# Patient Record
Sex: FEMALE | Race: WHITE | NOT HISPANIC OR LATINO | Employment: FULL TIME | URBAN - METROPOLITAN AREA
[De-identification: names, ages, dates, MRNs, and addresses within clinical notes are randomized per-mention and may not be internally consistent; named-entity substitution may affect disease eponyms.]

---

## 2018-01-03 ENCOUNTER — OFFICE VISIT CONVERTED (OUTPATIENT)
Dept: SURGERY | Facility: CLINIC | Age: 43
End: 2018-01-03
Attending: PHYSICIAN ASSISTANT

## 2018-01-17 ENCOUNTER — OFFICE VISIT CONVERTED (OUTPATIENT)
Dept: SURGERY | Facility: CLINIC | Age: 43
End: 2018-01-17
Attending: PHYSICIAN ASSISTANT

## 2018-03-20 ENCOUNTER — OFFICE VISIT CONVERTED (OUTPATIENT)
Dept: FAMILY MEDICINE CLINIC | Age: 43
End: 2018-03-20
Attending: NURSE PRACTITIONER

## 2018-05-11 ENCOUNTER — OFFICE VISIT CONVERTED (OUTPATIENT)
Dept: CARDIOLOGY | Facility: CLINIC | Age: 43
End: 2018-05-11
Attending: INTERNAL MEDICINE

## 2018-05-11 ENCOUNTER — CONVERSION ENCOUNTER (OUTPATIENT)
Dept: OTHER | Facility: HOSPITAL | Age: 43
End: 2018-05-11

## 2018-07-16 ENCOUNTER — CONVERSION ENCOUNTER (OUTPATIENT)
Dept: CARDIOLOGY | Facility: CLINIC | Age: 43
End: 2018-07-16
Attending: INTERNAL MEDICINE

## 2018-11-06 ENCOUNTER — OFFICE VISIT CONVERTED (OUTPATIENT)
Dept: FAMILY MEDICINE CLINIC | Age: 43
End: 2018-11-06
Attending: NURSE PRACTITIONER

## 2019-02-06 ENCOUNTER — OFFICE VISIT CONVERTED (OUTPATIENT)
Dept: FAMILY MEDICINE CLINIC | Age: 44
End: 2019-02-06
Attending: NURSE PRACTITIONER

## 2019-07-26 ENCOUNTER — OFFICE VISIT CONVERTED (OUTPATIENT)
Dept: FAMILY MEDICINE CLINIC | Age: 44
End: 2019-07-26
Attending: NURSE PRACTITIONER

## 2019-07-26 ENCOUNTER — HOSPITAL ENCOUNTER (OUTPATIENT)
Dept: OTHER | Facility: HOSPITAL | Age: 44
Discharge: HOME | End: 2019-07-26
Attending: NURSE PRACTITIONER

## 2019-07-26 LAB
25(OH)D3 SERPL-MCNC: 58.7 NG/ML (ref 30–100)
ALBUMIN SERPL-MCNC: 4.1 G/DL (ref 3.5–5)
ALBUMIN/GLOB SERPL: 1.2 {RATIO} (ref 1.4–2.6)
ALP SERPL-CCNC: 64 U/L (ref 42–98)
ALT SERPL-CCNC: 15 U/L (ref 10–40)
ANION GAP SERPL CALC-SCNC: 21 MMOL/L (ref 8–19)
AST SERPL-CCNC: 16 U/L (ref 15–50)
BILIRUB SERPL-MCNC: 0.24 MG/DL (ref 0.2–1.3)
BUN SERPL-MCNC: 10 MG/DL (ref 5–25)
BUN/CREAT SERPL: 15 {RATIO} (ref 6–20)
CALCIUM SERPL-MCNC: 9.6 MG/DL (ref 8.7–10.4)
CHLORIDE SERPL-SCNC: 100 MMOL/L (ref 99–111)
CHOLEST SERPL-MCNC: 186 MG/DL (ref 107–200)
CHOLEST/HDLC SERPL: 3.6 {RATIO} (ref 3–6)
CONV CO2: 22 MMOL/L (ref 22–32)
CONV TOTAL PROTEIN: 7.4 G/DL (ref 6.3–8.2)
CREAT UR-MCNC: 0.66 MG/DL (ref 0.5–0.9)
ERYTHROCYTE [DISTWIDTH] IN BLOOD BY AUTOMATED COUNT: 15.7 % (ref 11.5–14.5)
GFR SERPLBLD BASED ON 1.73 SQ M-ARVRAT: >60 ML/MIN/{1.73_M2}
GLOBULIN UR ELPH-MCNC: 3.3 G/DL (ref 2–3.5)
GLUCOSE SERPL-MCNC: 102 MG/DL (ref 65–99)
HBA1C MFR BLD: 12.3 G/DL (ref 12–16)
HCT VFR BLD AUTO: 37.2 % (ref 37–47)
HDLC SERPL-MCNC: 51 MG/DL (ref 40–60)
LDLC SERPL CALC-MCNC: 112 MG/DL (ref 70–100)
MCH RBC QN AUTO: 26.9 PG (ref 27–31)
MCHC RBC AUTO-ENTMCNC: 33.1 G/DL (ref 33–37)
MCV RBC AUTO: 81.4 FL (ref 81–99)
OSMOLALITY SERPL CALC.SUM OF ELEC: 285 MOSM/KG (ref 273–304)
PLATELET # BLD AUTO: 437 10*3/UL (ref 130–400)
PMV BLD AUTO: 9.4 FL (ref 7.4–10.4)
POTASSIUM SERPL-SCNC: 4.5 MMOL/L (ref 3.5–5.3)
RBC # BLD AUTO: 4.57 10*6/UL (ref 4.2–5.4)
SODIUM SERPL-SCNC: 138 MMOL/L (ref 135–147)
TRIGL SERPL-MCNC: 116 MG/DL (ref 40–150)
TSH SERPL-ACNC: 1.41 M[IU]/L (ref 0.27–4.2)
VIT B12 SERPL-MCNC: 508 PG/ML (ref 211–911)
VLDLC SERPL-MCNC: 23 MG/DL (ref 5–37)
WBC # BLD AUTO: 10.13 10*3/UL (ref 4.8–10.8)

## 2019-08-20 ENCOUNTER — HOSPITAL ENCOUNTER (OUTPATIENT)
Dept: OTHER | Facility: HOSPITAL | Age: 44
Discharge: HOME | End: 2019-08-20
Attending: NURSE PRACTITIONER

## 2019-12-20 ENCOUNTER — OFFICE VISIT CONVERTED (OUTPATIENT)
Dept: FAMILY MEDICINE CLINIC | Age: 44
End: 2019-12-20
Attending: FAMILY MEDICINE

## 2020-01-30 ENCOUNTER — ANESTHESIA EVENT (OUTPATIENT)
Dept: PERIOP | Facility: HOSPITAL | Age: 45
End: 2020-01-30

## 2020-01-30 ENCOUNTER — HOSPITAL ENCOUNTER (OUTPATIENT)
Facility: HOSPITAL | Age: 45
Discharge: HOME HEALTH | End: 2020-02-01
Attending: ORTHOPAEDIC SURGERY | Admitting: ORTHOPAEDIC SURGERY

## 2020-01-30 ENCOUNTER — ANESTHESIA (OUTPATIENT)
Dept: PERIOP | Facility: HOSPITAL | Age: 45
End: 2020-01-30

## 2020-01-30 DIAGNOSIS — Z98.890 S/P LEFT KNEE ARTHROSCOPY: Primary | ICD-10-CM

## 2020-01-30 DIAGNOSIS — M00.9 PYOGENIC ARTHRITIS OF RIGHT KNEE JOINT, DUE TO UNSPECIFIED ORGANISM (HCC): ICD-10-CM

## 2020-01-30 DIAGNOSIS — M00.9 SEPTIC ARTHRITIS OF KNEE, RIGHT (HCC): ICD-10-CM

## 2020-01-30 LAB
ANION GAP SERPL CALCULATED.3IONS-SCNC: 16.4 MMOL/L (ref 5–15)
B-HCG UR QL: NEGATIVE
BUN BLD-MCNC: 7 MG/DL (ref 6–20)
BUN/CREAT SERPL: 11.5 (ref 7–25)
CALCIUM SPEC-SCNC: 9.5 MG/DL (ref 8.6–10.5)
CHLORIDE SERPL-SCNC: 98 MMOL/L (ref 98–107)
CO2 SERPL-SCNC: 23.6 MMOL/L (ref 22–29)
CREAT BLD-MCNC: 0.61 MG/DL (ref 0.57–1)
DEPRECATED RDW RBC AUTO: 41.7 FL (ref 37–54)
ERYTHROCYTE [DISTWIDTH] IN BLOOD BY AUTOMATED COUNT: 14.5 % (ref 12.3–15.4)
GFR SERPL CREATININE-BSD FRML MDRD: 106 ML/MIN/1.73
GLUCOSE BLD-MCNC: 104 MG/DL (ref 65–99)
HCT VFR BLD AUTO: 33.6 % (ref 34–46.6)
HGB BLD-MCNC: 11.1 G/DL (ref 12–15.9)
INTERNAL NEGATIVE CONTROL: NEGATIVE
INTERNAL POSITIVE CONTROL: POSITIVE
Lab: NORMAL
MCH RBC QN AUTO: 26.7 PG (ref 26.6–33)
MCHC RBC AUTO-ENTMCNC: 33 G/DL (ref 31.5–35.7)
MCV RBC AUTO: 81 FL (ref 79–97)
PLATELET # BLD AUTO: 600 10*3/MM3 (ref 140–450)
PMV BLD AUTO: 9.3 FL (ref 6–12)
POTASSIUM BLD-SCNC: 3.9 MMOL/L (ref 3.5–5.2)
RBC # BLD AUTO: 4.15 10*6/MM3 (ref 3.77–5.28)
SODIUM BLD-SCNC: 138 MMOL/L (ref 136–145)
WBC NRBC COR # BLD: 10.19 10*3/MM3 (ref 3.4–10.8)

## 2020-01-30 PROCEDURE — G0378 HOSPITAL OBSERVATION PER HR: HCPCS

## 2020-01-30 PROCEDURE — 87205 SMEAR GRAM STAIN: CPT | Performed by: ORTHOPAEDIC SURGERY

## 2020-01-30 PROCEDURE — 81025 URINE PREGNANCY TEST: CPT | Performed by: ORTHOPAEDIC SURGERY

## 2020-01-30 PROCEDURE — 25010000002 DEXAMETHASONE PER 1 MG: Performed by: NURSE ANESTHETIST, CERTIFIED REGISTERED

## 2020-01-30 PROCEDURE — 80048 BASIC METABOLIC PNL TOTAL CA: CPT | Performed by: ORTHOPAEDIC SURGERY

## 2020-01-30 PROCEDURE — 25010000002 HYDROMORPHONE PER 4 MG: Performed by: NURSE ANESTHETIST, CERTIFIED REGISTERED

## 2020-01-30 PROCEDURE — 25010000002 ONDANSETRON PER 1 MG: Performed by: NURSE ANESTHETIST, CERTIFIED REGISTERED

## 2020-01-30 PROCEDURE — 93010 ELECTROCARDIOGRAM REPORT: CPT | Performed by: INTERNAL MEDICINE

## 2020-01-30 PROCEDURE — 25010000002 FENTANYL CITRATE (PF) 100 MCG/2ML SOLUTION: Performed by: ANESTHESIOLOGY

## 2020-01-30 PROCEDURE — 25010000002 KETOROLAC TROMETHAMINE PER 15 MG: Performed by: NURSE ANESTHETIST, CERTIFIED REGISTERED

## 2020-01-30 PROCEDURE — 85027 COMPLETE CBC AUTOMATED: CPT | Performed by: ORTHOPAEDIC SURGERY

## 2020-01-30 PROCEDURE — 25010000002 PROPOFOL 10 MG/ML EMULSION: Performed by: NURSE ANESTHETIST, CERTIFIED REGISTERED

## 2020-01-30 PROCEDURE — 87070 CULTURE OTHR SPECIMN AEROBIC: CPT | Performed by: ORTHOPAEDIC SURGERY

## 2020-01-30 PROCEDURE — 25010000002 VANCOMYCIN 1 G RECONSTITUTED SOLUTION: Performed by: NURSE ANESTHETIST, CERTIFIED REGISTERED

## 2020-01-30 PROCEDURE — 93005 ELECTROCARDIOGRAM TRACING: CPT | Performed by: ORTHOPAEDIC SURGERY

## 2020-01-30 PROCEDURE — 87075 CULTR BACTERIA EXCEPT BLOOD: CPT | Performed by: ORTHOPAEDIC SURGERY

## 2020-01-30 PROCEDURE — 25010000002 FENTANYL CITRATE (PF) 100 MCG/2ML SOLUTION: Performed by: NURSE ANESTHETIST, CERTIFIED REGISTERED

## 2020-01-30 RX ORDER — BUPIVACAINE HYDROCHLORIDE 5 MG/ML
INJECTION, SOLUTION EPIDURAL; INTRACAUDAL AS NEEDED
Status: DISCONTINUED | OUTPATIENT
Start: 2020-01-30 | End: 2020-01-30 | Stop reason: HOSPADM

## 2020-01-30 RX ORDER — ASPIRIN 81 MG/1
81 TABLET ORAL DAILY
COMMUNITY
End: 2020-02-01 | Stop reason: HOSPADM

## 2020-01-30 RX ORDER — KETOROLAC TROMETHAMINE 30 MG/ML
30 INJECTION, SOLUTION INTRAMUSCULAR; INTRAVENOUS EVERY 6 HOURS PRN
Status: COMPLETED | OUTPATIENT
Start: 2020-01-30 | End: 2020-02-01

## 2020-01-30 RX ORDER — ESCITALOPRAM OXALATE 20 MG/1
30 TABLET ORAL DAILY
COMMUNITY
End: 2021-12-16 | Stop reason: SDUPTHER

## 2020-01-30 RX ORDER — MIDAZOLAM HYDROCHLORIDE 1 MG/ML
1 INJECTION INTRAMUSCULAR; INTRAVENOUS
Status: DISCONTINUED | OUTPATIENT
Start: 2020-01-30 | End: 2020-01-30 | Stop reason: HOSPADM

## 2020-01-30 RX ORDER — FLUMAZENIL 0.1 MG/ML
0.2 INJECTION INTRAVENOUS AS NEEDED
Status: DISCONTINUED | OUTPATIENT
Start: 2020-01-30 | End: 2020-01-30 | Stop reason: HOSPADM

## 2020-01-30 RX ORDER — ONDANSETRON 2 MG/ML
4 INJECTION INTRAMUSCULAR; INTRAVENOUS EVERY 6 HOURS PRN
Status: DISCONTINUED | OUTPATIENT
Start: 2020-01-30 | End: 2020-02-01 | Stop reason: HOSPADM

## 2020-01-30 RX ORDER — ONDANSETRON 2 MG/ML
INJECTION INTRAMUSCULAR; INTRAVENOUS AS NEEDED
Status: DISCONTINUED | OUTPATIENT
Start: 2020-01-30 | End: 2020-01-30 | Stop reason: SURG

## 2020-01-30 RX ORDER — SENNA AND DOCUSATE SODIUM 50; 8.6 MG/1; MG/1
1 TABLET, FILM COATED ORAL 2 TIMES DAILY
Status: DISCONTINUED | OUTPATIENT
Start: 2020-01-30 | End: 2020-02-01 | Stop reason: HOSPADM

## 2020-01-30 RX ORDER — DEXAMETHASONE SODIUM PHOSPHATE 10 MG/ML
INJECTION INTRAMUSCULAR; INTRAVENOUS AS NEEDED
Status: DISCONTINUED | OUTPATIENT
Start: 2020-01-30 | End: 2020-01-30 | Stop reason: SURG

## 2020-01-30 RX ORDER — OXYCODONE AND ACETAMINOPHEN 7.5; 325 MG/1; MG/1
1 TABLET ORAL ONCE AS NEEDED
Status: COMPLETED | OUTPATIENT
Start: 2020-01-30 | End: 2020-01-30

## 2020-01-30 RX ORDER — NALOXONE HCL 0.4 MG/ML
0.2 VIAL (ML) INJECTION AS NEEDED
Status: DISCONTINUED | OUTPATIENT
Start: 2020-01-30 | End: 2020-01-30 | Stop reason: HOSPADM

## 2020-01-30 RX ORDER — PROMETHAZINE HYDROCHLORIDE 25 MG/ML
12.5 INJECTION, SOLUTION INTRAMUSCULAR; INTRAVENOUS EVERY 6 HOURS PRN
Status: DISCONTINUED | OUTPATIENT
Start: 2020-01-30 | End: 2020-02-01 | Stop reason: HOSPADM

## 2020-01-30 RX ORDER — IBUPROFEN 800 MG/1
800 TABLET ORAL EVERY 6 HOURS PRN
Status: DISCONTINUED | OUTPATIENT
Start: 2020-01-30 | End: 2020-02-01 | Stop reason: HOSPADM

## 2020-01-30 RX ORDER — PROMETHAZINE HYDROCHLORIDE 25 MG/ML
6.25 INJECTION, SOLUTION INTRAMUSCULAR; INTRAVENOUS
Status: DISCONTINUED | OUTPATIENT
Start: 2020-01-30 | End: 2020-01-30 | Stop reason: HOSPADM

## 2020-01-30 RX ORDER — HYDROMORPHONE HYDROCHLORIDE 1 MG/ML
0.5 INJECTION, SOLUTION INTRAMUSCULAR; INTRAVENOUS; SUBCUTANEOUS
Status: DISCONTINUED | OUTPATIENT
Start: 2020-01-30 | End: 2020-02-01 | Stop reason: HOSPADM

## 2020-01-30 RX ORDER — NALOXONE HCL 0.4 MG/ML
0.1 VIAL (ML) INJECTION
Status: DISCONTINUED | OUTPATIENT
Start: 2020-01-30 | End: 2020-02-01 | Stop reason: HOSPADM

## 2020-01-30 RX ORDER — SODIUM CHLORIDE, SODIUM LACTATE, POTASSIUM CHLORIDE, CALCIUM CHLORIDE 600; 310; 30; 20 MG/100ML; MG/100ML; MG/100ML; MG/100ML
9 INJECTION, SOLUTION INTRAVENOUS CONTINUOUS
Status: DISCONTINUED | OUTPATIENT
Start: 2020-01-30 | End: 2020-02-01 | Stop reason: HOSPADM

## 2020-01-30 RX ORDER — PROMETHAZINE HYDROCHLORIDE 25 MG/1
25 SUPPOSITORY RECTAL ONCE AS NEEDED
Status: DISCONTINUED | OUTPATIENT
Start: 2020-01-30 | End: 2020-01-30 | Stop reason: HOSPADM

## 2020-01-30 RX ORDER — EPHEDRINE SULFATE 50 MG/ML
5 INJECTION, SOLUTION INTRAVENOUS ONCE AS NEEDED
Status: DISCONTINUED | OUTPATIENT
Start: 2020-01-30 | End: 2020-01-30 | Stop reason: HOSPADM

## 2020-01-30 RX ORDER — DIAZEPAM 5 MG/1
5 TABLET ORAL EVERY 6 HOURS PRN
Status: DISCONTINUED | OUTPATIENT
Start: 2020-01-30 | End: 2020-02-01 | Stop reason: HOSPADM

## 2020-01-30 RX ORDER — SCOLOPAMINE TRANSDERMAL SYSTEM 1 MG/1
1 PATCH, EXTENDED RELEASE TRANSDERMAL
Status: DISCONTINUED | OUTPATIENT
Start: 2020-01-30 | End: 2020-02-01 | Stop reason: HOSPADM

## 2020-01-30 RX ORDER — BUPROPION HYDROCHLORIDE 300 MG/1
150 TABLET ORAL DAILY
COMMUNITY
End: 2021-07-01 | Stop reason: SDUPTHER

## 2020-01-30 RX ORDER — OXYCODONE AND ACETAMINOPHEN 7.5; 325 MG/1; MG/1
1 TABLET ORAL EVERY 4 HOURS PRN
Status: DISCONTINUED | OUTPATIENT
Start: 2020-01-30 | End: 2020-02-01 | Stop reason: HOSPADM

## 2020-01-30 RX ORDER — ALBUTEROL SULFATE 2.5 MG/3ML
2.5 SOLUTION RESPIRATORY (INHALATION) ONCE AS NEEDED
Status: DISCONTINUED | OUTPATIENT
Start: 2020-01-30 | End: 2020-01-30 | Stop reason: HOSPADM

## 2020-01-30 RX ORDER — MIDAZOLAM HYDROCHLORIDE 1 MG/ML
2 INJECTION INTRAMUSCULAR; INTRAVENOUS
Status: DISCONTINUED | OUTPATIENT
Start: 2020-01-30 | End: 2020-01-30 | Stop reason: HOSPADM

## 2020-01-30 RX ORDER — FAMOTIDINE 10 MG/ML
20 INJECTION, SOLUTION INTRAVENOUS ONCE
Status: COMPLETED | OUTPATIENT
Start: 2020-01-30 | End: 2020-01-30

## 2020-01-30 RX ORDER — LISINOPRIL 20 MG/1
20 TABLET ORAL DAILY
COMMUNITY
End: 2021-07-01

## 2020-01-30 RX ORDER — IBUPROFEN 200 MG
800 TABLET ORAL EVERY 6 HOURS PRN
COMMUNITY
End: 2021-09-22 | Stop reason: SDUPTHER

## 2020-01-30 RX ORDER — HYDROMORPHONE HYDROCHLORIDE 1 MG/ML
0.5 INJECTION, SOLUTION INTRAMUSCULAR; INTRAVENOUS; SUBCUTANEOUS
Status: DISCONTINUED | OUTPATIENT
Start: 2020-01-30 | End: 2020-01-30 | Stop reason: HOSPADM

## 2020-01-30 RX ORDER — BUPROPION HYDROCHLORIDE 150 MG/1
150 TABLET ORAL DAILY
Status: DISCONTINUED | OUTPATIENT
Start: 2020-01-30 | End: 2020-02-01 | Stop reason: HOSPADM

## 2020-01-30 RX ORDER — MONTELUKAST SODIUM 10 MG/1
10 TABLET ORAL DAILY
Status: DISCONTINUED | OUTPATIENT
Start: 2020-01-30 | End: 2020-02-01 | Stop reason: HOSPADM

## 2020-01-30 RX ORDER — LIDOCAINE HYDROCHLORIDE 20 MG/ML
INJECTION, SOLUTION INFILTRATION; PERINEURAL AS NEEDED
Status: DISCONTINUED | OUTPATIENT
Start: 2020-01-30 | End: 2020-01-30 | Stop reason: SURG

## 2020-01-30 RX ORDER — ALBUTEROL SULFATE 2.5 MG/3ML
2.5 SOLUTION RESPIRATORY (INHALATION) EVERY 4 HOURS PRN
Status: DISCONTINUED | OUTPATIENT
Start: 2020-01-30 | End: 2020-02-01 | Stop reason: HOSPADM

## 2020-01-30 RX ORDER — OXYCODONE AND ACETAMINOPHEN 7.5; 325 MG/1; MG/1
2 TABLET ORAL EVERY 4 HOURS PRN
Status: DISCONTINUED | OUTPATIENT
Start: 2020-01-30 | End: 2020-02-01 | Stop reason: HOSPADM

## 2020-01-30 RX ORDER — KETOROLAC TROMETHAMINE 30 MG/ML
INJECTION, SOLUTION INTRAMUSCULAR; INTRAVENOUS AS NEEDED
Status: DISCONTINUED | OUTPATIENT
Start: 2020-01-30 | End: 2020-01-30 | Stop reason: SURG

## 2020-01-30 RX ORDER — DIPHENHYDRAMINE HCL 25 MG
25 CAPSULE ORAL
Status: DISCONTINUED | OUTPATIENT
Start: 2020-01-30 | End: 2020-01-30 | Stop reason: HOSPADM

## 2020-01-30 RX ORDER — HYDRALAZINE HYDROCHLORIDE 20 MG/ML
5 INJECTION INTRAMUSCULAR; INTRAVENOUS
Status: DISCONTINUED | OUTPATIENT
Start: 2020-01-30 | End: 2020-01-30 | Stop reason: HOSPADM

## 2020-01-30 RX ORDER — ONDANSETRON 4 MG/1
4 TABLET, FILM COATED ORAL EVERY 6 HOURS PRN
Status: DISCONTINUED | OUTPATIENT
Start: 2020-01-30 | End: 2020-02-01 | Stop reason: HOSPADM

## 2020-01-30 RX ORDER — PROMETHAZINE HYDROCHLORIDE 25 MG/1
25 TABLET ORAL ONCE AS NEEDED
Status: DISCONTINUED | OUTPATIENT
Start: 2020-01-30 | End: 2020-01-30 | Stop reason: HOSPADM

## 2020-01-30 RX ORDER — LISINOPRIL 20 MG/1
20 TABLET ORAL DAILY
Status: DISCONTINUED | OUTPATIENT
Start: 2020-01-30 | End: 2020-02-01 | Stop reason: HOSPADM

## 2020-01-30 RX ORDER — PROPOFOL 10 MG/ML
VIAL (ML) INTRAVENOUS AS NEEDED
Status: DISCONTINUED | OUTPATIENT
Start: 2020-01-30 | End: 2020-01-30 | Stop reason: SURG

## 2020-01-30 RX ORDER — HYDROCODONE BITARTRATE AND ACETAMINOPHEN 7.5; 325 MG/1; MG/1
1 TABLET ORAL ONCE AS NEEDED
Status: DISCONTINUED | OUTPATIENT
Start: 2020-01-30 | End: 2020-01-30 | Stop reason: HOSPADM

## 2020-01-30 RX ORDER — FENTANYL CITRATE 50 UG/ML
50 INJECTION, SOLUTION INTRAMUSCULAR; INTRAVENOUS
Status: DISCONTINUED | OUTPATIENT
Start: 2020-01-30 | End: 2020-01-30 | Stop reason: HOSPADM

## 2020-01-30 RX ORDER — ONDANSETRON 2 MG/ML
4 INJECTION INTRAMUSCULAR; INTRAVENOUS ONCE AS NEEDED
Status: DISCONTINUED | OUTPATIENT
Start: 2020-01-30 | End: 2020-01-30 | Stop reason: HOSPADM

## 2020-01-30 RX ORDER — LIDOCAINE HYDROCHLORIDE 10 MG/ML
0.5 INJECTION, SOLUTION EPIDURAL; INFILTRATION; INTRACAUDAL; PERINEURAL ONCE AS NEEDED
Status: DISCONTINUED | OUTPATIENT
Start: 2020-01-30 | End: 2020-01-30 | Stop reason: HOSPADM

## 2020-01-30 RX ORDER — DIPHENHYDRAMINE HYDROCHLORIDE 50 MG/ML
12.5 INJECTION INTRAMUSCULAR; INTRAVENOUS
Status: DISCONTINUED | OUTPATIENT
Start: 2020-01-30 | End: 2020-01-30 | Stop reason: HOSPADM

## 2020-01-30 RX ORDER — ALBUTEROL SULFATE 90 UG/1
2 AEROSOL, METERED RESPIRATORY (INHALATION)
COMMUNITY
End: 2022-06-17 | Stop reason: SDUPTHER

## 2020-01-30 RX ORDER — SODIUM CHLORIDE 0.9 % (FLUSH) 0.9 %
3 SYRINGE (ML) INJECTION EVERY 12 HOURS SCHEDULED
Status: DISCONTINUED | OUTPATIENT
Start: 2020-01-30 | End: 2020-01-30 | Stop reason: HOSPADM

## 2020-01-30 RX ORDER — ACETAMINOPHEN 325 MG/1
650 TABLET ORAL ONCE AS NEEDED
Status: DISCONTINUED | OUTPATIENT
Start: 2020-01-30 | End: 2020-01-30 | Stop reason: HOSPADM

## 2020-01-30 RX ORDER — SODIUM CHLORIDE, SODIUM LACTATE, POTASSIUM CHLORIDE, CALCIUM CHLORIDE 600; 310; 30; 20 MG/100ML; MG/100ML; MG/100ML; MG/100ML
75 INJECTION, SOLUTION INTRAVENOUS CONTINUOUS
Status: ACTIVE | OUTPATIENT
Start: 2020-01-30 | End: 2020-01-31

## 2020-01-30 RX ORDER — ASPIRIN 81 MG/1
81 TABLET ORAL DAILY
Status: DISCONTINUED | OUTPATIENT
Start: 2020-01-30 | End: 2020-02-01 | Stop reason: HOSPADM

## 2020-01-30 RX ORDER — SODIUM CHLORIDE, SODIUM LACTATE, POTASSIUM CHLORIDE, AND CALCIUM CHLORIDE .6; .31; .03; .02 G/100ML; G/100ML; G/100ML; G/100ML
IRRIGANT IRRIGATION AS NEEDED
Status: DISCONTINUED | OUTPATIENT
Start: 2020-01-30 | End: 2020-01-30 | Stop reason: HOSPADM

## 2020-01-30 RX ORDER — PROMETHAZINE HYDROCHLORIDE 25 MG/ML
12.5 INJECTION, SOLUTION INTRAMUSCULAR; INTRAVENOUS ONCE AS NEEDED
Status: DISCONTINUED | OUTPATIENT
Start: 2020-01-30 | End: 2020-01-30 | Stop reason: HOSPADM

## 2020-01-30 RX ORDER — OXYCODONE AND ACETAMINOPHEN 7.5; 325 MG/1; MG/1
1 TABLET ORAL
Status: ON HOLD | COMMUNITY
End: 2020-02-01 | Stop reason: SDUPTHER

## 2020-01-30 RX ORDER — SODIUM CHLORIDE 0.9 % (FLUSH) 0.9 %
3-10 SYRINGE (ML) INJECTION AS NEEDED
Status: DISCONTINUED | OUTPATIENT
Start: 2020-01-30 | End: 2020-01-30 | Stop reason: HOSPADM

## 2020-01-30 RX ORDER — GLYCOPYRROLATE 0.2 MG/ML
INJECTION INTRAMUSCULAR; INTRAVENOUS AS NEEDED
Status: DISCONTINUED | OUTPATIENT
Start: 2020-01-30 | End: 2020-01-30 | Stop reason: SURG

## 2020-01-30 RX ORDER — VANCOMYCIN HYDROCHLORIDE 1 G/20ML
INJECTION, POWDER, LYOPHILIZED, FOR SOLUTION INTRAVENOUS AS NEEDED
Status: DISCONTINUED | OUTPATIENT
Start: 2020-01-30 | End: 2020-01-30 | Stop reason: SURG

## 2020-01-30 RX ORDER — MONTELUKAST SODIUM 10 MG/1
10 TABLET ORAL DAILY
COMMUNITY
End: 2021-12-16 | Stop reason: SDUPTHER

## 2020-01-30 RX ADMIN — FENTANYL CITRATE 50 MCG: 50 INJECTION INTRAMUSCULAR; INTRAVENOUS at 13:57

## 2020-01-30 RX ADMIN — VANCOMYCIN HYDROCHLORIDE 2 G: 1 INJECTION, POWDER, LYOPHILIZED, FOR SOLUTION INTRAVENOUS at 15:22

## 2020-01-30 RX ADMIN — LISINOPRIL 20 MG: 20 TABLET ORAL at 20:06

## 2020-01-30 RX ADMIN — HYDROMORPHONE HYDROCHLORIDE 0.5 MG: 1 INJECTION, SOLUTION INTRAMUSCULAR; INTRAVENOUS; SUBCUTANEOUS at 17:45

## 2020-01-30 RX ADMIN — FENTANYL CITRATE 50 MCG: 50 INJECTION INTRAMUSCULAR; INTRAVENOUS at 16:30

## 2020-01-30 RX ADMIN — OXYCODONE HYDROCHLORIDE AND ACETAMINOPHEN 1 TABLET: 7.5; 325 TABLET ORAL at 17:35

## 2020-01-30 RX ADMIN — FAMOTIDINE 20 MG: 10 INJECTION INTRAVENOUS at 13:57

## 2020-01-30 RX ADMIN — PROPOFOL 200 MG: 10 INJECTION, EMULSION INTRAVENOUS at 14:52

## 2020-01-30 RX ADMIN — KETOROLAC TROMETHAMINE 30 MG: 30 INJECTION, SOLUTION INTRAMUSCULAR; INTRAVENOUS at 15:35

## 2020-01-30 RX ADMIN — HYDROMORPHONE HYDROCHLORIDE 0.5 MG: 1 INJECTION, SOLUTION INTRAMUSCULAR; INTRAVENOUS; SUBCUTANEOUS at 16:45

## 2020-01-30 RX ADMIN — ONDANSETRON HYDROCHLORIDE 4 MG: 2 SOLUTION INTRAMUSCULAR; INTRAVENOUS at 15:32

## 2020-01-30 RX ADMIN — HYDROMORPHONE HYDROCHLORIDE 0.5 MG: 1 INJECTION, SOLUTION INTRAMUSCULAR; INTRAVENOUS; SUBCUTANEOUS at 16:20

## 2020-01-30 RX ADMIN — SCOPALAMINE 1 PATCH: 1 PATCH, EXTENDED RELEASE TRANSDERMAL at 14:01

## 2020-01-30 RX ADMIN — OXYCODONE HYDROCHLORIDE AND ACETAMINOPHEN 1 TABLET: 7.5; 325 TABLET ORAL at 16:20

## 2020-01-30 RX ADMIN — FENTANYL CITRATE 50 MCG: 50 INJECTION INTRAMUSCULAR; INTRAVENOUS at 16:00

## 2020-01-30 RX ADMIN — GLYCOPYRROLATE 0.2 MG: 0.2 INJECTION INTRAMUSCULAR; INTRAVENOUS at 14:49

## 2020-01-30 RX ADMIN — SODIUM CHLORIDE, POTASSIUM CHLORIDE, SODIUM LACTATE AND CALCIUM CHLORIDE 9 ML/HR: 600; 310; 30; 20 INJECTION, SOLUTION INTRAVENOUS at 13:57

## 2020-01-30 RX ADMIN — HYDROMORPHONE HYDROCHLORIDE 0.5 MG: 1 INJECTION, SOLUTION INTRAMUSCULAR; INTRAVENOUS; SUBCUTANEOUS at 17:20

## 2020-01-30 RX ADMIN — FENTANYL CITRATE 50 MCG: 50 INJECTION INTRAMUSCULAR; INTRAVENOUS at 16:10

## 2020-01-30 RX ADMIN — MONTELUKAST SODIUM 10 MG: 10 TABLET, FILM COATED ORAL at 20:06

## 2020-01-30 RX ADMIN — HYDROMORPHONE HYDROCHLORIDE 0.5 MG: 1 INJECTION, SOLUTION INTRAMUSCULAR; INTRAVENOUS; SUBCUTANEOUS at 17:25

## 2020-01-30 RX ADMIN — FENTANYL CITRATE 50 MCG: 50 INJECTION INTRAMUSCULAR; INTRAVENOUS at 14:07

## 2020-01-30 RX ADMIN — OXYCODONE HYDROCHLORIDE AND ACETAMINOPHEN 2 TABLET: 7.5; 325 TABLET ORAL at 21:28

## 2020-01-30 RX ADMIN — ESCITALOPRAM 30 MG: 20 TABLET, FILM COATED ORAL at 20:07

## 2020-01-30 RX ADMIN — DEXAMETHASONE SODIUM PHOSPHATE 6 MG: 10 INJECTION INTRAMUSCULAR; INTRAVENOUS at 15:29

## 2020-01-30 RX ADMIN — FENTANYL CITRATE 50 MCG: 50 INJECTION INTRAMUSCULAR; INTRAVENOUS at 14:58

## 2020-01-30 RX ADMIN — HYDROMORPHONE HYDROCHLORIDE 0.5 MG: 1 INJECTION, SOLUTION INTRAMUSCULAR; INTRAVENOUS; SUBCUTANEOUS at 16:05

## 2020-01-30 RX ADMIN — FENTANYL CITRATE 50 MCG: 50 INJECTION INTRAMUSCULAR; INTRAVENOUS at 14:49

## 2020-01-30 RX ADMIN — DIAZEPAM 5 MG: 5 TABLET ORAL at 18:26

## 2020-01-30 RX ADMIN — ASPIRIN 81 MG: 81 TABLET, COATED ORAL at 20:07

## 2020-01-30 RX ADMIN — BUPROPION HYDROCHLORIDE 150 MG: 150 TABLET, FILM COATED, EXTENDED RELEASE ORAL at 20:07

## 2020-01-30 RX ADMIN — PROPOFOL 100 MG: 10 INJECTION, EMULSION INTRAVENOUS at 14:57

## 2020-01-30 RX ADMIN — LIDOCAINE HYDROCHLORIDE 100 MG: 20 INJECTION, SOLUTION INFILTRATION; PERINEURAL at 14:52

## 2020-01-30 RX ADMIN — FENTANYL CITRATE 50 MCG: 50 INJECTION INTRAMUSCULAR; INTRAVENOUS at 16:55

## 2020-01-30 NOTE — ANESTHESIA POSTPROCEDURE EVALUATION
"Patient: Karey Lopez    Procedure Summary     Date:  01/30/20 Room / Location:  [facility] OR [address] [place] [address] MAIN OR    Anesthesia Start:  1443 Anesthesia Stop:  1558    Procedure:  KNEE ARTHROSCOPY WITH INCISION AND DRAINAGE (Right Knee) Diagnosis:      Surgeon:  Fareed Chacon MD Provider:  Candi Bautista MD    Anesthesia Type:  general ASA Status:  3          Anesthesia Type: general    Vitals  Vitals Value Taken Time   /88 1/30/2020  5:30 PM   Temp 36.8 °C (98.3 °F) 1/30/2020  3:57 PM   Pulse 91 1/30/2020  5:46 PM   Resp 18 1/30/2020  5:30 PM   SpO2 96 % 1/30/2020  5:46 PM   Vitals shown include unvalidated device data.        Post Anesthesia Care and Evaluation    Patient location during evaluation: bedside  Patient participation: complete - patient participated  Level of consciousness: awake and alert  Pain management: adequate  Airway patency: patent  Anesthetic complications: No anesthetic complications    Cardiovascular status: acceptable  Respiratory status: acceptable  Hydration status: acceptable    Comments: /88   Pulse 105   Temp 36.8 °C (98.3 °F) (Oral)   Resp 18   Ht 162.6 cm (64\")   Wt 127 kg (279 lb 1.6 oz)   SpO2 98%   BMI 47.91 kg/m²       "

## 2020-01-30 NOTE — ANESTHESIA PROCEDURE NOTES
Airway  Urgency: elective    Date/Time: 1/30/2020 2:56 PM    General Information and Staff    Patient location during procedure: OR  Anesthesiologist: Candi Bautista MD  CRNA: Benjamin Villegas CRNA    Indications and Patient Condition  Indications for airway management: airway protection    Preoxygenated: yes  Mask difficulty assessment: 1 - vent by mask    Final Airway Details  Final airway type: supraglottic airway      Successful airway: unique  Size 4  Cuff Pressure (cm H2O): 15  Airway Seal Pressure (cm H2O): 22    Number of attempts at approach: 1

## 2020-01-30 NOTE — ANESTHESIA PREPROCEDURE EVALUATION
Anesthesia Evaluation     Patient summary reviewed and Nursing notes reviewed   history of anesthetic complications: PONV  NPO Solid Status: > 8 hours  NPO Liquid Status: > 2 hours           Airway   Mallampati: II  TM distance: >3 FB  Neck ROM: full  no difficulty expected  Dental - normal exam     Pulmonary - negative pulmonary ROS and normal exam    breath sounds clear to auscultation  (-) decreased breath sounds, wheezes  Cardiovascular - normal exam  Exercise tolerance: good (4-7 METS)    Rhythm: regular  Rate: normal    (+) hypertension,       Neuro/Psych  (+) psychiatric history Anxiety,     (-) seizures, CVA  GI/Hepatic/Renal/Endo    (+) morbid obesity,    (-) diabetes    Musculoskeletal     Abdominal  - normal exam   Substance History - negative use  (-) alcohol use, drug use     OB/GYN negative ob/gyn ROS         Other   arthritis,                    Anesthesia Plan    ASA 3     general     intravenous induction     Anesthetic plan, all risks, benefits, and alternatives have been provided, discussed and informed consent has been obtained with: patient.    Plan discussed with CRNA.

## 2020-01-31 LAB
ANION GAP SERPL CALCULATED.3IONS-SCNC: 16.4 MMOL/L (ref 5–15)
BUN BLD-MCNC: 8 MG/DL (ref 6–20)
BUN/CREAT SERPL: 11.6 (ref 7–25)
CALCIUM SPEC-SCNC: 9.7 MG/DL (ref 8.6–10.5)
CHLORIDE SERPL-SCNC: 99 MMOL/L (ref 98–107)
CO2 SERPL-SCNC: 21.6 MMOL/L (ref 22–29)
CREAT BLD-MCNC: 0.69 MG/DL (ref 0.57–1)
CRP SERPL-MCNC: 10.83 MG/DL (ref 0–0.5)
DEPRECATED RDW RBC AUTO: 41.3 FL (ref 37–54)
ERYTHROCYTE [DISTWIDTH] IN BLOOD BY AUTOMATED COUNT: 14 % (ref 12.3–15.4)
GFR SERPL CREATININE-BSD FRML MDRD: 92 ML/MIN/1.73
GLUCOSE BLD-MCNC: 180 MG/DL (ref 65–99)
HCT VFR BLD AUTO: 32 % (ref 34–46.6)
HGB BLD-MCNC: 10.2 G/DL (ref 12–15.9)
MCH RBC QN AUTO: 26.1 PG (ref 26.6–33)
MCHC RBC AUTO-ENTMCNC: 31.9 G/DL (ref 31.5–35.7)
MCV RBC AUTO: 81.8 FL (ref 79–97)
PLATELET # BLD AUTO: 559 10*3/MM3 (ref 140–450)
PMV BLD AUTO: 9.5 FL (ref 6–12)
POTASSIUM BLD-SCNC: 4.9 MMOL/L (ref 3.5–5.2)
RBC # BLD AUTO: 3.91 10*6/MM3 (ref 3.77–5.28)
SODIUM BLD-SCNC: 137 MMOL/L (ref 136–145)
WBC NRBC COR # BLD: 8.4 10*3/MM3 (ref 3.4–10.8)

## 2020-01-31 PROCEDURE — 25010000002 VANCOMYCIN 10 G RECONSTITUTED SOLUTION: Performed by: ORTHOPAEDIC SURGERY

## 2020-01-31 PROCEDURE — 85027 COMPLETE CBC AUTOMATED: CPT | Performed by: ORTHOPAEDIC SURGERY

## 2020-01-31 PROCEDURE — 25010000002 ENOXAPARIN PER 10 MG: Performed by: ORTHOPAEDIC SURGERY

## 2020-01-31 PROCEDURE — 97110 THERAPEUTIC EXERCISES: CPT

## 2020-01-31 PROCEDURE — 97162 PT EVAL MOD COMPLEX 30 MIN: CPT

## 2020-01-31 PROCEDURE — 99205 OFFICE O/P NEW HI 60 MIN: CPT | Performed by: INTERNAL MEDICINE

## 2020-01-31 PROCEDURE — 25010000002 KETOROLAC TROMETHAMINE PER 15 MG: Performed by: ORTHOPAEDIC SURGERY

## 2020-01-31 PROCEDURE — 86140 C-REACTIVE PROTEIN: CPT | Performed by: INTERNAL MEDICINE

## 2020-01-31 PROCEDURE — 80048 BASIC METABOLIC PNL TOTAL CA: CPT | Performed by: ORTHOPAEDIC SURGERY

## 2020-01-31 RX ADMIN — OXYCODONE HYDROCHLORIDE AND ACETAMINOPHEN 2 TABLET: 7.5; 325 TABLET ORAL at 01:34

## 2020-01-31 RX ADMIN — BUPROPION HYDROCHLORIDE 150 MG: 150 TABLET, FILM COATED, EXTENDED RELEASE ORAL at 21:22

## 2020-01-31 RX ADMIN — DIAZEPAM 5 MG: 5 TABLET ORAL at 21:27

## 2020-01-31 RX ADMIN — KETOROLAC TROMETHAMINE 30 MG: 30 INJECTION, SOLUTION INTRAMUSCULAR at 16:19

## 2020-01-31 RX ADMIN — OXYCODONE HYDROCHLORIDE AND ACETAMINOPHEN 2 TABLET: 7.5; 325 TABLET ORAL at 05:40

## 2020-01-31 RX ADMIN — ASPIRIN 81 MG: 81 TABLET, COATED ORAL at 08:25

## 2020-01-31 RX ADMIN — ESCITALOPRAM 30 MG: 20 TABLET, FILM COATED ORAL at 21:22

## 2020-01-31 RX ADMIN — OXYCODONE HYDROCHLORIDE AND ACETAMINOPHEN 2 TABLET: 7.5; 325 TABLET ORAL at 09:42

## 2020-01-31 RX ADMIN — SENNOSIDES AND DOCUSATE SODIUM 1 TABLET: 8.6; 5 TABLET ORAL at 21:21

## 2020-01-31 RX ADMIN — DIAZEPAM 5 MG: 5 TABLET ORAL at 11:11

## 2020-01-31 RX ADMIN — KETOROLAC TROMETHAMINE 30 MG: 30 INJECTION, SOLUTION INTRAMUSCULAR at 07:27

## 2020-01-31 RX ADMIN — DIAZEPAM 5 MG: 5 TABLET ORAL at 02:30

## 2020-01-31 RX ADMIN — ENOXAPARIN SODIUM 40 MG: 40 INJECTION SUBCUTANEOUS at 08:24

## 2020-01-31 RX ADMIN — VANCOMYCIN HYDROCHLORIDE 1500 MG: 10 INJECTION, POWDER, LYOPHILIZED, FOR SOLUTION INTRAVENOUS at 16:16

## 2020-01-31 RX ADMIN — OXYCODONE HYDROCHLORIDE AND ACETAMINOPHEN 2 TABLET: 7.5; 325 TABLET ORAL at 19:21

## 2020-01-31 RX ADMIN — LISINOPRIL 20 MG: 20 TABLET ORAL at 21:22

## 2020-01-31 RX ADMIN — VANCOMYCIN HYDROCHLORIDE 1500 MG: 10 INJECTION, POWDER, LYOPHILIZED, FOR SOLUTION INTRAVENOUS at 03:34

## 2020-01-31 RX ADMIN — OXYCODONE HYDROCHLORIDE AND ACETAMINOPHEN 2 TABLET: 7.5; 325 TABLET ORAL at 14:30

## 2020-01-31 RX ADMIN — MONTELUKAST SODIUM 10 MG: 10 TABLET, FILM COATED ORAL at 21:22

## 2020-02-01 VITALS
BODY MASS INDEX: 47.8 KG/M2 | OXYGEN SATURATION: 94 % | HEIGHT: 64 IN | HEART RATE: 67 BPM | SYSTOLIC BLOOD PRESSURE: 138 MMHG | WEIGHT: 279.98 LBS | TEMPERATURE: 97.9 F | RESPIRATION RATE: 16 BRPM | DIASTOLIC BLOOD PRESSURE: 83 MMHG

## 2020-02-01 LAB
CREAT BLD-MCNC: 0.53 MG/DL (ref 0.57–1)
DEPRECATED RDW RBC AUTO: 43.1 FL (ref 37–54)
ERYTHROCYTE [DISTWIDTH] IN BLOOD BY AUTOMATED COUNT: 14.5 % (ref 12.3–15.4)
GFR SERPL CREATININE-BSD FRML MDRD: 125 ML/MIN/1.73
HCT VFR BLD AUTO: 29.4 % (ref 34–46.6)
HGB BLD-MCNC: 9.4 G/DL (ref 12–15.9)
MCH RBC QN AUTO: 26.3 PG (ref 26.6–33)
MCHC RBC AUTO-ENTMCNC: 32 G/DL (ref 31.5–35.7)
MCV RBC AUTO: 82.4 FL (ref 79–97)
PLATELET # BLD AUTO: 494 10*3/MM3 (ref 140–450)
PMV BLD AUTO: 9.4 FL (ref 6–12)
RBC # BLD AUTO: 3.57 10*6/MM3 (ref 3.77–5.28)
VANCOMYCIN TROUGH SERPL-MCNC: 13.6 MCG/ML (ref 5–20)
WBC NRBC COR # BLD: 10.44 10*3/MM3 (ref 3.4–10.8)

## 2020-02-01 PROCEDURE — C1751 CATH, INF, PER/CENT/MIDLINE: HCPCS

## 2020-02-01 PROCEDURE — 80202 ASSAY OF VANCOMYCIN: CPT | Performed by: ORTHOPAEDIC SURGERY

## 2020-02-01 PROCEDURE — 82565 ASSAY OF CREATININE: CPT | Performed by: INTERNAL MEDICINE

## 2020-02-01 PROCEDURE — 99214 OFFICE O/P EST MOD 30 MIN: CPT | Performed by: INTERNAL MEDICINE

## 2020-02-01 PROCEDURE — 85027 COMPLETE CBC AUTOMATED: CPT | Performed by: INTERNAL MEDICINE

## 2020-02-01 PROCEDURE — 25010000002 VANCOMYCIN 10 G RECONSTITUTED SOLUTION: Performed by: ORTHOPAEDIC SURGERY

## 2020-02-01 PROCEDURE — 25010000002 KETOROLAC TROMETHAMINE PER 15 MG: Performed by: ORTHOPAEDIC SURGERY

## 2020-02-01 PROCEDURE — 25010000002 ENOXAPARIN PER 10 MG: Performed by: ORTHOPAEDIC SURGERY

## 2020-02-01 RX ORDER — OXYCODONE AND ACETAMINOPHEN 7.5; 325 MG/1; MG/1
1-2 TABLET ORAL EVERY 4 HOURS PRN
Qty: 50 TABLET | Refills: 0 | Status: SHIPPED | OUTPATIENT
Start: 2020-02-01 | End: 2021-07-01

## 2020-02-01 RX ORDER — SODIUM CHLORIDE 0.9 % (FLUSH) 0.9 %
20 SYRINGE (ML) INJECTION AS NEEDED
Status: DISCONTINUED | OUTPATIENT
Start: 2020-02-01 | End: 2020-02-01 | Stop reason: HOSPADM

## 2020-02-01 RX ORDER — FLUCONAZOLE 100 MG/1
100 TABLET ORAL DAILY
Qty: 7 TABLET | Refills: 0
Start: 2020-02-01 | End: 2020-02-01 | Stop reason: HOSPADM

## 2020-02-01 RX ORDER — FLUCONAZOLE 150 MG/1
150 TABLET ORAL ONCE
Status: COMPLETED | OUTPATIENT
Start: 2020-02-01 | End: 2020-02-01

## 2020-02-01 RX ORDER — SODIUM CHLORIDE 0.9 % (FLUSH) 0.9 %
10 SYRINGE (ML) INJECTION EVERY 12 HOURS SCHEDULED
Status: DISCONTINUED | OUTPATIENT
Start: 2020-02-01 | End: 2020-02-01 | Stop reason: HOSPADM

## 2020-02-01 RX ORDER — SODIUM CHLORIDE 0.9 % (FLUSH) 0.9 %
10 SYRINGE (ML) INJECTION AS NEEDED
Status: DISCONTINUED | OUTPATIENT
Start: 2020-02-01 | End: 2020-02-01 | Stop reason: HOSPADM

## 2020-02-01 RX ORDER — ASPIRIN 325 MG
325 TABLET ORAL DAILY
Qty: 14 TABLET | Refills: 0 | Status: SHIPPED | OUTPATIENT
Start: 2020-02-01 | End: 2020-02-15

## 2020-02-01 RX ADMIN — SODIUM CHLORIDE, PRESERVATIVE FREE 10 ML: 5 INJECTION INTRAVENOUS at 13:03

## 2020-02-01 RX ADMIN — SODIUM CHLORIDE, PRESERVATIVE FREE 10 ML: 5 INJECTION INTRAVENOUS at 20:01

## 2020-02-01 RX ADMIN — OXYCODONE HYDROCHLORIDE AND ACETAMINOPHEN 2 TABLET: 7.5; 325 TABLET ORAL at 00:07

## 2020-02-01 RX ADMIN — ENOXAPARIN SODIUM 40 MG: 40 INJECTION SUBCUTANEOUS at 08:40

## 2020-02-01 RX ADMIN — ASPIRIN 81 MG: 81 TABLET, COATED ORAL at 08:40

## 2020-02-01 RX ADMIN — FLUCONAZOLE 150 MG: 150 TABLET ORAL at 16:45

## 2020-02-01 RX ADMIN — OXYCODONE HYDROCHLORIDE AND ACETAMINOPHEN 2 TABLET: 7.5; 325 TABLET ORAL at 17:20

## 2020-02-01 RX ADMIN — KETOROLAC TROMETHAMINE 30 MG: 30 INJECTION, SOLUTION INTRAMUSCULAR at 15:06

## 2020-02-01 RX ADMIN — BUPROPION HYDROCHLORIDE 150 MG: 150 TABLET, FILM COATED, EXTENDED RELEASE ORAL at 20:01

## 2020-02-01 RX ADMIN — MONTELUKAST SODIUM 10 MG: 10 TABLET, FILM COATED ORAL at 20:01

## 2020-02-01 RX ADMIN — SENNOSIDES AND DOCUSATE SODIUM 1 TABLET: 8.6; 5 TABLET ORAL at 08:40

## 2020-02-01 RX ADMIN — KETOROLAC TROMETHAMINE 30 MG: 30 INJECTION, SOLUTION INTRAMUSCULAR at 07:21

## 2020-02-01 RX ADMIN — SENNOSIDES AND DOCUSATE SODIUM 1 TABLET: 8.6; 5 TABLET ORAL at 20:01

## 2020-02-01 RX ADMIN — OXYCODONE HYDROCHLORIDE AND ACETAMINOPHEN 2 TABLET: 7.5; 325 TABLET ORAL at 08:40

## 2020-02-01 RX ADMIN — VANCOMYCIN HYDROCHLORIDE 1500 MG: 10 INJECTION, POWDER, LYOPHILIZED, FOR SOLUTION INTRAVENOUS at 03:07

## 2020-02-01 RX ADMIN — LISINOPRIL 20 MG: 20 TABLET ORAL at 20:01

## 2020-02-01 RX ADMIN — VANCOMYCIN HYDROCHLORIDE 1750 MG: 10 INJECTION, POWDER, LYOPHILIZED, FOR SOLUTION INTRAVENOUS at 16:45

## 2020-02-01 RX ADMIN — OXYCODONE HYDROCHLORIDE AND ACETAMINOPHEN 2 TABLET: 7.5; 325 TABLET ORAL at 04:10

## 2020-02-01 RX ADMIN — ESCITALOPRAM 30 MG: 20 TABLET, FILM COATED ORAL at 20:01

## 2020-02-01 RX ADMIN — OXYCODONE HYDROCHLORIDE AND ACETAMINOPHEN 2 TABLET: 7.5; 325 TABLET ORAL at 13:01

## 2020-02-01 NOTE — DISCHARGE SUMMARY
Discharge Summary    Date of Admission: 1/30/2020 12:40 PM  Date of Discharge:  2/1/2020    Discharge Diagnosis:   S/P left knee arthroscopy [Z98.890]  Septic arthritis of knee, right (CMS/[facility]) [M00.9]      PMHX:   Past Medical History:   Diagnosis Date   • Anxiety    • Arthritis    • Asthma    • Hypertension    • PONV (postoperative nausea and vomiting)        [place] Disposition  [place] [facility]    Procedures Performed  Procedure(s):  KNEE ARTHROSCOPY WITH INCISION AND DRAINAGE       Indication for Admission  Patient is a 45 y.o. female admitted after undergoing the above surgical procedure. They were admitted for post-operative pain control, medical management and physical therapy.  They progressed with physical therapy.  The patient was admitted for antibiotic management.  Infectious disease was consulted.  Postoperative cultures showed no growth at the time of discharge.  However plan was for IV antibiotics for a course of 4 weeks.  With a stop date of February 27, 2020.  Patient will remain on vancomycin.  PICC line was placed.  Home health was set up.  They were deemed stable for discharge.      Consults:   Consults     Date and Time Order Name Status Description    1/30/2020 1808 Inpatient Infectious Diseases Consult Completed           Discharge Instructions:  Patient is weight bearing as tolerated on the operative leg.  Patient is to progress range of motion and ambulation as tolerated.  Use walker as needed for stability and gait.  May progress to cane as tolerated.  The dressing should be left on knee until post-operative day 7, and then removed.  Use ace wrap as needed for swelling.  Patient will follow-up in the office in 7 to 10 days.  Call the office at [phone] for any questions or concerns.      Discharge Medications     Discharge Medications      New Medications      Instructions Start Date   aspirin 325 MG tablet  Commonly known as:  BAUTISTA ASPIRIN  Replaces:  aspirin 81 MG EC  tablet  Notes to patient:  Next dose due: tomorrow   325 mg, Oral, Daily      vancomycin   1,750 mg, Intravenous, Every 12 Hours         Changes to Medications      Instructions Start Date   oxyCODONE-acetaminophen 7.5-325 MG per tablet  Commonly known as:  PERCOCET  What changed:    · how much to take  · when to take this  · reasons to take this  Notes to patient:  Next dose available:    1-2 tablets, Oral, Every 4 Hours PRN         Continue These Medications      Instructions Start Date   albuterol sulfate  (90 Base) MCG/ACT inhaler  Commonly known as:  PROVENTIL HFA;VENTOLIN HFA;PROAIR HFA   2 puffs, Inhalation      buPROPion  MG 24 hr tablet  Commonly known as:  WELLBUTRIN XL  Notes to patient:  Next dose due: tonight   150 mg, Oral, Daily      escitalopram 20 MG tablet  Commonly known as:  LEXAPRO  Notes to patient:  Next dose due: tonight   30 mg, Oral, Daily      ibuprofen 200 MG tablet  Commonly known as:  ADVIL,MOTRIN  Notes to patient:  Next dose available at anytime   800 mg, Oral, Every 6 Hours PRN      lisinopril 20 MG tablet  Commonly known as:  PRINIVIL,ZESTRIL  Notes to patient:  Next dose due: tonight   20 mg, Oral, Daily      montelukast 10 MG tablet  Commonly known as:  SINGULAIR  Notes to patient:  Next dose due: tonight   10 mg, Oral, Daily         Stop These Medications    aspirin 81 MG EC tablet  Replaced by:  aspirin 325 MG tablet            Discharge Diet: Regular home diet    Activity at Discharge: Weightbearing as tolerated with range of motion as tolerated    Follow-up Appointments  Future Appointments   Date Time Provider Department Center   2/27/2020  1:30 PM Mejia Richards MD [facility] None     Follow-up in office in 7 to 10 days with Dr. Chacon.    Test Results Pending at Discharge   Order Current Status    Anaerobic Culture 10 Day Incubation - Wound, Knee, Left In process    Wound Culture - Wound, Knee, Left Preliminary result           Alfonso Brandon  EVAN  02/01/20,  6:53 AM

## 2020-02-01 NOTE — SIGNIFICANT NOTE
02/01/20 1150   PICC Single Lumen 02/01/20 Left Cephalic   Placement Date/Time: 02/01/20 1139   Hand Hygiene Completed: Yes  Size (Fr): 4  Description (optional): (c) power picc  Length (cm): 46 cm  Orientation: Left  Location: Cephalic  Site Prep: Chlorhexidine isopropyl alcohol  All 5 Sterile Barriers Used ...   Site Assessment Clean;Dry;Intact   #1 Lumen Status Blood return noted;Capped;Flushed;Normal saline locked   Length lamin (cm) 46 cm   Line Care Connections checked and tightened   Extremity Circumference (cm) 45 cm   Dressing Type Border Dressing;Securing device;Antimicrobial dressing/disc   Dressing Status Clean;Dry;Intact   Dressing Intervention New dressing   Liquid Adhesive Contraindicated (comment)   Dressing Change Due 02/08/20   Picc line tip in SVC per 3cg technology

## 2020-02-01 NOTE — PLAN OF CARE
Problem: Patient Care Overview  Goal: Plan of Care Review  Outcome: Ongoing (interventions implemented as appropriate)  Flowsheets (Taken 2/1/2020 0217)  Progress: improving  Plan of Care Reviewed With: patient  Outcome Summary: pain under control with pain meds. vitals stable. voiding without difficulty. Dressing dry and intact. Tolerating iv vancomycin. Awaiting PICC placement and culture results. Educated on blood pressure monitoring, verbalises understanding.

## 2020-02-01 NOTE — PLAN OF CARE
Problem: Patient Care Overview  Goal: Plan of Care Review  Outcome: Ongoing (interventions implemented as appropriate)  Flowsheets (Taken 1/31/2020 1933)  Progress: improving  Plan of Care Reviewed With: patient  Outcome Summary: Patient is ambulating using walker and stand by assist. Vitals are stable and voiding function is intact. Pain is managed with po meds and IV toradol. PICC ordered by ID, consents signed and on chart. Patient receiving Vanc, next trough due tomorrow afternoon. Patient anticipates possible d/c Sunday pending culture results.

## 2020-02-01 NOTE — PROGRESS NOTES
Orthopedic Progress Note    Subjective :   Patient seen and examined.  No complaints overnight.  Drain output was 5 cc.  Infectious disease did see the patient.  They did order a PICC line yesterday.  However, PICC line has not been placed yet.  Current culture showed no growth.  CRP yesterday was 10.83.  Plan is for vancomycin for 4 weeks with a stop date of February 27, 2020.    Objective :    Vital signs in last 24 hours:  Temp:  [97.1 °F (36.2 °C)-97.9 °F (36.6 °C)] 97.4 °F (36.3 °C)  Heart Rate:  [68-71] 68  Resp:  [16] 16  BP: (115-135)/(66-78) 135/75  Vitals:    01/31/20 1552 01/31/20 1910 02/01/20 0003 02/01/20 0346   BP: 129/71 132/76 130/78 135/75   BP Location: Right arm Right arm Right arm Right arm   Patient Position: Lying Lying Sitting Sitting   Pulse: 71 70 68    Resp: 16 16 16 16   Temp: 97.9 °F (36.6 °C) 97.5 °F (36.4 °C) 97.2 °F (36.2 °C) 97.4 °F (36.3 °C)   TempSrc: Oral Oral Oral Oral   SpO2: 96% 97% 97% 98%   Weight:       Height:           PHYSICAL EXAM:  Patient is calm, in no acute distress, awake and oriented x 3.  Dressing clean, dry and intact.  No signs of infection.  Swelling is appropriate.  Ecchymosis is appropriate in amount.  Homans test is negative.  Patient is neurovascularly intact distally.  Drain intact.    LABS:  Results from last 7 days   Lab Units 02/01/20  0341   WBC 10*3/mm3 10.44   HEMOGLOBIN g/dL 9.4*   HEMATOCRIT % 29.4*   PLATELETS 10*3/mm3 494*     Results from last 7 days   Lab Units 02/01/20  0341 01/31/20  0329   SODIUM mmol/L  --  137   POTASSIUM mmol/L  --  4.9   CHLORIDE mmol/L  --  99   CO2 mmol/L  --  21.6*   BUN mg/dL  --  8   CREATININE mg/dL 0.53* 0.69   GLUCOSE mg/dL  --  180*   CALCIUM mg/dL  --  9.7           ASSESSMENT:  Status post right knee arthroscopic irrigation and debridement, POD #2    Plan:  We will continue with physical therapy this morning.  Patient is weightbearing as tolerated.  Range of motion as tolerated.  Infectious disease did see  the patient.  They did order a PICC line yesterday.  However, PICC line has not been placed yet.  Current culture showed no growth.  CRP yesterday was 10.83.  Plan is for vancomycin for 4 weeks with a stop date of February 27, 2020.  Continue Lovenox for DVT prophylaxis.  Continue SCDs.  PICC line to be placed today.  Plan for discharge today if medically stable and cleared by infectious disease.  Follow-up in office in 7 to 10 days.    Alfonso Brandon PA-C    Date: 2/1/2020  Time: 6:48 AM

## 2020-02-01 NOTE — PROGRESS NOTES
LOS: 0 days     Chief Complaint: Right knee septic arthritis    Interval History: Afebrile, states right knee pain is well controlled.  Denies any abdominal pain nausea vomiting or diarrhea.  Tolerating antibiotics without a rash.  No cough or shortness of breath    Vital Signs  Temp:  [97.2 °F (36.2 °C)-98.1 °F (36.7 °C)] 98.1 °F (36.7 °C)  Heart Rate:  [68-79] 79  Resp:  [16] 16  BP: (129-138)/(71-78) 138/77    Physical Exam:  General: In no acute distress  Cardiovascular: RRR, no LE edema   Respiratory: Breathing comfortably on room air  GI: Soft, NT/ND, + bowel sounds bilaterally,   Skin: No rashes, right knee swollen warm to the touch no erythema    Antibiotics:  vanc 1500 mg IV every 12 hours     Results Review:    Lab Results   Component Value Date    WBC 10.44 02/01/2020    HGB 9.4 (L) 02/01/2020    HCT 29.4 (L) 02/01/2020    MCV 82.4 02/01/2020     (H) 02/01/2020     Lab Results   Component Value Date    GLUCOSE 180 (H) 01/31/2020    BUN 8 01/31/2020    CREATININE 0.53 (L) 02/01/2020    EGFRIFNONA 125 02/01/2020    BCR 11.6 01/31/2020    CO2 21.6 (L) 01/31/2020    CALCIUM 9.7 01/31/2020       Microbiology:  1/30 Left Knee Cx: NGTD    Assessment/Plan   1. Acute septic arthritis of right knee s/p ACL repair status post incision and drainage on January 30  2. Morbid obesity BMI 48    Operative cultures negative to date.  Continue empiric vancomycin dosing per pharmacy with vancomycin goal trough between 15 and 20.  We will treat for 4 weeks with an antibiotic stop date of February 27.    Please monitor weekly CBC with differential, CMP, and vancomycin trough and fax the results to infectious disease clinic at [phone]    ID follow-up on February 27 with Dr. Richards

## 2020-02-01 NOTE — PROGRESS NOTES
"Pharmacokinetic Consult - Vancomycin Dosing (Follow-Up Note)  Karey Lopez is a 45 y.o. female who is on day 2 pharmacy to dose vancomycin for possible R knee PJI.  Pharmacy dosing per Dr. Chacon's request.   Goal trough: 15-20 mg/L  ID following (Dr. Anderson)  Current vancomycin dose: 1500 mg IV q12h    Status post right knee arthroscopic irrigation and debridement, POD #2    Relevant clinical data and objective history reviewed:  162.6 cm (64.02\")  127 kg (279 lb 15.8 oz)  Body mass index is 48.04 kg/m².     She has a past medical history of Anxiety, Arthritis, Asthma, Hypertension, and PONV (postoperative nausea and vomiting).    Allergies as of 01/29/2020   • (Not on File)     Vital Signs (last 24 hours)       01/31 0700  -  02/01 0659 02/01 0700  -  02/01 1538   Most Recent    Temp (°F) 97.1 -  97.9    97.8 -  98.1     97.8 (36.6)    Heart Rate 68 -  71    78 -  79     78    Resp   16      16     16    /66 -  135/75    132/74 -  138/77     132/74    SpO2 (%) 96 -  98    95 -  96     96        Estimated Creatinine Clearance: 176.9 mL/min (A) (by C-G formula based on SCr of 0.53 mg/dL (L)).  Results from last 7 days   Lab Units 02/01/20  0341 01/31/20  0329 01/30/20  1312   CREATININE mg/dL 0.53* 0.69 0.61     Results from last 7 days   Lab Units 02/01/20  0341 01/31/20  0329 01/30/20  1312   WBC 10*3/mm3 10.44 8.40 10.19     Baseline culture/source/susceptibility:   Knee, Left; Wound Cx s/p I&D (01/30): NGTD       Vancomycin Dosing History  2000 mg (~16 mg/kg) IV x1 dose              01/30 1522  1500 mg (~12 mg/kg) IV q12h               01/31 0334, 1616   2/1 0307   2/1 1404 Trough = 13.6 mcg/mL     Assessment/Plan  1) Vancomycin Trough level collected before 5th total dose subtherapeutic at 13.6 mcg/mL.  Will adjust to 1750mg IV q12h and recommend trough level before dose scheduled Monday AM (2/3).    2) Will monitor serum creatinine at least every 24h for first 72h followed by at least q48 hours per " dosing recommendations.    3) Encourage hydration as allowed by MD to help prevent toxic accumulation; monitor for s/sxn of toxicity including increase in SCr and decrease in UOP.    Pharmacy will continue to follow daily while on vancomycin and adjust as needed.     Thanks, Henri Rojas, PharmD, BCPS

## 2020-02-02 LAB
BACTERIA SPEC AEROBE CULT: NORMAL
GRAM STN SPEC: NORMAL
GRAM STN SPEC: NORMAL

## 2020-02-03 NOTE — PROGRESS NOTES
Case Management Discharge Note      Final Note: DC'd home with [facility]. Thania More RN         Destination      No service has been selected for the patient.      Durable Medical Equipment      No service has been selected for the patient.      Dialysis/Infusion - Selection Complete      Service Provider Request Status Selected Services Address Phone Number Fax Number    [facility] Selected [facility]fusion [facility] [address] [facility] [address] [id number] [phone]      Home Medical Care - Selection Complete      Service Provider Request Status Selected Services Address Phone Number Fax Number    [facility] Selected [facility] Health Services [address] [place] [id number] [phone] --      Therapy      No service has been selected for the patient.      Community Resources      No service has been selected for the patient.        Transportation Services  Private: Car    Final Discharge Disposition Code: 06 - home with home health care

## 2020-02-05 ENCOUNTER — LAB REQUISITION (OUTPATIENT)
Dept: LAB | Facility: HOSPITAL | Age: 45
End: 2020-02-05

## 2020-02-05 ENCOUNTER — APPOINTMENT (OUTPATIENT)
Dept: CT IMAGING | Facility: HOSPITAL | Age: 45
End: 2020-02-05

## 2020-02-05 ENCOUNTER — APPOINTMENT (OUTPATIENT)
Dept: GENERAL RADIOLOGY | Facility: HOSPITAL | Age: 45
End: 2020-02-05

## 2020-02-05 ENCOUNTER — HOSPITAL ENCOUNTER (EMERGENCY)
Facility: HOSPITAL | Age: 45
Discharge: HOME | End: 2020-02-05
Attending: EMERGENCY MEDICINE | Admitting: EMERGENCY MEDICINE

## 2020-02-05 VITALS
SYSTOLIC BLOOD PRESSURE: 138 MMHG | RESPIRATION RATE: 20 BRPM | BODY MASS INDEX: 47.63 KG/M2 | HEIGHT: 64 IN | DIASTOLIC BLOOD PRESSURE: 93 MMHG | WEIGHT: 279 LBS | OXYGEN SATURATION: 96 % | HEART RATE: 92 BPM | TEMPERATURE: 99.3 F

## 2020-02-05 DIAGNOSIS — A41.9 SEPSIS, UNSPECIFIED ORGANISM (HCC): ICD-10-CM

## 2020-02-05 DIAGNOSIS — R07.9 CHEST PAIN IN ADULT: Primary | ICD-10-CM

## 2020-02-05 LAB
ALBUMIN SERPL-MCNC: 4.2 G/DL (ref 3.5–5.2)
ALBUMIN/GLOB SERPL: 1.3 G/DL
ALP SERPL-CCNC: 75 U/L (ref 39–117)
ALT SERPL W P-5'-P-CCNC: 12 U/L (ref 1–33)
ANION GAP SERPL CALCULATED.3IONS-SCNC: 17.4 MMOL/L (ref 5–15)
APTT PPP: 36.7 SECONDS (ref 22.7–35.4)
AST SERPL-CCNC: 12 U/L (ref 1–32)
BASOPHILS # BLD AUTO: 0.06 10*3/MM3 (ref 0–0.2)
BASOPHILS NFR BLD AUTO: 0.5 % (ref 0–1.5)
BILIRUB SERPL-MCNC: 0.4 MG/DL (ref 0.2–1.2)
BUN BLD-MCNC: 9 MG/DL (ref 6–20)
BUN/CREAT SERPL: 13.8 (ref 7–25)
CALCIUM SPEC-SCNC: 9.4 MG/DL (ref 8.6–10.5)
CHLORIDE SERPL-SCNC: 96 MMOL/L (ref 98–107)
CO2 SERPL-SCNC: 21.6 MMOL/L (ref 22–29)
CREAT BLD-MCNC: 0.65 MG/DL (ref 0.57–1)
DEPRECATED RDW RBC AUTO: 43 FL (ref 37–54)
EOSINOPHIL # BLD AUTO: 0.7 10*3/MM3 (ref 0–0.4)
EOSINOPHIL NFR BLD AUTO: 5.9 % (ref 0.3–6.2)
ERYTHROCYTE [DISTWIDTH] IN BLOOD BY AUTOMATED COUNT: 14.5 % (ref 12.3–15.4)
GFR SERPL CREATININE-BSD FRML MDRD: 99 ML/MIN/1.73
GLOBULIN UR ELPH-MCNC: 3.2 GM/DL
GLUCOSE BLD-MCNC: 133 MG/DL (ref 65–99)
HCG SERPL QL: NEGATIVE
HCT VFR BLD AUTO: 33.6 % (ref 34–46.6)
HGB BLD-MCNC: 11.1 G/DL (ref 12–15.9)
IMM GRANULOCYTES # BLD AUTO: 0.12 10*3/MM3 (ref 0–0.05)
IMM GRANULOCYTES NFR BLD AUTO: 1 % (ref 0–0.5)
INR PPP: 1.07 (ref 0.9–1.1)
LYMPHOCYTES # BLD AUTO: 2.36 10*3/MM3 (ref 0.7–3.1)
LYMPHOCYTES NFR BLD AUTO: 19.8 % (ref 19.6–45.3)
MCH RBC QN AUTO: 26.7 PG (ref 26.6–33)
MCHC RBC AUTO-ENTMCNC: 33 G/DL (ref 31.5–35.7)
MCV RBC AUTO: 81 FL (ref 79–97)
MONOCYTES # BLD AUTO: 0.72 10*3/MM3 (ref 0.1–0.9)
MONOCYTES NFR BLD AUTO: 6 % (ref 5–12)
NEUTROPHILS # BLD AUTO: 7.95 10*3/MM3 (ref 1.7–7)
NEUTROPHILS NFR BLD AUTO: 66.8 % (ref 42.7–76)
NRBC BLD AUTO-RTO: 0 /100 WBC (ref 0–0.2)
NT-PROBNP SERPL-MCNC: 45.5 PG/ML (ref 5–450)
PLATELET # BLD AUTO: 469 10*3/MM3 (ref 140–450)
PMV BLD AUTO: 9.2 FL (ref 6–12)
POTASSIUM BLD-SCNC: 3.8 MMOL/L (ref 3.5–5.2)
PROT SERPL-MCNC: 7.4 G/DL (ref 6–8.5)
PROTHROMBIN TIME: 13.6 SECONDS (ref 11.7–14.2)
RBC # BLD AUTO: 4.15 10*6/MM3 (ref 3.77–5.28)
SODIUM BLD-SCNC: 135 MMOL/L (ref 136–145)
TROPONIN T SERPL-MCNC: <0.01 NG/ML (ref 0–0.03)
WBC NRBC COR # BLD: 11.91 10*3/MM3 (ref 3.4–10.8)

## 2020-02-05 PROCEDURE — 83880 ASSAY OF NATRIURETIC PEPTIDE: CPT | Performed by: EMERGENCY MEDICINE

## 2020-02-05 PROCEDURE — 80053 COMPREHEN METABOLIC PANEL: CPT | Performed by: INTERNAL MEDICINE

## 2020-02-05 PROCEDURE — 84484 ASSAY OF TROPONIN QUANT: CPT | Performed by: EMERGENCY MEDICINE

## 2020-02-05 PROCEDURE — 84703 CHORIONIC GONADOTROPIN ASSAY: CPT | Performed by: PHYSICIAN ASSISTANT

## 2020-02-05 PROCEDURE — 80202 ASSAY OF VANCOMYCIN: CPT | Performed by: INTERNAL MEDICINE

## 2020-02-05 PROCEDURE — 93010 ELECTROCARDIOGRAM REPORT: CPT | Performed by: INTERNAL MEDICINE

## 2020-02-05 PROCEDURE — 93005 ELECTROCARDIOGRAM TRACING: CPT

## 2020-02-05 PROCEDURE — 85610 PROTHROMBIN TIME: CPT | Performed by: EMERGENCY MEDICINE

## 2020-02-05 PROCEDURE — 93005 ELECTROCARDIOGRAM TRACING: CPT | Performed by: EMERGENCY MEDICINE

## 2020-02-05 PROCEDURE — 99283 EMERGENCY DEPT VISIT LOW MDM: CPT

## 2020-02-05 PROCEDURE — 85025 COMPLETE CBC W/AUTO DIFF WBC: CPT | Performed by: EMERGENCY MEDICINE

## 2020-02-05 PROCEDURE — 85025 COMPLETE CBC W/AUTO DIFF WBC: CPT | Performed by: INTERNAL MEDICINE

## 2020-02-05 PROCEDURE — 80053 COMPREHEN METABOLIC PANEL: CPT | Performed by: EMERGENCY MEDICINE

## 2020-02-05 PROCEDURE — 71045 X-RAY EXAM CHEST 1 VIEW: CPT

## 2020-02-05 PROCEDURE — 85730 THROMBOPLASTIN TIME PARTIAL: CPT | Performed by: EMERGENCY MEDICINE

## 2020-02-05 PROCEDURE — 0 IOPAMIDOL PER 1 ML: Performed by: EMERGENCY MEDICINE

## 2020-02-05 PROCEDURE — 71275 CT ANGIOGRAPHY CHEST: CPT

## 2020-02-05 RX ADMIN — IOPAMIDOL 95 ML: 755 INJECTION, SOLUTION INTRAVENOUS at 20:50

## 2020-02-05 NOTE — ED NOTES
"Pt to ED with c/o midsternal CP that increases when taking deep breath. Pt states PICC placed on saturday for home IV antibiotic administration for knee. Pt states pain 4/10 and \"it's not that bad I thought it was normal and then when I went to the doctor they told me to come here for eval\". Pt denies cardiac hx or SOB.      Camelia Blanca, RN  02/05/20 1740       Camelia Blanca, RN  02/05/20 1740    "

## 2020-02-06 LAB
ALBUMIN SERPL-MCNC: 4.1 G/DL (ref 3.5–5.2)
ALBUMIN/GLOB SERPL: 1.3 G/DL
ALP SERPL-CCNC: 81 U/L (ref 39–117)
ALT SERPL W P-5'-P-CCNC: 14 U/L (ref 1–33)
ANION GAP SERPL CALCULATED.3IONS-SCNC: 21.6 MMOL/L (ref 5–15)
AST SERPL-CCNC: 16 U/L (ref 1–32)
BASOPHILS # BLD AUTO: 0.06 10*3/MM3 (ref 0–0.2)
BASOPHILS NFR BLD AUTO: 0.5 % (ref 0–1.5)
BILIRUB SERPL-MCNC: 0.4 MG/DL (ref 0.2–1.2)
BUN BLD-MCNC: 11 MG/DL (ref 6–20)
BUN/CREAT SERPL: 18.6 (ref 7–25)
CALCIUM SPEC-SCNC: 10 MG/DL (ref 8.6–10.5)
CHLORIDE SERPL-SCNC: 94 MMOL/L (ref 98–107)
CO2 SERPL-SCNC: 17.4 MMOL/L (ref 22–29)
CREAT BLD-MCNC: 0.59 MG/DL (ref 0.57–1)
DEPRECATED RDW RBC AUTO: 43.4 FL (ref 37–54)
EOSINOPHIL # BLD AUTO: 0.67 10*3/MM3 (ref 0–0.4)
EOSINOPHIL NFR BLD AUTO: 5.7 % (ref 0.3–6.2)
ERYTHROCYTE [DISTWIDTH] IN BLOOD BY AUTOMATED COUNT: 14.6 % (ref 12.3–15.4)
GFR SERPL CREATININE-BSD FRML MDRD: 110 ML/MIN/1.73
GLOBULIN UR ELPH-MCNC: 3.2 GM/DL
GLUCOSE BLD-MCNC: 66 MG/DL (ref 65–99)
HCT VFR BLD AUTO: 33.5 % (ref 34–46.6)
HGB BLD-MCNC: 10.9 G/DL (ref 12–15.9)
IMM GRANULOCYTES # BLD AUTO: 0.11 10*3/MM3 (ref 0–0.05)
IMM GRANULOCYTES NFR BLD AUTO: 0.9 % (ref 0–0.5)
LYMPHOCYTES # BLD AUTO: 2.33 10*3/MM3 (ref 0.7–3.1)
LYMPHOCYTES NFR BLD AUTO: 19.9 % (ref 19.6–45.3)
MCH RBC QN AUTO: 27 PG (ref 26.6–33)
MCHC RBC AUTO-ENTMCNC: 32.5 G/DL (ref 31.5–35.7)
MCV RBC AUTO: 82.9 FL (ref 79–97)
MONOCYTES # BLD AUTO: 0.77 10*3/MM3 (ref 0.1–0.9)
MONOCYTES NFR BLD AUTO: 6.6 % (ref 5–12)
NEUTROPHILS # BLD AUTO: 7.76 10*3/MM3 (ref 1.7–7)
NEUTROPHILS NFR BLD AUTO: 66.4 % (ref 42.7–76)
NRBC BLD AUTO-RTO: 0 /100 WBC (ref 0–0.2)
PLATELET # BLD AUTO: 510 10*3/MM3 (ref 140–450)
PMV BLD AUTO: 9.6 FL (ref 6–12)
POTASSIUM BLD-SCNC: 4.2 MMOL/L (ref 3.5–5.2)
PROT SERPL-MCNC: 7.3 G/DL (ref 6–8.5)
RBC # BLD AUTO: 4.04 10*6/MM3 (ref 3.77–5.28)
SODIUM BLD-SCNC: 133 MMOL/L (ref 136–145)
VANCOMYCIN TROUGH SERPL-MCNC: 14.8 MCG/ML (ref 5–20)
WBC NRBC COR # BLD: 11.7 10*3/MM3 (ref 3.4–10.8)

## 2020-02-06 NOTE — ED PROVIDER NOTES
MD ATTESTATION NOTE    The RONEN and I have discussed this patient's history, physical exam, and treatment plan.  I have reviewed the documentation and personally had a face to face interaction with the patient. I affirm the documentation and agree with the treatment and plan.  The attached note describes my personal findings.      Patient presents with pleuritic chest pain.  Her EKG is unremarkable and chest x-ray is unremarkable.  She recently had arthroscopic surgery and washout of her knee for septic arthritis.  Her CT scan shows no PE, there is some hilar lymphadenopathy which is likely related to the active infection.     Ravi Mckinley MD  02/06/20 0212

## 2020-02-09 LAB — BACTERIA SPEC ANAEROBE CULT: NORMAL

## 2020-02-10 ENCOUNTER — LAB REQUISITION (OUTPATIENT)
Dept: LAB | Facility: HOSPITAL | Age: 45
End: 2020-02-10

## 2020-02-10 DIAGNOSIS — A41.9 SEPSIS, UNSPECIFIED ORGANISM (HCC): ICD-10-CM

## 2020-02-10 PROCEDURE — 80053 COMPREHEN METABOLIC PANEL: CPT | Performed by: INTERNAL MEDICINE

## 2020-02-10 PROCEDURE — 85025 COMPLETE CBC W/AUTO DIFF WBC: CPT | Performed by: INTERNAL MEDICINE

## 2020-02-10 PROCEDURE — 80202 ASSAY OF VANCOMYCIN: CPT | Performed by: INTERNAL MEDICINE

## 2020-02-11 LAB
ALBUMIN SERPL-MCNC: 4 G/DL (ref 3.5–5.2)
ALBUMIN/GLOB SERPL: 1.4 G/DL
ALP SERPL-CCNC: 62 U/L (ref 39–117)
ALT SERPL W P-5'-P-CCNC: 10 U/L (ref 1–33)
ANION GAP SERPL CALCULATED.3IONS-SCNC: 19.7 MMOL/L (ref 5–15)
ANISOCYTOSIS BLD QL: ABNORMAL
AST SERPL-CCNC: 17 U/L (ref 1–32)
BASOPHILS # BLD MANUAL: 0.19 10*3/MM3 (ref 0–0.2)
BASOPHILS NFR BLD AUTO: 2.2 % (ref 0–1.5)
BILIRUB SERPL-MCNC: 0.2 MG/DL (ref 0.2–1.2)
BUN BLD-MCNC: 11 MG/DL (ref 6–20)
BUN/CREAT SERPL: 16.2 (ref 7–25)
BURR CELLS BLD QL SMEAR: ABNORMAL
CALCIUM SPEC-SCNC: 9.9 MG/DL (ref 8.6–10.5)
CHLORIDE SERPL-SCNC: 100 MMOL/L (ref 98–107)
CO2 SERPL-SCNC: 17.3 MMOL/L (ref 22–29)
CREAT BLD-MCNC: 0.68 MG/DL (ref 0.57–1)
DEPRECATED RDW RBC AUTO: 44.8 FL (ref 37–54)
EOSINOPHIL # BLD MANUAL: 0.19 10*3/MM3 (ref 0–0.4)
EOSINOPHIL NFR BLD MANUAL: 2.2 % (ref 0.3–6.2)
ERYTHROCYTE [DISTWIDTH] IN BLOOD BY AUTOMATED COUNT: 14.8 % (ref 12.3–15.4)
GFR SERPL CREATININE-BSD FRML MDRD: 94 ML/MIN/1.73
GLOBULIN UR ELPH-MCNC: 2.9 GM/DL
GLUCOSE BLD-MCNC: 69 MG/DL (ref 65–99)
HCT VFR BLD AUTO: 34.3 % (ref 34–46.6)
HGB BLD-MCNC: 10.7 G/DL (ref 12–15.9)
LYMPHOCYTES # BLD MANUAL: 3.19 10*3/MM3 (ref 0.7–3.1)
LYMPHOCYTES NFR BLD MANUAL: 12.2 % (ref 5–12)
LYMPHOCYTES NFR BLD MANUAL: 36.7 % (ref 19.6–45.3)
MCH RBC QN AUTO: 26 PG (ref 26.6–33)
MCHC RBC AUTO-ENTMCNC: 31.2 G/DL (ref 31.5–35.7)
MCV RBC AUTO: 83.5 FL (ref 79–97)
MONOCYTES # BLD AUTO: 1.06 10*3/MM3 (ref 0.1–0.9)
NEUTROPHILS # BLD AUTO: 4.05 10*3/MM3 (ref 1.7–7)
NEUTROPHILS NFR BLD MANUAL: 46.7 % (ref 42.7–76)
OVALOCYTES BLD QL SMEAR: ABNORMAL
PLAT MORPH BLD: NORMAL
PLATELET # BLD AUTO: 374 10*3/MM3 (ref 140–450)
PMV BLD AUTO: 10 FL (ref 6–12)
POIKILOCYTOSIS BLD QL SMEAR: ABNORMAL
POLYCHROMASIA BLD QL SMEAR: ABNORMAL
POTASSIUM BLD-SCNC: 4.3 MMOL/L (ref 3.5–5.2)
PROT SERPL-MCNC: 6.9 G/DL (ref 6–8.5)
RBC # BLD AUTO: 4.11 10*6/MM3 (ref 3.77–5.28)
SMUDGE CELLS BLD QL SMEAR: ABNORMAL
SODIUM BLD-SCNC: 137 MMOL/L (ref 136–145)
VANCOMYCIN TROUGH SERPL-MCNC: 16.8 MCG/ML (ref 5–20)
WBC NRBC COR # BLD: 8.68 10*3/MM3 (ref 3.4–10.8)

## 2020-02-18 ENCOUNTER — LAB REQUISITION (OUTPATIENT)
Dept: LAB | Facility: HOSPITAL | Age: 45
End: 2020-02-18

## 2020-02-18 DIAGNOSIS — A41.9 SEPSIS, UNSPECIFIED ORGANISM (HCC): ICD-10-CM

## 2020-02-18 LAB
ALBUMIN SERPL-MCNC: 3.9 G/DL (ref 3.5–5.2)
ALBUMIN/GLOB SERPL: 1.4 G/DL
ALP SERPL-CCNC: 58 U/L (ref 39–117)
ALT SERPL W P-5'-P-CCNC: 12 U/L (ref 1–33)
ANION GAP SERPL CALCULATED.3IONS-SCNC: 14.6 MMOL/L (ref 5–15)
AST SERPL-CCNC: 17 U/L (ref 1–32)
BASOPHILS # BLD AUTO: 0.04 10*3/MM3 (ref 0–0.2)
BASOPHILS NFR BLD AUTO: 0.5 % (ref 0–1.5)
BILIRUB SERPL-MCNC: 0.3 MG/DL (ref 0.2–1.2)
BUN BLD-MCNC: 6 MG/DL (ref 6–20)
BUN/CREAT SERPL: 8.2 (ref 7–25)
CALCIUM SPEC-SCNC: 9.5 MG/DL (ref 8.6–10.5)
CHLORIDE SERPL-SCNC: 98 MMOL/L (ref 98–107)
CO2 SERPL-SCNC: 24.4 MMOL/L (ref 22–29)
CREAT BLD-MCNC: 0.73 MG/DL (ref 0.57–1)
DEPRECATED RDW RBC AUTO: 45.8 FL (ref 37–54)
EOSINOPHIL # BLD AUTO: 0.55 10*3/MM3 (ref 0–0.4)
EOSINOPHIL NFR BLD AUTO: 6.8 % (ref 0.3–6.2)
ERYTHROCYTE [DISTWIDTH] IN BLOOD BY AUTOMATED COUNT: 15 % (ref 12.3–15.4)
GFR SERPL CREATININE-BSD FRML MDRD: 86 ML/MIN/1.73
GLOBULIN UR ELPH-MCNC: 2.8 GM/DL
GLUCOSE BLD-MCNC: 125 MG/DL (ref 65–99)
HCT VFR BLD AUTO: 33.5 % (ref 34–46.6)
HGB BLD-MCNC: 10.9 G/DL (ref 12–15.9)
IMM GRANULOCYTES # BLD AUTO: 0.08 10*3/MM3 (ref 0–0.05)
IMM GRANULOCYTES NFR BLD AUTO: 1 % (ref 0–0.5)
LYMPHOCYTES # BLD AUTO: 1.84 10*3/MM3 (ref 0.7–3.1)
LYMPHOCYTES NFR BLD AUTO: 22.7 % (ref 19.6–45.3)
MCH RBC QN AUTO: 26.8 PG (ref 26.6–33)
MCHC RBC AUTO-ENTMCNC: 32.5 G/DL (ref 31.5–35.7)
MCV RBC AUTO: 82.3 FL (ref 79–97)
MONOCYTES # BLD AUTO: 0.45 10*3/MM3 (ref 0.1–0.9)
MONOCYTES NFR BLD AUTO: 5.6 % (ref 5–12)
NEUTROPHILS # BLD AUTO: 5.14 10*3/MM3 (ref 1.7–7)
NEUTROPHILS NFR BLD AUTO: 63.4 % (ref 42.7–76)
NRBC BLD AUTO-RTO: 0 /100 WBC (ref 0–0.2)
PLATELET # BLD AUTO: 390 10*3/MM3 (ref 140–450)
PMV BLD AUTO: 9.3 FL (ref 6–12)
POTASSIUM BLD-SCNC: 3.7 MMOL/L (ref 3.5–5.2)
PROT SERPL-MCNC: 6.7 G/DL (ref 6–8.5)
RBC # BLD AUTO: 4.07 10*6/MM3 (ref 3.77–5.28)
SODIUM BLD-SCNC: 137 MMOL/L (ref 136–145)
VANCOMYCIN TROUGH SERPL-MCNC: 17.6 MCG/ML (ref 5–20)
WBC NRBC COR # BLD: 8.1 10*3/MM3 (ref 3.4–10.8)

## 2020-02-18 PROCEDURE — 80053 COMPREHEN METABOLIC PANEL: CPT | Performed by: INTERNAL MEDICINE

## 2020-02-18 PROCEDURE — 80202 ASSAY OF VANCOMYCIN: CPT | Performed by: INTERNAL MEDICINE

## 2020-02-18 PROCEDURE — 85025 COMPLETE CBC W/AUTO DIFF WBC: CPT | Performed by: INTERNAL MEDICINE

## 2020-02-24 ENCOUNTER — LAB REQUISITION (OUTPATIENT)
Dept: LAB | Facility: HOSPITAL | Age: 45
End: 2020-02-24

## 2020-02-24 DIAGNOSIS — A41.9 SEPSIS, UNSPECIFIED ORGANISM (HCC): ICD-10-CM

## 2020-02-24 PROCEDURE — 80202 ASSAY OF VANCOMYCIN: CPT | Performed by: INTERNAL MEDICINE

## 2020-02-24 PROCEDURE — 85025 COMPLETE CBC W/AUTO DIFF WBC: CPT | Performed by: INTERNAL MEDICINE

## 2020-02-24 PROCEDURE — 80053 COMPREHEN METABOLIC PANEL: CPT | Performed by: INTERNAL MEDICINE

## 2020-02-25 LAB
ALBUMIN SERPL-MCNC: 4 G/DL (ref 3.5–5.2)
ALBUMIN/GLOB SERPL: 1.5 G/DL
ALP SERPL-CCNC: 53 U/L (ref 39–117)
ALT SERPL W P-5'-P-CCNC: 14 U/L (ref 1–33)
ANION GAP SERPL CALCULATED.3IONS-SCNC: 20.2 MMOL/L (ref 5–15)
AST SERPL-CCNC: 17 U/L (ref 1–32)
BASOPHILS # BLD AUTO: 0.05 10*3/MM3 (ref 0–0.2)
BASOPHILS NFR BLD AUTO: 0.7 % (ref 0–1.5)
BILIRUB SERPL-MCNC: 0.3 MG/DL (ref 0.2–1.2)
BUN BLD-MCNC: 6 MG/DL (ref 6–20)
BUN/CREAT SERPL: 8.6 (ref 7–25)
CALCIUM SPEC-SCNC: 9.7 MG/DL (ref 8.6–10.5)
CHLORIDE SERPL-SCNC: 99 MMOL/L (ref 98–107)
CO2 SERPL-SCNC: 19.8 MMOL/L (ref 22–29)
CREAT BLD-MCNC: 0.7 MG/DL (ref 0.57–1)
DEPRECATED RDW RBC AUTO: 47.3 FL (ref 37–54)
EOSINOPHIL # BLD AUTO: 0.48 10*3/MM3 (ref 0–0.4)
EOSINOPHIL NFR BLD AUTO: 6.8 % (ref 0.3–6.2)
ERYTHROCYTE [DISTWIDTH] IN BLOOD BY AUTOMATED COUNT: 15.3 % (ref 12.3–15.4)
GFR SERPL CREATININE-BSD FRML MDRD: 90 ML/MIN/1.73
GLOBULIN UR ELPH-MCNC: 2.6 GM/DL
GLUCOSE BLD-MCNC: 76 MG/DL (ref 65–99)
HCT VFR BLD AUTO: 33.2 % (ref 34–46.6)
HGB BLD-MCNC: 10.4 G/DL (ref 12–15.9)
IMM GRANULOCYTES # BLD AUTO: 0.04 10*3/MM3 (ref 0–0.05)
IMM GRANULOCYTES NFR BLD AUTO: 0.6 % (ref 0–0.5)
LYMPHOCYTES # BLD AUTO: 1.5 10*3/MM3 (ref 0.7–3.1)
LYMPHOCYTES NFR BLD AUTO: 21.4 % (ref 19.6–45.3)
MCH RBC QN AUTO: 26.7 PG (ref 26.6–33)
MCHC RBC AUTO-ENTMCNC: 31.3 G/DL (ref 31.5–35.7)
MCV RBC AUTO: 85.1 FL (ref 79–97)
MONOCYTES # BLD AUTO: 0.47 10*3/MM3 (ref 0.1–0.9)
MONOCYTES NFR BLD AUTO: 6.7 % (ref 5–12)
NEUTROPHILS # BLD AUTO: 4.48 10*3/MM3 (ref 1.7–7)
NEUTROPHILS NFR BLD AUTO: 63.8 % (ref 42.7–76)
NRBC BLD AUTO-RTO: 0 /100 WBC (ref 0–0.2)
PLATELET # BLD AUTO: 345 10*3/MM3 (ref 140–450)
PMV BLD AUTO: 9.1 FL (ref 6–12)
POTASSIUM BLD-SCNC: 4 MMOL/L (ref 3.5–5.2)
PROT SERPL-MCNC: 6.6 G/DL (ref 6–8.5)
RBC # BLD AUTO: 3.9 10*6/MM3 (ref 3.77–5.28)
SODIUM BLD-SCNC: 139 MMOL/L (ref 136–145)
VANCOMYCIN TROUGH SERPL-MCNC: 15.6 MCG/ML (ref 5–20)
WBC NRBC COR # BLD: 7.02 10*3/MM3 (ref 3.4–10.8)

## 2020-02-27 ENCOUNTER — APPOINTMENT (OUTPATIENT)
Dept: LAB | Facility: HOSPITAL | Age: 45
End: 2020-02-27

## 2020-02-27 ENCOUNTER — OFFICE VISIT (OUTPATIENT)
Dept: INFECTIOUS DISEASES | Facility: CLINIC | Age: 45
End: 2020-02-27

## 2020-02-27 VITALS
HEIGHT: 64 IN | TEMPERATURE: 98.3 F | BODY MASS INDEX: 47.63 KG/M2 | DIASTOLIC BLOOD PRESSURE: 83 MMHG | HEART RATE: 82 BPM | WEIGHT: 279 LBS | SYSTOLIC BLOOD PRESSURE: 143 MMHG

## 2020-02-27 DIAGNOSIS — Z98.890 S/P ACL REPAIR: ICD-10-CM

## 2020-02-27 DIAGNOSIS — M00.061 STAPHYLOCOCCAL ARTHRITIS OF RIGHT KNEE (HCC): Primary | ICD-10-CM

## 2020-02-27 DIAGNOSIS — Z79.2 LONG TERM (CURRENT) USE OF ANTIBIOTICS: ICD-10-CM

## 2020-02-27 LAB — CRP SERPL-MCNC: 3.5 MG/DL (ref 0–0.5)

## 2020-02-27 PROCEDURE — 86140 C-REACTIVE PROTEIN: CPT | Performed by: INTERNAL MEDICINE

## 2020-02-27 PROCEDURE — 36415 COLL VENOUS BLD VENIPUNCTURE: CPT | Performed by: INTERNAL MEDICINE

## 2020-02-27 PROCEDURE — 99214 OFFICE O/P EST MOD 30 MIN: CPT | Performed by: INTERNAL MEDICINE

## 2020-02-27 RX ORDER — ASPIRIN 325 MG
TABLET, DELAYED RELEASE (ENTERIC COATED) ORAL DAILY
COMMUNITY
Start: 2020-02-17 | End: 2021-12-16

## 2020-02-27 NOTE — PROGRESS NOTES
Follow-up    CC: f/u R knee septic arthritis following R ACL repair    HPI: Karey Lopez is a 45 y.o. female here for f/u R knee septic arthritis following R ACL repair. Operative cultures finalized as negative. Her Synovasure was concerning for infection with 39,000 WBCs and was positive for Staph antigen but the culture was negative. She has now finished a 4-week course of vancomycin following operative I&D. She still has sharp constant pain worse w/ walking but the swelling has improved. There is no erythema. She is not having any fevers. WBC normalized from 11 to 7. She sees Dr Chacon (ortho) again next week.     She reports some nausea while on vancomycin but no rash or diarrhea.      Review of Systems: no CP or SOA    Past medical history:  HTN  Asthma  Obesity  R knee septic arthritis following ACL repair    Past Surgical History:   Procedure Laterality Date   • FRACTURE SURGERY     • GALLBLADDER SURGERY     • KNEE ARTHROPLASTY Right 12/10/2019   • KNEE ARTHROSCOPY Right 1/30/2020    Procedure: KNEE ARTHROSCOPY WITH INCISION AND DRAINAGE;  Surgeon: Fareed Chacon MD;  [facility]: [facility];  Service: Orthopedics   • LAPAROSCOPIC GASTRIC BANDING      and was removed   • LAPAROSCOPIC TUBAL LIGATION       Social History:    Lives in [place], [place]  Has an adult son  Works in compliance     Family History:  No 1st degree relatives w/ bone or joint infections     Antibiotic allergies and intolerances:  None    Medications:   Current Outpatient Medications:   •  albuterol sulfate  (90 Base) MCG/ACT inhaler, Inhale 2 puffs., Disp: , Rfl:   •  aspirin  MG tablet, Take  by mouth Daily., Disp: , Rfl:   •  buPROPion XL (WELLBUTRIN XL) 300 MG 24 hr tablet, Take 150 mg by mouth Daily., Disp: , Rfl:   •  escitalopram (LEXAPRO) 20 MG tablet, Take 30 mg by mouth Daily., Disp: , Rfl:   •  ibuprofen (ADVIL,MOTRIN) 200 MG tablet, Take 800 mg by mouth Every 6 (Six) Hours As Needed for Mild Pain .,  "Disp: , Rfl:   •  lisinopril (PRINIVIL,ZESTRIL) 20 MG tablet, Take 20 mg by mouth Daily., Disp: , Rfl:   •  montelukast (SINGULAIR) 10 MG tablet, Take 10 mg by mouth Daily., Disp: , Rfl:   •  oxyCODONE-acetaminophen (PERCOCET) 7.5-325 MG per tablet, Take 1-2 tablets by mouth Every 4 (Four) Hours As Needed for Severe Pain ., Disp: 50 tablet, Rfl: 0      OBJECTIVE:  Blood pressure 143/83, pulse 82, temperature 98.3 °F (36.8 °C), height 162.6 cm (64.02\"), weight 127 kg (279 lb).    General: awake, alert, NAD   Eyes:  no scleral icterus  ENT: MMM  Cardiovascular: NR  Respiratory: normal work of breathing on ambient air  GI: Abdomen is obese, non-distended  : no Meléndez catheter present  Musculoskeletal: R knee with mild effusion; no warmth or erythema; incisions are healed  Skin: No rashes  Neurological: Alert and oriented x 3  Vasc: LUE PICC w/o erythema    DIAGNOSTICS:  CBC, BMP reviewed today  Lab Results   Component Value Date    WBC 7.02 02/24/2020    HGB 10.4 (L) 02/24/2020    HCT 33.2 (L) 02/24/2020     02/24/2020     Lab Results   Component Value Date    CRP 10.83 (H) 01/31/2020     Lab Results   Component Value Date    GLUCOSE 76 02/24/2020    BUN 6 02/24/2020    CREATININE 0.70 02/24/2020    EGFRIFNONA 90 02/24/2020    BCR 8.6 02/24/2020    CO2 19.8 (L) 02/24/2020    CALCIUM 9.7 02/24/2020    ALBUMIN 4.00 02/24/2020    AST 17 02/24/2020    ALT 14 02/24/2020     Lab Results   Component Value Date    VANCOTROUGH 15.60 02/24/2020     Microbiology:  Ortho clinic arthrocentesis: Synovasure test with positive Staph panel; culture negative  1/30 Left Knee Cx: negative     ASSESSMENT/PLAN:  1. Acute septic arthritis of right knee s/p ACL repair  2. Morbid obesity BMI 47  3. Long term use of antibiotics - new    S/P I&D on 1/30/20 with negative operative cultures. Since she had a positive Staph panel on her Synovasure and since Staph is the most common culprit of joint infections, she was treated with a 4-week " course of vancomycin with therapeutic levels. She still has some pain but her swelling has nearly resolved. There is no heat, erythema, or drainage. I will check a CRP today. WBC has normalized from 11 to 7.     DC PICC line and IV vancomycin. I provided educations re: signs and symptoms of recurrent infection, and she will call me if they occur. She has follow-up scheduled w/ Dr Chacon in orthopedics next week. I will fax him a copy of this note.

## 2020-04-30 ENCOUNTER — OFFICE VISIT CONVERTED (OUTPATIENT)
Dept: FAMILY MEDICINE CLINIC | Age: 45
End: 2020-04-30
Attending: NURSE PRACTITIONER

## 2020-10-13 ENCOUNTER — OFFICE VISIT CONVERTED (OUTPATIENT)
Dept: FAMILY MEDICINE CLINIC | Age: 45
End: 2020-10-13
Attending: NURSE PRACTITIONER

## 2020-11-10 ENCOUNTER — HOSPITAL ENCOUNTER (OUTPATIENT)
Dept: OTHER | Facility: HOSPITAL | Age: 45
Discharge: HOME | End: 2020-11-10
Attending: NURSE PRACTITIONER

## 2020-11-10 ENCOUNTER — CONVERSION ENCOUNTER (OUTPATIENT)
Dept: FAMILY MEDICINE CLINIC | Age: 45
End: 2020-11-10

## 2020-11-10 ENCOUNTER — OFFICE VISIT CONVERTED (OUTPATIENT)
Dept: FAMILY MEDICINE CLINIC | Age: 45
End: 2020-11-10
Attending: NURSE PRACTITIONER

## 2020-11-10 LAB
ALBUMIN SERPL-MCNC: 4 G/DL (ref 3.5–5)
ALBUMIN/GLOB SERPL: 1.3 {RATIO} (ref 1.4–2.6)
ALP SERPL-CCNC: 92 U/L (ref 42–98)
ALT SERPL-CCNC: 19 U/L (ref 10–40)
ANION GAP SERPL CALC-SCNC: 18 MMOL/L (ref 8–19)
AST SERPL-CCNC: 15 U/L (ref 15–50)
BASOPHILS # BLD MANUAL: 0.03 10*3/UL (ref 0–0.2)
BASOPHILS NFR BLD MANUAL: 0.3 % (ref 0–3)
BILIRUB SERPL-MCNC: 0.27 MG/DL (ref 0.2–1.3)
BUN SERPL-MCNC: 9 MG/DL (ref 5–25)
BUN/CREAT SERPL: 14 {RATIO} (ref 6–20)
CALCIUM SERPL-MCNC: 9.3 MG/DL (ref 8.7–10.4)
CHLORIDE SERPL-SCNC: 102 MMOL/L (ref 99–111)
CHOLEST SERPL-MCNC: 208 MG/DL (ref 107–200)
CHOLEST/HDLC SERPL: 3.3 {RATIO} (ref 3–6)
CONV CO2: 22 MMOL/L (ref 22–32)
CONV TOTAL PROTEIN: 7.2 G/DL (ref 6.3–8.2)
CREAT UR-MCNC: 0.65 MG/DL (ref 0.5–0.9)
DEPRECATED RDW RBC AUTO: 49.3 FL
EOSINOPHIL # BLD MANUAL: 0.17 10*3/UL (ref 0–0.7)
EOSINOPHIL NFR BLD MANUAL: 1.6 % (ref 0–7)
ERYTHROCYTE [DISTWIDTH] IN BLOOD BY AUTOMATED COUNT: 15.5 % (ref 11.5–14.5)
ERYTHROCYTE [SEDIMENTATION RATE] IN BLOOD: 22 MM/H (ref 0–20)
GFR SERPLBLD BASED ON 1.73 SQ M-ARVRAT: >60 ML/MIN/{1.73_M2}
GLOBULIN UR ELPH-MCNC: 3.2 G/DL (ref 2–3.5)
GLUCOSE SERPL-MCNC: 105 MG/DL (ref 65–99)
GRANS (ABSOLUTE): 8.04 10*3/UL (ref 2–8)
GRANS: 74.3 % (ref 30–85)
HBA1C MFR BLD: 12.5 G/DL (ref 12–16)
HCT VFR BLD AUTO: 38.3 % (ref 37–47)
HDLC SERPL-MCNC: 63 MG/DL (ref 40–60)
IMM GRANULOCYTES # BLD: 0.06 10*3/UL (ref 0–0.54)
IMM GRANULOCYTES NFR BLD: 0.6 % (ref 0–0.43)
LDLC SERPL CALC-MCNC: 115 MG/DL (ref 70–100)
LYMPHOCYTES # BLD MANUAL: 1.85 10*3/UL (ref 1–5)
LYMPHOCYTES NFR BLD MANUAL: 6.1 % (ref 3–10)
MCH RBC QN AUTO: 27.8 PG (ref 27–31)
MCHC RBC AUTO-ENTMCNC: 32.6 G/DL (ref 33–37)
MCV RBC AUTO: 85.3 FL (ref 81–99)
MONOCYTES # BLD AUTO: 0.66 10*3/UL (ref 0.2–1.2)
OSMOLALITY SERPL CALC.SUM OF ELEC: 285 MOSM/KG (ref 273–304)
PLATELET # BLD AUTO: 467 10*3/UL (ref 130–400)
PMV BLD AUTO: 8.8 FL (ref 7.4–10.4)
POTASSIUM SERPL-SCNC: 4.2 MMOL/L (ref 3.5–5.3)
RBC # BLD AUTO: 4.49 10*6/UL (ref 4.2–5.4)
SODIUM SERPL-SCNC: 138 MMOL/L (ref 135–147)
TRIGL SERPL-MCNC: 151 MG/DL (ref 40–150)
VARIANT LYMPHS NFR BLD MANUAL: 17.1 % (ref 20–45)
VLDLC SERPL-MCNC: 30 MG/DL (ref 5–37)
WBC # BLD AUTO: 10.81 10*3/UL (ref 4.8–10.8)

## 2021-01-05 ENCOUNTER — HOSPITAL ENCOUNTER (OUTPATIENT)
Dept: OTHER | Facility: HOSPITAL | Age: 46
Discharge: HOME | End: 2021-01-05
Attending: NURSE PRACTITIONER

## 2021-01-07 ENCOUNTER — OFFICE VISIT CONVERTED (OUTPATIENT)
Dept: FAMILY MEDICINE CLINIC | Age: 46
End: 2021-01-07
Attending: NURSE PRACTITIONER

## 2021-01-08 ENCOUNTER — HOSPITAL ENCOUNTER (OUTPATIENT)
Dept: OTHER | Facility: HOSPITAL | Age: 46
Discharge: HOME | End: 2021-01-08
Attending: NURSE PRACTITIONER

## 2021-01-08 LAB
ALBUMIN SERPL-MCNC: 4 G/DL (ref 3.5–5)
ALBUMIN/GLOB SERPL: 1.2 {RATIO} (ref 1.4–2.6)
ALP SERPL-CCNC: 80 U/L (ref 42–98)
ALT SERPL-CCNC: 15 U/L (ref 10–40)
ANION GAP SERPL CALC-SCNC: 19 MMOL/L (ref 8–19)
AST SERPL-CCNC: 10 U/L (ref 15–50)
BASOPHILS # BLD AUTO: 0.1 10*3/UL (ref 0–0.2)
BASOPHILS NFR BLD AUTO: 0.4 % (ref 0–3)
BILIRUB SERPL-MCNC: 0.22 MG/DL (ref 0.2–1.3)
BUN SERPL-MCNC: 13 MG/DL (ref 5–25)
BUN/CREAT SERPL: 18 {RATIO} (ref 6–20)
CALCIUM SERPL-MCNC: 9.6 MG/DL (ref 8.7–10.4)
CHLORIDE SERPL-SCNC: 98 MMOL/L (ref 99–111)
CONV ABS IMM GRAN: 1.04 10*3/UL (ref 0–0.2)
CONV CO2: 21 MMOL/L (ref 22–32)
CONV IMMATURE GRAN: 4.1 % (ref 0–1.8)
CONV TOTAL PROTEIN: 7.3 G/DL (ref 6.3–8.2)
CREAT UR-MCNC: 0.71 MG/DL (ref 0.5–0.9)
DEPRECATED RDW RBC AUTO: 48.2 FL (ref 36.4–46.3)
EOSINOPHIL # BLD AUTO: 0 % (ref 0–7)
EOSINOPHIL # BLD AUTO: 0.01 10*3/UL (ref 0–0.7)
ERYTHROCYTE [DISTWIDTH] IN BLOOD BY AUTOMATED COUNT: 15.9 % (ref 11.7–14.4)
GFR SERPLBLD BASED ON 1.73 SQ M-ARVRAT: >60 ML/MIN/{1.73_M2}
GLOBULIN UR ELPH-MCNC: 3.3 G/DL (ref 2–3.5)
GLUCOSE SERPL-MCNC: 144 MG/DL (ref 65–99)
HCT VFR BLD AUTO: 40.1 % (ref 37–47)
HGB BLD-MCNC: 12.6 G/DL (ref 12–16)
LYMPHOCYTES # BLD AUTO: 2.29 10*3/UL (ref 1–5)
LYMPHOCYTES NFR BLD AUTO: 9.1 % (ref 20–45)
MCH RBC QN AUTO: 26.5 PG (ref 27–31)
MCHC RBC AUTO-ENTMCNC: 31.4 G/DL (ref 33–37)
MCV RBC AUTO: 84.2 FL (ref 81–99)
MONOCYTES # BLD AUTO: 0.76 10*3/UL (ref 0.2–1.2)
MONOCYTES NFR BLD AUTO: 3 % (ref 3–10)
NEUTROPHILS # BLD AUTO: 20.9 10*3/UL (ref 2–8)
NEUTROPHILS NFR BLD AUTO: 83.4 % (ref 30–85)
NRBC CBCN: 0 % (ref 0–0.7)
OSMOLALITY SERPL CALC.SUM OF ELEC: 281 MOSM/KG (ref 273–304)
PLATELET # BLD AUTO: 482 10*3/UL (ref 130–400)
PMV BLD AUTO: 9.7 FL (ref 9.4–12.3)
POTASSIUM SERPL-SCNC: 4.3 MMOL/L (ref 3.5–5.3)
RBC # BLD AUTO: 4.76 10*6/UL (ref 4.2–5.4)
SODIUM SERPL-SCNC: 134 MMOL/L (ref 135–147)
WBC # BLD AUTO: 25.1 10*3/UL (ref 4.8–10.8)

## 2021-01-18 ENCOUNTER — HOSPITAL ENCOUNTER (OUTPATIENT)
Dept: OTHER | Facility: HOSPITAL | Age: 46
Discharge: HOME | End: 2021-01-18
Attending: NURSE PRACTITIONER

## 2021-01-18 LAB
BASOPHILS # BLD MANUAL: 0.04 10*3/UL (ref 0–0.2)
BASOPHILS NFR BLD MANUAL: 0.3 % (ref 0–3)
D DIMER PPP FEU-MCNC: <0.17 MG/L (ref 0–0.59)
DEPRECATED RDW RBC AUTO: 48.6 FL
EOSINOPHIL # BLD MANUAL: 0.36 10*3/UL (ref 0–0.7)
EOSINOPHIL NFR BLD MANUAL: 2.6 % (ref 0–7)
ERYTHROCYTE [DISTWIDTH] IN BLOOD BY AUTOMATED COUNT: 15.8 % (ref 11.5–14.5)
GRANS (ABSOLUTE): 9.98 10*3/UL (ref 2–8)
GRANS: 72.4 % (ref 30–85)
HBA1C MFR BLD: 12.5 G/DL (ref 12–16)
HCT VFR BLD AUTO: 38.9 % (ref 37–47)
IMM GRANULOCYTES # BLD: 0.11 10*3/UL (ref 0–0.54)
IMM GRANULOCYTES NFR BLD: 0.8 % (ref 0–0.43)
LYMPHOCYTES # BLD MANUAL: 2.62 10*3/UL (ref 1–5)
LYMPHOCYTES NFR BLD MANUAL: 4.9 % (ref 3–10)
MCH RBC QN AUTO: 26.9 PG (ref 27–31)
MCHC RBC AUTO-ENTMCNC: 32.1 G/DL (ref 33–37)
MCV RBC AUTO: 83.8 FL (ref 81–99)
MONOCYTES # BLD AUTO: 0.67 10*3/UL (ref 0.2–1.2)
PLATELET # BLD AUTO: 388 10*3/UL (ref 130–400)
PMV BLD AUTO: 8.9 FL (ref 7.4–10.4)
RBC # BLD AUTO: 4.64 10*6/UL (ref 4.2–5.4)
VARIANT LYMPHS NFR BLD MANUAL: 19 % (ref 20–45)
WBC # BLD AUTO: 13.78 10*3/UL (ref 4.8–10.8)

## 2021-01-19 ENCOUNTER — OFFICE VISIT CONVERTED (OUTPATIENT)
Dept: FAMILY MEDICINE CLINIC | Age: 46
End: 2021-01-19
Attending: NURSE PRACTITIONER

## 2021-02-06 ENCOUNTER — OFFICE VISIT CONVERTED (OUTPATIENT)
Dept: FAMILY MEDICINE CLINIC | Age: 46
End: 2021-02-06
Attending: FAMILY MEDICINE

## 2021-02-06 ENCOUNTER — HOSPITAL ENCOUNTER (OUTPATIENT)
Dept: OTHER | Facility: HOSPITAL | Age: 46
Discharge: HOME | End: 2021-02-06
Attending: FAMILY MEDICINE

## 2021-02-08 LAB — BACTERIA SPEC AEROBE CULT: NORMAL

## 2021-05-14 ENCOUNTER — HOSPITAL ENCOUNTER (OUTPATIENT)
Dept: OTHER | Facility: HOSPITAL | Age: 46
Discharge: HOME | End: 2021-05-14
Attending: NURSE PRACTITIONER

## 2021-05-14 ENCOUNTER — OFFICE VISIT CONVERTED (OUTPATIENT)
Dept: FAMILY MEDICINE CLINIC | Age: 46
End: 2021-05-14
Attending: NURSE PRACTITIONER

## 2021-05-14 LAB
ALBUMIN SERPL-MCNC: 4.3 G/DL (ref 3.5–5)
ALBUMIN/GLOB SERPL: 1.2 {RATIO} (ref 1.4–2.6)
ALP SERPL-CCNC: 79 U/L (ref 42–98)
ALT SERPL-CCNC: 17 U/L (ref 10–40)
AMYLASE SERPL-CCNC: 62 U/L (ref 30–110)
ANION GAP SERPL CALC-SCNC: 16 MMOL/L (ref 8–19)
APPEARANCE UR: CLEAR
AST SERPL-CCNC: 17 U/L (ref 15–50)
BACTERIA UR QL AUTO: NORMAL
BILIRUB SERPL-MCNC: 0.34 MG/DL (ref 0.2–1.3)
BILIRUB UR QL: NEGATIVE
BUN SERPL-MCNC: 8 MG/DL (ref 5–25)
BUN/CREAT SERPL: 9 {RATIO} (ref 6–20)
CALCIUM SERPL-MCNC: 9.6 MG/DL (ref 8.7–10.4)
CASTS URNS QL MICRO: NORMAL /[LPF]
CHLORIDE SERPL-SCNC: 96 MMOL/L (ref 99–111)
COLOR UR: YELLOW
CONV CO2: 27 MMOL/L (ref 22–32)
CONV LEUKOCYTE ESTERASE: NEGATIVE
CONV TOTAL PROTEIN: 7.8 G/DL (ref 6.3–8.2)
CONV UROBILINOGEN IN URINE BY AUTOMATED TEST STRIP: 0.2 {EHRLICHU}/DL (ref 0.1–1)
CREAT UR-MCNC: 0.89 MG/DL (ref 0.5–0.9)
EPI CELLS #/AREA URNS HPF: NORMAL /[HPF]
ERYTHROCYTE [DISTWIDTH] IN BLOOD BY AUTOMATED COUNT: 16.5 % (ref 11.5–14.5)
GFR SERPLBLD BASED ON 1.73 SQ M-ARVRAT: >60 ML/MIN/{1.73_M2}
GLOBULIN UR ELPH-MCNC: 3.5 G/DL (ref 2–3.5)
GLUCOSE 24H UR-MCNC: NEGATIVE MG/DL
GLUCOSE SERPL-MCNC: 105 MG/DL (ref 65–99)
HBA1C MFR BLD: 11.8 G/DL (ref 12–16)
HCT VFR BLD AUTO: 37.7 % (ref 37–47)
HGB UR QL STRIP: NEGATIVE
KETONES UR QL STRIP: NEGATIVE MG/DL
LIPASE SERPL-CCNC: 54 U/L (ref 5–51)
MCH RBC QN AUTO: 26.3 PG (ref 27–31)
MCHC RBC AUTO-ENTMCNC: 31.3 G/DL (ref 33–37)
MCV RBC AUTO: 84.2 FL (ref 81–99)
MUCOUS THREADS URNS QL MICRO: NORMAL
NITRITE UR-MCNC: NEGATIVE MG/ML
OSMOLALITY SERPL CALC.SUM OF ELEC: 279 MOSM/KG (ref 273–304)
PH UR STRIP.AUTO: 6.5 [PH] (ref 5–8)
PLATELET # BLD AUTO: 472 10*3/UL (ref 130–400)
PMV BLD AUTO: 9.1 FL (ref 7.4–10.4)
POTASSIUM SERPL-SCNC: 3.8 MMOL/L (ref 3.5–5.3)
PROT UR-MCNC: NEGATIVE MG/DL
RBC # BLD AUTO: 4.48 10*6/UL (ref 4.2–5.4)
RBC # BLD AUTO: NORMAL /[HPF]
SODIUM SERPL-SCNC: 135 MMOL/L (ref 135–147)
SP GR UR STRIP: 1.02 (ref 1–1.03)
SPECIMEN SOURCE: NORMAL
TSH SERPL-ACNC: 2.41 M[IU]/L (ref 0.27–4.2)
UNIDENT CRYS URNS QL MICRO: NORMAL /[HPF]
WBC # BLD AUTO: 14.71 10*3/UL (ref 4.8–10.8)
WBC #/AREA URNS HPF: NORMAL /[HPF]

## 2021-05-16 VITALS — BODY MASS INDEX: 46.1 KG/M2 | RESPIRATION RATE: 16 BRPM | HEIGHT: 64 IN | WEIGHT: 270 LBS

## 2021-05-16 VITALS — RESPIRATION RATE: 16 BRPM | BODY MASS INDEX: 46.1 KG/M2 | HEIGHT: 64 IN | WEIGHT: 270 LBS

## 2021-05-16 VITALS
SYSTOLIC BLOOD PRESSURE: 127 MMHG | DIASTOLIC BLOOD PRESSURE: 76 MMHG | HEART RATE: 76 BPM | BODY MASS INDEX: 43.71 KG/M2 | HEIGHT: 64 IN | WEIGHT: 256 LBS

## 2021-05-18 NOTE — PROGRESS NOTES
Karey Lopez  1975     Office/Outpatient Visit    Visit Date: Tue, Jan 19, 2021 09:30 am    Provider: Maria Esther Garza N.P. (Assistant: Annette Robert RN)    Location: [facility]        Electronically signed by Maria Esther Garza N.P. on  01/19/2021 01:17:48 PM                             Subjective:        CC: Mrs. Lopez is a 45 year old White female.  asthma/wheezing;         HPI: 45 year old female presenting to office c/o continued soa and cough. She has moderate persistent asthma. She is not on Daily maintenance inhaler.  She only has albuterol.  This was discussed at last visit.  She states that she now has an appointment with allergist on the 27th.  She states she is now open to starting a maintenance inhaler.        Patient had labs drawn yesterday.  White count from earlier this month was almost 25,000.  Patient had a been on multiple steroids.  Yesterday white count was just over 13,000.  D-dimer was also negative yesterday.  Patient states that cough is still dry.  But just persistent.    ROS:     CONSTITUTIONAL:  Positive for fatigue.   Negative for chills, fever or night sweats.      E/N/T:  Negative for sinus pressure.      CARDIOVASCULAR:  Negative for chest pain, palpitations and pedal edema.      RESPIRATORY:  Positive for pleuritic chest pain ( midsternal chest pain ) and wheezing ( nocturnal ).      GASTROINTESTINAL:  Negative for abdominal pain, diarrhea, nausea and vomiting.      MUSCULOSKELETAL:  Positive for myalgias (from cough).      INTEGUMENTARY/BREAST:  Negative for rash.      NEUROLOGICAL:  Negative for dizziness, paresthesias and weakness.      PSYCHIATRIC:  Negative for anxiety, depression, sleep disturbance and suicidal thoughts.          Past Medical History / Family History / Social History:         Last Reviewed on 1/19/2021 09:51 AM by Maria Esther Garza    Past Medical History: normal echocardiogram and stress test july 2018. 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     lichen sclerosis - 2019         GYNECOLOGICAL HISTORY:         Sleep Apnea: dx'd in may 2020; (+) sleep study;         PREVENTIVE HEALTH MAINTENANCE             MAMMOGRAM: Done within last 2 years and results in are chart was last done 2021 with normal results     PAP SMEAR: was last done  with normal results             PAST MEDICAL HISTORY     Hospitalizations:    Asthma admitted once on 17, last admit date 2020, (right knee sepsis and left knee arthroscopy)         CURRENT MEDICAL PROVIDERS:    Allergist    Obstetrician/Gynecologist: [facility]    podiatry dr dick freedman Chronic pain specialist         Surgical History:         Cholecystectomy    Fracture Repair: ankle;  and 3-2015;     Tubal Ligation Other Surgeries    lap/band   removed 2019 (scar tissue);    right foot surgery  & 17;    left knee arthroscopy 2020;     Procedures:    Colonoscopy (  normal )    EGD ( /small hiatal hernia and 2018 mild gastitis )         Family History:     Father:  at age 67; Cause of death was stroke     Mother:  at age 47; Cause of death was CAD;  Asthma;  Type 2 Diabetes     Brother(s): 2 brother(s) total;  Myocardial Infarction (  at 42 );  Obesity     Son(s): 2 son(s) total     Paternal Grandfather:  at age 94     Paternal Grandmother:  at age 60's; Cause of death was stroke     Maternal Grandfather:  at age 63; Cause of death was lung cancer     Maternal Grandmother:  at age 50; Cause of death was cervical cancer         Social History:     Occupation: [employer] / [employer]      Marital Status:      Children: 2 children         Tobacco/Alcohol/Supplements:     Last Reviewed on 2021 09:51 AM by Maria Esther Garza    Tobacco: She has never smoked.          Alcohol: Frequency:    \"just " "rarely\";         Substance Abuse History:     Last Reviewed on 10/13/2020 02:14 PM by Maria Esther Garza        Mental Health History:     Last Reviewed on 1/16/2017 04:36 PM by Malorie Arzate        Communicable Diseases (eg STDs):     Last Reviewed on 1/16/2017 04:36 PM by Malorie Arzate        Immunizations:     influenza, injectable, quadrivalent, preservative free (FLUZONE QUAD 9572-4792) 12/20/2019    influenza, injectable, quadrivalent, preservative free (FLUZONE QUAD 2785-8393) 11/30/2020    zzFluzone pf-quadrivalent 3 and up 11/24/2014    zzFluzone pf-quadrivalent 3 and up 11/12/2015    zzFluzone pf-quadrivalent 3 and up 12/19/2016    zzFluzone pf-quadrivalent 3 and up 12/6/2017    Fluzone (3 + years dose) 10/23/2012    Fluzone pf (3+ years dose) 1/8/2014    Fluzone Quadrivalent (3+ years) 12/13/2018        Allergies:     Last Reviewed on 1/19/2021 09:51 AM by Maria Esther Garza    Viibryd:   (Adverse Reaction)        Current Medications:     Last Reviewed on 1/19/2021 09:51 AM by Maria Esther Garza    ibuprofen 800 mg oral tablet [take 1 tablet (800 mg) by oral route 3 times per day with food]    Vitamin D2     Alubuterol   [neb PRN]    montelukast 10 mg oral tablet [TAKE 1 TABLET DAILY]    escitalopram oxalate 20 mg oral tablet [TAKE 1 AND 1/2 TABLETS     DAILY]    allergy shot once every 2 to 28 days     buPROPion  mg oral Tablet, Extended Release 24 hr [TAKE 1 TABLET DAILY]    Ventolin HFA 90mcg/1actuation Oral Inhaler [Inhale 2 puff(s) by mouth 4 times a day as needed]    lisinopriL 30 mg oral tablet [take 1 tablet (30 mg) by oral route once daily]    ibuprofen 800 mg oral tablet [take 1 tablet (800 mg) by oral route 3 times per day with food PRN]        Objective:        Vitals:         Current: 1/19/2021 9:35:55 AM    Ht:  5 ft, 4 in;  Wt: 289.6 lbs;  BMI: 49.7T: 96.8 F (temporal);  BP: 125/78 mm Hg (right arm, sitting);  P: 114 bpm (right arm (BP Cuff), sitting);  sCr: 0.71 mg/dL;  GFR: 130.84O2 " Sat: 98 % (room air)        Repeat:     1:7:50 PM  P:   103bpm    Exams:     PHYSICAL EXAM:     GENERAL: vital signs recorded - well developed, well nourished, obese;  well groomed;  no apparent distress;     EYES: extraocular movements intact; conjunctiva and cornea are normal; PERRLA;     RESPIRATORY: mildly tachypneic; inspiratory and expiratory wheezing in the CHARO and left mid-lung field;     CARDIOVASCULAR: mildly tachycardic;  rhythm is regular;  no systolic murmur; no edema;     BREAST/INTEGUMENT: no rash or jaundice;     MUSCULOSKELETAL: normal gait; normal overall tone     NEUROLOGIC: mental status: alert and oriented x 3;     PSYCHIATRIC:  appropriate affect and demeanor; normal speech pattern; grossly normal memory;         Procedures:     Moderate persistent asthma, uncomplicated    1. Kenalog 60 mg given IM in the right hip; administered by skip;  lot number [id number]; expires 2/22             Assessment:         R06.02   Shortness of breath       J45.40   Moderate persistent asthma, uncomplicated       R00.0   Tachycardia, unspecified           ORDERS:         Meds Prescribed:       [New Rx] budesonide-formoterol 160-4.5 mcg/actuation Inhalation HFA Aerosol Inhaler [inhale 2 puffs by inhalation route 2 times per day in the morning and evening], #10.2 (ten point two) grams, Refills: 5 (five)       [New Rx] levoFLOXacin 500 mg oral tablet [take 1 tablet (500 mg) by oral route every 24 hours], #5 (five) tablets, Refills: 0 (zero)         Other Orders:       98826  Noninvasive ear or pulse oximetry for oxygen saturation; single determination  (In-House)            06348  Therapeutic injection  (In-House)              Kenalog, per 10 mg  (x6)                  Plan:         Shortness of breathPt's normal rx from previous specialist did not help symptoms. Pt to complete rx abx and take probiotic that she has at home. Potential s/e of medication discussed. Pt to go to ED with any soa or cp. Pt v/u and had no  further question upon d/c.           Prescriptions:       [New Rx] levoFLOXacin 500 mg oral tablet [take 1 tablet (500 mg) by oral route every 24 hours], #5 (five) tablets, Refills: 0 (zero)           Orders:       [id number]  Noninvasive ear or pulse oximetry for oxygen saturation; single determination  (In-House)              Moderate persistent asthma, uncomplicatedKeep scheduled appt with allergy and asthma. May send to pulmonary if no improvement. F/u in 3 weeks. Pt v/u.     Steroids Kenalog 60 mg 1.5 ml           Prescriptions:       [New Rx] budesonide-formoterol 160-4.5 mcg/actuation Inhalation HFA Aerosol Inhaler [inhale 2 puffs by inhalation route 2 times per day in the morning and evening], #10.2 (ten point two) grams, Refills: 5 (five)           Orders:       [id number]  Therapeutic injection  (In-House)              Kenalog, per 10 mg  (x6)          Tachycardia, unspecifiedincrease fluid intake. Go to ED with any chest pain/soa. Pt v/u and had no further questions.             Charge Capture:         Primary Diagnosis:     R06.02  Shortness of breath           Orders:      48193  Office/outpatient visit; established patient, level 4  (In-House)            43794  Noninvasive ear or pulse oximetry for oxygen saturation; single determination  (In-House)              J45.40  Moderate persistent asthma, uncomplicated           Orders:      07904  Therapeutic injection  (In-House)              Kenalog, per 10 mg  (x6)          R00.0  Tachycardia, unspecified

## 2021-05-18 NOTE — PROGRESS NOTES
Karey Lopez  1975     Office/Outpatient Visit    Visit Date: Sat, 2021 10:44 am    Provider: Vijay Rodriguze MD (Assistant: Ximena Oneil MA)    Location: [facility]        Electronically signed by Vijay Rodriguez MD on  2021 08:09:22 AM                             Subjective:        CC: sore throat    HPI:       Karey developed sore throat in the last day.  It was somewhat sore yesterday, but she says that is 'killing' her today.  She has some associated R ear pain.  She has noted 'blisters' on her tonsils.  She is prone to strep throat.  She denies fever or chills with this.  She also has some headache.  She has only been well for about 5 days.  She has asthma and took Levaquin recently because she was struggling with asthma.  She was on another antibiotic prior to that.  She has also been on steroids for about 3 weeks.    ROS:     CONSTITUTIONAL:  Negative for chills and fever.      CARDIOVASCULAR:  Negative for chest pain and palpitations.      GASTROINTESTINAL:  Negative for abdominal pain, nausea and vomiting.      INTEGUMENTARY/BREAST:  Negative for atypical mole(s) and rash.          Past Medical History / Family History / Social History:         Last Reviewed on 2021 09:51 AM by Maria Esther Garza    Past Medical History: normal echocardiogram and stress test 2018. 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     lichen sclerosis - 2019         GYNECOLOGICAL HISTORY:         Sleep Apnea: dx'd in may 2020; (+) sleep study;         PREVENTIVE HEALTH MAINTENANCE             MAMMOGRAM: Done within last 2 years and results in are chart was last done 2021 with normal results     PAP SMEAR: was last done  with normal results             PAST MEDICAL HISTORY     Hospitalizations:    Asthma admitted once on 17, last admit date 2020, (right knee sepsis and left knee  "arthroscopy)         CURRENT MEDICAL PROVIDERS:    Allergist    Obstetrician/Gynecologist: [facility]    podiatry dr dick freedman Chronic pain specialist         Surgical History:         Cholecystectomy    Fracture Repair: ankle;  and 3-2015;     Tubal Ligation Other Surgeries    lap/band   removed 2019 (scar tissue);    right foot surgery  & 17;    left knee arthroscopy 2020;     Procedures:    Colonoscopy (  normal )    EGD ( /small hiatal hernia and 2018 mild gastitis )         Family History:     Father:  at age 67; Cause of death was stroke     Mother:  at age 47; Cause of death was CAD;  Asthma;  Type 2 Diabetes     Brother(s): 2 brother(s) total;  Myocardial Infarction (  at 42 );  Obesity     Son(s): 2 son(s) total     Paternal Grandfather:  at age 94     Paternal Grandmother:  at age 60's; Cause of death was stroke     Maternal Grandfather:  at age 63; Cause of death was lung cancer     Maternal Grandmother:  at age 50; Cause of death was cervical cancer         Social History:     Occupation: [employer]      Marital Status:      Children: 2 children         Tobacco/Alcohol/Supplements:     Last Reviewed on 2021 09:51 AM by Maria Esther Garza    Tobacco: She has never smoked.          Alcohol: Frequency:    \"just rarely\";         Substance Abuse History:     Last Reviewed on 10/13/2020 02:14 PM by Maria Esther Garza        Mental Health History:     Last Reviewed on 2017 04:36 PM by Malorie Arzate        Communicable Diseases (eg STDs):     Last Reviewed on 2017 04:36 PM by Malorie Arzate        Current Problems:     Last Reviewed on 2021 09:51 AM by Maria Esther Garza    Moderate persistent asthma, uncomplicated    Essential (primary) hypertension    Allergic rhinitis, unspecified    Persistent mood [affective] disorder, unspecified    Family history of " ischemic heart disease and other diseases of the circulatory system    Obesity, unspecified    Other fatigue    Vitamin D deficiency, unspecified    Sleep apnea, unspecified    Allergic rhinitis    Otitis media, unspecified, right ear    Encounter for screening mammogram for malignant neoplasm of breast    Pain in unspecified joint    Unspecified asthma with (acute) exacerbation    Cough variant asthma    Tachycardia, unspecified    Shortness of breath        Immunizations:     influenza, injectable, quadrivalent, preservative free (FLUZONE QUAD 0410-6237) 12/20/2019    influenza, injectable, quadrivalent, preservative free (FLUZONE QUAD 1699-5024) 11/30/2020    zzFluzone pf-quadrivalent 3 and up 11/24/2014    zzFluzone pf-quadrivalent 3 and up 11/12/2015    zzFluzone pf-quadrivalent 3 and up 12/19/2016    zzFluzone pf-quadrivalent 3 and up 12/6/2017    Fluzone (3 + years dose) 10/23/2012    Fluzone pf (3+ years dose) 1/8/2014    Fluzone Quadrivalent (3+ years) 12/13/2018        Allergies:     Last Reviewed on 1/19/2021 09:51 AM by Maria Esther Garza    Viibryd:   (Adverse Reaction)        Current Medications:     Last Reviewed on 1/19/2021 09:51 AM by Maria Esther Garza    ibuprofen 800 mg oral tablet [take 1 tablet (800 mg) by oral route 3 times per day with food]    Vitamin D2     Alubuterol   [neb PRN]    montelukast 10 mg oral tablet [TAKE 1 TABLET DAILY]    escitalopram oxalate 20 mg oral tablet [TAKE 1 AND 1/2 TABLETS     DAILY]    allergy shot once every 2 to 28 days     buPROPion  mg oral Tablet, Extended Release 24 hr [TAKE 1 TABLET DAILY]    Ventolin HFA 90mcg/1actuation Oral Inhaler [Inhale 2 puff(s) by mouth 4 times a day as needed]    lisinopriL 30 mg oral tablet [take 1 tablet (30 mg) by oral route once daily]    ibuprofen 800 mg oral tablet [take 1 tablet (800 mg) by oral route 3 times per day with food PRN]    budesonide-formoterol 160-4.5 mcg/actuation Inhalation HFA Aerosol Inhaler [inhale 2  puffs by inhalation route 2 times per day in the morning and evening]    levoFLOXacin 500 mg oral tablet [take 1 tablet (500 mg) by oral route every 24 hours]        Objective:        Vitals:         Current: 2/6/2021 10:52:25 AM    Ht:  5 ft, 4 in;  Wt: 295.8 lbs;  BMI: 50.8T: 96.9 F (temporal);  BP: 142/73 mm Hg (left arm, sitting);  P: 89 bpm (left arm (BP Cuff), sitting);  sCr: 0.71 mg/dL;  GFR: 130.67        Exams:     PHYSICAL EXAM:     GENERAL: vital signs recorded - well developed,  morbidly obese;  no apparent distress;     EYES: extraocular movements intact; conjunctiva and cornea are normal; PERRL;     E/N/T: EARS:  normal external auditory canals and tympanic membranes;  grossly normal hearing; OROPHARYNX: posterior pharynx shows there is some ulceration and redness noted on the L soft palate and posterior pharynx;     NECK: range of motion is normal; thyroid is non-palpable;     RESPIRATORY: normal respiratory rate and pattern with no distress; normal breath sounds with no rales, rhonchi, wheezes or rubs;     CARDIOVASCULAR: normal rate; rhythm is regular;  no systolic murmur; no edema;     GASTROINTESTINAL: nontender; normal bowel sounds; no organomegaly;     LYMPHATIC: no enlargement of cervical or facial nodes; no supraclavicular nodes;     BREAST/INTEGUMENT: SKIN: no significant rashes or lesions; no suspicious moles;     NEUROLOGIC: mental status: alert and oriented x 3; cranial nerves II-XII grossly intact;     PSYCHIATRIC: appropriate affect and demeanor; normal psychomotor function;         Lab/Test Results:         Rapid Strep Screen: Negative (02/06/2021),     Performed by:: tls (02/06/2021),             Assessment:         J02.9   Acute pharyngitis, unspecified           ORDERS:         Meds Prescribed:       [New Rx] Diflucan 150 mg oral tablet [take 1 tablet (150 mg) by oral route once - may repeat in 1 week if needed], #2 (two) tablets, Refills: 0 (zero)       [New Rx] nystatin 100,000  unit/mL oral Suspension [take 5 milliliters (500,000 unit) by oral route 4 times per day], #200 (two hundred) milliliters, Refills: 1 (one)         Lab Orders:       [id number]  Group A Streptococcus detection by immunoassay with direct optical observation  (In-House)            05907  [facility] Throat culture, strep  (Send-Out)                      Plan:         Acute pharyngitis, unspecified        RECOMMENDATIONS given include: Based on the recent history and negative strep test, she very likely has yeast pharyngitis.  I'm going to cover her as noted below. I did recommend she not take Lexapro the day prior to and the day she take Diflucan given potential risk for irregular heartbeat.  We will start her on Nystatin immediately though.  She should continue to rinse her mouth out after use of Symbicort.  She might want to skip that medicine for a couple of days as well given the findings..            Prescriptions:       [New Rx] Diflucan 150 mg oral tablet [take 1 tablet (150 mg) by oral route once - may repeat in 1 week if needed], #2 (two) tablets, Refills: 0 (zero)       [New Rx] nystatin 100,000 unit/mL oral Suspension [take 5 milliliters (500,000 unit) by oral route 4 times per day], #200 (two hundred) milliliters, Refills: 1 (one)           Orders:       [id number]  Group A Streptococcus detection by immunoassay with direct optical observation  (In-House)            26614  [facility] Throat culture, strep  (Send-Out)                  Charge Capture:         Primary Diagnosis:     J02.9  Acute pharyngitis, unspecified           Orders:      [id number]  Office/outpatient visit; established patient, level 3  (In-House)            03696  Group A Streptococcus detection by immunoassay with direct optical observation  (In-House)

## 2021-05-18 NOTE — PROGRESS NOTES
Karey LopezZak 1975     Office/Outpatient Visit    Visit Date:  06:27 pm    Provider: Malorie Arzate N.P. (Assistant: Stacey Bautista MA)    Location: [facility]        Electronically signed by Malorie Arzate N.P. on  2019 12:53:35 PM                             SUBJECTIVE:        CC:     Mrs. Lopez is a 44 year old White female.  presents today due to complaints of right ear pain, cough X 5 days         HPI:         Patient complains of upper respiratory illness.  These have been present for the past 5 days.  The symptoms include cough, ear pain in right ear and scratchy throat.  She has already tried to relieve the symptoms with Advil cold and sinus, honey, Bromfed DM, Albuterol inhaler.      ROS:     CONSTITUTIONAL:  Negative for chills and fever.      EYES:  Negative for eye drainage and eye pain.      E/N/T:  Positive for ear pain ( right ), sinus pressure and scratchy throat.      CARDIOVASCULAR:  Negative for chest pain and palpitations.      RESPIRATORY:  Positive for recent cough and frequent wheezing.      GASTROINTESTINAL:  Negative for abdominal pain and vomiting.          PMH/FMH/SH:     Last Reviewed on 2017 06:37 PM by Karey Allen    Past Medical History: normal echocardiogram and stress test 2018. lj             GYNECOLOGICAL HISTORY:             PREVENTIVE HEALTH MAINTENANCE             MAMMOGRAM: Done within last 2 years and results in are chart was last done 2018 with normal results     PAP SMEAR: was last done  with normal results             PAST MEDICAL HISTORY     Hospitalizations:    Asthma admitted once on 17         CURRENT MEDICAL PROVIDERS:    Allergist    podiatry dr dick freedman [facility] pain specialist         Surgical History:         Cholecystectomy    Fracture Repair: ankle;  and 3-2015;     Tubal Ligation Other Surgeries    lap/band ;    right foot surgery  & 17;      "Procedures:    Colonoscopy (  normal )    EGD ( /small hiatal hernia and 2018 mild gastitis )         Family History:     Father:  at age 67; Cause of death was stroke     Mother:  at age 47; Cause of death was CAD;  Asthma;  Type 2 Diabetes     Brother(s): 2 brother(s) total;  Myocardial Infarction (  at 42 );  Obesity     Son(s): 2 son(s) total     Paternal Grandfather:  at age 94     Paternal Grandmother:  at age 60's; Cause of death was stroke     Maternal Grandfather:  at age 63; Cause of death was lung cancer     Maternal Grandmother:  at age 50; Cause of death was cervical cancer         Social History:     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          Alcohol: Frequency:    \"just rarely\";         Substance Abuse History:     Last Reviewed on 2017 04:36 PM by Malorie Arzate        Mental Health History:     Last Reviewed on 2017 04:36 PM by Malorie Arzate        Communicable Diseases (eg STDs):     Last Reviewed on 2017 04:36 PM by Malorie Arzate            Current Problems:     Last Reviewed on 2017 04:36 PM by Malorie Arzate    Family history of ischemic heart disease     Vitamin D deficiency, unspecified     Malaise and Fatigue     Anxiety with depression     Allergic rhinitis     Hypertension     Asthma     Simple obesity     Dysuria         Immunizations:     zzFluzone pf-quadrivalent 3 and up 2014     zzFluzone pf-quadrivalent 3 and up 2015     zzFluzone pf-quadrivalent 3 and up 2016     zzFluzone pf-quadrivalent 3 and up 2017     Fluzone (3 + years dose) 10/23/2012     Fluzone pf (3+ years dose) 2014     Fluzone Quadrivalent (3+ years) 2018         Allergies:     Last Reviewed on 2018 " 04:51 PM by Spurling, Sarah C      No Known Drug Allergies.     Viibryd: (Adverse Reaction)        Current Medications:     Last Reviewed on 11/06/2018 04:55 PM by Spurling, Sarah C    Singulair  10mg Tablet Take 1 tablet(s) by mouth daily     Lexapro 20mg Tablet 1 1/2 daily     Lisinopril 20mg Tablet Take 1 tablet(s) by mouth daily     Ventolin HFA 90mcg/1actuation Oral Inhaler Inhale 2 puff(s) by mouth 4 times a day as needed     Vimovo 500mg/20mg Tablets, Delayed Release Take 1 tablet(s) by mouth bid     allergy shot once every 2 to 28 days         OBJECTIVE:        Vitals:         Historical:     11/06/2018  BP:   149/89 mm Hg ( (right arm, , sitting, );)     03/20/2018  BP:   108/68 mm Hg ( (left arm, , sitting, );)         Current: 2/6/2019 6:31:03 PM    Ht:  5 ft, 4 in;  Wt: 252 lbs;  BMI: 43.3    T: 98.8 F (oral);  BP: 95/67 mm Hg (right arm, sitting);  P: 125 bpm (right arm (BP Cuff), sitting);  sCr: 0.74 mg/dL;  GFR: 119.54    O2 Sat: 98 % (room air)        Repeat:     7:26:16 PM     P:   118bpm        Exams:     PHYSICAL EXAM:     GENERAL: well developed, well nourished;  no apparent distress;     EYES: PERRL, EOMI     E/N/T: EARS: external auditory canal normal;  both TMs are have fluid behind them;  NOSE: normal turbinates; bilateral maxillary sinus tenderness present; OROPHARYNX: oral mucosa is normal; posterior pharynx shows no exudate and post nasal drip;     NECK: range of motion is normal; trachea is midline;     RESPIRATORY: normal appearance and symmetric expansion of chest wall; normal respiratory rate and pattern with no distress; diffuse expiratory wheezes;     CARDIOVASCULAR: normal rate; rhythm is regular;     GASTROINTESTINAL: nontender; normal bowel sounds; no organomegaly;     MUSCULOSKELETAL: normal gait;     NEUROLOGIC: mental status: alert and oriented x 3; GROSSLY INTACT     PSYCHIATRIC: appropriate affect and demeanor;         ASSESSMENT           466.0   J20.9  Acute bronchitis               DDx:         ORDERS:         Meds Prescribed:       Prednisone 10mg Tablet 4 tablets once daily for 5 days  #20 (Twenty) tablet(s) Refills: 0       Promethazine with Dextromethrophan 6.25mg/15mg per 5ml Syrup 1 tsp p.o. q 4-6 hrs. prn cough/nausea  #4 (Four) oz Refills: 0       Doxycycline Monohydrate 100mg Capsules Take 1 capsule(s) by mouth bid x10 days  #20 (Twenty) capsule(s) Refills: 0                 PLAN:          Acute bronchitis ER precautions given. Advised to follow up when well for recheck of heart rate and blood pressure. Heart rate may be elevated due to use of Albuterol.         RECOMMENDATIONS given include: Push Fluids, Rest, Follow up if no improvement or worsening symptoms like high fevers, vomiting, weakness, or increasing shortness of air.    .      FOLLOW-UP: Schedule follow-up appointments on a p.r.n. basis. Chronic visit follow up           Prescriptions: Advised that cough medication may cause drowsiness. Will treat with antibiotics based on exam findings and medical history.       Prednisone 10mg Tablet 4 tablets once daily for 5 days  #20 (Twenty) tablet(s) Refills: 0       Promethazine with Dextromethrophan 6.25mg/15mg per 5ml Syrup 1 tsp p.o. q 4-6 hrs. prn cough/nausea  #4 (Four) oz Refills: 0       Doxycycline Monohydrate 100mg Capsules Take 1 capsule(s) by mouth bid x10 days  #20 (Twenty) capsule(s) Refills: 0             Patient Recommendations:        For  Acute bronchitis:     Schedule follow-up appointments as needed.              CHARGE CAPTURE           **Please note: ICD descriptions below are intended for billing purposes only and may not represent clinical diagnoses**        Primary Diagnosis:         466.0 Acute bronchitis            J20.9    Acute bronchitis, unspecified              Orders:          28876   Office/outpatient visit; established patient, level 3  (In-House)

## 2021-05-18 NOTE — PROGRESS NOTES
Karey Lopez  1975     Office/Outpatient Visit    Visit Date: Fri, Dec 20, 2019 01:03 pm    Provider: Waldemar Casas MD (Assistant: Naa Rodrigues MA)    Location: [facility]        Electronically signed by Waldemar Casas MD on  2019 03:51:08 PM                             Subjective:        CC: Mrs. Lopez is a 44 year old White female.  She presents with congestion, sinus pressure and ears feel muffled..          HPI:       Pt has sinus pressure and drainage for 3 weeks, feels as if she can't hear well. Not much of a cough. No fever or chills. She has been on bedrest since 12/10 for ACL reconstruction. Mainly nasal and sinus congestion and ear congestion, a little sore throat. She is on singulair, allergy shots, and zyrtec. Not flonase or mucinex. She has used a neti pot just once or twice.     ROS:     CONSTITUTIONAL:  Negative for fatigue and fever.      EYES:  Negative for blurred vision.      E/N/T:  Positive for diminished hearing, nasal congestion, sinus pressure and sore throat.      CARDIOVASCULAR:  Negative for chest pain and palpitations.      RESPIRATORY:  Negative for recent cough and dyspnea.      GASTROINTESTINAL:  Negative for abdominal pain, constipation, diarrhea, nausea and vomiting.      MUSCULOSKELETAL:  Positive for arthralgias (right knee surgery).   Negative for myalgias.      NEUROLOGICAL:  Negative for paresthesias and weakness.      PSYCHIATRIC:  Negative for anxiety, depression and sleep disturbance.          Past Medical History / Family History / Social History:         Last Reviewed on 2019 08:57 AM by Sunita Hylton    Past Medical History: normal echocardiogram and stress test 2018. lj             GYNECOLOGICAL HISTORY:             PREVENTIVE HEALTH MAINTENANCE             MAMMOGRAM: Done within last 2 years and results in are chart was last done 19 with normal results     PAP SMEAR: was last done  with normal  "results             PAST MEDICAL HISTORY     Hospitalizations:    Asthma admitted once on 17         CURRENT MEDICAL PROVIDERS:    Allergist    podiatry dr dick freedman Chronic pain specialist         Surgical History:         Cholecystectomy    Fracture Repair: ankle;  and 3-2015;     Tubal Ligation Other Surgeries    lap/band   removed 2019 (scar tissue);    right foot surgery  & 17;     Procedures:    Colonoscopy (  normal )    EGD ( /small hiatal hernia and 2018 mild gastitis )         Family History:     Father:  at age 67; Cause of death was stroke     Mother:  at age 47; Cause of death was CAD;  Asthma;  Type 2 Diabetes     Brother(s): 2 brother(s) total;  Myocardial Infarction (  at 42 );  Obesity     Son(s): 2 son(s) total     Paternal Grandfather:  at age 94     Paternal Grandmother:  at age 60's; Cause of death was stroke     Maternal Grandfather:  at age 63; Cause of death was lung cancer     Maternal Grandmother:  at age 50; Cause of death was cervical cancer         Social History:     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          Alcohol: Frequency:    \"just rarely\";         Substance Abuse History:     Last Reviewed on 2017 04:36 PM by Malorie Arzate        Mental Health History:     Last Reviewed on 2017 04:36 PM by Malorie Arzate        Communicable Diseases (eg STDs):     Last Reviewed on 2017 04:36 PM by Malorie Arzate        Current Problems:     Last Reviewed on 2017 04:36 PM by Malorie Arzate    Moderate persistent asthma, uncomplicated    Simple obesity    Asthma    Hypertension    Essential (primary) hypertension    Allergic rhinitis, unspecified    Allergic rhinitis    " Persistent mood [affective] disorder, unspecified    Anxiety with depression    Malaise and Fatigue    Vitamin D deficiency, unspecified    Vitamin D deficiency, unspecified    Dysuria    Dysuria    Family history of ischemic heart disease    Family history of ischemic heart disease and other diseases of the circulatory system    Fatigue    Obesity, unspecified    Other fatigue    Obesity, NOS        Immunizations:     zzFluzone pf-quadrivalent 3 and up 11/24/2014    zzFluzone pf-quadrivalent 3 and up 11/12/2015    zzFluzone pf-quadrivalent 3 and up 12/19/2016    zzFluzone pf-quadrivalent 3 and up 12/6/2017    Fluzone (3 + years dose) 10/23/2012    Fluzone pf (3+ years dose) 1/8/2014    Fluzone Quadrivalent (3+ years) 12/13/2018        Allergies:     Last Reviewed on 7/26/2019 08:52 AM by Spurling, Sarah C    No Known Allergies.    Viibryd:   (Adverse Reaction)        Current Medications:     Last Reviewed on 10/10/2019 05:40 PM by Six, Team    Albuterol 90mcg/1actuation Oral Inhaler [1 puff daily PRN]    Singulair  10mg Tablet [Take 1 tablet(s) by mouth daily]    Lexapro 20mg Tablet [1 1/2 daily]    Lisinopril 20mg Tablet [Take 1 tablet(s) by mouth daily]    allergy shot once every 2 to 28 days    Bupropion HCl 150mg Tablets, Extended Release [1 tab daily]    Ventolin HFA 90mcg/1actuation Oral Inhaler [Inhale 2 puff(s) by mouth 4 times a day as needed]        Objective:        Vitals:         Current: 12/20/2019 1:09:14 PM    Ht:  5 ft, 4 in;  Wt: 289 lbs;  BMI: 49.6T: 98.4 F (oral);  BP: 142/73 mm Hg (left arm, sitting);  P: 96 bpm (left arm (BP Cuff), sitting);  sCr: 0.66 mg/dL;  GFR: 142.07        Exams:     PHYSICAL EXAM:     GENERAL: vital signs recorded - obese;  well groomed;  no apparent distress;     E/N/T: EARS: bilateral TMs are normal;  NOSE: normal nasal mucosa; bilateral maxillary sinus tenderness present; OROPHARYNX: posterior pharynx, including tonsils, tongue, and uvula are normal;     NECK:  thyroid is non-palpable;     RESPIRATORY: normal respiratory rate and pattern with no distress; normal breath sounds with no rales, rhonchi, wheezes or rubs;     CARDIOVASCULAR: normal rate; rhythm is regular;     LYMPHATIC: no enlargement of cervical or facial nodes;     MUSCULOSKELETAL:  Normal range of motion, strength and tone;     NEUROLOGIC: mental status: alert and oriented x 3; GROSSLY INTACT     PSYCHIATRIC:  appropriate affect and demeanor; normal speech pattern; grossly normal memory;         Procedures:     Encounter for immunization    Regarding contraindications to an Influenza vaccine: Denies moderate/severe illness with/without fever; serious reaction to eggs, egg proteins, gentamicin, gelatin, arginine, neomycin or polymixin; serious reaction after recieving previous influenza vaccines; and history of Guillain-[place] Syndrome.              Assessment:         J01.00   Acute maxillary sinusitis, unspecified       Z23   Encounter for immunization           ORDERS:         Meds Prescribed:       [New Rx] azithromycin 500 mg oral tablet [take 1 tablet (500 mg) by oral route once daily], #3 (three) tablets, Refills: 0 (zero)         Procedures Ordered:       [id number]  Immunization administration; one vaccine  (In-House)              Other Orders:       86696  Influenza virus vaccine, quadrivalent, split virus, preservative free 3 years of age & older  (In-House)                      Plan:         Acute maxillary sinusitis, unspecifiedPresentation c/w mucous plugging of sinuses but Sx have been refractory to allergy shots, zyrtec, and singulair and have been present for 3 weeks so antibiotics are warranted. Azith is sent and if not effective I will call in augmentin. Pt is advised to use flonase and neti pot and consider ENT referral given recurrent sinusitis.           Prescriptions:       [New Rx] azithromycin 500 mg oral tablet [take 1 tablet (500 mg) by oral route once daily], #3 (three) tablets, Refills:  0 (zero)         Encounter for immunization          Immunizations:       38039  Immunization administration; one vaccine  (In-House)            44620  Influenza virus vaccine, quadrivalent, split virus, preservative free 3 years of age & older  (In-House)                Dose (ml): 0.5  Site: left arm  Route: intramuscular  Administered by: Naa Rodrigues          : [employer] Paste[employer]  Lot #: [id number]  Exp: 06/30/2020          NDC: [address]-20            Charge Capture:         Primary Diagnosis:     J01.00  Acute maxillary sinusitis, unspecified           Orders:      65148  Office/outpatient visit; established patient, level 3  (In-House)              Z23  Encounter for immunization           Orders:      25210  Immunization administration; one vaccine  (In-House)            88899  Influenza virus vaccine, quadrivalent, split virus, preservative free 3 years of age & older  (In-House)

## 2021-05-18 NOTE — PROGRESS NOTES
"Karey Lopez 1975     Office/Outpatient Visit    Visit Date: Tue, Nov 6, 2018 04:50 pm    Provider: Sunita Hylton N.P. (Assistant: Sarah Spurling, MA)    Location: [facility]        Electronically signed by Sunita Hylton N.P. on  11/07/2018 10:28:05 PM                             SUBJECTIVE:        CC:     Mrs. Lopez is a 43 year old White female.  This is a follow-up visit.  sinus infection and med refills.;         HPI: seen with liza zapata student         Mrs. Lopez presents with anxiety with depression.  Mrs. Lopez presents with anxiety with depression.  Additionally, she presents with history of anxiety with depression.  doing well on wellbutrin and lexapro.  denies side effects.  requests refills.          Additionally, she presents with history of asthma.  in regard to the asthma, additionally, she presents with history of asthma.  dx with asthma; needs refills on singulair.    asthma is well controlled.          Additionally, she presents with history of hypertension.  her current cardiac medication regimen includes an ACE inhibitor ( lisinopril ).  She did not bring her blood pressure diary, but says that pressures have been okay.  She is tolerating the medication well without side effects.  Compliance with treatment has been good.          Concerning headache, onset was three days ago.  The location is primarily temporal and frontal.  It does not radiate.  She has had prior headaches similar to this one.  She characterizes it as moderate in severity, aching, and \"sinus pressure\".  Associated symptoms include allergy symptoms, rhinorrhea and sinus congestion.  Pertinent past medical history includes frequent sinusitis.  only zpack helps when she gets sinus infections.  last taken 2017 per allergist.  wants zpack today.  zyrtec , advil sinus not helping.      ROS:     CONSTITUTIONAL:  Negative for chills and fever.      EYES:  Positive for blurred vision ( " "bilaterally; always happens with h/a ).      E/N/T:  Positive for nasal congestion and frequent rhinorrhea.   Negative for diminished hearing.      CARDIOVASCULAR:  Negative for chest pain and palpitations.      RESPIRATORY:  Negative for recent cough and dyspnea.      GASTROINTESTINAL:  Negative for abdominal pain, nausea and vomiting.      MUSCULOSKELETAL:  Positive for arthralgias (mid foot pain).   Negative for myalgias.      INTEGUMENTARY/BREAST:  Negative for rash.      NEUROLOGICAL:  Positive for headaches ( \"sinus\" ).   Negative for dizziness, fainting, paresthesias, tremor, vertigo or weakness.      PSYCHIATRIC:  Positive for anxiety and depression.          PMH/FMH/SH:     Last Reviewed on 2017 06:37 PM by Karey Allen    Past Medical History: normal echocardiogram and stress test 2018. lj             GYNECOLOGICAL HISTORY:             PREVENTIVE HEALTH MAINTENANCE             MAMMOGRAM: Done within last 2 years and results in are chart was last done 2018 with normal results     PAP SMEAR: was last done  with normal results             PAST MEDICAL HISTORY     Hospitalizations:    Asthma admitted once on 17         CURRENT MEDICAL PROVIDERS:    Allergist    podiatry dr dick freedman Chronic pain specialist         Surgical History:         Cholecystectomy    Fracture Repair: ankle;  and 3-2015;     Tubal Ligation Other Surgeries    lap/band ;    right foot surgery  & 17;     Procedures:    Colonoscopy (  normal )    EGD ( /small hiatal hernia and 2018 mild gastitis )         Family History:     Father:  at age 67; Cause of death was stroke     Mother:  at age 47; Cause of death was CAD;  Asthma;  Type 2 Diabetes     Brother(s): 2 brother(s) total;  Myocardial Infarction (  at 42 );  Obesity     Son(s): 2 son(s) total     Paternal Grandfather:  at age 94     Paternal Grandmother:  at age 60's; Cause of death was " "stroke     Maternal Grandfather:  at age 63; Cause of death was lung cancer     Maternal Grandmother:  at age 50; Cause of death was cervical cancer         Social History:     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          Alcohol: Frequency:    \"just rarely\";         Substance Abuse History:     Last Reviewed on 2017 04:36 PM by Malorie Arzate        Mental Health History:     Last Reviewed on 2017 04:36 PM by Malorie Arzate        Communicable Diseases (eg STDs):     Last Reviewed on 2017 04:36 PM by Malorie Arzate            Current Problems:     Last Reviewed on 2017 04:36 PM by Malorie Arzate    Family history of ischemic heart disease     Vitamin D deficiency, unspecified     Malaise and Fatigue     Anxiety with depression     Allergic rhinitis     Hypertension     Asthma     Simple obesity     Dysuria         Immunizations:     zzFluzone pf-quadrivalent 3 and up 2014     zzFluzone pf-quadrivalent 3 and up 2015     zzFluzone pf-quadrivalent 3 and up 2016     zzFluzone pf-quadrivalent 3 and up 2017     Fluzone (3 + years dose) 10/23/2012     Fluzone pf (3+ years dose) 2014         Allergies:     Last Reviewed on 3/20/2018 05:52 PM by Beverly Lux      No Known Drug Allergies.     Viibryd: (Adverse Reaction)        Current Medications:     Last Reviewed on 3/20/2018 05:53 PM by Beverly Lux    Bupropion HCl 150mg Tablets, Extended Release 1 tab daily     Lexapro 20mg Tablet 1 1/2 daily     Lisinopril 20mg Tablet Take 1 tablet(s) by mouth daily     Singulair  10mg Tablet Take 1 tablet(s) by mouth daily     Ventolin HFA 90mcg/1actuation Oral Inhaler Inhale 2 puff(s) by mouth 4 times a day as needed     Vimovo 500mg/20mg Tablets, Delayed " Release Take 1 tablet(s) by mouth bid     vitamin d     Percocet  7.5mg/325mg Tablet 1 tab qid     allergy shot once every 2 to 28 days         OBJECTIVE:        Vitals:         Historical:     03/20/2018  BP:   108/68 mm Hg ( (left arm, , sitting, );)     03/20/2018  Wt:   262.8lbs        Current: 11/6/2018 4:59:11 PM    Ht:  5 ft, 4 in;  Wt: 254.2 lbs;  BMI: 43.6    T: 98.7 F (oral);  BP: 149/89 mm Hg (right arm, sitting);  P: 99 bpm (right arm (BP Cuff), sitting);  sCr: 0.74 mg/dL;  GFR: 121.20        Exams:     PHYSICAL EXAM:     GENERAL: tired-appearing;     E/N/T: EARS: both TMs are have fluid behind them;  NOSE: nasal mucosa is erythematous;  bilateral maxillary sinus tenderness present; OROPHARYNX: posterior pharynx, including tonsils, tongue, and uvula are normal;     RESPIRATORY: normal respiratory rate and pattern with no distress; normal breath sounds with no rales, rhonchi, wheezes or rubs;     CARDIOVASCULAR: normal rate; rhythm is regular;     LYMPHATIC: no enlargement of cervical or facial nodes;     MUSCULOSKELETAL:  Normal range of motion, strength and tone;     NEUROLOGIC: mental status: alert and oriented x 3; GROSSLY INTACT     PSYCHIATRIC:  appropriate affect and demeanor; normal speech pattern; grossly normal memory;         Procedures:     Allergic rhinitis         Single Allergy Injection exp 6/29/19/ pdr         Headache     1. Toradol 60 mg given IM in the left hip; administered by [facility];  lot number [id number]; expires 02/20             ASSESSMENT           477.9   J30.9  Allergic rhinitis              DDx:     300.4   F34.9  Anxiety with depression              DDx:     493.00   J45.40  Asthma              DDx:     401.1   I10  Hypertension              DDx:     461.0   J01.00  Acute sinusitis, maxillary              DDx:     784.0   R51  Headache              DDx:         ORDERS:         Meds Prescribed:       Refill of: Lexapro (Escitalopram Oxalate) 20mg Tablet 1 1/2 daily  #135 (One  [place]) tablet(s) Refills: 1       Refill of: Ventolin HFA (Albuterol) 90mcg/1actuation Oral Inhaler Inhale 2 puff(s) by mouth 4 times a day as needed  #1 (One) inhaler(s) Refills: 0       Refill of: Lisinopril 20mg Tablet Take 1 tablet(s) by mouth daily  #90 (Ninety) tablet(s) Refills: 1       Zithromax (Azithromycin) 250mg Tablet 2 tablets on day 1, then 1 tablet on days 2-5.  #6 (Six) tablet(s) Refills: 0       Refill of: Bupropion HCl (Bupropion HCl)  150mg Tablets, Sustained Release 1 Daily  #90 (Ninety) tablet(s) Refills: 1         Radiology/Test Orders:       [id number]  Professional services for allergen immunotherapy not including provision of allergenic extracts; sin  (In-House)           Other Orders:       [id number]  Therapeutic injection  (In-House)           Toradol, per 15 mg (x4)                 PLAN:          Allergic rhinitis           Orders:       [id number]  Professional services for allergen immunotherapy not including provision of allergenic extracts; sin  (In-House)            Anxiety with depression           Prescriptions:       Refill of: Lexapro (Escitalopram Oxalate) 20mg Tablet 1 1/2 daily  #135 (One [place] Five) tablet(s) Refills: 1       Refill of: Bupropion HCl (Bupropion HCl)  150mg Tablets, Sustained Release 1 Daily  #90 (Ninety) tablet(s) Refills: 1          Asthma           Prescriptions:       Refill of: Ventolin HFA (Albuterol) 90mcg/1actuation Oral Inhaler Inhale 2 puff(s) by mouth 4 times a day as needed  #1 (One) inhaler(s) Refills: 0          Hypertension         RECOMMENDATIONS given include: decrease consumption of alcohol, perform routine monitoring of blood pressure with home blood pressure cuff, have spouse or friend monitor for loud snoring/sleep apnea, reduction of dietary salt intake, and take medication as prescribed, try not to miss doses.            Prescriptions:       Refill of: Lisinopril 20mg Tablet Take 1 tablet(s) by mouth daily  #90  (Ninety) tablet(s) Refills: 1          Acute sinusitis, maxillary         RECOMMENDATIONS given include: rest, increase oral fluid intake, and follow up if not improving.  requests zpack..            Prescriptions:       Zithromax (Azithromycin) 250mg Tablet 2 tablets on day 1, then 1 tablet on days 2-5.  #6 (Six) tablet(s) Refills: 0          Headache         5/10 consistent.  to ER if not improving or worseining.  do not take any other pain relievers x 6 hrs after dose received today.            Orders:       [id number]  Therapeutic injection  (In-House)                     Toradol, per 15 mg (x4)             Patient Recommendations:Bp was elevated today but she thinks it is related to h/a and being sick. Agrees to check it at home and call if elevated. States Z-pack is only thing that helps her when she gets like this and was not interested in trying other measures. Agrees to try steroid nasal sprays and zyrtec along with z-pack.         For  Hypertension:     Avoid alcohol as it can contribute to elevated blood pressure. Begin monitoring your blood pressure by brief nurse visits at our office, a home blood pressure monitor, or by checking on the machines in pharmacies or stores.  Keep a log of the readings. Obstructive sleep apnea is much more prevalent than historically reported and is a greatly under recognized cause of hypertension. If you have loud snoring, if you wake up tired and feel tired thru out the day, you may have sleep apnea.  One way to suspect this is if a loved one reports that you snore very loudly or if you seem to quit breathing for a time while you are asleep.  If this describes you, you should consult your doctor about have a sleep study performed. Reduce the amount of salt in your diet.          For  Acute sinusitis, maxillary:     Get plenty of rest. Increase oral fluid intake.              CHARGE CAPTURE           **Please note: ICD descriptions below are intended for billing purposes  only and may not represent clinical diagnoses**        Primary Diagnosis:         477.9 Allergic rhinitis            J30.9    Allergic rhinitis, unspecified              Orders:          [id number]   Office/outpatient visit; established patient, level 4  (In-House)             82099   Professional services for allergen immunotherapy not including provision of allergenic extracts; sin  (In-House)           300.4 Anxiety with depression            F34.9    Persistent mood [affective] disorder, unspecified    493.00 Asthma            J45.40    Moderate persistent asthma, uncomplicated    401.1 Hypertension            I10    Essential (primary) hypertension    461.0 Acute sinusitis, maxillary            J01.00    Acute maxillary sinusitis, unspecified    784.0 Headache            R51    Headache              Orders:          [id number]   Therapeutic injection  (In-House)                                           Toradol, per 15 mg (x4)

## 2021-05-18 NOTE — PROGRESS NOTES
Karey Lopez  1975     Office/Outpatient Visit    Visit Date: Thu, Apr 30, 2020 09:50 am    Provider: Sunita Hylton N.P. (Assistant: Sergio Hester, )    Location: [facility]        Electronically signed by Sunita Hylton N.P. on  04/30/2020 02:19:29 PM                             Subjective:        CC: Mrs. Lopez is a 45 year old White female.  This is a follow-up visit.  PHONE CALL [phone] AUDIO        HPI: telephone visit due to covid 19          dx with asthma; needs refills on singulair.    asthma is well controlled.            With regard to the essential (primary) hypertension, additionally, she presents with history of hypertension.  additionally, she presents with history of hypertension.  her current cardiac medication regimen includes an ACE inhibitor ( lisinopril ).  She did not bring her blood pressure diary, but says that pressures have been higher.  most recent reading was 175/86.  She is tolerating the medication well without side effects.  Compliance with treatment has been good.            Anxiety with depression details; additionally, she presents with history of anxiety with depression.  doing well on wellbutrin and lexapro.  denies side effects.  requests refills.      ROS:     CONSTITUTIONAL:  Positive for fatigue ( was not able to complete scheduling for sleep study once contacted by [facility] sleep center.  would like to get scheduled with [facility] sleep center.  has concerns for possible sleep apnea. ).      CARDIOVASCULAR:  Negative for chest pain, palpitations, tachycardia, orthopnea, and edema.      RESPIRATORY:  Negative for cough, dyspnea, and hemoptysis.      NEUROLOGICAL:  Negative for dizziness, headaches, paresthesias, and weakness.      PSYCHIATRIC:  Positive for anxiety ( (stable) ).          Past Medical History / Family History / Social History:         Last Reviewed on 4/30/2020 10:10 AM by Sunita Hylton    Past Medical History: normal  "echocardiogram and stress test 2018. lj             PAST MEDICAL HISTORY         lichen sclerosis - 2019         GYNECOLOGICAL HISTORY:             PREVENTIVE HEALTH MAINTENANCE             MAMMOGRAM: Done within last 2 years and results in are chart was last done 19 with normal results     PAP SMEAR: was last done  with normal results             PAST MEDICAL HISTORY     Hospitalizations:    Asthma admitted once on 17, last admit date 2020, (right knee sepsis and left knee arthroscopy)         CURRENT MEDICAL PROVIDERS:    Allergist    Obstetrician/Gynecologist: [facility]    podiatry dr dick freedman Chronic pain specialist         Surgical History:         Cholecystectomy    Fracture Repair: ankle;  and 3-2015;     Tubal Ligation Other Surgeries    lap/band   removed 2019 (scar tissue);    right foot surgery  & 17;    left knee arthroscopy 2020;     Procedures:    Colonoscopy (  normal )    EGD ( /small hiatal hernia and 2018 mild gastitis )         Family History:     Father:  at age 67; Cause of death was stroke     Mother:  at age 47; Cause of death was CAD;  Asthma;  Type 2 Diabetes     Brother(s): 2 brother(s) total;  Myocardial Infarction (  at 42 );  Obesity     Son(s): 2 son(s) total     Paternal Grandfather:  at age 94     Paternal Grandmother:  at age 60's; Cause of death was stroke     Maternal Grandfather:  at age 63; Cause of death was lung cancer     Maternal Grandmother:  at age 50; Cause of death was cervical cancer         Social History:     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          Alcohol: Frequency:    \"just rarely\";         Substance Abuse History:     Last Reviewed " on 1/16/2017 04:36 PM by Malorie Arzate        Mental Health History:     Last Reviewed on 1/16/2017 04:36 PM by Malorie Arzate        Communicable Diseases (eg STDs):     Last Reviewed on 1/16/2017 04:36 PM by Malorie Arzate        Current Problems:     Last Reviewed on 4/30/2020 10:08 AM by Sunita Hylton    Moderate persistent asthma, uncomplicated    Essential (primary) hypertension    Allergic rhinitis, unspecified    Persistent mood [affective] disorder, unspecified    Family history of ischemic heart disease and other diseases of the circulatory system    Obesity, unspecified    Vitamin D deficiency, unspecified        Immunizations:     influenza, injectable, quadrivalent, preservative free (FLUZONE QUAD [id number]) 12/20/2019    zzFluzone pf-quadrivalent 3 and up 11/24/2014    zzFluzone pf-quadrivalent 3 and up 11/12/2015    zzFluzone pf-quadrivalent 3 and up 12/19/2016    zzFluzone pf-quadrivalent 3 and up 12/6/2017    Fluzone (3 + years dose) 10/23/2012    Fluzone pf (3+ years dose) 1/8/2014    Fluzone Quadrivalent (3+ years) 12/13/2018        Allergies:     Last Reviewed on 12/20/2019 01:09 PM by Naa Rodriguesd:   (Adverse Reaction)        Current Medications:     Last Reviewed on 4/30/2020 09:51 AM by Sergio Hester    Singulair  10mg Tablet [Take 1 tablet(s) by mouth daily]    escitalopram oxalate 20 mg oral tablet [TAKE 1 AND 1/2 TABLETS     DAILY]    allergy shot once every 2 to 28 days     buPROPion  mg oral Tablet, Extended Release 24 hr [TAKE 1 TABLET DAILY]    Ventolin HFA 90mcg/1actuation Oral Inhaler [Inhale 2 puff(s) by mouth 4 times a day as needed]    ibuprofen 800 mg oral tablet [take 1 tablet (800 mg) by oral route 3 times per day with food]    Vitamin D2         Objective:        Vitals:         Current: 4/30/2020 9:53:05 AM    Ht:  5 ft, 4 inT: 97.8 F (oral);  BP: 146/85 mm Hg (left arm, sitting);  P: 83 bpm (left arm (BP Cuff), sitting);  sCr: 0.66 mg/dL;  GFR: 106.36         Assessment:         J45.40   Moderate persistent asthma, uncomplicated       I10   Essential (primary) hypertension       F34.9   Persistent mood [affective] disorder, unspecified       R53.83   Other fatigue           ORDERS:         Meds Prescribed:       [Refilled] escitalopram oxalate 20 mg oral tablet [TAKE 1 AND 1/2 TABLETS     DAILY], #135 (one hundred and thirty five) tablets, Refills: 1 (one)       [Refilled] buPROPion  mg oral Tablet, Extended Release 24 hr [TAKE 1 TABLET DAILY], #90 (ninety) tablets, Refills: 1 (one)       [New Rx] lisinopriL 30 mg oral tablet [take 1 tablet (30 mg) by oral route once daily], #90 (ninety) tablets, Refills: 1 (one)       [Refilled] Singulair  10 mg oral tablet [Take 1 tablet(s) by mouth daily], #90 (ninety) tablets, Refills: 1 (one)         Procedures Ordered:       REFER  Referral to Specialist or Other Facility  (Send-Out)                      Plan:         Moderate persistent asthma, uncomplicated    Telehealth: Verbal consent obtained for visit to occur via phone call; Total time spent was 15 minutes; 74583--Sraavigxr E/M 11-20 minutes           Prescriptions:       [Refilled] Singulair  10 mg oral tablet [Take 1 tablet(s) by mouth daily], #90 (ninety) tablets, Refills: 1 (one)         Essential (primary) hypertension        RECOMMENDATIONS given include: avoid pseudoephedrine or other stimulants/decongestants in common cold remedies, perform routine monitoring of blood pressure with home blood pressure cuff, reduction of dietary salt intake, take medication as prescribed, try not to miss doses, and increase dose of lisinopril.  report dry cough.  wants to see bp 130s over 80s or lower.  follow up if not improving..            Prescriptions:       [New Rx] lisinopriL 30 mg oral tablet [take 1 tablet (30 mg) by oral route once daily], #90 (ninety) tablets, Refills: 1 (one)         Persistent mood [affective] disorder, unspecified          Prescriptions:        [Refilled] escitalopram oxalate 20 mg oral tablet [TAKE 1 AND 1/2 TABLETS     DAILY], #135 (one hundred and thirty five) tablets, Refills: 1 (one)       [Refilled] buPROPion  mg oral Tablet, Extended Release 24 hr [TAKE 1 TABLET DAILY], #90 (ninety) tablets, Refills: 1 (one)         Other fatigue        REFERRALS:  Referral initiated to [facility].            Orders:       REFER  Referral to Specialist or Other Facility  (Send-Out)                  Patient Recommendations:        For  Essential (primary) hypertension:    Certain decongestants and stimulants (like pseudoephedrine) in common cold remedies raise blood pressure, sometimes to dangerously high levels. Never take with MAO inhibitors! Begin monitoring your blood pressure by brief nurse visits at our office, a home blood pressure monitor, or by checking on the machines in pharmacies or stores.  Keep a log of the readings. Reduce the amount of salt in your diet.              Charge Capture:         Primary Diagnosis:     J45.40  Moderate persistent asthma, uncomplicated           Orders:      [id number]  Phys/QHP telephone evaluation 11-20 minutes  (In-House)              I10  Essential (primary) hypertension     F34.9  Persistent mood [affective] disorder, unspecified     R53.83  Other fatigue

## 2021-05-18 NOTE — PROGRESS NOTES
Karey Lopez  1975     Office/Outpatient Visit    Visit Date: Tue, Nov 10, 2020 08:41 am    Provider: Sunita Hylton N.P. (Assistant: Valeria Antoine MA)    Location: [facility]        Electronically signed by Sunita Hylton N.P. on  11/10/2020 08:06:04 PM                             Subjective:        CC: Mrs. Lopez is a 45 year old White female.  This is a follow-up visit.          HPI: Seen with Marivel Allen [employer] [facility] student      Mrs. Lopez feels her asthma has been under control with the singulair. She reports not recent exacerbations.          With regard to the essential (primary) hypertension, her current cardiac medication regimen includes an ACE inhibitor ( lisinopril 30 mg daily ).  Compliance with treatment has been good; she takes her medication as directed and follows up as directed.  She is tolerating the medication well without side effects.  She has not kept a blood pressure diary, but states that pressures have been well controlled.        Patient feels her anxiety and depression are well controlled on escitalopram and buproprion.       Mrs. Lopez reports her fatigue is still present but not worsened and slightly better since starting to wear CPAP    ROS:     CONSTITUTIONAL:  Positive for fatigue ( mild; improved ).   Negative for chills, fever or night sweats.      E/N/T:  Positive for ear pain ( right; tenderness around ear since infection one month ago ).   Negative for nasal congestion, frequent rhinorrhea, sinus pressure or sore throat.      CARDIOVASCULAR:  Negative for chest pain, dizziness, palpitations and pedal edema.      RESPIRATORY:  Negative for recent cough, dyspnea and frequent wheezing.      GASTROINTESTINAL:  Negative for abdominal pain, dysphagia, constipation, diarrhea, heartburn, nausea and vomiting.      GENITOURINARY:  Positive for irregular menstrual cycle (skips period every few cycles).   Negative for dysuria.       MUSCULOSKELETAL:  Positive for right foot pain chronic, improved.      INTEGUMENTARY/BREAST:  Negative for atypical mole(s), pruritis and rash.      NEUROLOGICAL:  Negative for dizziness, headaches and weakness.      ALLERGIC/IMMUNOLOGIC:  Positive for perennial allergies.      PSYCHIATRIC:  Positive for anxiety ( (stable) ).   Negative for depression, feelings of stress or sleep disturbance.          Past Medical History / Family History / Social History:         Last Reviewed on 10/13/2020 02:13 PM by Maria Esther Garza    Past Medical History: normal echocardiogram and stress test 2018. lj             PAST MEDICAL HISTORY         lichen sclerosis -          GYNECOLOGICAL HISTORY:         Sleep Apnea: dx'd in may 2020; (+) sleep study;         PREVENTIVE HEALTH MAINTENANCE             MAMMOGRAM: Done within last 2 years and results in are chart was last done 19 with normal results     PAP SMEAR: was last done  with normal results             PAST MEDICAL HISTORY     Hospitalizations:    Asthma admitted once on 17, last admit date 2020, (right knee sepsis and left knee arthroscopy)         CURRENT MEDICAL PROVIDERS:    Allergist    Obstetrician/Gynecologist: [facility]    podiatry dr dick freedman Chronic pain specialist         Surgical History:         Cholecystectomy    Fracture Repair: ankle;  and 3-2015;     Tubal Ligation Other Surgeries    lap/band   removed 2019 (scar tissue);    right foot surgery  & 17;    left knee arthroscopy 2020;     Procedures:    Colonoscopy (  normal )    EGD ( /small hiatal hernia and 2018 mild gastitis )         Family History:     Father:  at age 67; Cause of death was stroke     Mother:  at age 47; Cause of death was CAD;  Asthma;  Type 2 Diabetes     Brother(s): 2 brother(s) total;  Myocardial Infarction (  at 42 );  Obesity     Son(s): 2 son(s) total     Paternal Grandfather:  at age  "94     Paternal Grandmother:  at age 60's; Cause of death was stroke     Maternal Grandfather:  at age 63; Cause of death was lung cancer     Maternal Grandmother:  at age 50; Cause of death was cervical cancer         Social History:     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          Alcohol: Frequency:    \"just rarely\";         Substance Abuse History:     Last Reviewed on 10/13/2020 02:14 PM by Maria Esther Garza        Mental Health History:     Last Reviewed on 2017 04:36 PM by Malorie Arzate        Communicable Diseases (eg STDs):     Last Reviewed on 2017 04:36 PM by Malorie Arzate        Current Problems:     Last Reviewed on 10/13/2020 02:13 PM by Maria Esther Garza    Moderate persistent asthma, uncomplicated    Essential (primary) hypertension    Allergic rhinitis, unspecified    Persistent mood [affective] disorder, unspecified    Family history of ischemic heart disease and other diseases of the circulatory system    Obesity, unspecified    Vitamin D deficiency, unspecified    Other fatigue    Sleep apnea, unspecified    Allergic rhinitis    Otitis media, unspecified, right ear        Immunizations:     influenza, injectable, quadrivalent, preservative free (FLUZONE QUAD 2195-2216) 2019    zzFluzone pf-quadrivalent 3 and up 2014    zzFluzone pf-quadrivalent 3 and up 2015    zzFluzone pf-quadrivalent 3 and up 2016    zzFluzone pf-quadrivalent 3 and up 2017    Fluzone (3 + years dose) 10/23/2012    Fluzone pf (3+ years dose) 2014    Fluzone Quadrivalent (3+ years) 2018        Allergies:     Last Reviewed on 10/13/2020 02:13 PM by Maria Esther Garza    Viibryd:   (Adverse Reaction)        Current Medications:     Last Reviewed on " 10/13/2020 02:13 PM by Maria Esther Garza    ibuprofen 800 mg oral tablet [take 1 tablet (800 mg) by oral route 3 times per day with food]    Vitamin D2     montelukast 10 mg oral tablet [TAKE 1 TABLET DAILY]    montelukast 10 mg oral tablet [TAKE 1 TABLET DAILY]    escitalopram oxalate 20 mg oral tablet [TAKE 1 AND 1/2 TABLETS     DAILY]    allergy shot once every 2 to 28 days     buPROPion  mg oral Tablet, Extended Release 24 hr [TAKE 1 TABLET DAILY]    Ventolin HFA 90mcg/1actuation Oral Inhaler [Inhale 2 puff(s) by mouth 4 times a day as needed]    lisinopriL 30 mg oral tablet [take 1 tablet (30 mg) by oral route once daily]    Augmentin 875-125 mg oral tablet [take 1 tablet by oral route every 12 hours]    predniSONE 20 mg oral tablet [take 1 tab (20mg )  by oral route once daily]    fluconazole 150 mg oral tablet [take 1 tablet (150 mg) by oral route. May repeat in 72 hours if needed.]        Objective:        Vitals:         Historical:     10/13/2020  BP:   131/76 mm Hg ( (left arm, , sitting, );) 12/20/2019  BP:   142/73 mm Hg ( (left arm, , sitting, );) 10/13/2020  Wt:   275.8lbs    Current: 11/10/2020 8:47:52 AM    Ht:  5 ft, 4 in;  Wt: 279.8 lbs;  BMI: 48.0T: 97.2 F (temporal);  BP: 128/70 mm Hg (left arm, sitting);  P: 77 bpm (left arm (BP Cuff), sitting);  sCr: 0.66 mg/dL;  GFR: 138.70        Exams:     PHYSICAL EXAM:     GENERAL: well developed, well nourished, obese;  well groomed;  no apparent distress;     E/N/T: EARS: external auditory canal normal bilaterally;  left TM is normal, the right TM is has fluid behind it, and bilateral TMs are normal;     NECK: thyroid is non-palpable; carotid exam reveals no bruits;     RESPIRATORY: normal respiratory rate and pattern with no distress; normal breath sounds with no rales, rhonchi, wheezes or rubs;     CARDIOVASCULAR: normal rate; rhythm is regular;  no systolic murmur;    Peripheral Pulses: posterior tibial: 1+ L and 1+ R;  no edema;      LYMPHATIC: no enlargement of cervical or facial nodes; no supraclavicular nodes;     MUSCULOSKELETAL: normal gait;     NEUROLOGIC: mental status: alert and oriented x 3; GROSSLY INTACT     PSYCHIATRIC:  appropriate affect and demeanor; normal speech pattern; grossly normal memory;         Assessment:         J45.40   Moderate persistent asthma, uncomplicated       I10   Essential (primary) hypertension       F34.9   Persistent mood [affective] disorder, unspecified       R53.83   Other fatigue       Z12.31   Encounter for screening mammogram for malignant neoplasm of breast       M25.50   Pain in unspecified joint           ORDERS:         Meds Prescribed:       [Refilled] montelukast 10 mg oral tablet [TAKE 1 TABLET DAILY], #90 (ninety) tablets, Refills: 1 (one)       [Refilled] lisinopriL 30 mg oral tablet [take 1 tablet (30 mg) by oral route once daily], #90 (ninety) tablets, Refills: 1 (one)       [Refilled] escitalopram oxalate 20 mg oral tablet [TAKE 1 AND 1/2 TABLETS     DAILY], #135 (one hundred and thirty five) tablets, Refills: 1 (one)       [Refilled] buPROPion  mg oral Tablet, Extended Release 24 hr [TAKE 1 TABLET DAILY], #90 (ninety) tablets, Refills: 1 (one)       [New Rx] ibuprofen 800 mg oral tablet [take 1 tablet (800 mg) by oral route 3 times per day with food PRN], #90 (ninety) tablets, Refills: 0 (zero)         Radiology/Test Orders:       [id number]  Screening digital breast tomosynthesis bi  (Send-Out)              Lab Orders:       [id number]  HTN - [facility] CMP AND LIPID: [id number], [id number]  (Send-Out)            [id number]  BDCB2 [facility] CBC w/o diff  (Send-Out)                      Plan:         Moderate persistent asthma, uncomplicatedFor asthma, continue singulair daily. Inhalers as needed. Notify office if symptoms worsening or increased need for inhalers.          Prescriptions:       [Refilled] montelukast 10 mg oral tablet [TAKE 1 TABLET DAILY], #90 (ninety) tablets, Refills: 1 (one)         Essential  (primary) hypertensionFor HTN, continue current lisinopril dose. Will check CMP and lipids today as patient is fasting. Continue efforts for weight loss. Check BP at home and keep diary.    LABORATORY:  Labs ordered to be performed today include HTN/Lipid Panel: CMP, Lipid.            Prescriptions:       [Refilled] lisinopriL 30 mg oral tablet [take 1 tablet (30 mg) by oral route once daily], #90 (ninety) tablets, Refills: 1 (one)           Orders:       [id number]  [facility] CMP AND LIPID: [id number], [id number]  (Send-Out)              Persistent mood [affective] disorder, unspecifiedFor mood disorder, will continue current doses of lexapro and wellbutrin. Notify office with any concerns.           Prescriptions:       [Refilled] escitalopram oxalate 20 mg oral tablet [TAKE 1 AND 1/2 TABLETS     DAILY], #135 (one hundred and thirty five) tablets, Refills: 1 (one)       [Refilled] buPROPion  mg oral Tablet, Extended Release 24 hr [TAKE 1 TABLET DAILY], #90 (ninety) tablets, Refills: 1 (one)         Other fatigueFor persistent but improved fatigue, will recheck CBC to trend from July 2019. Continue wearing home CPAP as directed by pulmonology.     LABORATORY:  Labs ordered to be performed today include CBC W/O DIFF.            Orders:       [id number]  [address] CBC w/o diff  (Send-Out)              Encounter for screening mammogram for malignant neoplasm of breast          Orders:       [id number]  Screening digital breast tomosynthesis bi  (Send-Out)              Pain in unspecified joint          Prescriptions:       [New Rx] ibuprofen 800 mg oral tablet [take 1 tablet (800 mg) by oral route 3 times per day with food PRN], #90 (ninety) tablets, Refills: 0 (zero)             Charge Capture:         Primary Diagnosis:     J45.40  Moderate persistent asthma, uncomplicated           Orders:      [id number]  Office/outpatient visit; established patient, level 4  (In-House)              I10  Essential (primary) hypertension     F34.9   Persistent mood [affective] disorder, unspecified     R53.83  Other fatigue     Z12.31  Encounter for screening mammogram for malignant neoplasm of breast     M25.50  Pain in unspecified joint

## 2021-05-18 NOTE — PROGRESS NOTES
Karey Lopez 1975     Office/Outpatient Visit    Visit Date: Fri, Jul 26, 2019 08:48 am    Provider: Sunita Hylton N.P. (Assistant: Sarah Spurling, MA)    Location: [facility]        Electronically signed by Snuita Hylton N.P. on  07/26/2019 03:11:13 PM                             SUBJECTIVE:        CC:     Mrs. Lopez is a 44 year old White female.  This is a follow-up visit.          HPI: mild sleep apnea 2010 .  lmp 3 wks ago         Patient to be evaluated for screening for depression.          Anxiety with depression details; additionally, she presents with history of anxiety with depression.  doing well on wellbutrin and lexapro.  denies side effects.  requests refills.          Dx with asthma; dx with asthma; needs refills on singulair.    asthma is well controlled.          Additionally, she presents with history of hypertension.  additionally, she presents with history of hypertension.  her current cardiac medication regimen includes an ACE inhibitor ( lisinopril ).  She did not bring her blood pressure diary, but says that pressures have been okay.  She is tolerating the medication well without side effects.  Compliance with treatment has been good.          With regard to the fatigue, patient describes for the last 8 months.  This has fluctuated symptoms.  Sleep quality: It is difficult to maintain sleep.  Patient states that she is not depressed.          fatigue is interfering with her wanting to exercise.  has done well with wt watchers previously.  had to have lap band removed due to scar tissue.  does not want gastric sleeve or other baratric procedure.          PHQ-9 Depression Screening: Completed form scanned and in chart; Total Score 5 Alcohol Consumption Screening: Completed form scanned and in chart; Total Score 1     ROS:     CONSTITUTIONAL:  Positive for fatigue and unintentional weight gain.   Negative for chills or fever.      EYES:  Negative for blurred  vision, eye pain, and photophobia.      CARDIOVASCULAR:  Negative for chest pain, palpitations, tachycardia, orthopnea, and edema.      RESPIRATORY:  Negative for cough, dyspnea, and hemoptysis.      GASTROINTESTINAL:  Negative for abdominal pain, heartburn, constipation, diarrhea, and stool changes.      GENITOURINARY:  Negative for dysmenorrhea, dyspareunia, dysuria, vaginal discharge and vaginal itching.      MUSCULOSKELETAL:  Negative for arthralgias and back pain.      NEUROLOGICAL:  Negative for dizziness, headaches, paresthesias, and weakness.      ENDOCRINE:  Negative for hair loss, heat/cold intolerance, polydipsia, and polyphagia.      ALLERGIC/IMMUNOLOGIC:  Positive for seasonal allergies.      PSYCHIATRIC:  Positive for anxiety, danielle depression ( (stable) ) and insomnia.   Negative for suicidal thoughts.          PMH/FMH/SH:     Last Reviewed on 2019 08:57 AM by Sunita Hylton    Past Medical History: normal echocardiogram and stress test 2018. lj             GYNECOLOGICAL HISTORY:             PREVENTIVE HEALTH MAINTENANCE             MAMMOGRAM: Done within last 2 years and results in are chart was last done 2018 with normal results     PAP SMEAR: was last done  with normal results             PAST MEDICAL HISTORY     Hospitalizations:    Asthma admitted once on 17         CURRENT MEDICAL PROVIDERS:    Allergist    podiatry dr dick freedman Chronic pain specialist         Surgical History:         Cholecystectomy    Fracture Repair: ankle;  and 3-2015;     Tubal Ligation Other Surgeries    lap/band   removed 2019 (scar tissue);    right foot surgery  & 17;     Procedures:    Colonoscopy (  normal )    EGD ( /small hiatal hernia and 2018 mild gastitis )         Family History:     Father:  at age 67; Cause of death was stroke     Mother:  at age 47; Cause of death was CAD;  Asthma;  Type 2 Diabetes     Brother(s): 2  "brother(s) total;  Myocardial Infarction (  at 42 );  Obesity     Son(s): 2 son(s) total     Paternal Grandfather:  at age 94     Paternal Grandmother:  at age 60's; Cause of death was stroke     Maternal Grandfather:  at age 63; Cause of death was lung cancer     Maternal Grandmother:  at age 50; Cause of death was cervical cancer         Social History:     Occupation: [employer] / [employer]      Marital Status:      Children: 2 children         Tobacco/Alcohol/Supplements:     Last Reviewed on 2019 08:52 AM by Spurling, Sarah C    Tobacco: She has never smoked.          Alcohol: Frequency:    \"just rarely\";         Substance Abuse History:     Last Reviewed on 2017 04:36 PM by Malorie Arzate        Mental Health History:     Last Reviewed on 2017 04:36 PM by Malorie Arzate        Communicable Diseases (eg STDs):     Last Reviewed on 2017 04:36 PM by Malorie Arzate            Current Problems:     Last Reviewed on 2017 04:36 PM by Malorie Arzate    Family history of ischemic heart disease     Vitamin D deficiency, unspecified     Malaise and Fatigue     Anxiety with depression     Allergic rhinitis     Hypertension     Asthma     Simple obesity     Dysuria         Immunizations:     zzFluzone pf-quadrivalent 3 and up 2014     zzFluzone pf-quadrivalent 3 and up 2015     zzFluzone pf-quadrivalent 3 and up 2016     zzFluzone pf-quadrivalent 3 and up 2017     Fluzone (3 + years dose) 10/23/2012     Fluzone pf (3+ years dose) 2014     Fluzone Quadrivalent (3+ years) 2018         Allergies:     Last Reviewed on 2019 06:29 PM by Stacey Bautista      No Known Drug Allergies.     Viibryd: (Adverse Reaction)        Current Medications:     Last Reviewed on 2019 08:52 AM by Spurling, Sarah C    Bupropion HCl 150mg Tablets, Extended Release 1 tab daily     Singulair  10mg Tablet Take 1 tablet(s) by " mouth daily     Lexapro 20mg Tablet 1 1/2 daily     Lisinopril 20mg Tablet Take 1 tablet(s) by mouth daily     Ventolin HFA 90mcg/1actuation Oral Inhaler Inhale 2 puff(s) by mouth 4 times a day as needed     allergy shot once every 2 to 28 days         OBJECTIVE:        Vitals:         Historical:     02/06/2019  BP:   95/67 mm Hg ( (right arm, , sitting, );)     11/06/2018  BP:   149/89 mm Hg ( (right arm, , sitting, );)     02/06/2019  Wt:   252lbs    11/06/2018  Wt:   254.2lbs    03/20/2018  Wt:   262.8lbs        Current: 7/26/2019 8:54:42 AM    Ht:  5 ft, 4 in;  Wt: 266.2 lbs;  BMI: 45.7    T: 98.4 F (oral);  BP: 111/56 mm Hg (right arm, sitting);  P: 82 bpm (right arm (BP Cuff), sitting);  sCr: 0.74 mg/dL;  GFR: 122.36        Exams:     PHYSICAL EXAM:     GENERAL: obese;  no apparent distress;     E/N/T: EARS: bilateral TMs are normal;  NOSE: normal nasal mucosa; OROPHARYNX: posterior pharynx, including tonsils, tongue, and uvula are normal;     NECK: thyroid is non-palpable;     RESPIRATORY: normal respiratory rate and pattern with no distress; normal breath sounds with no rales, rhonchi, wheezes or rubs;     CARDIOVASCULAR: normal rate; rhythm is regular;     Peripheral Pulses: posterior tibial: 2+ amplitude, no bruits; no edema;     GENITOURINARY: declines exam;     LYMPHATIC: no enlargement of cervical or facial nodes;     BREAST/INTEGUMENT: declines exam     MUSCULOSKELETAL:  Normal range of motion, strength and tone;     NEUROLOGIC: mental status: alert and oriented x 3; GROSSLY INTACT     PSYCHIATRIC:  appropriate affect and demeanor; normal speech pattern; grossly normal memory;         ASSESSMENT           V79.0   Z13.31  Screening for depression              DDx:     477.9   J30.9  Allergic rhinitis              DDx:     300.4   F34.9  Anxiety with depression              DDx:     493.00   J45.40  Asthma              DDx:     401.1   I10  Hypertension              DDx:     780.79   R53.83  Fatigue               DDx:     V76.10   Z12.39  Screening for breast cancer, unspecified              DDx:     278.00   E66.9  Obesity, NOS              DDx:     268.9   E55.9  Vitamin D deficiency, unspecified              DDx:         ORDERS:         Meds Prescribed:       Refill of: Singulair  (Montelukast Sodium) 10mg Tablet Take 1 tablet(s) by mouth daily  #90 (Ninety) tablet(s) Refills: 1       Refill of: Lexapro (Escitalopram Oxalate)  20mg Tablet 1 1/2 daily  #135 (One [place] [address]) tablet(s) Refills: 1       Refill of: Bupropion HCl (Bupropion HCl)  150mg Tablets, Extended Release 1 tab daily  #90 (Ninety) tablet(s) Refills: 1       Refill of: Lisinopril (Lisinopril)  20mg Tablet Take 1 tablet(s) by mouth daily  #90 (Ninety) tablet(s) Refills: 1         Radiology/Test Orders:       33353  Pro services for allergen immunotherapy not including provision of allergenic extracts; 1 injection  (In-House)         42582  Screening mammography bi 2-view inc CAD  (Send-Out)         3014F  Screening mammography results documented and reviewed (PV)1  (In-House)           Lab Orders:       42004  HTNLP - [facility] CMP AND LIPID: [id number], [id number]  (Send-Out)         41850  BDCB2 - [facility] CBC w/o diff  (Send-Out)         71186  TSH - H TSH  (Send-Out)         83153  VB12 - H Vitamin B12  (Send-Out)         79650  VITD - [facility] Vitamin D, 25 Hydroxy  (Send-Out)           Procedures Ordered:       REFER  Referral to Specialist or Other Facility  (Send-Out)           Other Orders:         Depression screen negative  (In-House)           Negative EtOH screen  (In-House)                   PLAN:          Screening for depression     MIPS PHQ-9 Depression Screening: Completed form scanned and in chart; Total Score 5; Positive Depression Screen but after further evaluation the patient does not have a diagnosis of depression.  Negative alcohol screen           Orders:         Depression screen negative  (In-House)            Negative EtOH screen  (In-House)         3014F  Screening mammography results documented and reviewed (PV)1  (In-House)            Allergic rhinitis           Orders:       87015  Pro services for allergen immunotherapy not including provision of allergenic extracts; 1 injection  (In-House)            Anxiety with depression           Prescriptions:       Refill of: Lexapro (Escitalopram Oxalate)  20mg Tablet 1 1/2 daily  #135 (One [place] [address]) tablet(s) Refills: 1       Refill of: Bupropion HCl (Bupropion HCl)  150mg Tablets, Extended Release 1 tab daily  #90 (Ninety) tablet(s) Refills: 1          Asthma           Prescriptions:       Refill of: Singulair  (Montelukast Sodium) 10mg Tablet Take 1 tablet(s) by mouth daily  #90 (Ninety) tablet(s) Refills: 1          Hypertension     LABORATORY:  Labs ordered to be performed today include HTN/Lipid Panel: CMP, Lipid.            Prescriptions:       Refill of: Lisinopril (Lisinopril)  20mg Tablet Take 1 tablet(s) by mouth daily  #90 (Ninety) tablet(s) Refills: 1           Orders:       11750  HTNLP - [facility] CMP AND LIPID: [id number], [id number]  (Send-Out)            Fatigue     LABORATORY:  Labs ordered to be performed today include CBC W/O DIFF and TSH.      REFERRALS:  Referral initiated to [facility].      states she has noted a flat , white area near labia.  no blister or ulceration.  declines exam.  past due for pap.  could be lichen sclerosis.  she states she will call and schedule appt with dr skip martin's office.            Orders:       27563  BDCB2 - [facility] CBC w/o diff  (Send-Out)         24079  TSH - [facility] TSH  (Send-Out)         65924  VB12 - [facility] Vitamin B12  (Send-Out)         REFER  Referral to Specialist or Other Facility  (Send-Out)            Screening for breast cancer, unspecified           Orders:       84707  Screening mammography bi 2-view inc CAD  (Send-Out)            Obesity, NOS         RECOMMENDATIONS given include: discuss diet  and exercise.  consider use of che over the counter.  warned about GI effects on che.  encourage to join wt watchers again and evaluate fatige..           Vitamin D deficiency, unspecified           Orders:       65982  VITD - [facility] Vitamin D, 25 Hydroxy  (Send-Out)               CHARGE CAPTURE           **Please note: ICD descriptions below are intended for billing purposes only and may not represent clinical diagnoses**        Primary Diagnosis:         V79.0 Screening for depression            Z13.31    Encounter for screening for depression              Orders:          42770   Office/outpatient visit; established patient, level 4  (In-House)                Depression screen negative  (In-House)                Negative EtOH screen  (In-House)             3014F   Screening mammography results documented and reviewed (PV)1  (In-House)           477.9 Allergic rhinitis            J30.9    Allergic rhinitis, unspecified              Orders:          82190   Pro services for allergen immunotherapy not including provision of allergenic extracts; 1 injection  (In-House)           300.4 Anxiety with depression            F34.9    Persistent mood [affective] disorder, unspecified    493.00 Asthma            J45.40    Moderate persistent asthma, uncomplicated    401.1 Hypertension            I10    Essential (primary) hypertension    780.79 Fatigue            R53.83    Other fatigue    V76.10 Screening for breast cancer, unspecified            Z12.39    Encounter for other screening for malignant neoplasm of breast    278.00 Obesity, NOS            E66.9    Obesity, unspecified    268.9 Vitamin D deficiency, unspecified            E55.9    Vitamin D deficiency, unspecified

## 2021-05-18 NOTE — PROGRESS NOTES
Karey Lopez  1975     Office/Outpatient Visit    Visit Date: Thu, Jan 7, 2021 04:26 pm    Provider: Maria Esther Garza N.P. (Assistant: Ernestina Starr, )    Location: [facility]        Electronically signed by Maria Esther Garza N.P. on  01/08/2021 01:11:44 PM                             Subjective:        CC: Mrs. Lopez is a 45 year old White female.  SOA, cough, congestion, audible wheeze, went to urgent care [place] and was given steroids, zapack, cough medicine, getting worse, was COVID and flu tested and both negative;         HPI: 45-year-old female presenting to the office with shortness of air, cough, congestion and wheeze x2 weeks.  Patient states that she went to an urgent care and was given a steroid, azithromycin and cough medication.  She was feeling better while taking steroid but then has went downhill.  She states that her Covid and flu test were both negative.  No known ill contacts.  Patient has persistent severe asthma.  She has albuterol at home.  She takes her allergy medication.  She states her allergist has retired.  She states that he would give azithromycin 500 mg p.o. x5 days with refill if needed to continue.  Azithromycin prescribed by urgent care was 500 mg on day 1 with 250 mg p.o. daily on days 2 through 4. Pt declines going to ED, even though this is what urgent care told her to do as well.     ROS:     CONSTITUTIONAL:  Negative for chills, fatigue and fever.      EYES:  Negative for blurred vision.      CARDIOVASCULAR:  Negative for chest pain, palpitations and pedal edema.      RESPIRATORY:  Positive for wheezing ( with exertion; nocturnal ).   Negative for pleuritic chest pain.      GASTROINTESTINAL:  Negative for abdominal pain, constipation, diarrhea, nausea and vomiting.      MUSCULOSKELETAL:  Negative for myalgias.      INTEGUMENTARY/BREAST:  Negative for rash.      NEUROLOGICAL:  Negative for dizziness, paresthesias and weakness.          Past Medical  History / Family History / Social History:         Last Reviewed on 2021 12:58 PM by Maria Esther Garza    Past Medical History: normal echocardiogram and stress test 2018. 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     lichen sclerosis - 2019         GYNECOLOGICAL HISTORY:         Sleep Apnea: dx'd in may 2020; (+) sleep study;         PREVENTIVE HEALTH MAINTENANCE             MAMMOGRAM: Done within last 2 years and results in are chart was last done 19 with normal results     PAP SMEAR: was last done  with normal results             PAST MEDICAL HISTORY     Hospitalizations:    Asthma admitted once on 17, last admit date 2020, (right knee sepsis and left knee arthroscopy)         CURRENT MEDICAL PROVIDERS:    Allergist    [facility]st[facility]ian/Gynecologist: [facility]    podiatry dr dick freedman Chronic pain specialist         Surgical History:         Cholecystectomy    Fracture Repair: ankle;  and 3-2015;     Tubal Ligation Other Surgeries    lap/band   removed 2019 (scar tissue);    right foot surgery  & 17;    left knee arthroscopy 2020;     Procedures:    Colonoscopy (  normal )    EGD ( /small hiatal hernia and 2018 mild gastitis )         Family History:     Father:  at age 67; Cause of death was stroke     Mother:  at age 47; Cause of death was CAD;  Asthma;  Type 2 Diabetes     Brother(s): 2 brother(s) total;  Myocardial Infarction (  at 42 );  Obesity     Son(s): 2 son(s) total     Paternal Grandfather:  at age 94     Paternal Grandmother:  at age 60's; Cause of death was stroke     Maternal Grandfather:  at age 63; Cause of death was lung cancer     Maternal Grandmother:  at age 50; Cause of death was cervical cancer         Social History:     Occupation: [employer] / [employer]      Marital Status:   "    Children: 2 children         Tobacco/Alcohol/Supplements:     Last Reviewed on 1/08/2021 12:58 PM by Maria Esther Garza    Tobacco: She has never smoked.          Alcohol: Frequency:    \"just rarely\";         Substance Abuse History:     Last Reviewed on 10/13/2020 02:14 PM by Maria Esther Garza        Mental Health History:     Last Reviewed on 1/16/2017 04:36 PM by Malorie Arzate        Communicable Diseases (eg STDs):     Last Reviewed on 1/16/2017 04:36 PM by Malorie Arzate        Immunizations:     influenza, injectable, quadrivalent, preservative free (FLUZONE QUAD 5342-0891) 12/20/2019    influenza, injectable, quadrivalent, preservative free (FLUZONE QUAD 1025-2851) 11/30/2020    zzFluzone pf-quadrivalent 3 and up 11/24/2014    zzFluzone pf-quadrivalent 3 and up 11/12/2015    zzFluzone pf-quadrivalent 3 and up 12/19/2016    zzFluzone pf-quadrivalent 3 and up 12/6/2017    Fluzone (3 + years dose) 10/23/2012    Fluzone pf (3+ years dose) 1/8/2014    Fluzone Quadrivalent (3+ years) 12/13/2018        Allergies:     Last Reviewed on 1/08/2021 12:58 PM by Maria Esther Garza    Viibryd:   (Adverse Reaction)        Current Medications:     Last Reviewed on 1/08/2021 12:58 PM by Maria Esther Garza    ibuprofen 800 mg oral tablet [take 1 tablet (800 mg) by oral route 3 times per day with food]    Vitamin D2     Alubuterol   [neb PRN]    montelukast 10 mg oral tablet [TAKE 1 TABLET DAILY]    escitalopram oxalate 20 mg oral tablet [TAKE 1 AND 1/2 TABLETS     DAILY]    allergy shot once every 2 to 28 days     buPROPion  mg oral Tablet, Extended Release 24 hr [TAKE 1 TABLET DAILY]    Ventolin HFA 90mcg/1actuation Oral Inhaler [Inhale 2 puff(s) by mouth 4 times a day as needed]    lisinopriL 30 mg oral tablet [take 1 tablet (30 mg) by oral route once daily]    ibuprofen 800 mg oral tablet [take 1 tablet (800 mg) by oral route 3 times per day with food PRN]        Objective:        Vitals:         Current: 1/7/2021 " 4:29:37 PM    Ht:  5 ft, 4 in;  Wt: 297.5 lbs;  BMI: 51.1T: 97.5 F (temporal);  BP: 186/89 mm Hg (left arm, sitting);  P: 116 bpm (left arm (BP Cuff), sitting);  sCr: 0.65 mg/dL;  GFR: 144.56O2 Sat: 94 % (room air)        Repeat:     1:5:45 PM  BP:   142/85mm Hg (left arm, sitting, Taken 1715 on 1/7/2021) 1:6:53 PM  O2 Sat:   98% (room air, taken 1715 1/7/2021) 1:6:29 PM  P:   96bpm (finger clip, sitting, Taken 1715 1/7/2021)     Exams:     PHYSICAL EXAM:     GENERAL: vital signs recorded - well developed, well nourished;  well groomed;  no apparent distress, appears moderately ill;     EYES: extraocular movements intact; conjunctiva and cornea are normal; PERRLA;     RESPIRATORY: use of accessory muscles with respiration; diffuse inspiratory and expiratory wheezes;     CARDIOVASCULAR: normal rate; rhythm is regular;  no systolic murmur; no edema;     LYMPHATIC: no enlargement of cervical or facial nodes;     BREAST/INTEGUMENT: no rash or jaundice;     MUSCULOSKELETAL: normal gait; normal overall tone     NEUROLOGIC: mental status: alert and oriented x 3;         Procedures:     Unspecified asthma with (acute) exacerbation    1. Kenalog 60 mg given IM in the left hip; administered by atc;  lot number [id number]; expires 2/22 Patient experienced no reaction.              Assessment:         J45.901   Unspecified asthma with (acute) exacerbation           ORDERS:         Meds Prescribed:       [New Rx] predniSONE 20 mg oral tablet [take 2 tablets (40mg) by mouth daily x 5 days], #10 (ten) tablets, Refills: 0 (zero)       [New Rx] azithromycin 500 mg oral tablet [take 1 tablet (500 mg) by oral route once daily], #5 (five) tablets, Refills: 1 (one)         Radiology/Test Orders:       [id number]  Radiologic exam chest 2 views  (Send-Out)              Lab Orders:       44007  BDCBC - [facility] CBC with 3 part diff  (Send-Out)            88122  COMP - H Comp. Metabolic Panel  (Send-Out)            78963  D-DIM - [facility] D-Dimer   (Send-Out)              Other Orders:       12588  Therapeutic injection  (In-House)              Kenalog, per 10 mg  (x6)                  Plan:         Unspecified asthma with (acute) exacerbationKenalog injection given today in office.  Will start patient on azithromycin dosed as retired allergist use to prescribe. Patient given strict instruction to go immediately to ED with any change or worsening in symptoms. Discussed risk of not going to ED now.  She is also going to come in tomorrow for chest x-ray and labs.  Will notify patient of results. Patient and significant other both verbalized understanding and had no further questions upon discharge.    LABORATORY:  Labs ordered to be performed today include CBC, Comprehensive metabolic panel, and D-Dimer.      RADIOLOGY:  I have ordered a chest x-ray (PA and lateral) to be done today.  Steroids Kenalog 60 mg 1.5 ml           Prescriptions:       [New Rx] predniSONE 20 mg oral tablet [take 2 tablets (40mg) by mouth daily x 5 days], #10 (ten) tablets, Refills: 0 (zero)       [New Rx] azithromycin 500 mg oral tablet [take 1 tablet (500 mg) by oral route once daily], #5 (five) tablets, Refills: 1 (one)           Orders:       [facility]  [facility]  [facility]            67417  COMP - [facility] Comp. Metabolic Panel  (Send-Out)            90352  Radiologic exam chest 2 views  (Send-Out)            13234  Therapeutic injection  (In-House)              Kenalog, per 10 mg  (x6)        99108  D-DIM - [facility] D-Dimer  (Send-Out)                  Charge Capture:         Primary Diagnosis:     J45.901  Unspecified asthma with (acute) exacerbation           Orders:      91860  Office/outpatient visit; established patient, level 3  (In-House)            18281  Therapeutic injection  (In-House)              Kenalog, per 10 mg  (x6)              ADDENDUMS:      ____________________________________    Addendum: 01/27/2021 09:39 PM - Maria Esther Garza         remove [id number]    add [id number]

## 2021-05-18 NOTE — PROGRESS NOTES
Karey Lopez  1975     Office/Outpatient Visit    Visit Date: Fri, May 14, 2021 02:04 pm    Provider: Sunita Hylton N.P. (Assistant: Radha Painter LPN)    Location: [facility]        Electronically signed by Sunita Hylton N.P. on  05/16/2021 08:17:04 PM                             Subjective:        CC: Mrs. Lopez is a 46 year old White female.  preventative exam, pt has been having some pain to left lower side, wrapping around to upper/mid back;         HPI: lmp dec 2020          Patient to be evaluated for encounter for general adult medical examination without abnormal findings.  She cannot recall when she last had a physical exam.  She cannot recall the date of her last menstrual period.  She is not currently using any form of contraception.  She performs breast self-exams occasionally.   Her last Pap smear was 2 years ago and was normal.   Her last mammogram was <1 year ago and was normal.   She has never had a dexa scan. Preventative Health updated today.  Mrs. Lopez denies any history of abnormal Pap smears.  Tobacco: She has never smoked.            PHQ-9 Depression Screening: Completed form scanned and in chart; Total Score 2           needs refills on singulair.    asthma is well controlled.            With regard to the essential (primary) hypertension, her current cardiac medication regimen includes an ACE inhibitor ( lisinopril ).  Compliance with treatment has been good; she takes her medication as directed.  She is tolerating the medication well without side effects.  She did not bring her blood pressure diary, but says that pressures have been well controlled.            doing well on wellbutrin and lexapro.  denies side effects.  requests refills.            Concerning unspecified abdominal pain, this is located primarily in the left upper quadrant and left lower quadrant.  There is some radiation to the back.  It began 1 month ago.  The onset of pain occurred  with no apparent trigger.  She characterizes it as aching.  There are no obvious aggravating factors.  Nothing relieves the pain.  She denies diarrhea, dysuria, fever, nausea and vomiting.      ROS:     CONSTITUTIONAL:  Positive for fatigue ( improving ).   Negative for chills or fever.      CARDIOVASCULAR:  Positive for pedal edema ( mild; stable ).   Negative for chest pain.      RESPIRATORY:  Negative for cough, dyspnea, and hemoptysis.      GASTROINTESTINAL:  Positive for abdominal pain ( LLQ ).      MUSCULOSKELETAL:  Positive for arthralgias (intermittent right foot).      NEUROLOGICAL:  Negative for headaches and paresthesias.          Past Medical History / Family History / Social History:         Last Reviewed on 2021 09:51 AM by Maria Esther Garza    Past Medical History: normal echocardiogram and stress test 2018. 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     lichen sclerosis -          GYNECOLOGICAL HISTORY:         Sleep Apnea: dx'd in may 2020; (+) sleep study;         PREVENTIVE HEALTH MAINTENANCE             MAMMOGRAM: Done within last 2 years and results in are chart was last done 2021 with normal results     PAP SMEAR: was last done  with normal results             PAST MEDICAL HISTORY     Hospitalizations:    Asthma admitted once on 17, last admit date 2020, (right knee sepsis and left knee arthroscopy)         CURRENT MEDICAL PROVIDERS:    Allergist    Obstetrician/Gynecologist: [facility]    podiatry dr dick freedman Chronic pain specialist         Surgical History:         Cholecystectomy    Fracture Repair: ankle;  and 3-2015;     Tubal Ligation Other Surgeries    lap/band   removed 2019 (scar tissue);    right foot surgery  & 17;    left knee arthroscopy 2020;     Procedures:    Colonoscopy (  normal )    EGD ( /small hiatal hernia and 2018 mild gastitis )  "        Family History:     Father:  at age 67; Cause of death was stroke     Mother:  at age 47; Cause of death was CAD;  Asthma;  Type 2 Diabetes     Brother(s): 2 brother(s) total;  Myocardial Infarction (  at 42 );  Obesity     Son(s): 2 son(s) total     Paternal Grandfather:  at age 94     Paternal Grandmother:  at age 60's; Cause of death was stroke     Maternal Grandfather:  at age 63; Cause of death was lung cancer     Maternal Grandmother:  at age 50; Cause of death was cervical cancer         Social History:     Occupation: [employer] / [employer]      Marital Status:      Children: 2 children         Tobacco/Alcohol/Supplements:     Last Reviewed on 2021 02:07 PM by Radha Painter    Tobacco: She has never smoked.          Alcohol: Frequency:    \"just rarely\";         Substance Abuse History:     Last Reviewed on 10/13/2020 02:14 PM by Maria Esther Garza        Mental Health History:     Last Reviewed on 2017 04:36 PM by Malorie Arzate        Communicable Diseases (eg STDs):     Last Reviewed on 2017 04:36 PM by Malorie Arzate        Current Problems:     Last Reviewed on 2021 09:51 AM by Maria Esther Garza    Essential (primary) hypertension    Allergic rhinitis, unspecified    Persistent mood [affective] disorder, unspecified    Family history of ischemic heart disease and other diseases of the circulatory system    Obesity, unspecified    Vitamin D deficiency, unspecified    Other fatigue    Sleep apnea, unspecified    Allergic rhinitis    Cough variant asthma    Unspecified abdominal pain    Encounter for general adult medical examination without abnormal findings    Encounter for screening for depression        Immunizations:     influenza, injectable, quadrivalent, preservative free (FLUZONE QUAD 7118-3673) 2019    influenza, injectable, quadrivalent, preservative free (FLUZONE QUAD 7782-3430) 2020    " zzFluzone pf-quadrivalent 3 and up 11/24/2014    zzFluzone pf-quadrivalent 3 and up 11/12/2015    zzFluzone pf-quadrivalent 3 and up 12/19/2016    zzFluzone pf-quadrivalent 3 and up 12/6/2017    Fluzone (3 + years dose) 10/23/2012    Fluzone pf (3+ years dose) 1/8/2014    Fluzone Quadrivalent (3+ years) 12/13/2018        Allergies:     Last Reviewed on 2/06/2021 10:52 AM by Ximena Oneil    Viicharlied:   (Adverse Reaction)        Current Medications:     Last Reviewed on 5/14/2021 02:09 PM by Radha Painter    Vitamin D2     Alubuterol   [neb PRN]    montelukast 10 mg oral tablet [TAKE 1 TABLET DAILY]    escitalopram oxalate 20 mg oral tablet [TAKE 1 AND 1/2 TABLETS     DAILY]    allergy shot once every 2 to 28 days     buPROPion  mg oral Tablet, Extended Release 24 hr [TAKE 1 TABLET DAILY]    Ventolin HFA 90mcg/1actuation Oral Inhaler [Inhale 2 puff(s) by mouth 4 times a day as needed]    lisinopriL 30 mg oral tablet [TAKE 1 TABLET ONCE DAILY]    ibuprofen 800 mg oral tablet [TAKE 1 TABLET 3 TIMES A DAYWITH FOOD AS NEEDED]    Multi Vitamin         Objective:        Vitals:         Historical:     2/6/2021  BP:   142/73 mm Hg ( (left arm, , sitting, );) 2/6/2021  Wt:   295.8lbs    Current: 5/14/2021 2:12:12 PM    Ht:  5 ft, 4 in;  Wt: 298.4 lbs;  BMI: 51.2T: 97.4 F (temporal);  BP: 108/57 mm Hg (right arm, sitting);  P: 109 bpm (right arm (BP Cuff), sitting);  sCr: 0.71 mg/dL;  GFR: 131.15        Exams:     PHYSICAL EXAM:     GENERAL: obese;  no apparent distress;     EYES: PERRL, EOMI     E/N/T: EARS: bilateral TMs are normal;  OROPHARYNX: posterior pharynx, including tonsils, tongue, and uvula are normal;     RESPIRATORY: normal respiratory rate and pattern with no distress; normal breath sounds with no rales, rhonchi, wheezes or rubs;     CARDIOVASCULAR: normal rate; rhythm is regular;  no edema;     GASTROINTESTINAL: mild LUQ and LLQ tenderness;  no guarding;  no rebound tenderness;  normal bowel sounds;  no organomegaly;     MUSCULOSKELETAL:  Normal range of motion, strength and tone;     NEUROLOGIC: mental status: alert and oriented x 3; GROSSLY INTACT     PSYCHIATRIC:  appropriate affect and demeanor; normal speech pattern; grossly normal memory;         Procedures:     Allergic rhinitis, unspecified        Single Allergy Injection 1. Patient experienced no reaction.  exp 10/8/21/ pdr             Assessment:         Z00.00   Encounter for general adult medical examination without abnormal findings       Z13.31   Encounter for screening for depression       J45.40   Moderate persistent asthma, uncomplicated       I10   Essential (primary) hypertension       F34.9   Persistent mood [affective] disorder, unspecified       R10.9   Unspecified abdominal pain       J30.9   Allergic rhinitis, unspecified           ORDERS:         Meds Prescribed:       [Refilled] montelukast 10 mg oral tablet [TAKE 1 TABLET DAILY], #90 (ninety) tablets, Refills: 1 (one)       [Refilled] lisinopriL 30 mg oral tablet [TAKE 1 TABLET ONCE DAILY], #90 (ninety) tablets, Refills: 1 (one)       [Refilled] escitalopram oxalate 20 mg oral tablet [TAKE 1 AND 1/2 TABLETS     DAILY], #135 (one hundred and thirty five) tablets, Refills: 1 (one)       [Refilled] buPROPion  mg oral Tablet, Extended Release 24 hr [TAKE 1 TABLET DAILY], #90 (ninety) tablets, Refills: 1 (one)         Radiology/Test Orders:       60108  Radiologic exam abdomen 1 view  (Send-Out)            97824  Professional services for allergen immunotherapy not including provision of allergenic extracts; single injection  (In-House)              Lab Orders:       [id number]  BDCB2 - H CBC w/o diff  (Send-Out)            74075  COMP - [facility] Comp. Metabolic Panel  (Send-Out)            38928  TSH - H TSH  (Send-Out)            22866  AMYS - H Amylase, Serum  (Send-Out)            76107  LIP - [facility] Lipase, Serum  (Send-Out)            39447  BDUAM - [facility] Urinalysis, automated, with  micro  (Send-Out)              Other Orders:         Depression screen negative  (In-House)              Screening mammogram results documented  (Send-Out)                      Plan:         Encounter for general adult medical examination without abnormal findings    LABORATORY:  Labs ordered to be performed today include CBC W/O DIFF, Comprehensive metabolic panel, and TSH.      COUNSELING was provided today regarding the following topics: healthy eating habits, weight loss program, low cholesterol diet, low salt diet, regular exercise, and breast self-exam.            Orders:       25290  BDCB2 - [facility] CBC w/o diff  (Send-Out)            83450  COMP - [facility] Comp. Metabolic Panel  (Send-Out)            34319  TSH - [facility] TSH  (Send-Out)                Patient Education Handouts:       Physical Exam 40-49 year, Female          Encounter for screening for depression    MIPS PHQ-9 Depression Screening: Completed form scanned and in chart; Total Score 2; Negative Depression Screen           Orders:         Depression screen negative  (In-House)              Screening mammogram results documented  (Send-Out)              Moderate persistent asthma, uncomplicated          Prescriptions:       [Refilled] montelukast 10 mg oral tablet [TAKE 1 TABLET DAILY], #90 (ninety) tablets, Refills: 1 (one)         Essential (primary) hypertension          Prescriptions:       [Refilled] lisinopriL 30 mg oral tablet [TAKE 1 TABLET ONCE DAILY], #90 (ninety) tablets, Refills: 1 (one)         Persistent mood [affective] disorder, unspecified          Prescriptions:       [Refilled] escitalopram oxalate 20 mg oral tablet [TAKE 1 AND 1/2 TABLETS     DAILY], #135 (one hundred and thirty five) tablets, Refills: 1 (one)       [Refilled] buPROPion  mg oral Tablet, Extended Release 24 hr [TAKE 1 TABLET DAILY], #90 (ninety) tablets, Refills: 1 (one)         Unspecified abdominal pain    LABORATORY:  Labs ordered to be  performed today include amylase, Lipase, and urinalysis with micro.      RADIOLOGY:  I have ordered Abdominal A/P xray to be done today.      RECOMMENDATIONS given include: avoid spicy foods and to ER if worsens.  labs and xray pending..            Orders:       35249  AMYS - [facility] Amylase, Serum  (Send-Out)            47154  LIP - [facility] Lipase, Serum  (Send-Out)            64487  BDUAM - [facility] Urinalysis, automated, with micro  (Send-Out)            89619  Radiologic exam abdomen 1 view  (Send-Out)              Allergic rhinitis, unspecified          Orders:       39783  Professional services for allergen immunotherapy not including provision of allergenic extracts; single injection  (In-House)                  Patient Recommendations:        For  Encounter for general adult medical examination without abnormal findings:        Limit dietary intake of fat (especially saturated fat) and cholesterol.  Eat a variety of foods, including plenty of fruits, vegetables, and grain containg fiber, limit fat intake to 30% of total calories. Balance caloric intake with energy expended.    Maintaining regular physical activity is advised to help prevent heart disease, hypertension, diabetes, and obesity.    You should regularly examine your breasts, easily done while in the shower or with lotion.  Feel and look for differences in consistency from month to month, especially noting knots or lumps, changes in skin appearance, nipple retraction or discharge.          For  Unspecified abdominal pain:        Avoid spicy foods in your diet.              Charge Capture:         Primary Diagnosis:     Z00.00  Encounter for general adult medical examination without abnormal findings           Orders:      74373  Preventive medicine, established patient, age 40-64 years  (In-House)              Z13.31  Encounter for screening for depression           Orders:      [id number]  Office/outpatient visit; established patient, level 3  (In-House)               Depression screen negative  (In-House)              J45.40  Moderate persistent asthma, uncomplicated     I10  Essential (primary) hypertension     F34.9  Persistent mood [affective] disorder, unspecified     R10.9  Unspecified abdominal pain     J30.9  Allergic rhinitis, unspecified           Orders:      50083  Professional services for allergen immunotherapy not including provision of allergenic extracts; single injection  (In-House)                  ADDENDUMS:      ____________________________________    Addendum: 05/20/2021 10:18 PM - Sunita Hylton        add [id number]    remove [id number]. lj

## 2021-05-18 NOTE — PROGRESS NOTES
Karey Lopez 1975     Office/Outpatient Visit    Visit Date: Tue, Mar 20, 2018 05:48 pm    Provider: Sunita Hylton N.P. (Assistant: Beverly Lux MA)    Location: [facility], [place]        Electronically signed by Sunita Hylton N.P. on  03/21/2018 11:40:32 AM                             SUBJECTIVE:        CC:     Mrs. Lopez is a 43 year old White female.  med refill;         HPI: lmp 3 wks ago.         Mrs. Lopez presents with anxiety with depression.  Additionally, she presents with history of anxiety with depression.  doing well on wellbutrin and lexapro.  denies side effects.  requests refills.          In regard to the asthma, additionally, she presents with history of asthma.  dx with asthma; needs refills on singulair.    asthma is well controlled.          Dx with hypertension; additionally, she presents with history of hypertension.  ms. Lopez presents with hypertension.  Her current cardiac medication regimen includes lisinopril.  She did not bring her blood pressure diary, but says that pressures have been well controlled.  She is tolerating the medication well without side effects.  Compliance with treatment has been good; she takes her medication as directed.      ROS:     CONSTITUTIONAL:  Negative for chills, fatigue, fever, and weight change.      CARDIOVASCULAR:  Negative for chest pain, palpitations, tachycardia, orthopnea, and edema.      RESPIRATORY:  Negative for cough, dyspnea, and hemoptysis.      GASTROINTESTINAL:  Negative for abdominal pain, heartburn, constipation, diarrhea, and stool changes.      MUSCULOSKELETAL:  Negative for arthralgias, back pain, and myalgias.      NEUROLOGICAL:  Negative for dizziness, headaches, paresthesias, and weakness.      ENDOCRINE:  Negative for hair loss, heat/cold intolerance, polydipsia, and polyphagia.      PSYCHIATRIC:  Positive for anxiety and danielle depression ( (stable) ).   Negative for sleep disturbance or suicidal  "thoughts.          PMH/FMH/SH:     Last Reviewed on 2017 06:37 PM by Karey Allen    Past Medical History:             GYNECOLOGICAL HISTORY:             PREVENTIVE HEALTH MAINTENANCE             INFLUENZA VACCINE: was last done 2016     MAMMOGRAM: was last done 2017 with normal results     PAP SMEAR: was last done  with normal results             PAST MEDICAL HISTORY     Hospitalizations:    Asthma admitted once on 17         CURRENT MEDICAL PROVIDERS:    Allergist    podiatry dr dick freedman Chronic pain specialist         Surgical History:         Cholecystectomy    Fracture Repair: ankle;  and 3-2015;     Tubal Ligation Other Surgeries    lap/band ;    right foot surgery  & 17;     Procedures:    Colonoscopy (  normal )    EGD ( /small hiatal hernia and 2018 mild gastitis )         Family History:     Father:  at age 67; Cause of death was stroke     Mother:  at age 47; Cause of death was CAD;  Asthma;  Type 2 Diabetes     Brother(s): 2 brother(s) total;  Myocardial Infarction (  at 42 );  Obesity     Son(s): 2 son(s) total     Paternal Grandfather:  at age 94     Paternal Grandmother:  at age 60's; Cause of death was stroke     Maternal Grandfather:  at age 63; Cause of death was lung cancer     Maternal Grandmother:  at age 50; Cause of death was cervical cancer         Social History:     Occupation: [employer] / [employer]      Marital Status:      Children: 2 children         Tobacco/Alcohol/Supplements:     Last Reviewed on 2017 06:02 PM by Jennifer Allen    Tobacco: She has never smoked.          Alcohol: Frequency:    \"just rarely\";         Substance Abuse History:     Last Reviewed on 2017 04:36 PM by Malorie Arzate        Mental Health History:     Last Reviewed on 2017 04:36 PM by Malorie Arzate        Communicable Diseases (eg STDs):     " Last Reviewed on 1/16/2017 04:36 PM by Malorie Arzate            Current Problems:     Last Reviewed on 1/16/2017 04:36 PM by Malorie Arzate    Vitamin D deficiency, unspecified     Malaise and Fatigue     Anxiety with depression     Allergic rhinitis     Hypertension     Asthma     Simple obesity     Dysuria         Immunizations:     zzFluzone pf-quadrivalent 3 and up 11/24/2014     zzFluzone pf-quadrivalent 3 and up 11/12/2015     zzFluzone pf-quadrivalent 3 and up 12/19/2016     zzFluzone pf-quadrivalent 3 and up 12/6/2017     Fluzone (3 + years dose) 10/23/2012     Fluzone pf (3+ years dose) 1/8/2014         Allergies:     Last Reviewed on 11/13/2017 06:01 PM by Jennifer Allen      No Known Drug Allergies.     Viibryd: (Adverse Reaction)        Current Medications:     Last Reviewed on 3/20/2018 05:53 PM by Beverly Lux    Bupropion HCl 150mg Tablets, Extended Release 1 tab daily     Lisinopril 20mg Tablet Take 1 tablet(s) by mouth daily     Singulair  10mg Tablet Take 1 tablet(s) by mouth daily     Lexapro 20mg Tablet 1 1/2 daily     Ventolin HFA 90mcg/1actuation Oral Inhaler Inhale 2 puff(s) by mouth 4 times a day as needed     Vimovo 500mg/20mg Tablets, Delayed Release Take 1 tablet(s) by mouth bid     vitamin d     Percocet  7.5mg/325mg Tablet 1 tab qid     allergy shot once every 2 to 28 days         OBJECTIVE:        Vitals:         Historical:     11/13/2017  BP:   121/72 mm Hg ( (left arm, , sitting, );)     11/13/2017  Wt:   273.2lbs        Current: 3/20/2018 5:52:00 PM    Ht:  5 ft, 4 in;  Wt: 262.8 lbs;  BMI: 45.1    T: 97.8 F (oral);  BP: 108/68 mm Hg (left arm, sitting);  P: 70 bpm (left arm (BP Cuff), sitting);  sCr: 0.74 mg/dL;  GFR: 122.93        Exams:     PHYSICAL EXAM:     GENERAL: obese;  no apparent distress;     NECK: thyroid is non-palpable;     RESPIRATORY: normal respiratory rate and pattern with no distress; normal breath sounds with no rales, rhonchi, wheezes or rubs;      CARDIOVASCULAR: normal rate; rhythm is regular;  no edema;     BREAST/INTEGUMENT: (declines exam)     MUSCULOSKELETAL:  Normal range of motion, strength and tone;     NEUROLOGIC: mental status: alert and oriented x 3; GROSSLY INTACT     PSYCHIATRIC:  appropriate affect and demeanor; normal speech pattern; grossly normal memory;         ASSESSMENT           300.4   F34.9  Anxiety with depression              DDx:     493.00   J45.40  Asthma              DDx:     401.1   I10  Hypertension              DDx:     V76.10   Z12.39  Screening for breast cancer, unspecified              DDx:     V17.3   Z82.49  Family history of ischemic heart disease              DDx:         ORDERS:         Meds Prescribed:       Refill of: Bupropion HCl 150mg Tablets, Extended Release 1 tab daily  #90 (Ninety) tablet(s) Refills: 1       Refill of: Lexapro (Escitalopram Oxalate) 20mg Tablet 1 1/2 daily  #135 ([place]) tablet(s) Refills: 1       Refill of: Singulair  (Montelukast Sodium) 10mg Tablet Take 1 tablet(s) by mouth daily  #90 (Ninety) tablet(s) Refills: 1       Refill of: Lisinopril 20mg Tablet Take 1 tablet(s) by mouth daily  #90 (Ninety) tablet(s) Refills: 1         Radiology/Test Orders:       [id number]  Screening mammography bi 2-view inc CAD  (Send-Out)           Lab Orders:       [id number]  HTN - [facility] CMP AND LIPID: [id number], [id number]  (Send-Out)           Procedures Ordered:       REFER  Referral to Specialist or Other Facility  (Send-Out)                   PLAN:          Anxiety with depression           Prescriptions:       Refill of: Bupropion HCl 150mg Tablets, Extended Release 1 tab daily  #90 (Ninety) tablet(s) Refills: 1       Refill of: Lexapro (Escitalopram Oxalate) 20mg Tablet 1 1/2 daily  #135 (One [place]) tablet(s) Refills: 1          Asthma           Prescriptions:       Refill of: Singulair  (Montelukast Sodium) 10mg Tablet Take 1 tablet(s) by mouth daily  #90 (Ninety) tablet(s)  Refills: 1          Hypertension     LABORATORY:  Labs ordered to be performed today include HTN/Lipid Panel: CMP, Lipid.      RECOMMENDATIONS given include: avoid pseudoephedrine or other stimulants/decongestants in common cold remedies, perform routine monitoring of blood pressure with home blood pressure cuff, have spouse or friend monitor for loud snoring/sleep apnea, exercise, reduction of dietary salt intake, and weight loss.            Prescriptions:       Refill of: Lisinopril 20mg Tablet Take 1 tablet(s) by mouth daily  #90 (Ninety) tablet(s) Refills: 1           Orders:       [id number]  HTNLP - [facility] CMP AND LIPID: [id number], [id number]  (Send-Out)            Screening for breast cancer, unspecified         RECOMMENDATIONS given include: advise pap smear and well woman exam here or with GYn..            Orders:       [id number]  Screening mammography bi 2-view inc CAD  (Send-Out)            Family history of ischemic heart disease         REFERRALS:  Referral initiated to a cardiologist ( Dr. Esteban Ely, [facility] ).            Orders:       REFER  Referral to Specialist or Other Facility  (Send-Out)               Patient Recommendations:        For  Hypertension:     Certain decongestants and stimulants (like pseudoephedrine) in common cold remedies raise blood pressure, sometimes to dangerously high levels. Never take with MAO inhibitors! Begin monitoring your blood pressure by brief nurse visits at our office, a home blood pressure monitor, or by checking on the machines in pharmacies or stores.  Keep a log of the readings. Obstructive sleep apnea is much more prevalent than historically reported and is a greatly under recognized cause of hypertension. If you have loud snoring, if you wake up tired and feel tired thru out the day, you may have sleep apnea.  One way to suspect this is if a loved one reports that you snore very loudly or if you seem to quit breathing for a time while you are  asleep.  If this describes you, you should consult your doctor about have a sleep study performed. Maintain a regular exercise program. Reduce the amount of salt in your diet. Try to lose some weight; even modest weight reduction can improve your blood pressure.              CHARGE CAPTURE           **Please note: ICD descriptions below are intended for billing purposes only and may not represent clinical diagnoses**        Primary Diagnosis:         300.4 Anxiety with depression            F34.9    Persistent mood [affective] disorder, unspecified              Orders:          [id number]   Office/outpatient visit; established patient, level 4  (In-House)           493.00 Asthma            J45.40    Moderate persistent asthma, uncomplicated    401.1 Hypertension            I10    Essential (primary) hypertension    V76.10 Screening for breast cancer, unspecified            Z12.39    Encounter for other screening for malignant neoplasm of breast    V17.3 Family history of ischemic heart disease            Z82.49    Family history of ischemic heart disease and other diseases of the circulatory system        ADDENDUMS:      ____________________________________    Date: 03/26/2018 09:03 AM    Author: Jimena Lobato         Visit Note Faxed to:        Esteban Ely  (Cardiology); Number [phone]     Health Summary Faxed to:        Esteban Ely  (Cardiology); Number [phone]

## 2021-05-20 ENCOUNTER — HOSPITAL ENCOUNTER (OUTPATIENT)
Dept: OTHER | Facility: HOSPITAL | Age: 46
Discharge: HOME | End: 2021-05-20
Attending: NURSE PRACTITIONER

## 2021-05-25 ENCOUNTER — OFFICE VISIT CONVERTED (OUTPATIENT)
Dept: FAMILY MEDICINE CLINIC | Age: 46
End: 2021-05-25
Attending: NURSE PRACTITIONER

## 2021-05-26 ENCOUNTER — HOSPITAL ENCOUNTER (OUTPATIENT)
Dept: OTHER | Facility: HOSPITAL | Age: 46
Discharge: HOME | End: 2021-05-26
Attending: NURSE PRACTITIONER

## 2021-06-05 NOTE — PROGRESS NOTES
Karey Lopez  1975     Office/Outpatient Visit    Visit Date: Tue, May 25, 2021 03:47 pm    Provider: Maria Esther Garza N.P. (Assistant: Catalina Zepeda MA)    Location: [facility]        Electronically signed by Maria Esther Garza N.P. on  2021 10:46:47 AM                             Subjective:        CC: Mrs. Lopez is a 46 year old White female.  back pain;         HPI: 45 y/o female presenting to office c/o back pain x 2 days. States that the pain is sharp across shoulder blades. No known injury. States that she felt almost faint from the pain.        Also would like to discuss weight loss. she has hx of lap band that had to be reversed d/t complications.    ROS:     CONSTITUTIONAL:  Positive for fatigue.   Negative for fever.      EYES:  Negative for blurred vision.      CARDIOVASCULAR:  Negative for chest pain, dizziness, palpitations, pedal edema and tachycardia.      RESPIRATORY:  Negative for recent cough and dyspnea.      GASTROINTESTINAL:  Negative for abdominal pain, diarrhea, nausea and vomiting.      MUSCULOSKELETAL:  See HPI     INTEGUMENTARY/BREAST:  Negative for rash.      NEUROLOGICAL:  Negative for dizziness, paresthesias and weakness.      PSYCHIATRIC:  Negative for anxiety, depression and sleep disturbance.          Past Medical History / Family History / Social History:         Last Reviewed on 2021 04:00 PM by Maria Esther Garza    Past Medical History: normal echocardiogram and stress test 2018. 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     lichen sclerosis - 2019         GYNECOLOGICAL HISTORY:         Sleep Apnea: dx'd in may 2020; (+) sleep study;         PREVENTIVE HEALTH MAINTENANCE             MAMMOGRAM: Done within last 2 years and results in are chart was last done 2021 with normal results     PAP SMEAR: was last done  with normal results             PAST MEDICAL HISTORY  "    Hospitalizations:    Asthma admitted once on 17, last admit date 2020, (right knee sepsis and left knee arthroscopy)         CURRENT MEDICAL PROVIDERS:    Allergist    Obstetrician/Gynecologist: [facility]    podiatry dr dick freedman Chronic pain specialist         Surgical History:         Cholecystectomy    Fracture Repair: ankle;  and 3-2015;     Tubal Ligation Other Surgeries    lap/band   removed 2019 (scar tissue);    right foot surgery  & 17;    left knee arthroscopy 2020;     Procedures:    Colonoscopy (  normal )    EGD ( /small hiatal hernia and 2018 mild gastitis )         Family History:     Father:  at age 67; Cause of death was stroke     Mother:  at age 47; Cause of death was CAD;  Asthma;  Type 2 Diabetes     Brother(s): 2 brother(s) total;  Myocardial Infarction (  at 42 );  Obesity     Son(s): 2 son(s) total     Paternal Grandfather:  at age 94     Paternal Grandmother:  at age 60's; Cause of death was stroke     Maternal Grandfather:  at age 63; Cause of death was lung cancer     Maternal Grandmother:  at age 50; Cause of death was cervical cancer         Social History:     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          Alcohol: Frequency:    \"just rarely\";         Substance Abuse History:     Last Reviewed on 10/13/2020 02:14 PM by Maria Esther Garza        Mental Health History:     Last Reviewed on 2017 04:36 PM by Malorie Arzate        Communicable Diseases (eg STDs):     Last Reviewed on 2017 04:36 PM by Malorie Arzate        Immunizations:     influenza, injectable, quadrivalent, preservative free (FLUZONE QUAD 0659-7041) 2019    influenza, injectable, quadrivalent, " preservative free (FLUZONE QUAD 2506-2219) 11/30/2020    SARS-COV-2 (COVID-19) vaccine, UNSPECIFIED 3/13/2021    SARS-COV-2 (COVID-19) vaccine, UNSPECIFIED 4/9/2021    zzFluzone pf-quadrivalent 3 and up 11/24/2014    zzFluzone pf-quadrivalent 3 and up 11/12/2015    zzFluzone pf-quadrivalent 3 and up 12/19/2016    zzFluzone pf-quadrivalent 3 and up 12/6/2017    Fluzone (3 + years dose) 10/23/2012    Fluzone pf (3+ years dose) 1/8/2014    Fluzone Quadrivalent (3+ years) 12/13/2018        Allergies:     Last Reviewed on 5/25/2021 04:00 PM by Maria Esther Garza    Viibryd:   (Adverse Reaction)        Current Medications:     Last Reviewed on 5/25/2021 04:00 PM by Maria Esther Garza    Vitamin D2     Alubuterol   [neb PRN]    Multi Vitamin     montelukast 10 mg oral tablet [TAKE 1 TABLET DAILY]    escitalopram oxalate 20 mg oral tablet [TAKE 1 AND 1/2 TABLETS     DAILY]    allergy shot once every 2 to 28 days     buPROPion  mg oral Tablet, Extended Release 24 hr [TAKE 1 TABLET DAILY]    Ventolin HFA 90mcg/1actuation Oral Inhaler [Inhale 2 puff(s) by mouth 4 times a day as needed]    lisinopriL 30 mg oral tablet [TAKE 1 TABLET ONCE DAILY]    ibuprofen 800 mg oral tablet [TAKE 1 TABLET 3 TIMES A DAYWITH FOOD AS NEEDED]        Objective:        Vitals:         Historical:     5/14/2021  Wt:   298.4lbs2/6/2021  Wt:   295.8lbs1/19/2021  Wt:   289.6lbs1/7/2021  Wt:   297.5lbs11/10/2020  Wt:   279.8lbs    Current: 5/25/2021 3:48:11 PM    Ht:  5 ft, 4 in;  Wt: 300.2 lbs;  BMI: 51.5T: 96.8 F (temporal);  BP: 143/77 mm Hg (left arm, sitting);  P: 94 bpm (left arm (BP Cuff), sitting);  sCr: 0.89 mg/dL;  GFR: 104.90        Exams:     PHYSICAL EXAM:     GENERAL: vital signs recorded - well developed, well nourished,  moderately obese;  well groomed;  no apparent distress;     EYES: extraocular movements intact; conjunctiva and cornea are normal; PERRLA;     NECK: range of motion is normal; thyroid exam reveals not enlarged;   carotid exam is normal with good upstroke and no bruits;     RESPIRATORY: normal respiratory rate and pattern with no distress; normal breath sounds with no rales, rhonchi, wheezes or rubs;     CARDIOVASCULAR: normal rate; rhythm is regular;  no systolic murmur; no edema;     LYMPHATIC: no enlargement of cervical or facial nodes;     BREAST/INTEGUMENT: no rash/jaundice;     MUSCULOSKELETAL: normal gait; normal overall tone     NEUROLOGIC: mental status: alert and oriented x 3;     PSYCHIATRIC:  appropriate affect and demeanor; normal speech pattern; grossly normal memory;         Lab/Test Results:         LABORATORY RESULTS: EKG performed by tls         Procedures:     Dorsalgia, unspecified    1. Toradol 60 mg given IM in the left upper arm; administered by sq;  lot number [id number]; expires 02/23 Patient experienced no reaction.  Procedure concent form signed by pt/sq             Assessment:         M54.9   Dorsalgia, unspecified       E66.9   Obesity, unspecified           ORDERS:         Meds Prescribed:       [New Rx] predniSONE 10 mg oral tablet [Take 6 tablets (60mg) po x 2 days, then 4 tablets (40mg) po x 3 days, then 3 tablets (30mg) po x 3 days, then 2 tablets (20mg) po x 3 days, then 1 tablet (10mg) po x 3 days], #42 (forty two) tablets, Refills: 0 (zero)       [New Rx] tiZANidine 4 mg oral tablet [take 1 tablet (4 mg) by oral route every 8 hours as needed not to exceed 3 doses in 24 hours], #30 (thirty) tablets, Refills: 0 (zero)         Radiology/Test Orders:       [id number]  Electrocardiogram, routine with at least 12 leads; with interpretation and report  (In-House)              Other Orders:       [id number]  Therapeutic injection  (In-House)              Toradol, per 15 mg  (x4)                  Plan:         Dorsalgia, unspecifiedECG showed sinus arrhythmia. Toradol given in office. Oral steroid and zanaflex to be called in. Light stretching and massage. Go to ED if pain worsens or if pt has chest pain.  Pt v/u and had no further questions upon d/c.         TESTS/PROCEDURES:  Will proceed with an ECG to be performed/scheduled now.  Narcotic or pain medication Toradol 60 mg           Prescriptions:       [New Rx] predniSONE 10 mg oral tablet [Take 6 tablets (60mg) po x 2 days, then 4 tablets (40mg) po x 3 days, then 3 tablets (30mg) po x 3 days, then 2 tablets (20mg) po x 3 days, then 1 tablet (10mg) po x 3 days], #42 (forty two) tablets, Refills: 0 (zero)       [New Rx] tiZANidine 4 mg oral tablet [take 1 tablet (4 mg) by oral route every 8 hours as needed not to exceed 3 doses in 24 hours], #30 (thirty) tablets, Refills: 0 (zero)           Orders:       [id number]  Electrocardiogram, routine with at least 12 leads; with interpretation and report  (In-House)            [id number]  Therapeutic injection  (In-House)              Toradol, per 15 mg  (x4)          Obesity, unspecifiedPt to rtc for weight loss counseling. Will order labs and discuss options at this time.             Charge Capture:         Primary Diagnosis:     M54.9  Dorsalgia, unspecified           Orders:      [id number]  Office/outpatient visit; established patient, level 4  (In-House)            36856  Electrocardiogram, routine with at least 12 leads; with interpretation and report  (In-House)            98042  Therapeutic injection  (In-House)              Toradol, per 15 mg  (x4)          E66.9  Obesity, unspecified

## 2021-06-16 ENCOUNTER — CLINICAL SUPPORT (OUTPATIENT)
Dept: FAMILY MEDICINE CLINIC | Age: 46
End: 2021-06-16

## 2021-06-16 DIAGNOSIS — J30.9 ALLERGIC RHINITIS, UNSPECIFIED SEASONALITY, UNSPECIFIED TRIGGER: Primary | ICD-10-CM

## 2021-06-16 PROCEDURE — 95115 IMMUNOTHERAPY ONE INJECTION: CPT | Performed by: FAMILY MEDICINE

## 2021-07-01 ENCOUNTER — OFFICE VISIT (OUTPATIENT)
Dept: FAMILY MEDICINE CLINIC | Age: 46
End: 2021-07-01

## 2021-07-01 VITALS
WEIGHT: 262.8 LBS | TEMPERATURE: 97.8 F | HEART RATE: 70 BPM | SYSTOLIC BLOOD PRESSURE: 108 MMHG | HEIGHT: 64 IN | DIASTOLIC BLOOD PRESSURE: 68 MMHG | BODY MASS INDEX: 44.86 KG/M2

## 2021-07-01 VITALS
SYSTOLIC BLOOD PRESSURE: 142 MMHG | WEIGHT: 286.8 LBS | HEART RATE: 103 BPM | BODY MASS INDEX: 48.96 KG/M2 | HEIGHT: 64 IN | TEMPERATURE: 98.4 F | DIASTOLIC BLOOD PRESSURE: 69 MMHG

## 2021-07-01 VITALS
BODY MASS INDEX: 43.4 KG/M2 | DIASTOLIC BLOOD PRESSURE: 89 MMHG | HEART RATE: 99 BPM | HEIGHT: 64 IN | SYSTOLIC BLOOD PRESSURE: 149 MMHG | TEMPERATURE: 98.7 F | WEIGHT: 254.2 LBS

## 2021-07-01 VITALS
HEART RATE: 118 BPM | HEIGHT: 64 IN | DIASTOLIC BLOOD PRESSURE: 67 MMHG | OXYGEN SATURATION: 98 % | BODY MASS INDEX: 43.02 KG/M2 | TEMPERATURE: 98.8 F | SYSTOLIC BLOOD PRESSURE: 95 MMHG | WEIGHT: 252 LBS

## 2021-07-01 VITALS
HEART RATE: 82 BPM | TEMPERATURE: 98.4 F | SYSTOLIC BLOOD PRESSURE: 111 MMHG | WEIGHT: 266.2 LBS | DIASTOLIC BLOOD PRESSURE: 56 MMHG | BODY MASS INDEX: 45.45 KG/M2 | HEIGHT: 64 IN

## 2021-07-01 DIAGNOSIS — J45.41 MODERATE PERSISTENT ASTHMA WITH ACUTE EXACERBATION: Primary | ICD-10-CM

## 2021-07-01 DIAGNOSIS — J06.9 UPPER RESPIRATORY TRACT INFECTION, UNSPECIFIED TYPE: ICD-10-CM

## 2021-07-01 PROCEDURE — 99213 OFFICE O/P EST LOW 20 MIN: CPT | Performed by: NURSE PRACTITIONER

## 2021-07-01 PROCEDURE — 96372 THER/PROPH/DIAG INJ SC/IM: CPT | Performed by: NURSE PRACTITIONER

## 2021-07-01 RX ORDER — PREDNISONE 20 MG/1
TABLET ORAL
COMMUNITY
End: 2021-07-01

## 2021-07-01 RX ORDER — SACCHAROMYCES BOULARDII 250 MG
250 CAPSULE ORAL 2 TIMES DAILY
Qty: 14 CAPSULE | Refills: 0 | Status: SHIPPED | OUTPATIENT
Start: 2021-07-01 | End: 2022-03-08

## 2021-07-01 RX ORDER — DOXYCYCLINE HYCLATE 100 MG/1
100 CAPSULE ORAL 2 TIMES DAILY
COMMUNITY
End: 2021-12-16

## 2021-07-01 RX ORDER — LISINOPRIL 30 MG/1
30 TABLET ORAL DAILY
COMMUNITY
End: 2021-12-16 | Stop reason: SDUPTHER

## 2021-07-01 RX ORDER — TRIAMCINOLONE ACETONIDE 40 MG/ML
40 INJECTION, SUSPENSION INTRA-ARTICULAR; INTRAMUSCULAR ONCE
Status: COMPLETED | OUTPATIENT
Start: 2021-07-01 | End: 2021-07-01

## 2021-07-01 RX ORDER — KETOROLAC TROMETHAMINE 30 MG/ML
60 INJECTION, SOLUTION INTRAMUSCULAR; INTRAVENOUS ONCE
Status: COMPLETED | OUTPATIENT
Start: 2021-07-01 | End: 2021-07-01

## 2021-07-01 RX ORDER — BUPROPION HYDROCHLORIDE 150 MG/1
TABLET, EXTENDED RELEASE ORAL DAILY
COMMUNITY
End: 2021-12-16 | Stop reason: ALTCHOICE

## 2021-07-01 RX ORDER — LEVOFLOXACIN 750 MG/1
750 TABLET ORAL DAILY
Qty: 7 TABLET | Refills: 0 | Status: SHIPPED | OUTPATIENT
Start: 2021-07-01 | End: 2021-12-16

## 2021-07-01 RX ADMIN — TRIAMCINOLONE ACETONIDE 40 MG: 40 INJECTION, SUSPENSION INTRA-ARTICULAR; INTRAMUSCULAR at 11:01

## 2021-07-01 RX ADMIN — KETOROLAC TROMETHAMINE 60 MG: 30 INJECTION, SOLUTION INTRAMUSCULAR; INTRAVENOUS at 11:01

## 2021-07-02 VITALS
HEART RATE: 96 BPM | TEMPERATURE: 98.4 F | DIASTOLIC BLOOD PRESSURE: 73 MMHG | HEIGHT: 64 IN | WEIGHT: 289 LBS | BODY MASS INDEX: 49.34 KG/M2 | SYSTOLIC BLOOD PRESSURE: 142 MMHG

## 2021-07-02 VITALS
WEIGHT: 289.6 LBS | BODY MASS INDEX: 49.44 KG/M2 | DIASTOLIC BLOOD PRESSURE: 78 MMHG | OXYGEN SATURATION: 98 % | HEART RATE: 103 BPM | TEMPERATURE: 96.8 F | SYSTOLIC BLOOD PRESSURE: 125 MMHG | HEIGHT: 64 IN

## 2021-07-02 VITALS
SYSTOLIC BLOOD PRESSURE: 108 MMHG | WEIGHT: 293 LBS | DIASTOLIC BLOOD PRESSURE: 57 MMHG | BODY MASS INDEX: 50.02 KG/M2 | HEART RATE: 109 BPM | TEMPERATURE: 97.4 F | HEIGHT: 64 IN

## 2021-07-02 VITALS
HEART RATE: 81 BPM | HEIGHT: 64 IN | BODY MASS INDEX: 47.08 KG/M2 | WEIGHT: 275.8 LBS | DIASTOLIC BLOOD PRESSURE: 76 MMHG | SYSTOLIC BLOOD PRESSURE: 131 MMHG | TEMPERATURE: 96.8 F

## 2021-07-02 VITALS
TEMPERATURE: 97.5 F | DIASTOLIC BLOOD PRESSURE: 85 MMHG | WEIGHT: 293 LBS | OXYGEN SATURATION: 98 % | HEIGHT: 64 IN | HEART RATE: 96 BPM | BODY MASS INDEX: 50.02 KG/M2 | SYSTOLIC BLOOD PRESSURE: 142 MMHG

## 2021-07-02 VITALS
DIASTOLIC BLOOD PRESSURE: 77 MMHG | TEMPERATURE: 96.8 F | HEIGHT: 64 IN | WEIGHT: 293 LBS | BODY MASS INDEX: 50.02 KG/M2 | SYSTOLIC BLOOD PRESSURE: 143 MMHG | HEART RATE: 94 BPM

## 2021-07-02 VITALS
DIASTOLIC BLOOD PRESSURE: 85 MMHG | HEIGHT: 64 IN | SYSTOLIC BLOOD PRESSURE: 146 MMHG | HEART RATE: 83 BPM | BODY MASS INDEX: 47.89 KG/M2 | TEMPERATURE: 97.8 F

## 2021-07-02 VITALS
TEMPERATURE: 96.9 F | BODY MASS INDEX: 50.02 KG/M2 | HEIGHT: 64 IN | HEART RATE: 89 BPM | DIASTOLIC BLOOD PRESSURE: 73 MMHG | WEIGHT: 293 LBS | SYSTOLIC BLOOD PRESSURE: 142 MMHG

## 2021-07-02 VITALS
TEMPERATURE: 97.1 F | SYSTOLIC BLOOD PRESSURE: 128 MMHG | BODY MASS INDEX: 48.03 KG/M2 | HEIGHT: 64 IN | BODY MASS INDEX: 47.77 KG/M2 | HEART RATE: 77 BPM | DIASTOLIC BLOOD PRESSURE: 70 MMHG | TEMPERATURE: 97.2 F | HEIGHT: 64 IN | WEIGHT: 279.8 LBS

## 2021-07-08 ENCOUNTER — CLINICAL SUPPORT (OUTPATIENT)
Dept: FAMILY MEDICINE CLINIC | Age: 46
End: 2021-07-08

## 2021-07-08 DIAGNOSIS — J30.9 ALLERGIC RHINITIS, UNSPECIFIED SEASONALITY, UNSPECIFIED TRIGGER: Primary | ICD-10-CM

## 2021-07-08 PROCEDURE — 95115 IMMUNOTHERAPY ONE INJECTION: CPT | Performed by: FAMILY MEDICINE

## 2021-07-15 ENCOUNTER — CLINICAL SUPPORT (OUTPATIENT)
Dept: FAMILY MEDICINE CLINIC | Age: 46
End: 2021-07-15

## 2021-07-15 DIAGNOSIS — J30.9 ALLERGIC RHINITIS, UNSPECIFIED SEASONALITY, UNSPECIFIED TRIGGER: Primary | ICD-10-CM

## 2021-07-15 PROCEDURE — 95115 IMMUNOTHERAPY ONE INJECTION: CPT | Performed by: FAMILY MEDICINE

## 2021-07-25 NOTE — PROGRESS NOTES
"Chief Complaint  Sinusitis (currently on prednisone and doxycycline)    Subjective          Karey Lopez presents to [facility] for continued sinus pressure.  Patient is currently being treated with doxycycline and prednisone.  Patient also has asthma..  Denies fever, chills, nausea, vomiting, diarrhea chest pain.          Objective   Vital Signs:   /69 (BP Location: Left arm, Patient Position: Sitting)   Pulse 103   Temp 98.4 °F (36.9 °C) (Oral)   Ht 162.6 cm (64\")   Wt 130 kg (286 lb 12.8 oz)   BMI 49.23 kg/m²     Physical Exam  Vitals reviewed.   Constitutional:       Appearance: Normal appearance. She is well-developed.   HENT:      Head: Normocephalic and atraumatic.      Nose:      Comments: maxilary sinus tenderness noted.   Eyes:      Conjunctiva/sclera: Conjunctivae normal.      Pupils: Pupils are equal, round, and reactive to light.   Cardiovascular:      Rate and Rhythm: Normal rate and regular rhythm.      Heart sounds: Normal heart sounds. No murmur heard.     Pulmonary:      Effort: Pulmonary effort is normal. No respiratory distress.      Breath sounds: Wheezing present. No rhonchi.   Musculoskeletal:      Cervical back: Full passive range of motion without pain.   Skin:     General: Skin is warm and dry.   Neurological:      Mental Status: She is alert and oriented to person, place, and time.   Psychiatric:         Mood and Affect: Mood and affect normal.         Behavior: Behavior normal.         Thought Content: Thought content normal.         Judgment: Judgment normal.          Result Review :              Assessment and Plan    Diagnoses and all orders for this visit:    1. Moderate persistent asthma with acute exacerbation (Primary)  -     triamcinolone acetonide (KENALOG-40) injection 40 mg  -     ketorolac (TORADOL) injection 60 mg    2. Upper respiratory tract infection, unspecified type    Other orders  -     levoFLOXacin (Levaquin) 750 MG " tablet; Take 1 tablet by mouth Daily.  Dispense: 7 tablet; Refill: 0  -     saccharomyces boulardii (Florastor) 250 MG capsule; Take 1 capsule by mouth 2 (Two) Times a Day.  Dispense: 14 capsule; Refill: 0    Patient to stop taking doxycycline.  Start taking Levaquin.  Complete prescribed antibiotic.  Patient to start probiotic with Levaquin.  Increase fluid intake and rest.  Tylenol/ibuprofen for discomfort/fever.  Zyrtec and Flonase daily.    Patient to notify office with any acute concerns or issues.  Patient verbalizes understanding, agrees with plan of care and has no further questions upon discharge.    Please note that portions of this note were completed with a voice recognition program.    Follow Up    Return if symptoms worsen or fail to improve.  Patient was given instructions and counseling regarding her condition or for health maintenance advice. Please see specific information pulled into the AVS if appropriate.

## 2021-08-03 ENCOUNTER — CLINICAL SUPPORT (OUTPATIENT)
Dept: FAMILY MEDICINE CLINIC | Age: 46
End: 2021-08-03

## 2021-08-03 DIAGNOSIS — J30.9 ALLERGIC RHINITIS, UNSPECIFIED SEASONALITY, UNSPECIFIED TRIGGER: Primary | ICD-10-CM

## 2021-08-03 PROCEDURE — 95115 IMMUNOTHERAPY ONE INJECTION: CPT | Performed by: FAMILY MEDICINE

## 2021-08-13 ENCOUNTER — CLINICAL SUPPORT (OUTPATIENT)
Dept: FAMILY MEDICINE CLINIC | Age: 46
End: 2021-08-13

## 2021-08-13 DIAGNOSIS — J30.9 ALLERGIC RHINITIS, UNSPECIFIED SEASONALITY, UNSPECIFIED TRIGGER: Primary | ICD-10-CM

## 2021-08-13 PROCEDURE — 95115 IMMUNOTHERAPY ONE INJECTION: CPT | Performed by: FAMILY MEDICINE

## 2021-08-25 ENCOUNTER — CLINICAL SUPPORT (OUTPATIENT)
Dept: FAMILY MEDICINE CLINIC | Age: 46
End: 2021-08-25

## 2021-08-25 DIAGNOSIS — J30.9 ALLERGIC RHINITIS, UNSPECIFIED SEASONALITY, UNSPECIFIED TRIGGER: Primary | ICD-10-CM

## 2021-08-25 PROCEDURE — 95115 IMMUNOTHERAPY ONE INJECTION: CPT | Performed by: FAMILY MEDICINE

## 2021-09-08 ENCOUNTER — CLINICAL SUPPORT (OUTPATIENT)
Dept: FAMILY MEDICINE CLINIC | Age: 46
End: 2021-09-08

## 2021-09-08 DIAGNOSIS — J30.9 ALLERGIC RHINITIS, UNSPECIFIED SEASONALITY, UNSPECIFIED TRIGGER: Primary | ICD-10-CM

## 2021-09-08 PROCEDURE — 95115 IMMUNOTHERAPY ONE INJECTION: CPT | Performed by: FAMILY MEDICINE

## 2021-09-17 ENCOUNTER — CLINICAL SUPPORT (OUTPATIENT)
Dept: FAMILY MEDICINE CLINIC | Age: 46
End: 2021-09-17

## 2021-09-17 DIAGNOSIS — J30.9 ALLERGIC RHINITIS, UNSPECIFIED SEASONALITY, UNSPECIFIED TRIGGER: Primary | ICD-10-CM

## 2021-09-17 PROCEDURE — 95115 IMMUNOTHERAPY ONE INJECTION: CPT | Performed by: FAMILY MEDICINE

## 2021-09-22 RX ORDER — IBUPROFEN 800 MG/1
TABLET ORAL
Qty: 90 TABLET | Refills: 0 | Status: SHIPPED | OUTPATIENT
Start: 2021-09-22 | End: 2022-03-08

## 2021-10-12 ENCOUNTER — CLINICAL SUPPORT (OUTPATIENT)
Dept: FAMILY MEDICINE CLINIC | Age: 46
End: 2021-10-12

## 2021-10-12 DIAGNOSIS — J30.9 ALLERGIC RHINITIS, UNSPECIFIED SEASONALITY, UNSPECIFIED TRIGGER: Primary | ICD-10-CM

## 2021-10-12 PROCEDURE — 95115 IMMUNOTHERAPY ONE INJECTION: CPT | Performed by: FAMILY MEDICINE

## 2021-10-27 ENCOUNTER — CLINICAL SUPPORT (OUTPATIENT)
Dept: FAMILY MEDICINE CLINIC | Age: 46
End: 2021-10-27

## 2021-10-27 DIAGNOSIS — J30.9 ALLERGIC RHINITIS, UNSPECIFIED SEASONALITY, UNSPECIFIED TRIGGER: Primary | ICD-10-CM

## 2021-10-27 PROCEDURE — 95115 IMMUNOTHERAPY ONE INJECTION: CPT | Performed by: FAMILY MEDICINE

## 2021-11-10 ENCOUNTER — CLINICAL SUPPORT (OUTPATIENT)
Dept: FAMILY MEDICINE CLINIC | Age: 46
End: 2021-11-10

## 2021-11-10 DIAGNOSIS — J30.9 ALLERGIC RHINITIS, UNSPECIFIED SEASONALITY, UNSPECIFIED TRIGGER: Primary | ICD-10-CM

## 2021-11-10 PROCEDURE — 95115 IMMUNOTHERAPY ONE INJECTION: CPT | Performed by: FAMILY MEDICINE

## 2021-11-30 ENCOUNTER — CLINICAL SUPPORT (OUTPATIENT)
Dept: FAMILY MEDICINE CLINIC | Age: 46
End: 2021-11-30

## 2021-11-30 DIAGNOSIS — J30.9 ALLERGIC RHINITIS, UNSPECIFIED SEASONALITY, UNSPECIFIED TRIGGER: Primary | ICD-10-CM

## 2021-11-30 PROCEDURE — 95115 IMMUNOTHERAPY ONE INJECTION: CPT | Performed by: FAMILY MEDICINE

## 2021-11-30 NOTE — PROGRESS NOTES
I have reviewed the notes, assessments, and/or procedures performed by Beatriz Sarah LPN, and I concur with her documentation of Karey Lopez.

## 2021-12-16 ENCOUNTER — OFFICE VISIT (OUTPATIENT)
Dept: FAMILY MEDICINE CLINIC | Age: 46
End: 2021-12-16

## 2021-12-16 VITALS
BODY MASS INDEX: 48.83 KG/M2 | WEIGHT: 286 LBS | DIASTOLIC BLOOD PRESSURE: 82 MMHG | HEIGHT: 64 IN | TEMPERATURE: 98.4 F | OXYGEN SATURATION: 96 % | SYSTOLIC BLOOD PRESSURE: 121 MMHG | HEART RATE: 98 BPM

## 2021-12-16 DIAGNOSIS — J30.9 ALLERGIC RHINITIS, UNSPECIFIED SEASONALITY, UNSPECIFIED TRIGGER: ICD-10-CM

## 2021-12-16 DIAGNOSIS — R10.84 GENERALIZED ABDOMINAL PAIN: ICD-10-CM

## 2021-12-16 DIAGNOSIS — I10 ESSENTIAL (PRIMARY) HYPERTENSION: ICD-10-CM

## 2021-12-16 DIAGNOSIS — J40 BRONCHITIS: Primary | ICD-10-CM

## 2021-12-16 DIAGNOSIS — E55.9 VITAMIN D DEFICIENCY: ICD-10-CM

## 2021-12-16 DIAGNOSIS — F41.9 ANXIETY: ICD-10-CM

## 2021-12-16 PROBLEM — G47.30 SLEEP APNEA, UNSPECIFIED: Status: ACTIVE | Noted: 2020-06-22

## 2021-12-16 PROBLEM — F34.9 PERSISTENT MOOD (AFFECTIVE) DISORDER, UNSPECIFIED: Status: ACTIVE | Noted: 2021-05-14

## 2021-12-16 PROBLEM — Z82.49 FAMILY HISTORY OF ISCHEMIC HEART DISEASE AND OTHER DISEASES OF THE CIRCULATORY SYSTEM: Status: ACTIVE | Noted: 2018-03-21

## 2021-12-16 PROCEDURE — 96372 THER/PROPH/DIAG INJ SC/IM: CPT | Performed by: NURSE PRACTITIONER

## 2021-12-16 PROCEDURE — 99214 OFFICE O/P EST MOD 30 MIN: CPT | Performed by: NURSE PRACTITIONER

## 2021-12-16 RX ORDER — BUPROPION HYDROCHLORIDE 150 MG/1
150 TABLET ORAL DAILY
Qty: 90 TABLET | Refills: 1 | Status: SHIPPED | OUTPATIENT
Start: 2021-12-16 | End: 2022-06-17 | Stop reason: SDUPTHER

## 2021-12-16 RX ORDER — ESCITALOPRAM OXALATE 20 MG/1
30 TABLET ORAL DAILY
Qty: 135 TABLET | Refills: 1 | Status: SHIPPED | OUTPATIENT
Start: 2021-12-16 | End: 2022-06-17 | Stop reason: SDUPTHER

## 2021-12-16 RX ORDER — TRIAMCINOLONE ACETONIDE 40 MG/ML
60 INJECTION, SUSPENSION INTRA-ARTICULAR; INTRAMUSCULAR ONCE
Status: COMPLETED | OUTPATIENT
Start: 2021-12-16 | End: 2021-12-16

## 2021-12-16 RX ORDER — LISINOPRIL 30 MG/1
30 TABLET ORAL DAILY
Qty: 90 TABLET | Refills: 1 | Status: SHIPPED | OUTPATIENT
Start: 2021-12-16 | End: 2022-06-17 | Stop reason: SDUPTHER

## 2021-12-16 RX ORDER — CEFDINIR 300 MG/1
300 CAPSULE ORAL 2 TIMES DAILY
Qty: 14 CAPSULE | Refills: 0 | Status: SHIPPED | OUTPATIENT
Start: 2021-12-16 | End: 2022-03-08

## 2021-12-16 RX ORDER — DEXTROMETHORPHAN HYDROBROMIDE AND PROMETHAZINE HYDROCHLORIDE 15; 6.25 MG/5ML; MG/5ML
5 SYRUP ORAL 4 TIMES DAILY PRN
Qty: 180 ML | Refills: 0 | Status: SHIPPED | OUTPATIENT
Start: 2021-12-16 | End: 2022-03-08

## 2021-12-16 RX ORDER — MONTELUKAST SODIUM 10 MG/1
10 TABLET ORAL DAILY
Qty: 90 TABLET | Refills: 1 | Status: SHIPPED | OUTPATIENT
Start: 2021-12-16 | End: 2022-06-17 | Stop reason: SDUPTHER

## 2021-12-16 RX ORDER — METHYLPREDNISOLONE 4 MG/1
TABLET ORAL
Qty: 21 TABLET | Refills: 0 | Status: SHIPPED | OUTPATIENT
Start: 2021-12-16 | End: 2022-03-08

## 2021-12-16 RX ADMIN — TRIAMCINOLONE ACETONIDE 60 MG: 40 INJECTION, SUSPENSION INTRA-ARTICULAR; INTRAMUSCULAR at 13:49

## 2021-12-16 NOTE — PROGRESS NOTES
Chief Complaint  Cough (patient complain of sinus drainage, sinus pressure in face and chest tightness patient states she feels like she is wheezing as well ), Nasal Congestion (has had for a week but worsening for 1 day ), and Wheezing    Subjective  Patient is a 46-year-old female here today with more than 1 week of cough runny nose and wheezing.  She denies fever, loss of taste or smell, or diarrhea or exposure to ill contacts of concern.  Received Covid booster on December 9.  History of asthma and feels that she has an exacerbation of asthma with a sinus infection.  Is taking Singulair and has an albuterol inhaler at home but has not used it yet.  Is out of her chronic medications and she takes lisinopril 30 mg once daily for hypertension.  States blood pressure is under good control.  Denies side effects and requests refills.  Anxiety and depression is stable on Wellbutrin 150 mg once daily and Escitalopram 20 mg 1-1/2 tablet once daily.  Denies side effects and requests refills.  Allergic rhinitis and asthma stable on Singulair 10 mg once daily.  Denies side effects and requests refills.        Karey PEARL RamirezJohn presents to [facility]    Review of Systems   Constitutional: Positive for fatigue. Negative for chills and fever.   HENT: Positive for congestion and postnasal drip.    Respiratory: Positive for cough and wheezing. Negative for shortness of breath.    Cardiovascular: Negative.    Gastrointestinal: Negative for diarrhea, nausea and vomiting.        Intermittent abdominal pain chronic with negative imaging of the abdomen done earlier this year she did have an ovarian cyst that resolved.  Has had upper and lower scopes and symptoms remain unchanged for years.  Has never seen gastroenterology.   Psychiatric/Behavioral:        Anxiety stable        Objective   Vital Signs:   Vitals:    12/16/21 1305   BP: 121/82   BP Location: Right arm   Patient Position: Sitting   Cuff  "Size: Large Adult   Pulse: 98   Temp: 98.4 °F (36.9 °C)   TempSrc: Oral   SpO2: 96%   Weight: 130 kg (286 lb)   Height: 162.6 cm (64\")      Physical Exam  Vitals reviewed.   Constitutional:       General: She is not in acute distress.     Appearance: Normal appearance. She is well-developed.   HENT:      Right Ear: Tympanic membrane normal.      Left Ear: Tympanic membrane normal.      Mouth/Throat:      Mouth: Mucous membranes are moist.      Pharynx: Oropharynx is clear. No oropharyngeal exudate or posterior oropharyngeal erythema.   Cardiovascular:      Rate and Rhythm: Normal rate and regular rhythm.      Heart sounds: Normal heart sounds.   Pulmonary:      Effort: Pulmonary effort is normal.      Breath sounds: Normal breath sounds.   Musculoskeletal:      Right lower leg: No edema.      Left lower leg: No edema.   Skin:     General: Skin is warm and dry.   Neurological:      General: No focal deficit present.      Mental Status: She is alert.   Psychiatric:         Attention and Perception: Attention normal.         Mood and Affect: Mood and affect normal.         Behavior: Behavior normal.          Result Review :                Assessment and Plan    Diagnoses and all orders for this visit:    1. Bronchitis (Primary)  Assessment & Plan:  States Kenalog injection alone or Medrol Dosepak alone have not resulted in resolution of symptoms in the past.  Request both the Kenalog injection and oral regimen that she can take at home.  States her symptoms have been severe enough in the past where she is always had to go to the hospital.  She is in no distress today.  Dysemia may cause drowsiness.  Follow-up if not improving or to the ER if worsens.    Orders:  -     promethazine-dextromethorphan (PROMETHAZINE-DM) 6.25-15 MG/5ML syrup; Take 5 mL by mouth 4 (Four) Times a Day As Needed for Cough.  Dispense: 180 mL; Refill: 0  -     cefdinir (OMNICEF) 300 MG capsule; Take 1 capsule by mouth 2 (Two) Times a Day.  " Dispense: 14 capsule; Refill: 0  -     methylPREDNISolone (MEDROL) 4 MG dose pack; Take as directed on package instructions.  Dispense: 21 tablet; Refill: 0  -     triamcinolone acetonide (KENALOG-40) injection 60 mg    2. Generalized abdominal pain  Assessment & Plan:  Chronic and not present today.  Refer to gastroenterology for further evaluation.    Orders:  -     Ambulatory Referral to Gastroenterology    3. Allergic rhinitis, unspecified seasonality, unspecified trigger  -     montelukast (SINGULAIR) 10 MG tablet; Take 1 tablet by mouth Daily.  Dispense: 90 tablet; Refill: 1    4. Essential (primary) hypertension  Assessment & Plan:  Monitor blood pressure at home and report any uncontrolled blood pressure readings.  Follow-up in 6 months or sooner if concerns.    Orders:  -     lisinopril (PRINIVIL,ZESTRIL) 30 MG tablet; Take 1 tablet by mouth Daily.  Dispense: 90 tablet; Refill: 1  -     Comprehensive metabolic panel; Future  -     Lipid Panel    5. Anxiety  -     buPROPion XL (WELLBUTRIN XL) 150 MG 24 hr tablet; Take 1 tablet by mouth Daily for 60 days.  Dispense: 90 tablet; Refill: 1  -     escitalopram (LEXAPRO) 20 MG tablet; Take 1.5 tablets by mouth Daily.  Dispense: 135 tablet; Refill: 1    6. Vitamin D deficiency  -     Vitamin D 25 hydroxy; Future      Follow Up    No follow-ups on file.  Patient was given instructions and counseling regarding her condition or for health maintenance advice. Please see specific information pulled into the AVS if appropriate.

## 2021-12-16 NOTE — ASSESSMENT & PLAN NOTE
Monitor blood pressure at home and report any uncontrolled blood pressure readings.  Follow-up in 6 months or sooner if concerns.

## 2021-12-16 NOTE — ASSESSMENT & PLAN NOTE
States Kenalog injection alone or Medrol Dosepak alone have not resulted in resolution of symptoms in the past.  Request both the Kenalog injection and oral regimen that she can take at home.  States her symptoms have been severe enough in the past where she is always had to go to the hospital.  She is in no distress today.  Dysemia may cause drowsiness.  Follow-up if not improving or to the ER if worsens.

## 2021-12-29 ENCOUNTER — CLINICAL SUPPORT (OUTPATIENT)
Dept: FAMILY MEDICINE CLINIC | Age: 46
End: 2021-12-29

## 2021-12-29 DIAGNOSIS — J30.9 ALLERGIC RHINITIS, UNSPECIFIED SEASONALITY, UNSPECIFIED TRIGGER: Primary | ICD-10-CM

## 2021-12-29 PROCEDURE — 95115 IMMUNOTHERAPY ONE INJECTION: CPT | Performed by: FAMILY MEDICINE

## 2022-01-10 ENCOUNTER — TELEPHONE (OUTPATIENT)
Dept: FAMILY MEDICINE CLINIC | Age: 47
End: 2022-01-10

## 2022-01-10 NOTE — TELEPHONE ENCOUNTER
Caller: Karey Lopez    Relationship to patient: Self    Best call back number: [phone] OKAY TO LEAVE A MESSAGE ON PHONE    Patient is needing: PATIENT CALLED IN AND WAS DIAGNOSED POSITIVE FOR COVID YESTERDAY. SHE IS INTERESTED IN MONOCLONAL ANTIBODIES AND HAS ASTHMA  ALSO. PLEASE CALL PATIENT WITH NEXT BEST STEPS.

## 2022-01-12 NOTE — TELEPHONE ENCOUNTER
Monoclonal antibodies have not been effective for omicron variant.  What is her current level of symptoms?  Current treatment regimen?

## 2022-01-26 ENCOUNTER — CLINICAL SUPPORT (OUTPATIENT)
Dept: FAMILY MEDICINE CLINIC | Age: 47
End: 2022-01-26

## 2022-01-26 DIAGNOSIS — J30.9 ALLERGIC RHINITIS, UNSPECIFIED SEASONALITY, UNSPECIFIED TRIGGER: Primary | ICD-10-CM

## 2022-01-26 PROCEDURE — 95115 IMMUNOTHERAPY ONE INJECTION: CPT | Performed by: FAMILY MEDICINE

## 2022-02-02 ENCOUNTER — CLINICAL SUPPORT (OUTPATIENT)
Dept: FAMILY MEDICINE CLINIC | Age: 47
End: 2022-02-02

## 2022-02-02 DIAGNOSIS — J30.9 ALLERGIC RHINITIS, UNSPECIFIED SEASONALITY, UNSPECIFIED TRIGGER: Primary | ICD-10-CM

## 2022-02-02 PROCEDURE — 95115 IMMUNOTHERAPY ONE INJECTION: CPT | Performed by: FAMILY MEDICINE

## 2022-02-18 ENCOUNTER — CLINICAL SUPPORT (OUTPATIENT)
Dept: FAMILY MEDICINE CLINIC | Age: 47
End: 2022-02-18

## 2022-02-18 DIAGNOSIS — J30.9 ALLERGIC RHINITIS, UNSPECIFIED SEASONALITY, UNSPECIFIED TRIGGER: Primary | ICD-10-CM

## 2022-02-18 PROCEDURE — 95115 IMMUNOTHERAPY ONE INJECTION: CPT | Performed by: FAMILY MEDICINE

## 2022-02-25 ENCOUNTER — CLINICAL SUPPORT (OUTPATIENT)
Dept: FAMILY MEDICINE CLINIC | Age: 47
End: 2022-02-25

## 2022-02-25 DIAGNOSIS — J30.9 ALLERGIC RHINITIS, UNSPECIFIED SEASONALITY, UNSPECIFIED TRIGGER: Primary | ICD-10-CM

## 2022-02-25 PROCEDURE — 95115 IMMUNOTHERAPY ONE INJECTION: CPT | Performed by: FAMILY MEDICINE

## 2022-03-07 ENCOUNTER — APPOINTMENT (OUTPATIENT)
Dept: GENERAL RADIOLOGY | Facility: HOSPITAL | Age: 47
End: 2022-03-07

## 2022-03-07 ENCOUNTER — HOSPITAL ENCOUNTER (EMERGENCY)
Facility: HOSPITAL | Age: 47
Discharge: HOME | End: 2022-03-07
Attending: EMERGENCY MEDICINE | Admitting: EMERGENCY MEDICINE

## 2022-03-07 VITALS
RESPIRATION RATE: 22 BRPM | HEART RATE: 87 BPM | TEMPERATURE: 98.2 F | SYSTOLIC BLOOD PRESSURE: 142 MMHG | DIASTOLIC BLOOD PRESSURE: 78 MMHG | OXYGEN SATURATION: 96 %

## 2022-03-07 DIAGNOSIS — R20.2 PARESTHESIA OF LEFT UPPER EXTREMITY: ICD-10-CM

## 2022-03-07 DIAGNOSIS — S20.212A CONTUSION OF LEFT CHEST WALL, INITIAL ENCOUNTER: Primary | ICD-10-CM
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
## 2022-03-07 DIAGNOSIS — S46.912A STRAIN OF LEFT SHOULDER, INITIAL ENCOUNTER: ICD-10-CM

## 2022-03-07 LAB
ALBUMIN SERPL-MCNC: 4.1 G/DL (ref 3.5–5.2)
ALBUMIN/GLOB SERPL: 1.1 G/DL
ALP SERPL-CCNC: 78 U/L (ref 39–117)
ALT SERPL W P-5'-P-CCNC: 16 U/L (ref 1–33)
ANION GAP SERPL CALCULATED.3IONS-SCNC: 13.7 MMOL/L (ref 5–15)
AST SERPL-CCNC: 14 U/L (ref 1–32)
BASOPHILS # BLD AUTO: 0.11 10*3/MM3 (ref 0–0.2)
BASOPHILS NFR BLD AUTO: 0.7 % (ref 0–1.5)
BILIRUB SERPL-MCNC: 0.2 MG/DL (ref 0–1.2)
BUN SERPL-MCNC: 14 MG/DL (ref 6–20)
BUN/CREAT SERPL: 20.9 (ref 7–25)
CALCIUM SPEC-SCNC: 9.6 MG/DL (ref 8.6–10.5)
CHLORIDE SERPL-SCNC: 97 MMOL/L (ref 98–107)
CK MB SERPL-CCNC: 1.01 NG/ML
CK SERPL-CCNC: 37 U/L (ref 20–180)
CO2 SERPL-SCNC: 23.3 MMOL/L (ref 22–29)
CREAT SERPL-MCNC: 0.67 MG/DL (ref 0.57–1)
DEPRECATED RDW RBC AUTO: 48.7 FL (ref 37–54)
EGFRCR SERPLBLD CKD-EPI 2021: 108.6 ML/MIN/1.73
EOSINOPHIL # BLD AUTO: 0.45 10*3/MM3 (ref 0–0.4)
EOSINOPHIL NFR BLD AUTO: 2.8 % (ref 0.3–6.2)
ERYTHROCYTE [DISTWIDTH] IN BLOOD BY AUTOMATED COUNT: 16.1 % (ref 12.3–15.4)
GLOBULIN UR ELPH-MCNC: 3.7 GM/DL
GLUCOSE SERPL-MCNC: 118 MG/DL (ref 65–99)
HCT VFR BLD AUTO: 38.8 % (ref 34–46.6)
HGB BLD-MCNC: 12.6 G/DL (ref 12–15.9)
HOLD SPECIMEN: NORMAL
IMM GRANULOCYTES # BLD AUTO: 0.41 10*3/MM3 (ref 0–0.05)
IMM GRANULOCYTES NFR BLD AUTO: 2.6 % (ref 0–0.5)
LYMPHOCYTES # BLD AUTO: 3.26 10*3/MM3 (ref 0.7–3.1)
LYMPHOCYTES NFR BLD AUTO: 20.5 % (ref 19.6–45.3)
MCH RBC QN AUTO: 27.2 PG (ref 26.6–33)
MCHC RBC AUTO-ENTMCNC: 32.5 G/DL (ref 31.5–35.7)
MCV RBC AUTO: 83.8 FL (ref 79–97)
MONOCYTES # BLD AUTO: 0.98 10*3/MM3 (ref 0.1–0.9)
MONOCYTES NFR BLD AUTO: 6.2 % (ref 5–12)
NEUTROPHILS NFR BLD AUTO: 10.66 10*3/MM3 (ref 1.7–7)
NEUTROPHILS NFR BLD AUTO: 67.2 % (ref 42.7–76)
NRBC BLD AUTO-RTO: 0 /100 WBC (ref 0–0.2)
PLATELET # BLD AUTO: 440 10*3/MM3 (ref 140–450)
PMV BLD AUTO: 9.2 FL (ref 6–12)
POTASSIUM SERPL-SCNC: 4.1 MMOL/L (ref 3.5–5.2)
PROT SERPL-MCNC: 7.8 G/DL (ref 6–8.5)
RBC # BLD AUTO: 4.63 10*6/MM3 (ref 3.77–5.28)
SODIUM SERPL-SCNC: 134 MMOL/L (ref 136–145)
TROPONIN I SERPL-MCNC: 0 NG/ML (ref 0–0.6)
WBC NRBC COR # BLD: 15.87 10*3/MM3 (ref 3.4–10.8)
WHOLE BLOOD HOLD SPECIMEN: NORMAL
WHOLE BLOOD HOLD SPECIMEN: NORMAL

## 2022-03-07 PROCEDURE — 71045 X-RAY EXAM CHEST 1 VIEW: CPT

## 2022-03-07 PROCEDURE — 96374 THER/PROPH/DIAG INJ IV PUSH: CPT

## 2022-03-07 PROCEDURE — 84484 ASSAY OF TROPONIN QUANT: CPT

## 2022-03-07 PROCEDURE — 36415 COLL VENOUS BLD VENIPUNCTURE: CPT

## 2022-03-07 PROCEDURE — 25010000002 DEXAMETHASONE PER 1 MG: Performed by: NURSE PRACTITIONER

## 2022-03-07 PROCEDURE — 73030 X-RAY EXAM OF SHOULDER: CPT

## 2022-03-07 PROCEDURE — 99283 EMERGENCY DEPT VISIT LOW MDM: CPT

## 2022-03-07 PROCEDURE — 25010000002 KETOROLAC TROMETHAMINE PER 15 MG: Performed by: NURSE PRACTITIONER

## 2022-03-07 PROCEDURE — 93005 ELECTROCARDIOGRAM TRACING: CPT | Performed by: EMERGENCY MEDICINE

## 2022-03-07 PROCEDURE — 82550 ASSAY OF CK (CPK): CPT | Performed by: EMERGENCY MEDICINE

## 2022-03-07 PROCEDURE — 96375 TX/PRO/DX INJ NEW DRUG ADDON: CPT

## 2022-03-07 PROCEDURE — 85025 COMPLETE CBC W/AUTO DIFF WBC: CPT | Performed by: EMERGENCY MEDICINE

## 2022-03-07 PROCEDURE — 80053 COMPREHEN METABOLIC PANEL: CPT | Performed by: EMERGENCY MEDICINE

## 2022-03-07 PROCEDURE — 82553 CREATINE MB FRACTION: CPT | Performed by: EMERGENCY MEDICINE

## 2022-03-07 RX ORDER — KETOROLAC TROMETHAMINE 30 MG/ML
30 INJECTION, SOLUTION INTRAMUSCULAR; INTRAVENOUS ONCE
Status: COMPLETED | OUTPATIENT
Start: 2022-03-07 | End: 2022-03-07

## 2022-03-07 RX ORDER — SODIUM CHLORIDE 0.9 % (FLUSH) 0.9 %
10 SYRINGE (ML) INJECTION AS NEEDED
Status: DISCONTINUED | OUTPATIENT
Start: 2022-03-07 | End: 2022-03-07 | Stop reason: HOSPADM

## 2022-03-07 RX ORDER — ASPIRIN 81 MG/1
324 TABLET, CHEWABLE ORAL ONCE
Status: DISCONTINUED | OUTPATIENT
Start: 2022-03-07 | End: 2022-03-07

## 2022-03-07 RX ORDER — DEXAMETHASONE SODIUM PHOSPHATE 10 MG/ML
10 INJECTION INTRAMUSCULAR; INTRAVENOUS ONCE
Status: COMPLETED | OUTPATIENT
Start: 2022-03-07 | End: 2022-03-07

## 2022-03-07 RX ADMIN — DEXAMETHASONE SODIUM PHOSPHATE 10 MG: 10 INJECTION INTRAMUSCULAR; INTRAVENOUS at 07:48

## 2022-03-07 RX ADMIN — KETOROLAC TROMETHAMINE 30 MG: 30 INJECTION, SOLUTION INTRAMUSCULAR; INTRAVENOUS at 07:39

## 2022-03-08 ENCOUNTER — OFFICE VISIT (OUTPATIENT)
Dept: FAMILY MEDICINE CLINIC | Age: 47
End: 2022-03-08

## 2022-03-08 ENCOUNTER — TELEPHONE (OUTPATIENT)
Dept: FAMILY MEDICINE CLINIC | Age: 47
End: 2022-03-08

## 2022-03-08 ENCOUNTER — HOSPITAL ENCOUNTER (OUTPATIENT)
Dept: GENERAL RADIOLOGY | Facility: HOSPITAL | Age: 47
Discharge: HOME | End: 2022-03-08
Admitting: NURSE PRACTITIONER

## 2022-03-08 VITALS
SYSTOLIC BLOOD PRESSURE: 124 MMHG | DIASTOLIC BLOOD PRESSURE: 67 MMHG | HEART RATE: 103 BPM | TEMPERATURE: 98.6 F | HEIGHT: 64 IN | WEIGHT: 293 LBS | BODY MASS INDEX: 50.02 KG/M2
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
## 2022-03-08 DIAGNOSIS — M79.641 RIGHT HAND PAIN: ICD-10-CM

## 2022-03-08 PROCEDURE — 99213 OFFICE O/P EST LOW 20 MIN: CPT | Performed by: NURSE PRACTITIONER

## 2022-03-08 PROCEDURE — 73130 X-RAY EXAM OF HAND: CPT

## 2022-03-08 RX ORDER — TIZANIDINE 4 MG/1
4 TABLET ORAL EVERY 6 HOURS PRN
Qty: 30 TABLET | Refills: 0 | Status: SHIPPED | OUTPATIENT
Start: 2022-03-08 | End: 2023-02-02 | Stop reason: SDUPTHER

## 2022-03-08 RX ORDER — IBUPROFEN 800 MG/1
800 TABLET ORAL EVERY 6 HOURS PRN
Qty: 30 TABLET | Refills: 0 | Status: SHIPPED | OUTPATIENT
Start: 2022-03-08 | End: 2022-07-20 | Stop reason: SDUPTHER

## 2022-03-08 NOTE — PROGRESS NOTES
Please let pt know that her hand xray was negative. Continue with treatment plan as discussed. F/u as needed. Izzyx, mv

## 2022-03-09 ENCOUNTER — TELEPHONE (OUTPATIENT)
Dept: FAMILY MEDICINE CLINIC | Age: 47
End: 2022-03-09

## 2022-03-09 NOTE — TELEPHONE ENCOUNTER
Caller: Karey Lopez    Relationship: Self    Best call back number: [phone]    What medications are you currently taking:   Current Outpatient Medications on File Prior to Visit   Medication Sig Dispense Refill   • albuterol sulfate  (90 Base) MCG/ACT inhaler Inhale 2 puffs.     • buPROPion XL (WELLBUTRIN XL) 150 MG 24 hr tablet Take 1 tablet by mouth Daily for 60 days. 90 tablet 1   • ergocalciferol (ERGOCALCIFEROL) 1.25 MG (70527 UT) capsule Vitamin D2 50,000 unit oral capsule take 1 capsule by oral route once daily   Active     • escitalopram (LEXAPRO) 20 MG tablet Take 1.5 tablets by mouth Daily. 135 tablet 1   • ibuprofen (ADVIL,MOTRIN) 800 MG tablet Take 1 tablet by mouth Every 6 (Six) Hours As Needed for Mild Pain . 30 tablet 0   • lisinopril (PRINIVIL,ZESTRIL) 30 MG tablet Take 1 tablet by mouth Daily. 90 tablet 1   • montelukast (SINGULAIR) 10 MG tablet Take 1 tablet by mouth Daily. 90 tablet 1   • tiZANidine (Zanaflex) 4 MG tablet Take 1 tablet by mouth Every 6 (Six) Hours As Needed for Muscle Spasms. 30 tablet 0     No current facility-administered medications on file prior to visit.      TIZANIDINE 4MG IS THE MEDICATION CONCERN    When did you start taking these medications: YESTERDAY AROUND 4PM    Which medication are you concerned about: TIZANIDINE 4MG    Who prescribed you this medication: LATOSHA VERAS    What are your concerns: PATIENT HAS TAKEN THIS MEDICATION BEFORE WITHOUT ANY PROBLEMS BUT THIS TIME IT IS MAKING HER DIZZY. SHE DOES NOT FEEL SHE CAN TAKE THIS AND GO TO WORK TOMORROW. PLEASE CALL HER BACK ASAP SO SHE CAN GET SOMETHING ELSE FROM THE PHARMACY [facility] FOR TOMORROW.    [id number] - [place], [place] - [address] [facility] [place] [address] [facility] - [phone] [facility] [phone] FX  [phone]    How long have you had these concerns:SINCE TAKING THE FIRST PILL YESTERDAY. IT MADE HER DIZZY THEN. IT HAS INCREASED WITH EVERY DOSE.    IT HAS HELPED WITH  MUSCLE SORENESS BUT SHE CANNOT FUNCTION IN THIS WAY WITH BEING DIZZY. PLEASE CALL HER BACK AND ADVISE.
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
## 2022-03-11 ENCOUNTER — TELEPHONE (OUTPATIENT)
Dept: FAMILY MEDICINE CLINIC | Age: 47
End: 2022-03-11
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
## 2022-03-11 DIAGNOSIS — R51.9 NONINTRACTABLE HEADACHE, UNSPECIFIED CHRONICITY PATTERN, UNSPECIFIED HEADACHE TYPE: ICD-10-CM

## 2022-03-11 LAB — QT INTERVAL: 350 MS

## 2022-03-11 NOTE — TELEPHONE ENCOUNTER
This is likely normal, but will order a CT to make sure everything is okay. If any worsening headache or vision changes, go to ED.  doe Yu
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
## 2022-03-18 ENCOUNTER — CLINICAL SUPPORT (OUTPATIENT)
Dept: FAMILY MEDICINE CLINIC | Age: 47
End: 2022-03-18

## 2022-03-18 ENCOUNTER — HOSPITAL ENCOUNTER (OUTPATIENT)
Dept: CT IMAGING | Facility: HOSPITAL | Age: 47
Discharge: HOME | End: 2022-03-18
Admitting: NURSE PRACTITIONER

## 2022-03-18 DIAGNOSIS — R51.9 NONINTRACTABLE HEADACHE, UNSPECIFIED CHRONICITY PATTERN, UNSPECIFIED HEADACHE TYPE: ICD-10-CM

## 2022-03-18 DIAGNOSIS — J30.9 ALLERGIC RHINITIS, UNSPECIFIED SEASONALITY, UNSPECIFIED TRIGGER: Primary | ICD-10-CM
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
## 2022-03-18 PROCEDURE — 70450 CT HEAD/BRAIN W/O DYE: CPT

## 2022-03-18 PROCEDURE — 95115 IMMUNOTHERAPY ONE INJECTION: CPT | Performed by: FAMILY MEDICINE

## 2022-04-05 ENCOUNTER — HOSPITAL ENCOUNTER (OUTPATIENT)
Dept: GENERAL RADIOLOGY | Facility: HOSPITAL | Age: 47
Discharge: HOME | End: 2022-04-05
Admitting: NURSE PRACTITIONER

## 2022-04-05 ENCOUNTER — OFFICE VISIT (OUTPATIENT)
Dept: FAMILY MEDICINE CLINIC | Age: 47
End: 2022-04-05

## 2022-04-05 VITALS
SYSTOLIC BLOOD PRESSURE: 143 MMHG | HEIGHT: 64 IN | DIASTOLIC BLOOD PRESSURE: 93 MMHG | HEART RATE: 85 BPM | OXYGEN SATURATION: 95 % | BODY MASS INDEX: 50.02 KG/M2 | WEIGHT: 293 LBS

## 2022-04-05 DIAGNOSIS — G89.29 CHRONIC LEFT SHOULDER PAIN: Primary | ICD-10-CM

## 2022-04-05 DIAGNOSIS — M54.2 NECK PAIN: ICD-10-CM

## 2022-04-05 DIAGNOSIS — M25.512 CHRONIC LEFT SHOULDER PAIN: Primary | ICD-10-CM

## 2022-04-05 DIAGNOSIS — M79.609 PARESTHESIA AND PAIN OF LEFT EXTREMITY: ICD-10-CM

## 2022-04-05 DIAGNOSIS — R20.2 PARESTHESIA AND PAIN OF LEFT EXTREMITY: ICD-10-CM

## 2022-04-05 DIAGNOSIS — J30.9 ALLERGIC RHINITIS, UNSPECIFIED SEASONALITY, UNSPECIFIED TRIGGER: ICD-10-CM

## 2022-04-05 PROBLEM — Z98.890 S/P LEFT KNEE ARTHROSCOPY: Status: RESOLVED | Noted: 2020-01-30 | Resolved: 2022-04-05

## 2022-04-05 PROCEDURE — 72040 X-RAY EXAM NECK SPINE 2-3 VW: CPT

## 2022-04-05 PROCEDURE — 95115 IMMUNOTHERAPY ONE INJECTION: CPT | Performed by: NURSE PRACTITIONER

## 2022-04-05 PROCEDURE — 99213 OFFICE O/P EST LOW 20 MIN: CPT | Performed by: NURSE PRACTITIONER

## 2022-04-05 RX ORDER — METHYLPREDNISOLONE 4 MG/1
TABLET ORAL
Qty: 21 TABLET | Refills: 0 | Status: SHIPPED | OUTPATIENT
Start: 2022-04-05 | End: 2022-04-25

## 2022-04-05 NOTE — PROGRESS NOTES
Your cervical spine x-ray notes degenerative and arthritic changes greatest between cervical vertebrae 5 and 6.  Radiologist also suggested MRI would be useful centering your current symptoms.  I will therefore order MRI of the cervical spine without contrast and MRI of the left shoulder without contrast.  Please let me know if you have any concerns regarding ordering these tests.
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
## 2022-04-05 NOTE — ASSESSMENT & PLAN NOTE
I anticipate an order for an MRI of the left shoulder without contrast.  Conservative measures such as applying heat and cold, anti-inflammatories and muscle relaxers have been ineffective.  We will wait and review neck x-ray as MRI of cervical spine may also be necessary due to the reported nerve related symptoms of burning and numbness and tingling

## 2022-04-20 ENCOUNTER — CLINICAL SUPPORT (OUTPATIENT)
Dept: FAMILY MEDICINE CLINIC | Age: 47
End: 2022-04-20

## 2022-04-20 DIAGNOSIS — J30.9 ALLERGIC RHINITIS, UNSPECIFIED SEASONALITY, UNSPECIFIED TRIGGER: Primary | ICD-10-CM

## 2022-04-20 PROCEDURE — 95115 IMMUNOTHERAPY ONE INJECTION: CPT | Performed by: FAMILY MEDICINE

## 2022-04-25 ENCOUNTER — OFFICE VISIT (OUTPATIENT)
Dept: FAMILY MEDICINE CLINIC | Age: 47
End: 2022-04-25

## 2022-04-25 VITALS
BODY MASS INDEX: 50.02 KG/M2 | HEART RATE: 118 BPM | WEIGHT: 293 LBS | OXYGEN SATURATION: 98 % | DIASTOLIC BLOOD PRESSURE: 99 MMHG | HEIGHT: 64 IN | TEMPERATURE: 99 F | SYSTOLIC BLOOD PRESSURE: 151 MMHG

## 2022-04-25 DIAGNOSIS — R05.9 COUGH: Primary | ICD-10-CM

## 2022-04-25 PROBLEM — M00.9 SEPTIC ARTHRITIS OF KNEE, RIGHT (HCC): Status: RESOLVED | Noted: 2020-01-30 | Resolved: 2022-04-25

## 2022-04-25 PROBLEM — J40 BRONCHITIS: Status: RESOLVED | Noted: 2021-12-16 | Resolved: 2022-04-25

## 2022-04-25 PROBLEM — J45.40 MODERATE PERSISTENT ASTHMA WITHOUT COMPLICATION: Status: ACTIVE | Noted: 2022-04-25

## 2022-04-25 PROBLEM — R10.84 GENERALIZED ABDOMINAL PAIN: Status: RESOLVED | Noted: 2021-12-16 | Resolved: 2022-04-25

## 2022-04-25 PROCEDURE — 96372 THER/PROPH/DIAG INJ SC/IM: CPT | Performed by: FAMILY MEDICINE

## 2022-04-25 PROCEDURE — 99213 OFFICE O/P EST LOW 20 MIN: CPT | Performed by: FAMILY MEDICINE

## 2022-04-25 RX ORDER — BUDESONIDE AND FORMOTEROL FUMARATE DIHYDRATE 80; 4.5 UG/1; UG/1
2 AEROSOL RESPIRATORY (INHALATION)
Qty: 1 EACH | Refills: 1 | Status: SHIPPED | OUTPATIENT
Start: 2022-04-25 | End: 2022-06-10 | Stop reason: HOSPADM

## 2022-04-25 RX ORDER — TRIAMCINOLONE ACETONIDE 40 MG/ML
40 INJECTION, SUSPENSION INTRA-ARTICULAR; INTRAMUSCULAR ONCE
Status: COMPLETED | OUTPATIENT
Start: 2022-04-25 | End: 2022-04-25

## 2022-04-25 RX ORDER — CEFDINIR 300 MG/1
300 CAPSULE ORAL 2 TIMES DAILY
Qty: 20 CAPSULE | Refills: 0 | Status: SHIPPED | OUTPATIENT
Start: 2022-04-25 | End: 2022-05-05

## 2022-04-25 RX ORDER — PREDNISONE 20 MG/1
20 TABLET ORAL 2 TIMES DAILY
Qty: 14 TABLET | Refills: 0 | Status: SHIPPED | OUTPATIENT
Start: 2022-04-25 | End: 2022-06-04

## 2022-04-25 RX ADMIN — TRIAMCINOLONE ACETONIDE 40 MG: 40 INJECTION, SUSPENSION INTRA-ARTICULAR; INTRAMUSCULAR at 14:37
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
## 2022-05-02 ENCOUNTER — CLINICAL SUPPORT (OUTPATIENT)
Dept: FAMILY MEDICINE CLINIC | Age: 47
End: 2022-05-02

## 2022-05-02 DIAGNOSIS — J30.9 ALLERGIC RHINITIS, UNSPECIFIED SEASONALITY, UNSPECIFIED TRIGGER: Primary | ICD-10-CM

## 2022-05-02 PROCEDURE — 95115 IMMUNOTHERAPY ONE INJECTION: CPT | Performed by: FAMILY MEDICINE

## 2022-05-05 ENCOUNTER — HOSPITAL ENCOUNTER (OUTPATIENT)
Dept: MRI IMAGING | Facility: HOSPITAL | Age: 47
Discharge: HOME | End: 2022-05-05

## 2022-05-05 DIAGNOSIS — M79.609 PARESTHESIA AND PAIN OF LEFT EXTREMITY: ICD-10-CM

## 2022-05-05 DIAGNOSIS — M25.512 CHRONIC LEFT SHOULDER PAIN: ICD-10-CM

## 2022-05-05 DIAGNOSIS — M54.2 NECK PAIN: ICD-10-CM

## 2022-05-05 DIAGNOSIS — G89.29 CHRONIC LEFT SHOULDER PAIN: ICD-10-CM

## 2022-05-05 DIAGNOSIS — R20.2 PARESTHESIA AND PAIN OF LEFT EXTREMITY: ICD-10-CM

## 2022-05-05 PROCEDURE — 72141 MRI NECK SPINE W/O DYE: CPT

## 2022-05-05 PROCEDURE — 73221 MRI JOINT UPR EXTREM W/O DYE: CPT

## 2022-05-11 NOTE — PROGRESS NOTES
MRI of shoulder normal with exception of mild arthritis.  Please check status of left shoulder pain.

## 2022-05-13 ENCOUNTER — CLINICAL SUPPORT (OUTPATIENT)
Dept: FAMILY MEDICINE CLINIC | Age: 47
End: 2022-05-13

## 2022-05-13 DIAGNOSIS — J30.9 ALLERGIC RHINITIS, UNSPECIFIED SEASONALITY, UNSPECIFIED TRIGGER: Primary | ICD-10-CM

## 2022-05-13 PROCEDURE — 95115 IMMUNOTHERAPY ONE INJECTION: CPT | Performed by: FAMILY MEDICINE

## 2022-05-20 DIAGNOSIS — M54.2 NECK PAIN: Primary | ICD-10-CM

## 2022-05-24 ENCOUNTER — TELEPHONE (OUTPATIENT)
Dept: FAMILY MEDICINE CLINIC | Age: 47
End: 2022-05-24

## 2022-05-24 DIAGNOSIS — G89.29 CHRONIC LEFT SHOULDER PAIN: Primary | ICD-10-CM

## 2022-05-24 DIAGNOSIS — M25.512 CHRONIC LEFT SHOULDER PAIN: Primary | ICD-10-CM
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
## 2022-06-03 ENCOUNTER — CLINICAL SUPPORT (OUTPATIENT)
Dept: FAMILY MEDICINE CLINIC | Age: 47
End: 2022-06-03

## 2022-06-03 DIAGNOSIS — J30.9 ALLERGIC RHINITIS, UNSPECIFIED SEASONALITY, UNSPECIFIED TRIGGER: Primary | ICD-10-CM

## 2022-06-03 PROCEDURE — 95115 IMMUNOTHERAPY ONE INJECTION: CPT | Performed by: FAMILY MEDICINE

## 2022-06-04 ENCOUNTER — APPOINTMENT (OUTPATIENT)
Dept: CT IMAGING | Facility: HOSPITAL | Age: 47
End: 2022-06-04

## 2022-06-04 ENCOUNTER — HOSPITAL ENCOUNTER (INPATIENT)
Facility: HOSPITAL | Age: 47
LOS: 5 days | Discharge: HOME | End: 2022-06-10
Attending: EMERGENCY MEDICINE | Admitting: FAMILY MEDICINE

## 2022-06-04 ENCOUNTER — APPOINTMENT (OUTPATIENT)
Dept: GENERAL RADIOLOGY | Facility: HOSPITAL | Age: 47
End: 2022-06-04

## 2022-06-04 DIAGNOSIS — R26.2 DIFFICULTY WALKING: ICD-10-CM

## 2022-06-04 DIAGNOSIS — Z78.9 FAILURE OF OUTPATIENT TREATMENT: ICD-10-CM

## 2022-06-04 DIAGNOSIS — J45.51 SEVERE PERSISTENT ASTHMA WITH EXACERBATION: ICD-10-CM

## 2022-06-04 DIAGNOSIS — A41.9 SEPSIS, DUE TO UNSPECIFIED ORGANISM, UNSPECIFIED WHETHER ACUTE ORGAN DYSFUNCTION PRESENT: Primary | ICD-10-CM

## 2022-06-04 LAB
ALBUMIN SERPL-MCNC: 4.4 G/DL (ref 3.5–5.2)
ALBUMIN/GLOB SERPL: 1.3 G/DL
ALP SERPL-CCNC: 76 U/L (ref 39–117)
ALT SERPL W P-5'-P-CCNC: 21 U/L (ref 1–33)
ANION GAP SERPL CALCULATED.3IONS-SCNC: 16.6 MMOL/L (ref 5–15)
AST SERPL-CCNC: 14 U/L (ref 1–32)
BASOPHILS # BLD AUTO: 0.08 10*3/MM3 (ref 0–0.2)
BASOPHILS NFR BLD AUTO: 0.6 % (ref 0–1.5)
BILIRUB SERPL-MCNC: 0.2 MG/DL (ref 0–1.2)
BILIRUB UR QL STRIP: NEGATIVE
BUN SERPL-MCNC: 11 MG/DL (ref 6–20)
BUN/CREAT SERPL: 16.7 (ref 7–25)
CALCIUM SPEC-SCNC: 10.2 MG/DL (ref 8.6–10.5)
CHLORIDE SERPL-SCNC: 101 MMOL/L (ref 98–107)
CLARITY UR: CLEAR
CO2 SERPL-SCNC: 19.4 MMOL/L (ref 22–29)
COLOR UR: YELLOW
CREAT SERPL-MCNC: 0.66 MG/DL (ref 0.57–1)
D DIMER PPP FEU-MCNC: <0.27 MCGFEU/ML (ref 0–0.57)
D-LACTATE SERPL-SCNC: 4.1 MMOL/L (ref 0.5–2)
D-LACTATE SERPL-SCNC: 5.2 MMOL/L (ref 0.5–2)
DEPRECATED RDW RBC AUTO: 52 FL (ref 37–54)
EGFRCR SERPLBLD CKD-EPI 2021: 109 ML/MIN/1.73
EOSINOPHIL # BLD AUTO: 0 10*3/MM3 (ref 0–0.4)
EOSINOPHIL NFR BLD AUTO: 0 % (ref 0.3–6.2)
ERYTHROCYTE [DISTWIDTH] IN BLOOD BY AUTOMATED COUNT: 16.9 % (ref 12.3–15.4)
FLUAV AG NPH QL: NEGATIVE
FLUBV AG NPH QL IA: NEGATIVE
GLOBULIN UR ELPH-MCNC: 3.4 GM/DL
GLUCOSE SERPL-MCNC: 126 MG/DL (ref 65–99)
GLUCOSE UR STRIP-MCNC: NEGATIVE MG/DL
HCG SERPL QL: NEGATIVE
HCT VFR BLD AUTO: 38.2 % (ref 34–46.6)
HGB BLD-MCNC: 12.1 G/DL (ref 12–15.9)
HGB UR QL STRIP.AUTO: NEGATIVE
HOLD SPECIMEN: NORMAL
IMM GRANULOCYTES # BLD AUTO: 1.03 10*3/MM3 (ref 0–0.05)
IMM GRANULOCYTES NFR BLD AUTO: 7.1 % (ref 0–0.5)
KETONES UR QL STRIP: NEGATIVE
LEUKOCYTE ESTERASE UR QL STRIP.AUTO: NEGATIVE
LYMPHOCYTES # BLD AUTO: 1.49 10*3/MM3 (ref 0.7–3.1)
LYMPHOCYTES NFR BLD AUTO: 10.3 % (ref 19.6–45.3)
MCH RBC QN AUTO: 26.8 PG (ref 26.6–33)
MCHC RBC AUTO-ENTMCNC: 31.7 G/DL (ref 31.5–35.7)
MCV RBC AUTO: 84.5 FL (ref 79–97)
MONOCYTES # BLD AUTO: 0.58 10*3/MM3 (ref 0.1–0.9)
MONOCYTES NFR BLD AUTO: 4 % (ref 5–12)
NEUTROPHILS NFR BLD AUTO: 11.27 10*3/MM3 (ref 1.7–7)
NEUTROPHILS NFR BLD AUTO: 78 % (ref 42.7–76)
NITRITE UR QL STRIP: NEGATIVE
NRBC BLD AUTO-RTO: 0 /100 WBC (ref 0–0.2)
NT-PROBNP SERPL-MCNC: 123.3 PG/ML (ref 0–450)
PH UR STRIP.AUTO: 6 [PH] (ref 5–8)
PLAT MORPH BLD: NORMAL
PLATELET # BLD AUTO: 439 10*3/MM3 (ref 140–450)
PMV BLD AUTO: 9.3 FL (ref 6–12)
POTASSIUM SERPL-SCNC: 4.3 MMOL/L (ref 3.5–5.2)
PROT SERPL-MCNC: 7.8 G/DL (ref 6–8.5)
PROT UR QL STRIP: NEGATIVE
RBC # BLD AUTO: 4.52 10*6/MM3 (ref 3.77–5.28)
RBC MORPH BLD: NORMAL
SODIUM SERPL-SCNC: 137 MMOL/L (ref 136–145)
SP GR UR STRIP: 1.01 (ref 1–1.03)
UROBILINOGEN UR QL STRIP: NORMAL
WBC MORPH BLD: NORMAL
WBC NRBC COR # BLD: 14.45 10*3/MM3 (ref 3.4–10.8)
WHOLE BLOOD HOLD COAG: NORMAL
WHOLE BLOOD HOLD SPECIMEN: NORMAL

## 2022-06-04 PROCEDURE — 80053 COMPREHEN METABOLIC PANEL: CPT | Performed by: EMERGENCY MEDICINE

## 2022-06-04 PROCEDURE — 94799 UNLISTED PULMONARY SVC/PX: CPT

## 2022-06-04 PROCEDURE — 83880 ASSAY OF NATRIURETIC PEPTIDE: CPT | Performed by: NURSE PRACTITIONER

## 2022-06-04 PROCEDURE — 99285 EMERGENCY DEPT VISIT HI MDM: CPT

## 2022-06-04 PROCEDURE — 71260 CT THORAX DX C+: CPT

## 2022-06-04 PROCEDURE — 85379 FIBRIN DEGRADATION QUANT: CPT | Performed by: NURSE PRACTITIONER

## 2022-06-04 PROCEDURE — U0004 COV-19 TEST NON-CDC HGH THRU: HCPCS | Performed by: NURSE PRACTITIONER

## 2022-06-04 PROCEDURE — 94640 AIRWAY INHALATION TREATMENT: CPT

## 2022-06-04 PROCEDURE — 87899 AGENT NOS ASSAY W/OPTIC: CPT | Performed by: HOSPITALIST

## 2022-06-04 PROCEDURE — 84703 CHORIONIC GONADOTROPIN ASSAY: CPT | Performed by: EMERGENCY MEDICINE

## 2022-06-04 PROCEDURE — 87804 INFLUENZA ASSAY W/OPTIC: CPT | Performed by: EMERGENCY MEDICINE

## 2022-06-04 PROCEDURE — 25010000002 METHYLPREDNISOLONE PER 125 MG: Performed by: NURSE PRACTITIONER

## 2022-06-04 PROCEDURE — 71045 X-RAY EXAM CHEST 1 VIEW: CPT

## 2022-06-04 PROCEDURE — 85007 BL SMEAR W/DIFF WBC COUNT: CPT | Performed by: EMERGENCY MEDICINE

## 2022-06-04 PROCEDURE — 81003 URINALYSIS AUTO W/O SCOPE: CPT | Performed by: NURSE PRACTITIONER

## 2022-06-04 PROCEDURE — 93005 ELECTROCARDIOGRAM TRACING: CPT | Performed by: EMERGENCY MEDICINE

## 2022-06-04 PROCEDURE — 25010000002 CEFTRIAXONE PER 250 MG: Performed by: NURSE PRACTITIONER

## 2022-06-04 PROCEDURE — 0 IOPAMIDOL PER 1 ML: Performed by: EMERGENCY MEDICINE

## 2022-06-04 PROCEDURE — 83605 ASSAY OF LACTIC ACID: CPT | Performed by: EMERGENCY MEDICINE

## 2022-06-04 PROCEDURE — 99223 1ST HOSP IP/OBS HIGH 75: CPT | Performed by: HOSPITALIST

## 2022-06-04 PROCEDURE — 87449 NOS EACH ORGANISM AG IA: CPT | Performed by: HOSPITALIST

## 2022-06-04 PROCEDURE — 85025 COMPLETE CBC W/AUTO DIFF WBC: CPT | Performed by: EMERGENCY MEDICINE

## 2022-06-04 PROCEDURE — 87040 BLOOD CULTURE FOR BACTERIA: CPT | Performed by: EMERGENCY MEDICINE

## 2022-06-04 RX ORDER — METHYLPREDNISOLONE SODIUM SUCCINATE 40 MG/ML
40 INJECTION, POWDER, LYOPHILIZED, FOR SOLUTION INTRAMUSCULAR; INTRAVENOUS EVERY 12 HOURS
Status: DISCONTINUED | OUTPATIENT
Start: 2022-06-04 | End: 2022-06-06

## 2022-06-04 RX ORDER — BUPROPION HYDROCHLORIDE 150 MG/1
150 TABLET ORAL DAILY
Status: DISCONTINUED | OUTPATIENT
Start: 2022-06-05 | End: 2022-06-05

## 2022-06-04 RX ORDER — CETIRIZINE HYDROCHLORIDE 10 MG/1
10 TABLET ORAL DAILY
Status: DISCONTINUED | OUTPATIENT
Start: 2022-06-05 | End: 2022-06-05

## 2022-06-04 RX ORDER — IPRATROPIUM BROMIDE AND ALBUTEROL SULFATE 2.5; .5 MG/3ML; MG/3ML
3 SOLUTION RESPIRATORY (INHALATION) ONCE
Status: COMPLETED | OUTPATIENT
Start: 2022-06-04 | End: 2022-06-04

## 2022-06-04 RX ORDER — MAGNESIUM SULFATE 1 G/100ML
2 INJECTION INTRAVENOUS ONCE
Status: COMPLETED | OUTPATIENT
Start: 2022-06-04 | End: 2022-06-05

## 2022-06-04 RX ORDER — SODIUM CHLORIDE 0.9 % (FLUSH) 0.9 %
10 SYRINGE (ML) INJECTION AS NEEDED
Status: DISCONTINUED | OUTPATIENT
Start: 2022-06-04 | End: 2022-06-10 | Stop reason: HOSPADM

## 2022-06-04 RX ORDER — BUDESONIDE 0.5 MG/2ML
0.5 INHALANT ORAL
Status: DISCONTINUED | OUTPATIENT
Start: 2022-06-04 | End: 2022-06-10 | Stop reason: HOSPADM

## 2022-06-04 RX ORDER — GUAIFENESIN 600 MG/1
1200 TABLET, EXTENDED RELEASE ORAL EVERY 12 HOURS SCHEDULED
Status: DISCONTINUED | OUTPATIENT
Start: 2022-06-04 | End: 2022-06-10 | Stop reason: HOSPADM

## 2022-06-04 RX ORDER — TIZANIDINE 4 MG/1
4 TABLET ORAL EVERY 6 HOURS PRN
Status: DISCONTINUED | OUTPATIENT
Start: 2022-06-04 | End: 2022-06-10 | Stop reason: HOSPADM

## 2022-06-04 RX ORDER — LISINOPRIL 10 MG/1
30 TABLET ORAL DAILY
Status: DISCONTINUED | OUTPATIENT
Start: 2022-06-05 | End: 2022-06-05

## 2022-06-04 RX ORDER — FLUTICASONE PROPIONATE 50 MCG
2 SPRAY, SUSPENSION (ML) NASAL DAILY
Status: DISCONTINUED | OUTPATIENT
Start: 2022-06-05 | End: 2022-06-10 | Stop reason: HOSPADM

## 2022-06-04 RX ORDER — CEFTRIAXONE SODIUM 1 G/50ML
1 INJECTION, SOLUTION INTRAVENOUS ONCE
Status: COMPLETED | OUTPATIENT
Start: 2022-06-04 | End: 2022-06-05

## 2022-06-04 RX ORDER — ESCITALOPRAM OXALATE 10 MG/1
30 TABLET ORAL DAILY
Status: DISCONTINUED | OUTPATIENT
Start: 2022-06-05 | End: 2022-06-05

## 2022-06-04 RX ORDER — METHYLPREDNISOLONE SODIUM SUCCINATE 125 MG/2ML
125 INJECTION, POWDER, LYOPHILIZED, FOR SOLUTION INTRAMUSCULAR; INTRAVENOUS ONCE
Status: COMPLETED | OUTPATIENT
Start: 2022-06-04 | End: 2022-06-04

## 2022-06-04 RX ORDER — ARFORMOTEROL TARTRATE 15 UG/2ML
15 SOLUTION RESPIRATORY (INHALATION)
Status: DISCONTINUED | OUTPATIENT
Start: 2022-06-04 | End: 2022-06-10 | Stop reason: HOSPADM

## 2022-06-04 RX ORDER — MONTELUKAST SODIUM 10 MG/1
10 TABLET ORAL NIGHTLY
Status: DISCONTINUED | OUTPATIENT
Start: 2022-06-04 | End: 2022-06-10 | Stop reason: HOSPADM

## 2022-06-04 RX ORDER — IPRATROPIUM BROMIDE AND ALBUTEROL SULFATE 2.5; .5 MG/3ML; MG/3ML
3 SOLUTION RESPIRATORY (INHALATION) EVERY 4 HOURS PRN
Status: DISCONTINUED | OUTPATIENT
Start: 2022-06-04 | End: 2022-06-05

## 2022-06-04 RX ADMIN — SODIUM CHLORIDE 1000 ML: 9 INJECTION, SOLUTION INTRAVENOUS at 21:56

## 2022-06-04 RX ADMIN — METHYLPREDNISOLONE SODIUM SUCCINATE 125 MG: 125 INJECTION, POWDER, FOR SOLUTION INTRAMUSCULAR; INTRAVENOUS at 20:44

## 2022-06-04 RX ADMIN — IPRATROPIUM BROMIDE AND ALBUTEROL SULFATE 3 ML: .5; 3 SOLUTION RESPIRATORY (INHALATION) at 19:38

## 2022-06-04 RX ADMIN — IOPAMIDOL 100 ML: 755 INJECTION, SOLUTION INTRAVENOUS at 21:45

## 2022-06-04 RX ADMIN — CEFTRIAXONE SODIUM 1 G: 1 INJECTION, SOLUTION INTRAVENOUS at 23:24

## 2022-06-04 RX ADMIN — SODIUM CHLORIDE 1000 ML: 9 INJECTION, SOLUTION INTRAVENOUS at 20:44

## 2022-06-05 LAB
ANION GAP SERPL CALCULATED.3IONS-SCNC: 18.4 MMOL/L (ref 5–15)
BASOPHILS # BLD AUTO: 0.06 10*3/MM3 (ref 0–0.2)
BASOPHILS NFR BLD AUTO: 0.4 % (ref 0–1.5)
BUN SERPL-MCNC: 13 MG/DL (ref 6–20)
BUN/CREAT SERPL: 15.9 (ref 7–25)
CALCIUM SPEC-SCNC: 9.4 MG/DL (ref 8.6–10.5)
CHLORIDE SERPL-SCNC: 101 MMOL/L (ref 98–107)
CO2 SERPL-SCNC: 17.6 MMOL/L (ref 22–29)
CREAT SERPL-MCNC: 0.82 MG/DL (ref 0.57–1)
D-LACTATE SERPL-SCNC: 4.7 MMOL/L (ref 0.5–2)
D-LACTATE SERPL-SCNC: 5.1 MMOL/L (ref 0.5–2)
D-LACTATE SERPL-SCNC: NORMAL MMOL/L
DEPRECATED RDW RBC AUTO: 53.3 FL (ref 37–54)
EGFRCR SERPLBLD CKD-EPI 2021: 88.9 ML/MIN/1.73
EOSINOPHIL # BLD AUTO: 0 10*3/MM3 (ref 0–0.4)
EOSINOPHIL NFR BLD AUTO: 0 % (ref 0.3–6.2)
ERYTHROCYTE [DISTWIDTH] IN BLOOD BY AUTOMATED COUNT: 17.1 % (ref 12.3–15.4)
GLUCOSE SERPL-MCNC: 297 MG/DL (ref 65–99)
HBA1C MFR BLD: 7 % (ref 4.8–5.6)
HCT VFR BLD AUTO: 39 % (ref 34–46.6)
HGB BLD-MCNC: 12.2 G/DL (ref 12–15.9)
IMM GRANULOCYTES # BLD AUTO: 1.07 10*3/MM3 (ref 0–0.05)
IMM GRANULOCYTES NFR BLD AUTO: 7.3 % (ref 0–0.5)
L PNEUMO1 AG UR QL IA: NEGATIVE
LYMPHOCYTES # BLD AUTO: 1.32 10*3/MM3 (ref 0.7–3.1)
LYMPHOCYTES NFR BLD AUTO: 9 % (ref 19.6–45.3)
MAGNESIUM SERPL-MCNC: 2.5 MG/DL (ref 1.6–2.6)
MAGNESIUM SERPL-MCNC: 2.7 MG/DL (ref 1.6–2.6)
MCH RBC QN AUTO: 27.1 PG (ref 26.6–33)
MCHC RBC AUTO-ENTMCNC: 31.3 G/DL (ref 31.5–35.7)
MCV RBC AUTO: 86.5 FL (ref 79–97)
MONOCYTES # BLD AUTO: 0.17 10*3/MM3 (ref 0.1–0.9)
MONOCYTES NFR BLD AUTO: 1.2 % (ref 5–12)
NEUTROPHILS NFR BLD AUTO: 12.04 10*3/MM3 (ref 1.7–7)
NEUTROPHILS NFR BLD AUTO: 82.1 % (ref 42.7–76)
NRBC BLD AUTO-RTO: 0 /100 WBC (ref 0–0.2)
PHOSPHATE SERPL-MCNC: 2.2 MG/DL (ref 2.5–4.5)
PHOSPHATE SERPL-MCNC: 2.6 MG/DL (ref 2.5–4.5)
PHOSPHATE SERPL-MCNC: NORMAL MG/DL
PLATELET # BLD AUTO: 427 10*3/MM3 (ref 140–450)
PMV BLD AUTO: 9.4 FL (ref 6–12)
POTASSIUM SERPL-SCNC: 4.3 MMOL/L (ref 3.5–5.2)
PROCALCITONIN SERPL-MCNC: 0.03 NG/ML (ref 0–0.25)
QT INTERVAL: 341 MS
RBC # BLD AUTO: 4.51 10*6/MM3 (ref 3.77–5.28)
S PNEUM AG SPEC QL LA: NEGATIVE
SARS-COV-2 RNA PNL SPEC NAA+PROBE: NOT DETECTED
SODIUM SERPL-SCNC: 137 MMOL/L (ref 136–145)
WBC NRBC COR # BLD: 14.66 10*3/MM3 (ref 3.4–10.8)

## 2022-06-05 PROCEDURE — 25010000002 AZITHROMYCIN PER 500 MG: Performed by: HOSPITALIST

## 2022-06-05 PROCEDURE — 94799 UNLISTED PULMONARY SVC/PX: CPT

## 2022-06-05 PROCEDURE — 83036 HEMOGLOBIN GLYCOSYLATED A1C: CPT | Performed by: INTERNAL MEDICINE

## 2022-06-05 PROCEDURE — 99233 SBSQ HOSP IP/OBS HIGH 50: CPT | Performed by: INTERNAL MEDICINE

## 2022-06-05 PROCEDURE — 86738 MYCOPLASMA ANTIBODY: CPT | Performed by: HOSPITALIST

## 2022-06-05 PROCEDURE — 36415 COLL VENOUS BLD VENIPUNCTURE: CPT | Performed by: EMERGENCY MEDICINE

## 2022-06-05 PROCEDURE — 85025 COMPLETE CBC W/AUTO DIFF WBC: CPT | Performed by: HOSPITALIST

## 2022-06-05 PROCEDURE — 84100 ASSAY OF PHOSPHORUS: CPT | Performed by: HOSPITALIST

## 2022-06-05 PROCEDURE — 83735 ASSAY OF MAGNESIUM: CPT | Performed by: HOSPITALIST

## 2022-06-05 PROCEDURE — 84145 PROCALCITONIN (PCT): CPT | Performed by: HOSPITALIST

## 2022-06-05 PROCEDURE — 83605 ASSAY OF LACTIC ACID: CPT | Performed by: INTERNAL MEDICINE

## 2022-06-05 PROCEDURE — 25010000002 METHYLPREDNISOLONE PER 40 MG: Performed by: HOSPITALIST

## 2022-06-05 PROCEDURE — 86713 LEGIONELLA ANTIBODY: CPT | Performed by: HOSPITALIST

## 2022-06-05 PROCEDURE — 83605 ASSAY OF LACTIC ACID: CPT | Performed by: EMERGENCY MEDICINE

## 2022-06-05 PROCEDURE — 25010000002 MAGNESIUM SULFATE IN D5W 1G/100ML (PREMIX) 1-5 GM/100ML-% SOLUTION: Performed by: NURSE PRACTITIONER

## 2022-06-05 PROCEDURE — 86603 ADENOVIRUS ANTIBODY: CPT | Performed by: HOSPITALIST

## 2022-06-05 PROCEDURE — 86631 CHLAMYDIA ANTIBODY: CPT | Performed by: HOSPITALIST

## 2022-06-05 PROCEDURE — 25010000002 ENOXAPARIN PER 10 MG: Performed by: HOSPITALIST

## 2022-06-05 PROCEDURE — 80048 BASIC METABOLIC PNL TOTAL CA: CPT | Performed by: HOSPITALIST

## 2022-06-05 RX ORDER — ENOXAPARIN SODIUM 100 MG/ML
40 INJECTION SUBCUTANEOUS EVERY 24 HOURS
Status: DISCONTINUED | OUTPATIENT
Start: 2022-06-05 | End: 2022-06-07

## 2022-06-05 RX ORDER — ALPRAZOLAM 0.25 MG/1
0.5 TABLET ORAL EVERY 8 HOURS PRN
Status: DISCONTINUED | OUTPATIENT
Start: 2022-06-05 | End: 2022-06-05

## 2022-06-05 RX ORDER — NITROGLYCERIN 0.4 MG/1
0.4 TABLET SUBLINGUAL
Status: DISCONTINUED | OUTPATIENT
Start: 2022-06-05 | End: 2022-06-10 | Stop reason: HOSPADM

## 2022-06-05 RX ORDER — BUPROPION HYDROCHLORIDE 150 MG/1
150 TABLET ORAL NIGHTLY
Status: DISCONTINUED | OUTPATIENT
Start: 2022-06-05 | End: 2022-06-10 | Stop reason: HOSPADM

## 2022-06-05 RX ORDER — ESCITALOPRAM OXALATE 10 MG/1
30 TABLET ORAL NIGHTLY
Status: DISCONTINUED | OUTPATIENT
Start: 2022-06-05 | End: 2022-06-10 | Stop reason: HOSPADM

## 2022-06-05 RX ORDER — IPRATROPIUM BROMIDE AND ALBUTEROL SULFATE 2.5; .5 MG/3ML; MG/3ML
3 SOLUTION RESPIRATORY (INHALATION)
Status: DISCONTINUED | OUTPATIENT
Start: 2022-06-05 | End: 2022-06-06

## 2022-06-05 RX ORDER — CHOLECALCIFEROL (VITAMIN D3) 125 MCG
5 CAPSULE ORAL NIGHTLY PRN
Status: DISCONTINUED | OUTPATIENT
Start: 2022-06-05 | End: 2022-06-10 | Stop reason: HOSPADM

## 2022-06-05 RX ORDER — SODIUM CHLORIDE 0.9 % (FLUSH) 0.9 %
10 SYRINGE (ML) INJECTION AS NEEDED
Status: DISCONTINUED | OUTPATIENT
Start: 2022-06-05 | End: 2022-06-10 | Stop reason: HOSPADM

## 2022-06-05 RX ORDER — AMOXICILLIN 250 MG
2 CAPSULE ORAL 2 TIMES DAILY
Status: DISCONTINUED | OUTPATIENT
Start: 2022-06-05 | End: 2022-06-10 | Stop reason: HOSPADM

## 2022-06-05 RX ORDER — CEFTRIAXONE SODIUM 1 G/50ML
1 INJECTION, SOLUTION INTRAVENOUS NIGHTLY
Status: DISCONTINUED | OUTPATIENT
Start: 2022-06-05 | End: 2022-06-05

## 2022-06-05 RX ORDER — BISACODYL 10 MG
10 SUPPOSITORY, RECTAL RECTAL DAILY PRN
Status: DISCONTINUED | OUTPATIENT
Start: 2022-06-05 | End: 2022-06-10 | Stop reason: HOSPADM

## 2022-06-05 RX ORDER — SODIUM CHLORIDE 0.9 % (FLUSH) 0.9 %
10 SYRINGE (ML) INJECTION EVERY 12 HOURS SCHEDULED
Status: DISCONTINUED | OUTPATIENT
Start: 2022-06-05 | End: 2022-06-10 | Stop reason: HOSPADM

## 2022-06-05 RX ORDER — CETIRIZINE HYDROCHLORIDE 10 MG/1
10 TABLET ORAL NIGHTLY
Status: DISCONTINUED | OUTPATIENT
Start: 2022-06-05 | End: 2022-06-10 | Stop reason: HOSPADM

## 2022-06-05 RX ORDER — BISACODYL 5 MG/1
5 TABLET, DELAYED RELEASE ORAL DAILY PRN
Status: DISCONTINUED | OUTPATIENT
Start: 2022-06-05 | End: 2022-06-10 | Stop reason: HOSPADM

## 2022-06-05 RX ORDER — ACETAMINOPHEN 500 MG
1000 TABLET ORAL 3 TIMES DAILY
Status: DISCONTINUED | OUTPATIENT
Start: 2022-06-05 | End: 2022-06-05

## 2022-06-05 RX ORDER — FENTANYL/ROPIVACAINE/NS/PF 2-625MCG/1
15 PLASTIC BAG, INJECTION (ML) EPIDURAL ONCE
Status: COMPLETED | OUTPATIENT
Start: 2022-06-05 | End: 2022-06-05

## 2022-06-05 RX ORDER — ALUMINA, MAGNESIA, AND SIMETHICONE 2400; 2400; 240 MG/30ML; MG/30ML; MG/30ML
15 SUSPENSION ORAL EVERY 6 HOURS PRN
Status: DISCONTINUED | OUTPATIENT
Start: 2022-06-05 | End: 2022-06-10 | Stop reason: HOSPADM

## 2022-06-05 RX ORDER — ONDANSETRON 4 MG/1
4 TABLET, FILM COATED ORAL EVERY 6 HOURS PRN
Status: DISCONTINUED | OUTPATIENT
Start: 2022-06-05 | End: 2022-06-10 | Stop reason: HOSPADM

## 2022-06-05 RX ORDER — SODIUM CHLORIDE 9 MG/ML
125 INJECTION, SOLUTION INTRAVENOUS CONTINUOUS
Status: DISCONTINUED | OUTPATIENT
Start: 2022-06-05 | End: 2022-06-06

## 2022-06-05 RX ORDER — HYDROCODONE BITARTRATE AND ACETAMINOPHEN 5; 325 MG/1; MG/1
1 TABLET ORAL EVERY 4 HOURS PRN
Status: DISCONTINUED | OUTPATIENT
Start: 2022-06-05 | End: 2022-06-10 | Stop reason: HOSPADM

## 2022-06-05 RX ORDER — ONDANSETRON 2 MG/ML
4 INJECTION INTRAMUSCULAR; INTRAVENOUS EVERY 6 HOURS PRN
Status: DISCONTINUED | OUTPATIENT
Start: 2022-06-05 | End: 2022-06-10 | Stop reason: HOSPADM

## 2022-06-05 RX ORDER — ALBUTEROL SULFATE 2.5 MG/3ML
2.5 SOLUTION RESPIRATORY (INHALATION) EVERY 6 HOURS PRN
Status: DISCONTINUED | OUTPATIENT
Start: 2022-06-05 | End: 2022-06-06

## 2022-06-05 RX ORDER — POLYETHYLENE GLYCOL 3350 17 G/17G
17 POWDER, FOR SOLUTION ORAL DAILY PRN
Status: DISCONTINUED | OUTPATIENT
Start: 2022-06-05 | End: 2022-06-10 | Stop reason: HOSPADM

## 2022-06-05 RX ADMIN — MAGNESIUM SULFATE 2 G: 1 INJECTION INTRAVENOUS at 01:15

## 2022-06-05 RX ADMIN — IPRATROPIUM BROMIDE AND ALBUTEROL SULFATE 3 ML: .5; 3 SOLUTION RESPIRATORY (INHALATION) at 00:50

## 2022-06-05 RX ADMIN — ESCITALOPRAM OXALATE 30 MG: 10 TABLET ORAL at 02:00

## 2022-06-05 RX ADMIN — Medication 10 ML: at 01:19

## 2022-06-05 RX ADMIN — BUDESONIDE 0.5 MG: 0.5 SUSPENSION RESPIRATORY (INHALATION) at 18:47

## 2022-06-05 RX ADMIN — IPRATROPIUM BROMIDE AND ALBUTEROL SULFATE 3 ML: .5; 3 SOLUTION RESPIRATORY (INHALATION) at 18:48

## 2022-06-05 RX ADMIN — GUAIFENESIN 1200 MG: 600 TABLET ORAL at 01:27

## 2022-06-05 RX ADMIN — SODIUM CHLORIDE 125 ML/HR: 9 INJECTION, SOLUTION INTRAVENOUS at 01:19

## 2022-06-05 RX ADMIN — FLUTICASONE PROPIONATE 2 SPRAY: 50 SPRAY, METERED NASAL at 08:28

## 2022-06-05 RX ADMIN — BUPROPION HYDROCHLORIDE 150 MG: 150 TABLET, EXTENDED RELEASE ORAL at 20:21

## 2022-06-05 RX ADMIN — LISINOPRIL 30 MG: 20 TABLET ORAL at 20:20

## 2022-06-05 RX ADMIN — ESCITALOPRAM OXALATE 30 MG: 10 TABLET ORAL at 20:21

## 2022-06-05 RX ADMIN — MONTELUKAST 10 MG: 10 TABLET, FILM COATED ORAL at 01:26

## 2022-06-05 RX ADMIN — ACETAMINOPHEN 1000 MG: 500 TABLET ORAL at 08:27

## 2022-06-05 RX ADMIN — GUAIFENESIN 1200 MG: 600 TABLET ORAL at 20:20

## 2022-06-05 RX ADMIN — ALBUTEROL SULFATE 2.5 MG: 2.5 SOLUTION RESPIRATORY (INHALATION) at 22:13

## 2022-06-05 RX ADMIN — AZITHROMYCIN 500 MG: 500 INJECTION, POWDER, LYOPHILIZED, FOR SOLUTION INTRAVENOUS at 02:31

## 2022-06-05 RX ADMIN — BUPROPION HYDROCHLORIDE 150 MG: 150 TABLET, EXTENDED RELEASE ORAL at 02:00

## 2022-06-05 RX ADMIN — SODIUM CHLORIDE 125 ML/HR: 9 INJECTION, SOLUTION INTRAVENOUS at 08:27

## 2022-06-05 RX ADMIN — CETIRIZINE HYDROCHLORIDE 10 MG: 10 TABLET, FILM COATED ORAL at 02:00

## 2022-06-05 RX ADMIN — BUDESONIDE 0.5 MG: 0.5 SUSPENSION RESPIRATORY (INHALATION) at 07:38

## 2022-06-05 RX ADMIN — GUAIFENESIN 1200 MG: 600 TABLET ORAL at 08:27

## 2022-06-05 RX ADMIN — MONTELUKAST 10 MG: 10 TABLET, FILM COATED ORAL at 20:20

## 2022-06-05 RX ADMIN — AZITHROMYCIN 500 MG: 500 INJECTION, POWDER, LYOPHILIZED, FOR SOLUTION INTRAVENOUS at 23:31

## 2022-06-05 RX ADMIN — MAGNESIUM SULFATE 1 G: 1 INJECTION INTRAVENOUS at 00:04

## 2022-06-05 RX ADMIN — METHYLPREDNISOLONE SODIUM SUCCINATE 40 MG: 40 INJECTION, POWDER, FOR SOLUTION INTRAMUSCULAR; INTRAVENOUS at 23:31

## 2022-06-05 RX ADMIN — SODIUM CHLORIDE 125 ML/HR: 9 INJECTION, SOLUTION INTRAVENOUS at 18:44

## 2022-06-05 RX ADMIN — LISINOPRIL 30 MG: 20 TABLET ORAL at 02:00

## 2022-06-05 RX ADMIN — ARFORMOTEROL TARTRATE 15 MCG: 15 SOLUTION RESPIRATORY (INHALATION) at 07:38

## 2022-06-05 RX ADMIN — Medication 10 ML: at 20:21

## 2022-06-05 RX ADMIN — CETIRIZINE HYDROCHLORIDE 10 MG: 10 TABLET, FILM COATED ORAL at 20:21

## 2022-06-05 RX ADMIN — METHYLPREDNISOLONE SODIUM SUCCINATE 40 MG: 40 INJECTION, POWDER, FOR SOLUTION INTRAMUSCULAR; INTRAVENOUS at 11:53

## 2022-06-05 RX ADMIN — ARFORMOTEROL TARTRATE 15 MCG: 15 SOLUTION RESPIRATORY (INHALATION) at 18:48

## 2022-06-05 RX ADMIN — METHYLPREDNISOLONE SODIUM SUCCINATE 40 MG: 40 INJECTION, POWDER, FOR SOLUTION INTRAMUSCULAR; INTRAVENOUS at 01:27

## 2022-06-05 RX ADMIN — ENOXAPARIN SODIUM 40 MG: 100 INJECTION SUBCUTANEOUS at 02:00

## 2022-06-05 RX ADMIN — POTASSIUM PHOSPHATE, MONOBASIC AND POTASSIUM PHOSPHATE, DIBASIC 15 MMOL: 224; 236 INJECTION, SOLUTION, CONCENTRATE INTRAVENOUS at 09:56

## 2022-06-05 RX ADMIN — IPRATROPIUM BROMIDE AND ALBUTEROL SULFATE 3 ML: .5; 3 SOLUTION RESPIRATORY (INHALATION) at 12:26

## 2022-06-05 NOTE — H&P
[facility]  Date: 2022   Patient Name: Karey Lopez  : 1975  MRN: [id number]  Primary Care Physician:  Sunita Hylton APRN  Date of admission: 2022    Subjective Short of breath  Subjective     Chief Complaint: Short of breath    HPI: Patient is a 47-year-old female who presents to the emergency room with a complaint of cough shortness of breath, and wheezing that is progressively gotten worse.  Patient has asthma.  She has been taking more breathing treatments than normal, has been taking steroids, and has been taking augmentin without any relief for 4 days.  Patient can barely walk because she is dyspneic on exertion.    In the ED, patient's temperature is 98.4, pulse 116, respiratory rate 22, blood pressure 163/77 and she was saturating 96% on room air.  However, eventually patient needed 2 L of oxygen.    On arrival, patient's lactate is 4.1, CO2 was 19.4, patient has leukocytosis white blood cell count is 14.45; however, she has been on steroids.  Chest x-ray and chest CT showed no acute process.  Personal History     Past Medical History:  Past Medical History:   Diagnosis Date   • Allergic rhinitis, unspecified    • Anxiety    • Arthritis    • Asthma    • Cough variant asthma    • Dorsalgia, unspecified    • Essential (primary) hypertension    • Family history of ischemic heart disease and other diseases of the circulatory system    • Hypertension    • Left lower quadrant pain    • Leucocytosis     due to chronic inflammation per hematology   • Lichen sclerosus 2019   • Obesity, unspecified    • Other fatigue    • Persistent mood (affective) disorder, unspecified (HCC)    • PONV (postoperative nausea and vomiting)    • Sepsis (HCC)     Right knee Sepsis   • Sleep apnea, unspecified    • Unspecified abdominal pain    • Unspecified ovarian cyst, left side    • Vitamin D deficiency, unspecified        Past Surgical History:  Past Surgical  History:   Procedure Laterality Date   • COLONOSCOPY  01/2010    normal   • ENDOSCOPY  01/2010, 01/2018 1/2010- small hernia and 01/2018 mild gastitis   • FOOT SURGERY Right 11/2015, 4/28/2017   • FRACTURE SURGERY  11/2014, 3/2015    ankle   • GALLBLADDER SURGERY     • KNEE ARTHROPLASTY Right 12/10/2019   • KNEE ARTHROSCOPY Right 01/30/2020    Procedure: KNEE ARTHROSCOPY WITH INCISION AND DRAINAGE;  Surgeon: Fareed Chacon MD;  Location: [facility];  Service: Orthopedics   • LAPAROSCOPIC GASTRIC BANDING  07/2010    and was removed july 2019; scar tissue   • LAPAROSCOPIC TUBAL LIGATION     • TUBAL ABDOMINAL LIGATION         Family History:   Family History   Problem Relation Age of Onset   • Asthma Mother    • Heart disease Mother    • Coronary artery disease Mother    • Diabetes type II Mother    • Stroke Father    • Obesity Brother    • Heart attack Brother         MI   • Cervical cancer Maternal Grandmother    • Lung cancer Maternal Grandfather    • Stroke Paternal Grandmother    • Malig Hyperthermia Neg Hx        Social History:   Social History     Socioeconomic History   • Marital status:    • Number of children: 2   Tobacco Use   • Smoking status: Never Smoker   • Smokeless tobacco: Never Used   Vaping Use   • Vaping Use: Never used   Substance and Sexual Activity   • Alcohol use: Never     Comment: just rarely   • Drug use: Never   • Sexual activity: Defer       Ergocalciferol, albuterol sulfate HFA, buPROPion XL, budesonide-formoterol, escitalopram, ibuprofen, lisinopril, montelukast, and tiZANidine    Allergies:  Allergies   Allergen Reactions   • Vilazodone Hcl Mental Status Change       Review of Systems   All systems were reviewed and negative except for: Wheezing    Objective   Objective     Vitals:   Temp:  [98.4 °F (36.9 °C)] 98.4 °F (36.9 °C)  Heart Rate:  [] 91  Resp:  [18-22] 18  BP: (139-163)/(72-88) 150/88  Flow (L/min):  [2] 2    Physical Exam    Constitutional: Awake,  alert, no acute distress   Eyes: Pupils equal, sclerae anicteric, no conjunctival injection   HENT: NCAT, mucous membranes moist   Neck: Supple, no thyromegaly, no lymphadenopathy, trachea midline   Respiratory: Wheezing   Cardiovascular: RRR, no murmurs, rubs, or gallops, palpable pedal pulses bilaterally   Gastrointestinal: Positive bowel sounds, soft, nontender, nondistended   Musculoskeletal: No bilateral ankle edema, no clubbing or cyanosis to extremities   Psychiatric: Appropriate affect, cooperative   Neurologic: Oriented x 3, strength symmetric in all extremities, Cranial Nerves grossly intact to confrontation, speech clear   Skin: No rashes     Result Review    Result Review:  I have personally reviewed the results from the time of this admission to 6/4/2022 23:11 EDT and agree with these findings:  [x]  Laboratory  []  Microbiology  [x]  Radiology  []  EKG/Telemetry   []  Cardiology/Vascular   []  Pathology  [x]  Old records  []  Other:      Assessment & Plan   Assessment / Plan   #1 asthma exacerbation  -DuoNeb, Brovana, Pulmicort, steroids, Mucinex, Singulair, Zyrtec, Flonase, BiPAP as needed.  -Respiratory panel, respiratory culture, strep, Legionella, COVID  -IS, Acapella  -Azithromycin for its anti-inflammatory properties    #2 depression  -Continue Wellbutrin and Lexapro    #3 hypertension continue lisinopril    DVT prophylaxis:  No DVT prophylaxis order currently exists.    CODE STATUS:    Level Of Support Discussed With: Patient  Code Status (Patient has no pulse and is not breathing): CPR (Attempt to Resuscitate)  Medical Interventions (Patient has pulse or is breathing): Full Support      Admission Status:  I believe this patient meets inpatient status.    Electronically signed by Everett Okeefe DO, 06/04/22, 11:11 PM EDT.

## 2022-06-05 NOTE — SIGNIFICANT NOTE
06/05/22 1157   Plan   Plan Reports lives with spouse and adult son.  Good family support.  Currently on O2 but reports MD wants her to be off of O2 before she returns home.  Reports compliance with c-pap.  Works and provides own IADL's.  Facesheet verified.  Plans to return home with no needs at this tiem.

## 2022-06-05 NOTE — ED PROVIDER NOTES
Tan Eden is a 47-year-old female that presents the emergency department today for complaints of cough, shortness of air, wheezing x3 days that is getting worse.  She reports that she has asthma.  She denies any COPD history.  She states that she has been doing breathing treatments every 4 hours today including albuterol and duo nebs without any relief.  She has also been taking prednisone 60 mg by mouth for 4 days and Augmentin without relief.  She denies any chest pain with this, body aches, fever, chills or any other complaints.  She reports that she can hardly ambulate and is scared because she is so short of air.  She denies any intubation history.          Review of Systems   Respiratory: Positive for cough, shortness of breath and wheezing.    All other systems reviewed and are negative.      Past Medical History:   Diagnosis Date   • Allergic rhinitis, unspecified    • Anxiety    • Arthritis    • Asthma    • Cough variant asthma    • Dorsalgia, unspecified    • Essential (primary) hypertension    • Family history of ischemic heart disease and other diseases of the circulatory system    • Hypertension    • Left lower quadrant pain    • Leucocytosis 2020    due to chronic inflammation per hematology   • Lichen sclerosus 2019   • Obesity, unspecified    • Other fatigue    • Persistent mood (affective) disorder, unspecified (HCC)    • PONV (postoperative nausea and vomiting)    • Sepsis (HCC)     Right knee Sepsis   • Sleep apnea, unspecified    • Unspecified abdominal pain    • Unspecified ovarian cyst, left side    • Vitamin D deficiency, unspecified        Allergies   Allergen Reactions   • Vilazodone Hcl Mental Status Change       Past Surgical History:   Procedure Laterality Date   • COLONOSCOPY  01/2010    normal   • ENDOSCOPY  01/2010, 01/2018 1/2010- small hernia and 01/2018 mild gastitis   • FOOT SURGERY Right 11/2015, 4/28/2017   • FRACTURE SURGERY  11/2014, 3/2015    ankle   • GALLBLADDER  SURGERY     • KNEE ARTHROPLASTY Right 12/10/2019   • KNEE ARTHROSCOPY Right 01/30/2020    Procedure: KNEE ARTHROSCOPY WITH INCISION AND DRAINAGE;  Surgeon: Fareed Chacon MD;  Location: [facility];  Service: Orthopedics   • LAPAROSCOPIC GASTRIC BANDING  07/2010    and was removed july 2019; scar tissue   • LAPAROSCOPIC TUBAL LIGATION     • TUBAL ABDOMINAL LIGATION         Family History   Problem Relation Age of Onset   • Asthma Mother    • Heart disease Mother    • Coronary artery disease Mother    • Diabetes type II Mother    • Stroke Father    • Obesity Brother    • Heart attack Brother         MI   • Cervical cancer Maternal Grandmother    • Lung cancer Maternal Grandfather    • Stroke Paternal Grandmother    • Malig Hyperthermia Neg Hx        Social History     Socioeconomic History   • Marital status:    • Number of children: 2   Tobacco Use   • Smoking status: Never Smoker   • Smokeless tobacco: Never Used   Vaping Use   • Vaping Use: Never used   Substance and Sexual Activity   • Alcohol use: Never     Comment: just rarely   • Drug use: Never   • Sexual activity: Defer           Objective   Physical Exam  Vitals and nursing note reviewed.   Constitutional:       General: She is not in acute distress.     Appearance: Normal appearance. She is well-developed. She is not ill-appearing, toxic-appearing or diaphoretic.   HENT:      Mouth/Throat:      Mouth: Mucous membranes are moist.      Pharynx: Oropharynx is clear. No pharyngeal swelling or oropharyngeal exudate.   Eyes:      General: No scleral icterus.  Cardiovascular:      Rate and Rhythm: Normal rate and regular rhythm.      Pulses: Normal pulses.      Heart sounds: Normal heart sounds.   Pulmonary:      Effort: Tachypnea, accessory muscle usage and respiratory distress present.      Breath sounds: Examination of the right-upper field reveals decreased breath sounds. Examination of the left-upper field reveals wheezing. Examination of the  right-middle field reveals decreased breath sounds. Examination of the left-middle field reveals decreased breath sounds. Examination of the right-lower field reveals decreased breath sounds. Examination of the left-lower field reveals decreased breath sounds. Decreased breath sounds and wheezing present.   Abdominal:      General: Abdomen is flat.      Palpations: Abdomen is soft.      Tenderness: There is no abdominal tenderness.   Musculoskeletal:         General: Normal range of motion.      Cervical back: Normal range of motion and neck supple.   Skin:     General: Skin is warm and dry.      Capillary Refill: Capillary refill takes less than 2 seconds.   Neurological:      General: No focal deficit present.      Mental Status: She is alert and oriented to person, place, and time. Mental status is at baseline.   Psychiatric:         Mood and Affect: Mood is anxious.         Procedures           ED Course                                               qSOFA (for mortality prediction with sepsis) reviewed and/or performed as part of the patient evaluation and treatment planning process.  The result associated with this review/performance is: 1       MDM  Number of Diagnoses or Management Options  Diagnosis management comments: Sepsis score is 1 out of 0 for risk of mortality based on RR of 22.    Seen and assessed patient as noted.  Afebrile, pt is distressed, increased work of breathing, audible wheezing- I put pt on O2 directly.  She was satting 89% without oxygen when ambulating.  No VBG was ordered since pt on O2.     Differentials include but are not limited to:  Asthma exacerbation, PE, covid, PNA, CHF, COPD, other pulmonary etiology.    Labs and imaging ordered.      Pt flags for severe sepsis although CXR, CT chest, and UA show no infectious findings.  Her sepsis score was 1/0 though for mortality, and based on her ambulatory O2 sat dropping down to 89% on RA, her continued wheezing and RR distress after  duoneb and solumedrol IV-- I feel she is best to observe over night for an acute asthma exacerbation with failure to respond to therapy.    She has had failed outpatient therapy with her Prednisone 60 mg PO daily since Thursday and only worsening, using her albuterol and duonebs q4h without relief.               Amount and/or Complexity of Data Reviewed  Clinical lab tests: reviewed and ordered  Tests in the radiology section of CPT®: reviewed and ordered  Tests in the medicine section of CPT®: reviewed  Discuss the patient with other providers: yes    Risk of Complications, Morbidity, and/or Mortality  Presenting problems: high  Diagnostic procedures: moderate  Management options: moderate    Patient Progress  Patient progress: stable      Final diagnoses:   Sepsis, due to unspecified organism, unspecified whether acute organ dysfunction present (HCC)   Severe persistent asthma with exacerbation   Failure of outpatient treatment       ED Disposition  ED Disposition     ED Disposition   Decision to Admit    Condition   --    Comment   Level of Care: Telemetry [5]   Diagnosis: Sepsis, due to unspecified organism, unspecified whether acute organ dysfunction present (HCC) [[id number]]   Admitting Physician: MIGUEL ANGEL GUTIÉRREZ [[id number]]   Attending Physician: MIGUEL ANGEL GUTIÉRREZ [[id number]]   Isolate for COVID?: No [0]   Certification: I Certify That Inpatient Hospital Services Are Medically Necessary For Greater Than 2 Midnights               No follow-up provider specified.       Medication List      No changes were made to your prescriptions during this visit.          Juhi Lima, AMBIKA  06/04/22 [id number]

## 2022-06-05 NOTE — PROGRESS NOTES
[facility]   Hospitalist Progress Note  Date: 2022  Patient Name: Karey Lopez  : 1975  MRN: [id number]  Date of admission: 2022      Subjective   Subjective     Chief Complaint: Shortness of breath    Summary: 47-year-old female who presents to the emergency room with a complaint of cough shortness of breath, and wheezing that is progressively gotten worse.  Patient has asthma.  She has been taking more breathing treatments than normal, has been taking steroids, and has been taking augmentin without any relief for 4 days.  Patient can barely walk because she is dyspneic on exertion.  Admitted for asthma exacerbation, started on steroids, scheduled bronchodilators.  Infectious work-up negative, remains on azithromycin alone for inflammation related to asthma.     Interval Followup: No acute events overnight.  Patient states her breathing is starting to feel better and ease up a bit.  Still requiring supplemental oxygen which she does not use at home.  Wheezing seems to be improved.  Otherwise no new complaints.    Review of Systems   All systems reviewed and negative unless otherwise stated under interval follow-up    Objective   Objective     Vitals:   Temp:  [98.1 °F (36.7 °C)-99.1 °F (37.3 °C)] 99 °F (37.2 °C)  Heart Rate:  [] 83  Resp:  [16-22] 16  BP: (112-163)/(59-88) 112/59  Flow (L/min):  [2-3] 2  Physical Exam    Constitutional: Awake, alert, no acute distress   Eyes: Pupils equal, sclerae anicteric, no conjunctival injection   HENT: NCAT, mucous membranes moist   Neck: Supple, no thyromegaly, no lymphadenopathy, trachea midline   Respiratory: Minimal expiratory wheeze in bilateral bases but otherwise clear to auscultation bilaterally, good air movement, nasal cannula in place, nonlabored respirations    Cardiovascular: RRR, no murmurs, rubs, or gallops   Gastrointestinal: Positive bowel sounds, soft, nontender, nondistended   Musculoskeletal: No bilateral ankle edema, no  clubbing or cyanosis to extremities   Psychiatric: Appropriate affect, cooperative   Neurologic: Oriented x 3, strength symmetric in all extremities, Cranial Nerves grossly intact to confrontation, speech clear   Skin: No rashes     Result Review    Result Review:  I have personally reviewed the results from the time of this admission to 6/5/2022 11:32 EDT and agree with these findings:  [x]  Laboratory sodium 137, potassium 4.3, bicarb 17, creatinine 0.82, lactate 4.7, WBC 14, hemoglobin 12.2, platelets 427  [x]  Microbiology procalcitonin 0.03, strep and Legionella negative, COVID and flu negative, blood cultures pending  []  Radiology  [x]  EKG/Telemetry telemetry reviewed showed normal sinus rhythm  []  Cardiology/Vascular   []  Pathology  []  Old records  []  Other:    Assessment & Plan   Assessment / Plan     Assessment/Plan:  Acute asthma exacerbation  Acute hypoxemia  Lactic acidosis  Depression  Hypertension    Continue to monitor in the hospital for work-up and management of the above  Continue IV Solu-Medrol 40 mg every 12 hours, will likely transition to oral prednisone tomorrow if patient continues to improve  Start scheduled duo nebs, Pulmicort and Brovana twice daily, albuterol as needed  Patient's lactic acidosis may be more related to albuterol, but will trend until cleared  Continue IV fluids for now  Scheduled Mucinex twice daily  Infectious work-up negative, discontinue Rocephin and continue azithromycin alone  Continue appropriate home medications  Trend renal function and electrolytes with a.m. BMP  Trend Hgb and WBC with a.m. CBC     Discussed plan with RN.    DVT prophylaxis:  Medical DVT prophylaxis orders are present.    CODE STATUS:   Level Of Support Discussed With: Patient  Code Status (Patient has no pulse and is not breathing): CPR (Attempt to Resuscitate)  Medical Interventions (Patient has pulse or is breathing): Full Support        Electronically signed by Nikolai Goddard MD,  06/05/22, 11:32 AM EDT.

## 2022-06-05 NOTE — PLAN OF CARE
Goal Outcome Evaluation:  Plan of Care Reviewed With: patient        Progress: no change  Outcome Evaluation: Pt admitted tonight with SOB and dyspnic on excersion which needed supplimental O2 to remain above 90%. Pt received breathing treatments and O2 that has helped with SOB. Pt up independently to the bathroom. Becomes dyspnic but able to settle after a few minutes. Retention of O2 tubing placed to reach into bathroom D/T pt not wanting to use BSC. Still needing sputum sample. Tested negative for COVID. Home CPAP in use at night. VSS and no acute changes since admission. Activated plan of care.

## 2022-06-06 LAB
ANION GAP SERPL CALCULATED.3IONS-SCNC: 10.9 MMOL/L (ref 5–15)
ANISOCYTOSIS BLD QL: ABNORMAL
ARTERIAL PATENCY WRIST A: POSITIVE
BASE EXCESS BLDA CALC-SCNC: -2.7 MMOL/L (ref -2–2)
BDY SITE: ABNORMAL
BUN SERPL-MCNC: 16 MG/DL (ref 6–20)
BUN/CREAT SERPL: 24.6 (ref 7–25)
CALCIUM SPEC-SCNC: 9.1 MG/DL (ref 8.6–10.5)
CHLORIDE SERPL-SCNC: 107 MMOL/L (ref 98–107)
CO2 SERPL-SCNC: 20.1 MMOL/L (ref 22–29)
COHGB MFR BLD: 0.3 % (ref 0–1.5)
CREAT SERPL-MCNC: 0.65 MG/DL (ref 0.57–1)
D-LACTATE SERPL-SCNC: 4 MMOL/L (ref 0.5–2)
DEPRECATED RDW RBC AUTO: 54.1 FL (ref 37–54)
EGFRCR SERPLBLD CKD-EPI 2021: 109.4 ML/MIN/1.73
ERYTHROCYTE [DISTWIDTH] IN BLOOD BY AUTOMATED COUNT: 16.9 % (ref 12.3–15.4)
FHHB: 3.7 % (ref 0–5)
GAS FLOW AIRWAY: 2 LPM
GLUCOSE BLDC GLUCOMTR-MCNC: 165 MG/DL (ref 70–99)
GLUCOSE BLDC GLUCOMTR-MCNC: 330 MG/DL (ref 70–99)
GLUCOSE SERPL-MCNC: 185 MG/DL (ref 65–99)
HCO3 BLDA-SCNC: 21.5 MMOL/L (ref 22–26)
HCT VFR BLD AUTO: 37 % (ref 34–46.6)
HGB BLD-MCNC: 11.4 G/DL (ref 12–15.9)
HGB BLDA-MCNC: 12.3 G/DL (ref 11.7–14.6)
INHALED O2 CONCENTRATION: 28 %
LYMPHOCYTES # BLD MANUAL: 0.93 10*3/MM3 (ref 0.7–3.1)
LYMPHOCYTES NFR BLD MANUAL: 1 % (ref 5–12)
MAGNESIUM SERPL-MCNC: 2.4 MG/DL (ref 1.6–2.6)
MCH RBC QN AUTO: 26.9 PG (ref 26.6–33)
MCHC RBC AUTO-ENTMCNC: 30.8 G/DL (ref 31.5–35.7)
MCV RBC AUTO: 87.3 FL (ref 79–97)
METHGB BLD QL: 0.2 % (ref 0–1.5)
MODALITY: ABNORMAL
MONOCYTES # BLD: 0.15 10*3/MM3 (ref 0.1–0.9)
MYELOCYTES NFR BLD MANUAL: 1 % (ref 0–0)
NEUTROPHILS # BLD AUTO: 14.24 10*3/MM3 (ref 1.7–7)
NEUTROPHILS NFR BLD MANUAL: 91 % (ref 42.7–76)
NEUTS BAND NFR BLD MANUAL: 1 % (ref 0–5)
NRBC SPEC MANUAL: 1 /100 WBC (ref 0–0.2)
OXYHGB MFR BLDV: 95.8 % (ref 94–99)
PCO2 BLDA: 35.4 MM HG (ref 35–45)
PH BLDA: 7.4 PH UNITS (ref 7.35–7.45)
PHOSPHATE SERPL-MCNC: 2.8 MG/DL (ref 2.5–4.5)
PLAT MORPH BLD: NORMAL
PLATELET # BLD AUTO: 376 10*3/MM3 (ref 140–450)
PMV BLD AUTO: 9.2 FL (ref 6–12)
PO2 BLD: 317 MM[HG] (ref 0–500)
PO2 BLDA: 88.7 MM HG (ref 80–100)
POTASSIUM SERPL-SCNC: 4.9 MMOL/L (ref 3.5–5.2)
RBC # BLD AUTO: 4.24 10*6/MM3 (ref 3.77–5.28)
SAO2 % BLDCOA: 96.3 % (ref 95–99)
SCAN SLIDE: NORMAL
SODIUM SERPL-SCNC: 138 MMOL/L (ref 136–145)
VARIANT LYMPHS NFR BLD MANUAL: 6 % (ref 19.6–45.3)
WBC MORPH BLD: NORMAL
WBC NRBC COR # BLD: 15.48 10*3/MM3 (ref 3.4–10.8)

## 2022-06-06 PROCEDURE — 83050 HGB METHEMOGLOBIN QUAN: CPT | Performed by: INTERNAL MEDICINE

## 2022-06-06 PROCEDURE — 83605 ASSAY OF LACTIC ACID: CPT | Performed by: INTERNAL MEDICINE

## 2022-06-06 PROCEDURE — 82805 BLOOD GASES W/O2 SATURATION: CPT | Performed by: INTERNAL MEDICINE

## 2022-06-06 PROCEDURE — 25010000002 AZITHROMYCIN PER 500 MG: Performed by: HOSPITALIST

## 2022-06-06 PROCEDURE — 25010000002 FUROSEMIDE PER 20 MG: Performed by: INTERNAL MEDICINE

## 2022-06-06 PROCEDURE — 82962 GLUCOSE BLOOD TEST: CPT

## 2022-06-06 PROCEDURE — 84100 ASSAY OF PHOSPHORUS: CPT | Performed by: HOSPITALIST

## 2022-06-06 PROCEDURE — 80048 BASIC METABOLIC PNL TOTAL CA: CPT | Performed by: HOSPITALIST

## 2022-06-06 PROCEDURE — 83735 ASSAY OF MAGNESIUM: CPT | Performed by: HOSPITALIST

## 2022-06-06 PROCEDURE — 99233 SBSQ HOSP IP/OBS HIGH 50: CPT | Performed by: FAMILY MEDICINE

## 2022-06-06 PROCEDURE — 25010000002 FUROSEMIDE PER 20 MG: Performed by: FAMILY MEDICINE

## 2022-06-06 PROCEDURE — 94799 UNLISTED PULMONARY SVC/PX: CPT

## 2022-06-06 PROCEDURE — 25010000002 ENOXAPARIN PER 10 MG: Performed by: HOSPITALIST

## 2022-06-06 PROCEDURE — 36600 WITHDRAWAL OF ARTERIAL BLOOD: CPT | Performed by: INTERNAL MEDICINE

## 2022-06-06 PROCEDURE — 99223 1ST HOSP IP/OBS HIGH 75: CPT | Performed by: INTERNAL MEDICINE

## 2022-06-06 PROCEDURE — 25010000002 METHYLPREDNISOLONE PER 40 MG: Performed by: INTERNAL MEDICINE

## 2022-06-06 PROCEDURE — 82375 ASSAY CARBOXYHB QUANT: CPT | Performed by: INTERNAL MEDICINE

## 2022-06-06 PROCEDURE — 85025 COMPLETE CBC W/AUTO DIFF WBC: CPT | Performed by: HOSPITALIST

## 2022-06-06 PROCEDURE — 85007 BL SMEAR W/DIFF WBC COUNT: CPT | Performed by: HOSPITALIST

## 2022-06-06 RX ORDER — FUROSEMIDE 10 MG/ML
40 INJECTION INTRAMUSCULAR; INTRAVENOUS
Status: DISCONTINUED | OUTPATIENT
Start: 2022-06-06 | End: 2022-06-10 | Stop reason: HOSPADM

## 2022-06-06 RX ORDER — FUROSEMIDE 10 MG/ML
40 INJECTION INTRAMUSCULAR; INTRAVENOUS ONCE
Status: COMPLETED | OUTPATIENT
Start: 2022-06-06 | End: 2022-06-06

## 2022-06-06 RX ORDER — IPRATROPIUM BROMIDE AND ALBUTEROL SULFATE 2.5; .5 MG/3ML; MG/3ML
3 SOLUTION RESPIRATORY (INHALATION)
Status: DISCONTINUED | OUTPATIENT
Start: 2022-06-06 | End: 2022-06-10 | Stop reason: HOSPADM

## 2022-06-06 RX ORDER — ALBUTEROL SULFATE 2.5 MG/3ML
2.5 SOLUTION RESPIRATORY (INHALATION)
Status: DISCONTINUED | OUTPATIENT
Start: 2022-06-06 | End: 2022-06-10 | Stop reason: HOSPADM

## 2022-06-06 RX ORDER — METHYLPREDNISOLONE SODIUM SUCCINATE 40 MG/ML
40 INJECTION, POWDER, LYOPHILIZED, FOR SOLUTION INTRAMUSCULAR; INTRAVENOUS EVERY 8 HOURS
Status: DISCONTINUED | OUTPATIENT
Start: 2022-06-06 | End: 2022-06-08

## 2022-06-06 RX ADMIN — ARFORMOTEROL TARTRATE 15 MCG: 15 SOLUTION RESPIRATORY (INHALATION) at 07:24

## 2022-06-06 RX ADMIN — GUAIFENESIN 1200 MG: 600 TABLET ORAL at 20:37

## 2022-06-06 RX ADMIN — ESCITALOPRAM OXALATE 30 MG: 10 TABLET ORAL at 20:37

## 2022-06-06 RX ADMIN — IPRATROPIUM BROMIDE AND ALBUTEROL SULFATE 3 ML: .5; 3 SOLUTION RESPIRATORY (INHALATION) at 23:29

## 2022-06-06 RX ADMIN — IPRATROPIUM BROMIDE AND ALBUTEROL SULFATE 3 ML: .5; 3 SOLUTION RESPIRATORY (INHALATION) at 10:38

## 2022-06-06 RX ADMIN — METHYLPREDNISOLONE SODIUM SUCCINATE 40 MG: 40 INJECTION, POWDER, FOR SOLUTION INTRAMUSCULAR; INTRAVENOUS at 19:37

## 2022-06-06 RX ADMIN — Medication 10 ML: at 20:41

## 2022-06-06 RX ADMIN — BUDESONIDE 0.5 MG: 0.5 SUSPENSION RESPIRATORY (INHALATION) at 17:45

## 2022-06-06 RX ADMIN — ENOXAPARIN SODIUM 40 MG: 100 INJECTION SUBCUTANEOUS at 02:29

## 2022-06-06 RX ADMIN — GUAIFENESIN 1200 MG: 600 TABLET ORAL at 08:03

## 2022-06-06 RX ADMIN — IPRATROPIUM BROMIDE AND ALBUTEROL SULFATE 3 ML: .5; 3 SOLUTION RESPIRATORY (INHALATION) at 07:24

## 2022-06-06 RX ADMIN — FLUTICASONE PROPIONATE 2 SPRAY: 50 SPRAY, METERED NASAL at 08:03

## 2022-06-06 RX ADMIN — IPRATROPIUM BROMIDE AND ALBUTEROL SULFATE 3 ML: .5; 3 SOLUTION RESPIRATORY (INHALATION) at 17:45

## 2022-06-06 RX ADMIN — BUDESONIDE 0.5 MG: 0.5 SUSPENSION RESPIRATORY (INHALATION) at 07:24

## 2022-06-06 RX ADMIN — ALBUTEROL SULFATE 2.5 MG: 2.5 SOLUTION RESPIRATORY (INHALATION) at 20:43

## 2022-06-06 RX ADMIN — Medication 10 ML: at 08:04

## 2022-06-06 RX ADMIN — BUPROPION HYDROCHLORIDE 150 MG: 150 TABLET, EXTENDED RELEASE ORAL at 20:38

## 2022-06-06 RX ADMIN — AZITHROMYCIN 500 MG: 500 INJECTION, POWDER, LYOPHILIZED, FOR SOLUTION INTRAVENOUS at 23:40

## 2022-06-06 RX ADMIN — IPRATROPIUM BROMIDE AND ALBUTEROL SULFATE 3 ML: .5; 3 SOLUTION RESPIRATORY (INHALATION) at 15:22

## 2022-06-06 RX ADMIN — LISINOPRIL 30 MG: 20 TABLET ORAL at 20:37

## 2022-06-06 RX ADMIN — ARFORMOTEROL TARTRATE 15 MCG: 15 SOLUTION RESPIRATORY (INHALATION) at 17:45

## 2022-06-06 RX ADMIN — METHYLPREDNISOLONE SODIUM SUCCINATE 40 MG: 40 INJECTION, POWDER, FOR SOLUTION INTRAMUSCULAR; INTRAVENOUS at 13:44

## 2022-06-06 RX ADMIN — MONTELUKAST 10 MG: 10 TABLET, FILM COATED ORAL at 20:38

## 2022-06-06 RX ADMIN — IPRATROPIUM BROMIDE AND ALBUTEROL SULFATE 3 ML: .5; 3 SOLUTION RESPIRATORY (INHALATION) at 01:07

## 2022-06-06 RX ADMIN — FUROSEMIDE 40 MG: 10 INJECTION, SOLUTION INTRAMUSCULAR; INTRAVENOUS at 13:44

## 2022-06-06 RX ADMIN — CETIRIZINE HYDROCHLORIDE 10 MG: 10 TABLET, FILM COATED ORAL at 20:38

## 2022-06-06 RX ADMIN — SENNOSIDES AND DOCUSATE SODIUM 2 TABLET: 50; 8.6 TABLET ORAL at 08:03

## 2022-06-06 RX ADMIN — ALBUTEROL SULFATE 2.5 MG: 2.5 SOLUTION RESPIRATORY (INHALATION) at 13:46

## 2022-06-06 RX ADMIN — FUROSEMIDE 40 MG: 10 INJECTION, SOLUTION INTRAMUSCULAR; INTRAVENOUS at 19:37

## 2022-06-06 NOTE — PROGRESS NOTES
[facility]   Hospitalist Progress Note       Patient Name: Karey Lopez  : 1975  MRN: [id number]  Primary Care Physician: Sunita Hylton APRN  Date of admission: 2022  Today's Date: 2022  Room / Bed:   [facility]/  Subjective   Chief Complaint: Shortness of breath     Summary: Ms. Kraey Lopez is a 47-year-old female who presented to the emergency room with a complaint of cough, shortness of breath, and wheezing that became progressively worse.  Patient has a hx of asthma.  She has been taking more breathing treatments than normal, has been taking steroids, and has been taking augmentin without any relief for 4 days.  Patient can barely walk because she is dyspneic on exertion.  Admitted for asthma exacerbation, started on steroids, scheduled bronchodilators.  Infectious work-up negative, remains on azithromycin alone for inflammation related to asthma.       Interval Followup: 2022    Up in bed.  Very pleasant.  Conversational.  Some mild dyspnea with conversation noted.    Still with expiratory wheezes.  On 2L O2 per NC (none at home)  Occasional cough noted.  Minimally productive.  Overall patient feels better but not back to her baseline.    Monitor with NSR.      REVIEW OF SYSTEMS: All other systems reviewed and are negative.   GEN: No fevers. No chills. No weight gain. No weight loss.     HEENT:   No dysphagia/odynophagia. No visual disturbance.    GI:    No N/V.  No abd pain. No diarrhea. No constipation.  No bloody/black tarry stools. No hematemesis.   CV: No chest discomfort.  No palps. No lightheadedness. No syncope. No orthopnea/PND. No edema.   RESP:    No cough. + wheeze.  No increased sputum. No hemoptysis. + ALVAREZ.  :   No dysuria or suprapubic discomfort. No frequency.No urgency. No hesitancy. No incontinence. No hematuria. No flank pain.    MS:   No joint stiffness or arthralgias. No myalgias. No muscle weakness.    SKIN:   No painful or pruritic rashes.  No skin  discoloration.  NEURO:  No focal numbness or weakness.  No headaches.  No ataxia. No slurred speech. No receptive/expressive aphasia.      PSYCH:   No anxiety. No depression. + fatigue  ENDO:  No tremor, hair loss, heat or cold intolerance.  Objective   Temp:  [97.7 °F (36.5 °C)-98.8 °F (37.1 °C)] 98.2 °F (36.8 °C)  Heart Rate:  [] 95  Resp:  [18-22] 20  BP: (142-166)/(69-86) 155/81  Flow (L/min):  [1-3] 1  PHYSICAL EXAM   CON: WN. WD. NAD.   EYES:  Sclera anicteric. EOMI. Normal conjunctiva.   ENT:  Oropharyngeal mucosa without ulcers or thrush.    NECK:  No thyromegaly. No stridor. Trachea midline.  RESP:  CTA. + Bilat expiratory wheezes. Some diminished air movement. No crackles.  No work of breathing or tachypnea.   CV:  Rhythm regular. Rate WNL. No murmur noted.  No edema.  GI:  Soft and nontender. Nondistended.  Bowel sounds present.   EXT: Peripheral pulses intact.  No joint deformities or cyanosis.  LYMPH:  No lymphedema noted.  No cervical lymphadenopathy.  PSYCH:  Alert. Oriented. Normal affect and mood.  NEURO:  CNII-XII grossly intact. No dysarthria or aphasia. No unilateral weakness or paresthesia.  SKIN: No chronic venous stasis changes or varicosities.  No cellulitis    Results from last 7 days   Lab Units 06/06/22 0627 06/05/22  0159 06/04/22  2047   WBC 10*3/mm3 15.48* 14.66* 14.45*   HEMOGLOBIN g/dL 11.4* 12.2 12.1   HEMATOCRIT % 37.0 39.0 38.2   PLATELETS 10*3/mm3 376 427 439     Results from last 7 days   Lab Units 06/06/22 0627 06/05/22  0159 06/04/22  1836   SODIUM mmol/L 138 137 137   POTASSIUM mmol/L 4.9 4.3 4.3   CO2 mmol/L 20.1* 17.6* 19.4*   CHLORIDE mmol/L 107 101 101   ANION GAP mmol/L 10.9 18.4* 16.6*   BUN mg/dL 16 13 11   CREATININE mg/dL 0.65 0.82 0.66   GLUCOSE mg/dL 185* 297* 126*           RESULTS REVIEWED:  I have personally reviewed the results from the time of this admission to 6/6/2022 12:22 EDT and agree with these findings:  []  Laboratory  []  Microbiology  []   Radiology  []  EKG/Telemetry   []  Cardiology/Vascular   []  Pathology  []  Old records  []  Other:  Assessment / Plan   Assessment:      Acute asthma exacerbation  Acute hypoxemia  Lactic acidosis  Depression  Hypertension  Morbid obesity with BMI 51      Plan:     Pulmonology consulted  Continue IV Solu-Medrol 40 mg every 12 hours, will likely transition to oral prednisone as improves  Duo nebs, Pulmicort and Brovana twice daily, albuterol as needed  Patient's lactic acidosis may be more related to albuterol, but will trend until cleared  Continue IV fluids for now  Scheduled Mucinex twice daily  Infectious work-up negative, discontinue Rocephin and continue azithromycin alone  DVT prophylaxis:  Medical DVT prophylaxis orders are present.  CODE STATUS:      Level Of Support Discussed With: Patient  Code Status (Patient has no pulse and is not breathing): CPR (Attempt to Resuscitate)  Medical Interventions (Patient has pulse or is breathing): Full Support       Electronically signed by WILLIAMS Gonzales, 06/06/22, 12:22 PM EDT.    Attending documentation:  I reviewed the above documentation and independently reviewed and rounded and evaluated the patient and discussed the care plan with CIRILO Farah PA-C, I agree with his findings and plan as documented, what I have added to the care plan and modified is as follows in my documentation and my medical decision making; this 47-year-old female hospitalized on 6/4/2022 with shortness of breath symptoms, with underlying asthma with acute exacerbation, seen on rounds this morning, still tachypneic and short of air with significant wheezing, she appeared to be in with his status asthmaticus, elevated lactate remains, could be related to albuterol, she is diabetic and is not on metformin.  She continues to complain of cough and shortness of breath symptoms.  She is on 2 L nasal cannula, she does not use nasal cannula oxygen at home at all.  Her white count remains elevated at  15,000.  Given her lack of improvement over the past 24 to 48 hours while in the hospital, consulted pulmonary discussed with Dr. Mchugh.  She denies chest pain or palpitations.  She has received 6+ liters of fluids since being in the hospital.  I reviewed her telemetry monitoring, sinus rhythm, no acute major arrhythmic events.  Review of systems obtained, all systems reviewed and negative except for cough, shortness of breath, wheezing, generalized fatigue.  Vitals reviewed, on physical exam, obese female in no acute distress, breathing on nasal cannula oxygen, with conversational dyspnea, coarse breath sounds throughout, scattered wheezing, regular rate rhythm, soft nontender abdomen, no lower extremity edema.  Assessment as above, plan, consulted pulmonary discussed with Dr. Mchugh, recommendations appreciated, start Lasix 40 mg x 1 dose today, will continue with Lasix 40 mg IV twice daily.  Continuing Brovana and Pulmicort nebs twice daily.  Continue Solu-Medrol 40 mg IV every 8 hours.  Supplemental oxygen to maintain sats greater 92%.  BiPAP per pulm.  A.m. labs, telemetry monitoring, full code, DVT prophylaxis with Lovenox, clinical course to dictate further management, discussed with nurse at bedside.  More than 70% of the time of this patient's encounter was performed by me, this included face-to-face time, planning and coordinating, medical decision making and critical thinking personally done by me.  -XAVIER PUTNAM  Electronically signed by Carly Hu MD, 06/06/22, 7:16 PM EDT.

## 2022-06-06 NOTE — CONSULTS
[facility]   Consult Note    Patient Name: Karey Lopez  : 1975  MRN: [id number]  Primary Care Physician:  Sunita Hylton APRN  Referring Physician: No ref. provider found  Date of admission: 2022    Consults  Subjective   Subjective     Reason for Consult/ Chief Complaint: Reason for consultation asthma    History of Present Illness  Karey Lopez is a 47 y.o. female very pleasant female she has history of asthma admitted to the hospital with worsening shortness of breath.  The patient states that her symptoms started this past Thursday which was 4 days ago, she has been having increasing shortness of breath and increasing wheezing.  She is using her rescue inhalers more frequently, she was even taking her steroids that she had for asthma action plan for the past several days as well, she was also taking Augmentin she was using these medications since her symptoms onset and her shortness of breath continued to persist.  It progressed to the point where she was becoming severely short of breath even at rest on 2022 which prompted her to present to the emergency room.  That day the patient states that her shortness of breath got much more severe she was using her rescue inhaler and her nebulizers near continuous, she denies having any fevers no chills no nausea no vomiting denies having chest pain but she does have chest tightness or shortness of breath was worse with any all activities and movement with no significant improvement with bronchodilator therapies.  Upon arrival the patient's chest x-ray showed no evidence of any acute process she underwent a CT scan that was also unremarkable, she was found to have a white blood cell count of 14 likely related to the steroids she was also found to have a lactic acid of 4 which it has remained for.  She is still wheezy she still conversationally dyspneic, given the patient's persistent symptoms of shortness of breath with status  asthmaticus pulmonary has been consulted.    Review of Systems   Constitutional: Positive for activity change and fatigue.   HENT: Positive for congestion, postnasal drip and rhinorrhea.    Eyes: Negative.    Respiratory: Positive for cough, chest tightness, shortness of breath and wheezing.    Cardiovascular: Negative.    Gastrointestinal: Negative.    Endocrine: Negative.    Genitourinary: Negative.    Musculoskeletal: Negative.    Skin: Negative.    Allergic/Immunologic: Negative.    Neurological: Negative.    Hematological: Negative.    Psychiatric/Behavioral: The patient is nervous/anxious.         Personal History     Past Medical History:   Diagnosis Date   • Allergic rhinitis, unspecified    • Anxiety    • Arthritis    • Asthma    • Cough variant asthma    • Dorsalgia, unspecified    • Essential (primary) hypertension    • Family history of ischemic heart disease and other diseases of the circulatory system    • Hypertension    • Left lower quadrant pain    • Leucocytosis 2020    due to chronic inflammation per hematology   • Lichen sclerosus 2019   • Obesity, unspecified    • Other fatigue    • Persistent mood (affective) disorder, unspecified (HCC)    • PONV (postoperative nausea and vomiting)    • Sepsis (HCC)     Right knee Sepsis   • Sleep apnea, unspecified    • Unspecified abdominal pain    • Unspecified ovarian cyst, left side    • Vitamin D deficiency, unspecified        Past Surgical History:   Procedure Laterality Date   • COLONOSCOPY  01/2010    normal   • ENDOSCOPY  01/2010, 01/2018 1/2010- small hernia and 01/2018 mild gastitis   • FOOT SURGERY Right 11/2015, 4/28/2017   • FRACTURE SURGERY  11/2014, 3/2015    ankle   • GALLBLADDER SURGERY     • KNEE ARTHROPLASTY Right 12/10/2019   • KNEE ARTHROSCOPY Right 01/30/2020    Procedure: KNEE ARTHROSCOPY WITH INCISION AND DRAINAGE;  Surgeon: Fareed Chacon MD;  Location: [facility];  Service: Orthopedics   • LAPAROSCOPIC GASTRIC BANDING   07/2010    and was removed july 2019; scar tissue   • LAPAROSCOPIC TUBAL LIGATION     • TUBAL ABDOMINAL LIGATION         Family History: family history includes Asthma in her mother; Cervical cancer in her maternal grandmother; Coronary artery disease in her mother; Diabetes type II in her mother; Heart attack in her brother; Heart disease in her mother; Lung cancer in her maternal grandfather; Obesity in her brother; Stroke in her father and paternal grandmother. Otherwise pertinent FHx was reviewed and not pertinent to current issue.    Social History:  reports that she has never smoked. She has never used smokeless tobacco. She reports that she does not drink alcohol and does not use drugs.    Home Medications:   Ergocalciferol, albuterol sulfate HFA, buPROPion XL, budesonide-formoterol, escitalopram, ibuprofen, lisinopril, montelukast, and tiZANidine    Allergies:  Allergies   Allergen Reactions   • Vilazodone Hcl Mental Status Change       Objective    Objective     Vitals:  Temp:  [97.7 °F (36.5 °C)-98.8 °F (37.1 °C)] 97.7 °F (36.5 °C)  Heart Rate:  [] 68  Resp:  [16-22] 22  BP: (115-166)/(65-86) 166/86  Flow (L/min):  [1-3] 1    Physical Exam  Vital Signs Reviewed  General obese female, Alert, she does appear to have some conversational dyspnea  HEENT:  PERRL, EOMI.  OP, nares clear, no sinus tenderness  Neck:  Supple, no JVD, no thyromegaly  Lymph: no axillary, cervical, supraclavicular lymphadenopathy noted bilaterally  Chest: The patient is tachypneic she is breathing approximately 30 times a minute patient does have conversational dyspnea she gets short of breath speaking 3-4 word sentences she does have diffuse expiratory wheezes throughout all lung fields she has no accessory muscle use at this time however  CV: RRR, no MGR, pulses 2+, equal.  Abd:  Soft, NT, ND, + BS, no HSM  EXT:  no clubbing, no cyanosis, no edema, no joint tenderness  Neuro:  A&Ox3, CN grossly intact, no focal deficits.  Skin:  No rashes or lesions noted  Result Review    Result Review:  I have personally reviewed the results from the time of this admission to 6/6/2022 11:14 EDT and agree with these findings:  [x]  Laboratory  [x]  Microbiology  [x]  Radiology  [x]  EKG/Telemetry   [x]  Cardiology/Vascular   [x]  Pathology  []  Old records  []  Other:    Most notable findings include: CT scan of the chest unremarkable COVID testing unremarkable    Assessment & Plan   Assessment / Plan     Brief Patient Summary:  Karey Lopez is a 47 y.o. female who admitted to the hospital has status asthmaticus    Active Hospital Problems:  Active Hospital Problems    Diagnosis    • Sepsis, due to unspecified organism, unspecified whether acute organ dysfunction present (HCC)      Assessment  Acute asthma exacerbation with status asthmaticus  Elevated lactate-?  If this is due to all the bronchodilator therapies that she is getting  Obesity with body mass index of 51.1  Obstructive sleep apnea    Plan:   Obtain arterial blood gas at this time  Suspect that the patient's lactic acidosis is a result of continuous nebulizer treatments and will not clear with fluids  At this time given worsening respiratory status I will discontinue saline  We will give patient 40 mg of IV Lasix x1   Patient started on Brovana and Pulmicort  Duo nebs every 4 hours while awake  As needed albuterol every 2 hours  Singulair started  Continue antibiotics  COVID and flu is negative  Can consider swabbing for respiratory viral panel to see if there is another virus that is driving this  Will increase steroids to Solu-Medrol 40 mg every 8 hours  Continue CPAP  If arterial blood gas shows any evidence of hypercapnia will start patient on noninvasive positive pressure ventilation  If respiratory status worsens would have low threshold to transfer to the ICU    I reviewed CT scan of the chest showing no acute infiltrative process, reviewed microbiology studies showing negative flu  and COVID, I have discussed case with hospitalist regarding the patient's plan of care    Electronically signed by Tom Mchugh DO, 06/06/22, 11:14 AM EDT.

## 2022-06-06 NOTE — SIGNIFICANT NOTE
06/06/22 1546   Coping/Psychosocial   Observed Emotional State calm;quiet   Verbalized Emotional State other (see comments)  (Pt can't talk and just shake his head)   Trust Relationship/Rapport empathic listening provided   Involvement in Care interacting with patient   Additional Documentation Spiritual Care (Group)   Spiritual Care   Use of Spiritual Resources non-Orthodoxy use of spiritual care   Spiritual Care Source  initiative   Spiritual Care Follow-Up follow-up planned regularly for general support   Response to Spiritual Care engagement, unsatisfactory;visit unsatisfactory   Spiritual Care Interventions supportive conversation provided   Spiritual Care Visit Type initial   Receptivity to Spiritual Care unresponsive

## 2022-06-07 LAB
ALBUMIN SERPL-MCNC: 3.9 G/DL (ref 3.5–5.2)
ALP SERPL-CCNC: 63 U/L (ref 39–117)
ALT SERPL W P-5'-P-CCNC: 21 U/L (ref 1–33)
ANION GAP SERPL CALCULATED.3IONS-SCNC: 14.8 MMOL/L (ref 5–15)
AST SERPL-CCNC: 15 U/L (ref 1–32)
BILIRUB CONJ SERPL-MCNC: <0.2 MG/DL (ref 0–0.3)
BILIRUB INDIRECT SERPL-MCNC: NORMAL MG/DL
BILIRUB SERPL-MCNC: 0.2 MG/DL (ref 0–1.2)
BUN SERPL-MCNC: 20 MG/DL (ref 6–20)
BUN/CREAT SERPL: 32.3 (ref 7–25)
CALCIUM SPEC-SCNC: 9.5 MG/DL (ref 8.6–10.5)
CHLORIDE SERPL-SCNC: 100 MMOL/L (ref 98–107)
CO2 SERPL-SCNC: 22.2 MMOL/L (ref 22–29)
CREAT SERPL-MCNC: 0.62 MG/DL (ref 0.57–1)
D-LACTATE SERPL-SCNC: 4.5 MMOL/L (ref 0.5–2)
DEPRECATED RDW RBC AUTO: 51.9 FL (ref 37–54)
EGFRCR SERPLBLD CKD-EPI 2021: 110.7 ML/MIN/1.73
ERYTHROCYTE [DISTWIDTH] IN BLOOD BY AUTOMATED COUNT: 16.5 % (ref 12.3–15.4)
GLUCOSE BLDC GLUCOMTR-MCNC: 148 MG/DL (ref 70–99)
GLUCOSE BLDC GLUCOMTR-MCNC: 203 MG/DL (ref 70–99)
GLUCOSE BLDC GLUCOMTR-MCNC: 242 MG/DL (ref 70–99)
GLUCOSE BLDC GLUCOMTR-MCNC: 361 MG/DL (ref 70–99)
GLUCOSE SERPL-MCNC: 188 MG/DL (ref 65–99)
HCT VFR BLD AUTO: 37.3 % (ref 34–46.6)
HGB BLD-MCNC: 11.7 G/DL (ref 12–15.9)
LARGE PLATELETS: ABNORMAL
LYMPHOCYTES # BLD MANUAL: 1.02 10*3/MM3 (ref 0.7–3.1)
LYMPHOCYTES NFR BLD MANUAL: 3 % (ref 5–12)
MAGNESIUM SERPL-MCNC: 2.4 MG/DL (ref 1.6–2.6)
MCH RBC QN AUTO: 26.8 PG (ref 26.6–33)
MCHC RBC AUTO-ENTMCNC: 31.4 G/DL (ref 31.5–35.7)
MCV RBC AUTO: 85.6 FL (ref 79–97)
MONOCYTES # BLD: 0.51 10*3/MM3 (ref 0.1–0.9)
NEUTROPHILS # BLD AUTO: 15.52 10*3/MM3 (ref 1.7–7)
NEUTROPHILS NFR BLD MANUAL: 86 % (ref 42.7–76)
NEUTS BAND NFR BLD MANUAL: 5 % (ref 0–5)
PHOSPHATE SERPL-MCNC: 3.4 MG/DL (ref 2.5–4.5)
PLATELET # BLD AUTO: 408 10*3/MM3 (ref 140–450)
PMV BLD AUTO: 9.4 FL (ref 6–12)
POTASSIUM SERPL-SCNC: 4.6 MMOL/L (ref 3.5–5.2)
PROT SERPL-MCNC: 6.8 G/DL (ref 6–8.5)
RBC # BLD AUTO: 4.36 10*6/MM3 (ref 3.77–5.28)
RBC MORPH BLD: NORMAL
SCAN SLIDE: NORMAL
SMALL PLATELETS BLD QL SMEAR: ADEQUATE
SODIUM SERPL-SCNC: 137 MMOL/L (ref 136–145)
VARIANT LYMPHS NFR BLD MANUAL: 6 % (ref 19.6–45.3)
WBC MORPH BLD: NORMAL
WBC NRBC COR # BLD: 17.05 10*3/MM3 (ref 3.4–10.8)

## 2022-06-07 PROCEDURE — 25010000002 FUROSEMIDE PER 20 MG: Performed by: FAMILY MEDICINE

## 2022-06-07 PROCEDURE — 85007 BL SMEAR W/DIFF WBC COUNT: CPT | Performed by: FAMILY MEDICINE

## 2022-06-07 PROCEDURE — 83735 ASSAY OF MAGNESIUM: CPT | Performed by: FAMILY MEDICINE

## 2022-06-07 PROCEDURE — 99232 SBSQ HOSP IP/OBS MODERATE 35: CPT | Performed by: FAMILY MEDICINE

## 2022-06-07 PROCEDURE — 63710000001 INSULIN LISPRO (HUMAN) PER 5 UNITS: Performed by: PHYSICIAN ASSISTANT

## 2022-06-07 PROCEDURE — 94799 UNLISTED PULMONARY SVC/PX: CPT

## 2022-06-07 PROCEDURE — 25010000002 ENOXAPARIN PER 10 MG: Performed by: FAMILY MEDICINE

## 2022-06-07 PROCEDURE — 85025 COMPLETE CBC W/AUTO DIFF WBC: CPT | Performed by: FAMILY MEDICINE

## 2022-06-07 PROCEDURE — 82962 GLUCOSE BLOOD TEST: CPT

## 2022-06-07 PROCEDURE — 83605 ASSAY OF LACTIC ACID: CPT | Performed by: PHYSICIAN ASSISTANT

## 2022-06-07 PROCEDURE — 25010000002 ENOXAPARIN PER 10 MG: Performed by: HOSPITALIST

## 2022-06-07 PROCEDURE — 25010000002 METHYLPREDNISOLONE PER 40 MG: Performed by: INTERNAL MEDICINE

## 2022-06-07 PROCEDURE — 99232 SBSQ HOSP IP/OBS MODERATE 35: CPT | Performed by: INTERNAL MEDICINE

## 2022-06-07 PROCEDURE — 80048 BASIC METABOLIC PNL TOTAL CA: CPT | Performed by: FAMILY MEDICINE

## 2022-06-07 PROCEDURE — 84100 ASSAY OF PHOSPHORUS: CPT | Performed by: FAMILY MEDICINE

## 2022-06-07 PROCEDURE — 80076 HEPATIC FUNCTION PANEL: CPT | Performed by: FAMILY MEDICINE

## 2022-06-07 PROCEDURE — 25010000002 AZITHROMYCIN PER 500 MG: Performed by: HOSPITALIST

## 2022-06-07 RX ORDER — NICOTINE POLACRILEX 4 MG
24 LOZENGE BUCCAL
Status: DISCONTINUED | OUTPATIENT
Start: 2022-06-07 | End: 2022-06-10 | Stop reason: HOSPADM

## 2022-06-07 RX ORDER — ENOXAPARIN SODIUM 100 MG/ML
40 INJECTION SUBCUTANEOUS NIGHTLY
Status: DISCONTINUED | OUTPATIENT
Start: 2022-06-07 | End: 2022-06-10 | Stop reason: HOSPADM

## 2022-06-07 RX ORDER — DEXTROSE MONOHYDRATE 25 G/50ML
25 INJECTION, SOLUTION INTRAVENOUS
Status: DISCONTINUED | OUTPATIENT
Start: 2022-06-07 | End: 2022-06-10 | Stop reason: HOSPADM

## 2022-06-07 RX ORDER — INSULIN LISPRO 100 [IU]/ML
0-7 INJECTION, SOLUTION INTRAVENOUS; SUBCUTANEOUS
Status: DISCONTINUED | OUTPATIENT
Start: 2022-06-07 | End: 2022-06-07

## 2022-06-07 RX ORDER — INSULIN LISPRO 100 [IU]/ML
0-7 INJECTION, SOLUTION INTRAVENOUS; SUBCUTANEOUS
Status: DISCONTINUED | OUTPATIENT
Start: 2022-06-07 | End: 2022-06-10 | Stop reason: HOSPADM

## 2022-06-07 RX ADMIN — ARFORMOTEROL TARTRATE 15 MCG: 15 SOLUTION RESPIRATORY (INHALATION) at 20:07

## 2022-06-07 RX ADMIN — IPRATROPIUM BROMIDE AND ALBUTEROL SULFATE 3 ML: .5; 3 SOLUTION RESPIRATORY (INHALATION) at 14:52

## 2022-06-07 RX ADMIN — BUDESONIDE 0.5 MG: 0.5 SUSPENSION RESPIRATORY (INHALATION) at 06:58

## 2022-06-07 RX ADMIN — AZITHROMYCIN 500 MG: 500 INJECTION, POWDER, LYOPHILIZED, FOR SOLUTION INTRAVENOUS at 23:23

## 2022-06-07 RX ADMIN — LISINOPRIL 30 MG: 20 TABLET ORAL at 20:59

## 2022-06-07 RX ADMIN — FUROSEMIDE 40 MG: 10 INJECTION, SOLUTION INTRAMUSCULAR; INTRAVENOUS at 18:23

## 2022-06-07 RX ADMIN — CETIRIZINE HYDROCHLORIDE 10 MG: 10 TABLET, FILM COATED ORAL at 20:56

## 2022-06-07 RX ADMIN — FUROSEMIDE 40 MG: 10 INJECTION, SOLUTION INTRAMUSCULAR; INTRAVENOUS at 10:05

## 2022-06-07 RX ADMIN — INSULIN LISPRO 6 UNITS: 100 INJECTION, SOLUTION INTRAVENOUS; SUBCUTANEOUS at 18:23

## 2022-06-07 RX ADMIN — INSULIN LISPRO 3 UNITS: 100 INJECTION, SOLUTION INTRAVENOUS; SUBCUTANEOUS at 08:10

## 2022-06-07 RX ADMIN — METHYLPREDNISOLONE SODIUM SUCCINATE 40 MG: 40 INJECTION, POWDER, FOR SOLUTION INTRAMUSCULAR; INTRAVENOUS at 04:17

## 2022-06-07 RX ADMIN — ENOXAPARIN SODIUM 40 MG: 100 INJECTION SUBCUTANEOUS at 20:55

## 2022-06-07 RX ADMIN — INSULIN LISPRO 3 UNITS: 100 INJECTION, SOLUTION INTRAVENOUS; SUBCUTANEOUS at 21:34

## 2022-06-07 RX ADMIN — ESCITALOPRAM OXALATE 30 MG: 10 TABLET ORAL at 20:56

## 2022-06-07 RX ADMIN — GUAIFENESIN 1200 MG: 600 TABLET ORAL at 10:05

## 2022-06-07 RX ADMIN — ENOXAPARIN SODIUM 40 MG: 100 INJECTION SUBCUTANEOUS at 02:43

## 2022-06-07 RX ADMIN — IPRATROPIUM BROMIDE AND ALBUTEROL SULFATE 3 ML: .5; 3 SOLUTION RESPIRATORY (INHALATION) at 06:58

## 2022-06-07 RX ADMIN — IPRATROPIUM BROMIDE AND ALBUTEROL SULFATE 3 ML: .5; 3 SOLUTION RESPIRATORY (INHALATION) at 20:07

## 2022-06-07 RX ADMIN — FLUTICASONE PROPIONATE 2 SPRAY: 50 SPRAY, METERED NASAL at 10:06

## 2022-06-07 RX ADMIN — BUDESONIDE 0.5 MG: 0.5 SUSPENSION RESPIRATORY (INHALATION) at 20:07

## 2022-06-07 RX ADMIN — INSULIN LISPRO 5 UNITS: 100 INJECTION, SOLUTION INTRAVENOUS; SUBCUTANEOUS at 00:46

## 2022-06-07 RX ADMIN — BUPROPION HYDROCHLORIDE 150 MG: 150 TABLET, EXTENDED RELEASE ORAL at 20:56

## 2022-06-07 RX ADMIN — IPRATROPIUM BROMIDE AND ALBUTEROL SULFATE 3 ML: .5; 3 SOLUTION RESPIRATORY (INHALATION) at 11:38

## 2022-06-07 RX ADMIN — METHYLPREDNISOLONE SODIUM SUCCINATE 40 MG: 40 INJECTION, POWDER, FOR SOLUTION INTRAMUSCULAR; INTRAVENOUS at 20:55

## 2022-06-07 RX ADMIN — MONTELUKAST 10 MG: 10 TABLET, FILM COATED ORAL at 20:56

## 2022-06-07 RX ADMIN — SENNOSIDES AND DOCUSATE SODIUM 2 TABLET: 50; 8.6 TABLET ORAL at 10:05

## 2022-06-07 RX ADMIN — Medication 10 ML: at 21:00

## 2022-06-07 RX ADMIN — GUAIFENESIN 1200 MG: 600 TABLET ORAL at 20:55

## 2022-06-07 RX ADMIN — Medication 10 ML: at 10:06

## 2022-06-07 RX ADMIN — METHYLPREDNISOLONE SODIUM SUCCINATE 40 MG: 40 INJECTION, POWDER, FOR SOLUTION INTRAMUSCULAR; INTRAVENOUS at 13:45

## 2022-06-07 RX ADMIN — ARFORMOTEROL TARTRATE 15 MCG: 15 SOLUTION RESPIRATORY (INHALATION) at 06:58

## 2022-06-07 NOTE — PLAN OF CARE
Goal Outcome Evaluation:  Plan of Care Reviewed With: patient        Progress: improving  Outcome Evaluation: Pt continues to have audible wheezing even without stethoscope. Was very SOB at the beginning of shift but got better into the night. Had asked for additional albuterol txs per RT at the beginning. Continues to use home CPAP at night. Slight edema noted on BLE. Glucose was 330. PA was notified and insulin ordered for better control while on steriods. Continue with plan of care.

## 2022-06-07 NOTE — PROGRESS NOTES
Reason for consultation asthma  Subjective  Much better  Feels relieved that her breathing is improved  Feels better after diuretics        Review of Systems   Constitutional: Positive for activity change and fatigue.   HENT: Positive for congestion, postnasal drip and rhinorrhea.    Eyes: Negative.    Respiratory: Positive for cough, chest tightness, shortness of breath and wheezing.    Cardiovascular: Negative.    Gastrointestinal: Negative.    Endocrine: Negative.    Genitourinary: Negative.    Musculoskeletal: Negative.      Vitals:    06/07/22 0655 06/07/22 0739 06/07/22 1138 06/07/22 1155   BP:  148/69  134/69   BP Location:  Left arm  Left arm   Patient Position:  Lying  Sitting   Pulse: 64 64 79 82   Resp: 20 20 20 18   Temp:  97.6 °F (36.4 °C)  98.1 °F (36.7 °C)   TempSrc:  Oral  Oral   SpO2:  100% 95% 97%   Weight:       Height:           Vital Signs Reviewed  General WDWN, Alert, NAD.    HEENT:  PERRL, EOMI.  OP, nares clear, no sinus tenderness  Neck:  Supple, no JVD, no thyromegaly  Lymph: no axillary, cervical, supraclavicular lymphadenopathy noted bilaterally  Chest: Patient with significantly improved respiratory exam from yesterday does still have scattered wheezes but much better   CV: RRR, no MGR, pulses 2+, equal.  Abd:  Soft, NT, ND, + BS, no HSM  EXT:  no clubbing, no cyanosis, no edema, no joint tenderness  Neuro:  A&Ox3, CN grossly intact, no focal deficits.  Skin: No rashes or lesions noted         Assessment  Acute asthma exacerbation with status asthmaticus  Elevated lactate-?  If this is due to all the bronchodilator therapies that she is getting  Obesity with body mass index of 51.1  Obstructive sleep apnea    Plan  Improved with diuretics we will continue diuretics today    Continue steroids    Continue LABA LAMA and no corticosteroids    Will have respiratory come educate patient on how to use peak flow    Electronically signed by Tom Mchugh DO, 06/07/22, 1:24 PM  EDT.

## 2022-06-07 NOTE — PLAN OF CARE
Goal Outcome Evaluation:      No acute events over the course of the shift.  Patient expresses that she is starting to feel better and her breathing feels less labored. PRN breathing treatment required once. Vital signs stable.

## 2022-06-07 NOTE — PROGRESS NOTES
[facility]   Hospitalist Progress Note       Patient Name: Karey Lopez  : 1975  MRN: [id number]  Primary Care Physician: Sunita Hylton APRN  Date of admission: 2022  Today's Date: 2022  Room / Bed:   [facility]/  Subjective   Chief Complaint: Shortness of breath     Summary: Ms. Karey Lopez is a 47-year-old female who presented to the emergency room with a complaint of cough, shortness of breath, and wheezing that became progressively worse.  Patient has a hx of asthma.  She has been taking more breathing treatments than normal, has been taking steroids, and has been taking augmentin without any relief for 4 days.  Patient can barely walk because she is dyspneic on exertion.  Admitted for asthma exacerbation, started on steroids, scheduled bronchodilators.  Infectious work-up negative, remains on azithromycin alone for inflammation related to asthma.       Interval Followup: 2022    Up in bed.  Very pleasant.  Conversational.    Feels better / making progress, but not back to baseline.      Still with bilat expiratory wheezes.  On 2L O2 per NC (none at home)  Occasional cough noted.  Minimally productive.  Overall patient feels better but not back to her baseline.    Monitor with sinus rhythm.  No major arrhythmia alarms.  Steroids driving up glucose.  SSI started.      REVIEW OF SYSTEMS: All other systems reviewed and are negative.   GEN: No fevers. No chills. No weight gain. No weight loss.     HEENT:   No dysphagia/odynophagia. No visual disturbance.    GI:    No N/V.  No abd pain. No diarrhea. No constipation.  No bloody/black tarry stools. No hematemesis.   CV: No chest discomfort.  No palps. No lightheadedness. No syncope. No orthopnea/PND. No edema.   RESP:    No cough. + wheeze.  No increased sputum. No hemoptysis. + ALVAREZ.  :   No dysuria or suprapubic discomfort. No frequency.No urgency. No hesitancy. No incontinence. No hematuria. No flank pain.    MS:   No joint  stiffness or arthralgias. No myalgias. No muscle weakness.    SKIN:   No painful or pruritic rashes.  No skin discoloration.  NEURO:  No focal numbness or weakness.  No headaches.  No ataxia. No slurred speech. No receptive/expressive aphasia.      PSYCH:   No anxiety. No depression. + fatigue  ENDO:  No tremor, hair loss, heat or cold intolerance.  Objective   Temp:  [97.6 °F (36.4 °C)-98.2 °F (36.8 °C)] 97.6 °F (36.4 °C)  Heart Rate:  [] 64  Resp:  [18-22] 20  BP: (142-159)/(69-81) 148/69  Flow (L/min):  [1-3] 2  PHYSICAL EXAM   CON: WN. WD. NAD.   EYES:  Sclera anicteric. EOMI. Normal conjunctiva.   ENT:  Oropharyngeal mucosa without ulcers or thrush.    NECK:  No thyromegaly. No stridor. Trachea midline.  RESP:  CTA. + Bilat expiratory wheezes. Some diminished air movement. No crackles.  No work of breathing or tachypnea.   CV:  Rhythm regular. Rate WNL. No murmur noted.  No edema.  GI:  Soft and nontender. Nondistended.  Bowel sounds present.   EXT: Peripheral pulses intact.  No joint deformities or cyanosis.  LYMPH:  No lymphedema noted.  No cervical lymphadenopathy.  PSYCH:  Alert. Oriented. Normal affect and mood.  NEURO:  CNII-XII grossly intact. No dysarthria or aphasia. No unilateral weakness or paresthesia.  SKIN: No chronic venous stasis changes or varicosities.  No cellulitis    Results from last 7 days   Lab Units 06/07/22  0523 06/06/22 0627 06/05/22  0159 06/04/22 2047   WBC 10*3/mm3 17.05* 15.48* 14.66* 14.45*   HEMOGLOBIN g/dL 11.7* 11.4* 12.2 12.1   HEMATOCRIT % 37.3 37.0 39.0 38.2   PLATELETS 10*3/mm3 408 376 427 439     Results from last 7 days   Lab Units 06/07/22  0523 06/06/22  0627 06/05/22  0159 06/04/22  1836   SODIUM mmol/L 137 138 137 137   POTASSIUM mmol/L 4.6 4.9 4.3 4.3   CO2 mmol/L 22.2 20.1* 17.6* 19.4*   CHLORIDE mmol/L 100 107 101 101   ANION GAP mmol/L 14.8 10.9 18.4* 16.6*   BUN mg/dL 20 16 13 11   CREATININE mg/dL 0.62 0.65 0.82 0.66   GLUCOSE mg/dL 188* 185* 297* 126*            RESULTS REVIEWED:  I have personally reviewed the results from the time of this admission to 6/7/2022 09:37 EDT and agree with these findings:  []  Laboratory  []  Microbiology  []  Radiology  []  EKG/Telemetry   []  Cardiology/Vascular   []  Pathology  []  Old records  []  Other:  Assessment / Plan   Assessment:      Acute asthma exacerbation  Acute hypoxemia  Lactic acidosis  Hyperglycemia, worsened with steroids  Depression  Hypertension  Morbid obesity with BMI 51      Plan:     Pulmonology consulted for current mgmt and outpt follow up  Continue IV Solu-Medrol 40 mg every 12 hours, will likely transition to oral prednisone as improves  Add sliding scale and accuchecks AC/HS  Duo nebs, Pulmicort and Brovana twice daily, albuterol as needed  Trend lactate    .....  Lasix scheduled  Scheduled Mucinex twice daily  Infectious work-up negative, discontinue Rocephin and continue azithromycin alone  Lovenox for DVT prophylaxis  DVT prophylaxis:  Medical DVT prophylaxis orders are present.  CODE STATUS:      Level Of Support Discussed With: Patient  Code Status (Patient has no pulse and is not breathing): CPR (Attempt to Resuscitate)  Medical Interventions (Patient has pulse or is breathing): Full Support         Attending documentation:  I reviewed the above documentation and independently reviewed and rounded and evaluated the patient and discussed the care plan with CIRILO Farah PA-C, I agree with his findings and plan as documented, what I have added to the care plan and modified is as follows in my documentation and my medical decision making; 47-year-old female hospitalized on 6/4/2022 with shortness of breath symptoms, with underlying asthma with acute exacerbation, seen on rounds this morning, still tachypneic and short of air with significant wheezing, she appeared to be in with his status asthmaticus, elevated lactate, pulmonary consulted, revise nebs, started diuresis after she had 6+ liters of IV  fluids, breathing is slowly improving.  Patient seen and examined this morning, no acute distress, no acute major overnight events, patient's breathing is significantly proved for the past 24 hours, she now can carry on a conversation with the conversational dyspnea, her sats are stable on 2 L nasal cannula, she continues to have cough and shortness of breath symptoms with exertion.  Wheezing has significantly improved.  Denies chest pain or palpitations, denied fevers, chills, sweats.  Blood sugars are still ranging in the 300-100 range, lactate is hovering around 4.0-4.5.  White blood cell count up to 17,000.  Telemetry reviewed, no acute major arrhythmic events, remains in sinus rhythm.  Review of systems obtained, all systems reviewed and negative except for cough, shortness of breath, wheezing.  Vitals reviewed, on physical exam, well-appearing female morbidly obese, no acute distress, regular rhythm, air to bilaterally, coarse wheezing throughout, trace pedal edema, soft nontender abdomen, alert and oriented x3.  Assessment as above, plan is to continue scheduled Lasix, Solu-Medrol twice daily, plan to transition to p.o. prednisone tomorrow if she continues to show signs of improvement, wean oxygen per tolerance, continue antibiotics, a.m. labs, full code, VTE prophylaxis with Lovenox, clinical course to dictate further management, discussed with nurse at bedside.  More than 55% of the time of this patient's encounter was performed by me, this included face-to-face time, planning and coordinating, medical decision making and critical thinking personally done by me.  -XAVIER PUTNAM    Electronically signed by Carly Hu MD, 06/07/22, 4:00 PM EDT.

## 2022-06-08 ENCOUNTER — APPOINTMENT (OUTPATIENT)
Dept: GENERAL RADIOLOGY | Facility: HOSPITAL | Age: 47
End: 2022-06-08

## 2022-06-08 LAB
ALBUMIN SERPL-MCNC: 3.7 G/DL (ref 3.5–5.2)
ALP SERPL-CCNC: 56 U/L (ref 39–117)
ALT SERPL W P-5'-P-CCNC: 16 U/L (ref 1–33)
ANION GAP SERPL CALCULATED.3IONS-SCNC: 13 MMOL/L (ref 5–15)
AST SERPL-CCNC: 12 U/L (ref 1–32)
BASOPHILS # BLD AUTO: 0.1 10*3/MM3 (ref 0–0.2)
BASOPHILS NFR BLD AUTO: 0.7 % (ref 0–1.5)
BILIRUB CONJ SERPL-MCNC: <0.2 MG/DL (ref 0–0.3)
BILIRUB INDIRECT SERPL-MCNC: NORMAL MG/DL
BILIRUB SERPL-MCNC: <0.2 MG/DL (ref 0–1.2)
BUN SERPL-MCNC: 24 MG/DL (ref 6–20)
BUN/CREAT SERPL: 36.4 (ref 7–25)
CALCIUM SPEC-SCNC: 9.6 MG/DL (ref 8.6–10.5)
CHLORIDE SERPL-SCNC: 100 MMOL/L (ref 98–107)
CO2 SERPL-SCNC: 21 MMOL/L (ref 22–29)
CREAT SERPL-MCNC: 0.66 MG/DL (ref 0.57–1)
DEPRECATED RDW RBC AUTO: 51.7 FL (ref 37–54)
EGFRCR SERPLBLD CKD-EPI 2021: 109 ML/MIN/1.73
EOSINOPHIL # BLD AUTO: 0 10*3/MM3 (ref 0–0.4)
EOSINOPHIL NFR BLD AUTO: 0 % (ref 0.3–6.2)
ERYTHROCYTE [DISTWIDTH] IN BLOOD BY AUTOMATED COUNT: 16.6 % (ref 12.3–15.4)
GLUCOSE BLDC GLUCOMTR-MCNC: 137 MG/DL (ref 70–99)
GLUCOSE BLDC GLUCOMTR-MCNC: 189 MG/DL (ref 70–99)
GLUCOSE BLDC GLUCOMTR-MCNC: 190 MG/DL (ref 70–99)
GLUCOSE BLDC GLUCOMTR-MCNC: 300 MG/DL (ref 70–99)
GLUCOSE SERPL-MCNC: 198 MG/DL (ref 65–99)
HCT VFR BLD AUTO: 36.4 % (ref 34–46.6)
HGB BLD-MCNC: 11.4 G/DL (ref 12–15.9)
IMM GRANULOCYTES # BLD AUTO: 0.92 10*3/MM3 (ref 0–0.05)
IMM GRANULOCYTES NFR BLD AUTO: 6.1 % (ref 0–0.5)
LYMPHOCYTES # BLD AUTO: 1.74 10*3/MM3 (ref 0.7–3.1)
LYMPHOCYTES NFR BLD AUTO: 11.5 % (ref 19.6–45.3)
MAGNESIUM SERPL-MCNC: 2.3 MG/DL (ref 1.6–2.6)
MCH RBC QN AUTO: 26.6 PG (ref 26.6–33)
MCHC RBC AUTO-ENTMCNC: 31.3 G/DL (ref 31.5–35.7)
MCV RBC AUTO: 85 FL (ref 79–97)
MONOCYTES # BLD AUTO: 0.92 10*3/MM3 (ref 0.1–0.9)
MONOCYTES NFR BLD AUTO: 6.1 % (ref 5–12)
NEUTROPHILS NFR BLD AUTO: 11.49 10*3/MM3 (ref 1.7–7)
NEUTROPHILS NFR BLD AUTO: 75.6 % (ref 42.7–76)
NRBC BLD AUTO-RTO: 0 /100 WBC (ref 0–0.2)
PHOSPHATE SERPL-MCNC: 3.9 MG/DL (ref 2.5–4.5)
PLAT MORPH BLD: NORMAL
PLATELET # BLD AUTO: 369 10*3/MM3 (ref 140–450)
PMV BLD AUTO: 9.4 FL (ref 6–12)
POTASSIUM SERPL-SCNC: 4.5 MMOL/L (ref 3.5–5.2)
PROT SERPL-MCNC: 6.3 G/DL (ref 6–8.5)
RBC # BLD AUTO: 4.28 10*6/MM3 (ref 3.77–5.28)
RBC MORPH BLD: NORMAL
SODIUM SERPL-SCNC: 134 MMOL/L (ref 136–145)
WBC MORPH BLD: NORMAL
WBC NRBC COR # BLD: 15.17 10*3/MM3 (ref 3.4–10.8)

## 2022-06-08 PROCEDURE — 99232 SBSQ HOSP IP/OBS MODERATE 35: CPT | Performed by: FAMILY MEDICINE

## 2022-06-08 PROCEDURE — 25010000002 METHYLPREDNISOLONE PER 40 MG: Performed by: INTERNAL MEDICINE

## 2022-06-08 PROCEDURE — 83735 ASSAY OF MAGNESIUM: CPT | Performed by: FAMILY MEDICINE

## 2022-06-08 PROCEDURE — 25010000002 ENOXAPARIN PER 10 MG: Performed by: FAMILY MEDICINE

## 2022-06-08 PROCEDURE — 94760 N-INVAS EAR/PLS OXIMETRY 1: CPT

## 2022-06-08 PROCEDURE — 80076 HEPATIC FUNCTION PANEL: CPT | Performed by: FAMILY MEDICINE

## 2022-06-08 PROCEDURE — 94799 UNLISTED PULMONARY SVC/PX: CPT

## 2022-06-08 PROCEDURE — 82962 GLUCOSE BLOOD TEST: CPT

## 2022-06-08 PROCEDURE — 97161 PT EVAL LOW COMPLEX 20 MIN: CPT

## 2022-06-08 PROCEDURE — 25010000002 AZITHROMYCIN PER 500 MG: Performed by: HOSPITALIST

## 2022-06-08 PROCEDURE — 63710000001 INSULIN LISPRO (HUMAN) PER 5 UNITS: Performed by: PHYSICIAN ASSISTANT

## 2022-06-08 PROCEDURE — 25010000002 FUROSEMIDE PER 20 MG: Performed by: FAMILY MEDICINE

## 2022-06-08 PROCEDURE — 80048 BASIC METABOLIC PNL TOTAL CA: CPT | Performed by: FAMILY MEDICINE

## 2022-06-08 PROCEDURE — 85007 BL SMEAR W/DIFF WBC COUNT: CPT | Performed by: FAMILY MEDICINE

## 2022-06-08 PROCEDURE — 71045 X-RAY EXAM CHEST 1 VIEW: CPT

## 2022-06-08 PROCEDURE — 99232 SBSQ HOSP IP/OBS MODERATE 35: CPT | Performed by: INTERNAL MEDICINE

## 2022-06-08 PROCEDURE — 84100 ASSAY OF PHOSPHORUS: CPT | Performed by: FAMILY MEDICINE

## 2022-06-08 PROCEDURE — 85025 COMPLETE CBC W/AUTO DIFF WBC: CPT | Performed by: FAMILY MEDICINE

## 2022-06-08 RX ORDER — PREDNISONE 20 MG/1
40 TABLET ORAL
Status: DISCONTINUED | OUTPATIENT
Start: 2022-06-09 | End: 2022-06-10 | Stop reason: HOSPADM

## 2022-06-08 RX ORDER — ACETAMINOPHEN 325 MG/1
650 TABLET ORAL EVERY 6 HOURS PRN
Status: DISCONTINUED | OUTPATIENT
Start: 2022-06-08 | End: 2022-06-10 | Stop reason: HOSPADM

## 2022-06-08 RX ADMIN — GUAIFENESIN 1200 MG: 600 TABLET ORAL at 08:13

## 2022-06-08 RX ADMIN — TIZANIDINE 4 MG: 4 TABLET ORAL at 18:24

## 2022-06-08 RX ADMIN — LISINOPRIL 30 MG: 20 TABLET ORAL at 22:14

## 2022-06-08 RX ADMIN — IPRATROPIUM BROMIDE AND ALBUTEROL SULFATE 3 ML: .5; 3 SOLUTION RESPIRATORY (INHALATION) at 18:53

## 2022-06-08 RX ADMIN — INSULIN LISPRO 2 UNITS: 100 INJECTION, SOLUTION INTRAVENOUS; SUBCUTANEOUS at 08:12

## 2022-06-08 RX ADMIN — METHYLPREDNISOLONE SODIUM SUCCINATE 40 MG: 40 INJECTION, POWDER, FOR SOLUTION INTRAMUSCULAR; INTRAVENOUS at 11:52

## 2022-06-08 RX ADMIN — FLUTICASONE PROPIONATE 2 SPRAY: 50 SPRAY, METERED NASAL at 08:13

## 2022-06-08 RX ADMIN — FUROSEMIDE 40 MG: 10 INJECTION, SOLUTION INTRAMUSCULAR; INTRAVENOUS at 17:11

## 2022-06-08 RX ADMIN — IPRATROPIUM BROMIDE AND ALBUTEROL SULFATE 3 ML: .5; 3 SOLUTION RESPIRATORY (INHALATION) at 23:22

## 2022-06-08 RX ADMIN — CETIRIZINE HYDROCHLORIDE 10 MG: 10 TABLET, FILM COATED ORAL at 22:15

## 2022-06-08 RX ADMIN — ACETAMINOPHEN 650 MG: 325 TABLET ORAL at 17:05

## 2022-06-08 RX ADMIN — INSULIN LISPRO 5 UNITS: 100 INJECTION, SOLUTION INTRAVENOUS; SUBCUTANEOUS at 22:25

## 2022-06-08 RX ADMIN — ENOXAPARIN SODIUM 40 MG: 100 INJECTION SUBCUTANEOUS at 22:13

## 2022-06-08 RX ADMIN — IPRATROPIUM BROMIDE AND ALBUTEROL SULFATE 3 ML: .5; 3 SOLUTION RESPIRATORY (INHALATION) at 00:13

## 2022-06-08 RX ADMIN — IPRATROPIUM BROMIDE AND ALBUTEROL SULFATE 3 ML: .5; 3 SOLUTION RESPIRATORY (INHALATION) at 07:36

## 2022-06-08 RX ADMIN — IPRATROPIUM BROMIDE AND ALBUTEROL SULFATE 3 ML: .5; 3 SOLUTION RESPIRATORY (INHALATION) at 15:16

## 2022-06-08 RX ADMIN — ARFORMOTEROL TARTRATE 15 MCG: 15 SOLUTION RESPIRATORY (INHALATION) at 07:36

## 2022-06-08 RX ADMIN — FUROSEMIDE 40 MG: 10 INJECTION, SOLUTION INTRAMUSCULAR; INTRAVENOUS at 08:13

## 2022-06-08 RX ADMIN — INSULIN LISPRO 2 UNITS: 100 INJECTION, SOLUTION INTRAVENOUS; SUBCUTANEOUS at 11:52

## 2022-06-08 RX ADMIN — AZITHROMYCIN 500 MG: 500 INJECTION, POWDER, LYOPHILIZED, FOR SOLUTION INTRAVENOUS at 22:31

## 2022-06-08 RX ADMIN — Medication 10 ML: at 22:14

## 2022-06-08 RX ADMIN — BUPROPION HYDROCHLORIDE 150 MG: 150 TABLET, EXTENDED RELEASE ORAL at 22:14

## 2022-06-08 RX ADMIN — METHYLPREDNISOLONE SODIUM SUCCINATE 40 MG: 40 INJECTION, POWDER, FOR SOLUTION INTRAMUSCULAR; INTRAVENOUS at 03:54

## 2022-06-08 RX ADMIN — ESCITALOPRAM OXALATE 30 MG: 10 TABLET ORAL at 22:14

## 2022-06-08 RX ADMIN — SENNOSIDES AND DOCUSATE SODIUM 2 TABLET: 50; 8.6 TABLET ORAL at 08:12

## 2022-06-08 RX ADMIN — BUDESONIDE 0.5 MG: 0.5 SUSPENSION RESPIRATORY (INHALATION) at 18:53

## 2022-06-08 RX ADMIN — BUDESONIDE 0.5 MG: 0.5 SUSPENSION RESPIRATORY (INHALATION) at 07:36

## 2022-06-08 RX ADMIN — Medication 10 ML: at 08:13

## 2022-06-08 RX ADMIN — MONTELUKAST 10 MG: 10 TABLET, FILM COATED ORAL at 22:15

## 2022-06-08 RX ADMIN — ARFORMOTEROL TARTRATE 15 MCG: 15 SOLUTION RESPIRATORY (INHALATION) at 18:53

## 2022-06-08 NOTE — PROGRESS NOTES
Reason for consultation asthma  Subjective  Much better  Feels relieved that her breathing is improved  Feels better after diuretics  Still feels he has difficulties expectorating mucus      Review of Systems   Constitutional: Positive for activity change and fatigue.   HENT: Positive for congestion, postnasal drip and rhinorrhea.    Eyes: Negative.    Respiratory: Positive for cough, chest tightness, shortness of breath and wheezing.    Cardiovascular: Negative.    Gastrointestinal: Negative.    Endocrine: Negative.    Genitourinary: Negative.    Musculoskeletal: Negative.      Vitals:    06/08/22 0732 06/08/22 0737 06/08/22 0750 06/08/22 1215   BP:   138/69 126/59   BP Location:   Right arm Right arm   Patient Position:   Lying Lying   Pulse: 74 65 66 84   Resp: 20 20 20 20   Temp:   97.3 °F (36.3 °C) 98.2 °F (36.8 °C)   TempSrc:   Oral Oral   SpO2: 96% 95% 100% 99%   Weight:       Height:           Vital Signs Reviewed  General WDWN, Alert, NAD.    HEENT:  PERRL, EOMI.  OP, nares clear, no sinus tenderness  Neck:  Supple, no JVD, no thyromegaly  Chest: Patient with significantly improved respiratory exam from yesterday does still have scattered wheezes but much better   CV: RRR, no MGR, pulses 2+, equal.  Abd:  Soft, NT, ND, + BS, no HSM  Neuro:  A&Ox3, CN grossly intact, no focal deficits.  Skin: No rashes or lesions noted         Assessment  Acute asthma exacerbation with status asthmaticus  Elevated lactate-?  If this is due to all the bronchodilator therapies that she is getting  Obesity with body mass index of 51.1  Obstructive sleep apnea    Plan    Transition from Solu-Medrol to prednisone    Repeat chest x-ray today    Continue steroids    Continue LABA LAMA and no corticosteroids    Will have respiratory come educate patient on how to use peak flow    We will start bronchopulmonary hygiene      Electronically signed by Tom Mchugh DO, 06/08/22, 3:16 PM EDT.

## 2022-06-08 NOTE — SIGNIFICANT NOTE
06/08/22 1430   Coping/Psychosocial   Observed Emotional State calm;cooperative   Verbalized Emotional State hopefulness;relief   Trust Relationship/Rapport empathic listening provided   Family/Support Persons spouse   Involvement in Care interacting with patient   Additional Documentation Spiritual Care (Group)   Spiritual Care   Use of Spiritual Resources non-Restoration use of spiritual care   Spiritual Care Source  initiative   Spiritual Care Follow-Up follow-up, none required as presently assessed   Response to Spiritual Care engaged in conversation;receptive of support   Spiritual Care Interventions supportive conversation provided   Spiritual Care Visit Type initial   Receptivity to Spiritual Care visit welcomed

## 2022-06-08 NOTE — PLAN OF CARE
Goal Outcome Evaluation:      Patient had no acute events over the course of the shift. Patient states that she feels she is breathing better.  Plan to start PO steriods tomorrow and d/c IV steriods today. Vital signs stable.

## 2022-06-08 NOTE — THERAPY EVALUATION
Acute Care - Physical Therapy Initial Evaluation   [facility]     Patient Name: Karey Lopez  : 1975  MRN: [id number]  Today's Date: 2022      Visit Dx:     ICD-10-CM ICD-9-CM   1. Sepsis, due to unspecified organism, unspecified whether acute organ dysfunction present ([facility])  A41.9 038.9     995.91   2. Severe persistent asthma with exacerbation  J45.51 493.92   3. Failure of outpatient treatment  Z78.9 V49.89   4. Difficulty walking  R26.2 719.7     Patient Active Problem List   Diagnosis   • Allergic rhinitis   • Anxiety   • Chronic postoperative pain   • Sleep apnea, unspecified   • Essential (primary) hypertension   • Family history of ischemic heart disease and other diseases of the circulatory system   • BMI 45.0-49.9, adult ([facility])   • Persistent mood (affective) disorder, unspecified ([facility])   • Vitamin D deficiency   • Chronic left shoulder pain   • Neck pain   • Moderate persistent asthma without complication   • Cough   • Sepsis, due to unspecified organism, unspecified whether acute organ dysfunction present ([facility])     Past Medical History:   Diagnosis Date   • Allergic rhinitis, unspecified    • Anxiety    • Arthritis    • Asthma    • Cough variant asthma    • Dorsalgia, unspecified    • Essential (primary) hypertension    • Family history of ischemic heart disease and other diseases of the circulatory system    • Hypertension    • Left lower quadrant pain    • Leucocytosis     due to chronic inflammation per hematology   • Lichen sclerosus 2019   • Obesity, unspecified    • Other fatigue    • Persistent mood (affective) disorder, unspecified ([facility])    • PONV (postoperative nausea and vomiting)    • Sepsis ([facility])     Right knee Sepsis   • Sleep apnea, unspecified    • Unspecified abdominal pain    • Unspecified ovarian cyst, left side    • Vitamin D deficiency, unspecified      Past Surgical History:   Procedure Laterality Date   • COLONOSCOPY  2010    normal   • ENDOSCOPY  2010,  01/2018 1/2010- small hernia and 01/2018 mild gastitis   • FOOT SURGERY Right 11/2015, 4/28/2017   • FRACTURE SURGERY  11/2014, 3/2015    ankle   • GALLBLADDER SURGERY     • KNEE ARTHROPLASTY Right 12/10/2019   • KNEE ARTHROSCOPY Right 01/30/2020    Procedure: KNEE ARTHROSCOPY WITH INCISION AND DRAINAGE;  Surgeon: Fareed Chacon MD;  Location: [facility] OR;  Service: Orthopedics   • LAPAROSCOPIC GASTRIC BANDING  07/2010    and was removed july 2019; scar tissue   • LAPAROSCOPIC TUBAL LIGATION     • TUBAL ABDOMINAL LIGATION       PT Assessment (last 12 hours)     PT Evaluation and Treatment     Row Name 06/08/22 1100          Physical Therapy Time and Intention    Subjective Information complains of;dyspnea  -AV     Document Type evaluation  -AV     Mode of Treatment individual therapy;physical therapy  -AV     Patient Effort excellent  -AV     Symptoms Noted During/After Treatment none  -AV     Row Name 06/08/22 1100          General Information    Patient Profile Reviewed yes  -AV     Patient Observations alert;cooperative;agree to therapy  -AV     General Observations of Patient Pt is alert and oriented x4, pleasant and motivated to participate in PT services.  -AV     Prior Level of Function independent:;all household mobility;community mobility;transfer;gait;bed mobility  -AV     Equipment Currently Used at Home bipap/ cpap  at night  -AV     Existing Precautions/Restrictions oxygen therapy device and L/min  2L via NC  -AV     Barriers to Rehab none identified  -AV     Row Name 06/08/22 1100          Living Environment    Current Living Arrangements home  -AV     Home Accessibility stairs to enter home  -AV     People in Home spouse;child(scott), adult  -AV     Primary Care Provided by self  -AV     Row Name 06/08/22 1100          Home Main Entrance    Number of Stairs, Main Entrance four  -AV     Stair Railings, Main Entrance railings on both sides of stairs  -AV     Row Name 06/08/22 1100          Home  Use of Assistive/Adaptive Equipment    Equipment Currently Used at Home cpap;nebulizer  -AV     Row Name 06/08/22 1100          Range of Motion (ROM)    Range of Motion bilateral lower extremities;ROM is WNL  -AV     Row Name 06/08/22 1100          Strength (Manual Muscle Testing)    Strength (Manual Muscle Testing) bilateral lower extremities;strength is WNL  -AV     Row Name 06/08/22 1100          Bed Mobility    Bed Mobility bed mobility (all) activities  -AV     All Activities, [place] (Bed Mobility) modified independence  -AV     Assistive Device (Bed Mobility) bed rails  -AV     Row Name 06/08/22 1100          Transfers    Transfers sit-stand transfer  -AV     Sit-Stand [place] (Transfers) independent  -AV     Row Name 06/08/22 1100          Gait/Stairs (Locomotion)    Gait/Stairs Locomotion gait/ambulation independence  without supplemental O2, O2 sat remaining above 95% throughout session and no complaints of SOA  -AV     [place] Level (Gait) supervision  -AV     Distance in Feet (Gait) 200  -AV     Row Name 06/08/22 1100          Balance    Balance Assessment standing dynamic balance  -AV     Dynamic Standing Balance independent  -AV     Position/Device Used, Standing Balance unsupported  -AV     Row Name 06/08/22 1100          Plan of Care Review    Plan of Care Reviewed With patient  -AV     Outcome Evaluation Pt is not candidate for skilled PT services within this facility at this time as she is functioning within baseline levels with functional mobility. PT recommends discharge to outpatient pulmonary rehab facility upon discharge in order to address endurance.  -AV     Row Name 06/08/22 1100          Positioning and Restraints    Pre-Treatment Position in bed  -AV     Post Treatment Position bed  -AV     In Bed call light within reach  -AV     Row Name 06/08/22 1100          Therapy Assessment/Plan (PT)    Criteria for Skilled Interventions Met (PT) no;does not meet criteria for skilled  intervention  -AV     Therapy Frequency (PT) evaluation only  -AV     Row Name 06/08/22 1100          PT Evaluation Complexity    History, PT Evaluation Complexity no personal factors and/or comorbidities  -AV     Examination of Body Systems (PT Eval Complexity) 1-2 elements  -AV     Clinical Presentation (PT Evaluation Complexity) stable  -AV     Clinical Decision Making (PT Evaluation Complexity) low complexity  -AV     Overall Complexity (PT Evaluation Complexity) low complexity  -AV     Row Name 06/08/22 1100          Therapy Plan Review/Discharge Plan (PT)    Therapy Plan Review (PT) evaluation/treatment results reviewed;care plan/treatment goals reviewed;participants included;patient  -AV           User Key  (r) = Recorded By, (t) = Taken By, (c) = Cosigned By    Initials Name Provider Type    AV Sergio Ramos, PT Physical Therapist                Physical Therapy Education                 Title: PT OT SLP Therapies (Done)     Topic: Physical Therapy (Done)     Point: Mobility training (Done)     Learning Progress Summary           Patient Acceptance, E, VU by AV at 6/8/2022 1159                               User Key     Initials Effective Dates Name Provider Type Discipline    AV 06/11/21 -  Sergio Ramos, PT Physical Therapist PT              PT Recommendation and Plan  Anticipated Discharge Disposition (PT): other (see comments) (out-patient pulmonary rehab facility)  Therapy Frequency (PT): evaluation only  Plan of Care Reviewed With: patient  Outcome Evaluation: Pt is not candidate for skilled PT services within this facility at this time as she is functioning within baseline levels with functional mobility. PT recommends discharge to outpatient pulmonary rehab facility upon discharge in order to address endurance.   Outcome Measures     Row Name 06/08/22 1100             How much help from another person do you currently need...    Turning from your back to your side while in flat bed without  using bedrails? 4  -AV      Moving from lying on back to sitting on the side of a flat bed without bedrails? 4  -AV      Moving to and from a bed to a chair (including a wheelchair)? 4  -AV      Standing up from a chair using your arms (e.g., wheelchair, bedside chair)? 4  -AV      Climbing 3-5 steps with a railing? 4  -AV      To walk in hospital room? 4  -AV      AM-PAC 6 Clicks Score (PT) 24  -AV              Functional Assessment    Outcome Measure Options AM-PAC 6 Clicks Basic Mobility (PT)  -AV            User Key  (r) = Recorded By, (t) = Taken By, (c) = Cosigned By    Initials Name Provider Type    AV Sergio Ramos, PT Physical Therapist                 Time Calculation:         PT G-Codes  Outcome Measure Options: AM-PAC 6 Clicks Basic Mobility (PT)  AM-PAC 6 Clicks Score (PT): 24    Sergio Ramos, PT  6/8/2022

## 2022-06-08 NOTE — PLAN OF CARE
Goal Outcome Evaluation:  Plan of Care Reviewed With: patient           Outcome Evaluation: Pt is not candidate for skilled PT services within this facility at this time as she is functioning within baseline levels with functional mobility. PT recommends discharge to outpatient pulmonary rehab facility upon discharge in order to address endurance.

## 2022-06-08 NOTE — PLAN OF CARE
Goal Outcome Evaluation:      Patient slept well overnight and states no complaints. Breathing even and unlabored with no sings of distress noted. Fall precautions in place with call light in reach.

## 2022-06-09 LAB
BACTERIA SPEC AEROBE CULT: NORMAL
BACTERIA SPEC AEROBE CULT: NORMAL
GLUCOSE BLDC GLUCOMTR-MCNC: 112 MG/DL (ref 70–99)
GLUCOSE BLDC GLUCOMTR-MCNC: 135 MG/DL (ref 70–99)
GLUCOSE BLDC GLUCOMTR-MCNC: 186 MG/DL (ref 70–99)
GLUCOSE BLDC GLUCOMTR-MCNC: 191 MG/DL (ref 70–99)

## 2022-06-09 PROCEDURE — 94799 UNLISTED PULMONARY SVC/PX: CPT

## 2022-06-09 PROCEDURE — 82962 GLUCOSE BLOOD TEST: CPT

## 2022-06-09 PROCEDURE — 25010000002 ENOXAPARIN PER 10 MG: Performed by: FAMILY MEDICINE

## 2022-06-09 PROCEDURE — 63710000001 PREDNISONE PER 1 MG: Performed by: INTERNAL MEDICINE

## 2022-06-09 PROCEDURE — 25010000002 FUROSEMIDE PER 20 MG: Performed by: FAMILY MEDICINE

## 2022-06-09 PROCEDURE — 63710000001 INSULIN LISPRO (HUMAN) PER 5 UNITS: Performed by: PHYSICIAN ASSISTANT

## 2022-06-09 PROCEDURE — 99233 SBSQ HOSP IP/OBS HIGH 50: CPT | Performed by: FAMILY MEDICINE

## 2022-06-09 PROCEDURE — 99232 SBSQ HOSP IP/OBS MODERATE 35: CPT | Performed by: INTERNAL MEDICINE

## 2022-06-09 PROCEDURE — 94760 N-INVAS EAR/PLS OXIMETRY 1: CPT

## 2022-06-09 RX ADMIN — GUAIFENESIN 1200 MG: 600 TABLET ORAL at 21:33

## 2022-06-09 RX ADMIN — MONTELUKAST 10 MG: 10 TABLET, FILM COATED ORAL at 21:33

## 2022-06-09 RX ADMIN — BUDESONIDE 0.5 MG: 0.5 SUSPENSION RESPIRATORY (INHALATION) at 07:29

## 2022-06-09 RX ADMIN — CETIRIZINE HYDROCHLORIDE 10 MG: 10 TABLET, FILM COATED ORAL at 21:38

## 2022-06-09 RX ADMIN — FLUTICASONE PROPIONATE 2 SPRAY: 50 SPRAY, METERED NASAL at 08:21

## 2022-06-09 RX ADMIN — IPRATROPIUM BROMIDE AND ALBUTEROL SULFATE 3 ML: .5; 3 SOLUTION RESPIRATORY (INHALATION) at 12:06

## 2022-06-09 RX ADMIN — IPRATROPIUM BROMIDE AND ALBUTEROL SULFATE 3 ML: .5; 3 SOLUTION RESPIRATORY (INHALATION) at 14:56

## 2022-06-09 RX ADMIN — ESCITALOPRAM OXALATE 30 MG: 10 TABLET ORAL at 21:34

## 2022-06-09 RX ADMIN — IPRATROPIUM BROMIDE AND ALBUTEROL SULFATE 3 ML: .5; 3 SOLUTION RESPIRATORY (INHALATION) at 22:50

## 2022-06-09 RX ADMIN — GUAIFENESIN 1200 MG: 600 TABLET ORAL at 08:18

## 2022-06-09 RX ADMIN — Medication 10 ML: at 08:22

## 2022-06-09 RX ADMIN — FUROSEMIDE 40 MG: 10 INJECTION, SOLUTION INTRAMUSCULAR; INTRAVENOUS at 08:19

## 2022-06-09 RX ADMIN — ACETAMINOPHEN 650 MG: 325 TABLET ORAL at 16:22

## 2022-06-09 RX ADMIN — LISINOPRIL 30 MG: 20 TABLET ORAL at 21:33

## 2022-06-09 RX ADMIN — BUPROPION HYDROCHLORIDE 150 MG: 150 TABLET, EXTENDED RELEASE ORAL at 21:33

## 2022-06-09 RX ADMIN — INSULIN LISPRO 2 UNITS: 100 INJECTION, SOLUTION INTRAVENOUS; SUBCUTANEOUS at 21:32

## 2022-06-09 RX ADMIN — BUDESONIDE 0.5 MG: 0.5 SUSPENSION RESPIRATORY (INHALATION) at 19:07

## 2022-06-09 RX ADMIN — ARFORMOTEROL TARTRATE 15 MCG: 15 SOLUTION RESPIRATORY (INHALATION) at 19:07

## 2022-06-09 RX ADMIN — IPRATROPIUM BROMIDE AND ALBUTEROL SULFATE 3 ML: .5; 3 SOLUTION RESPIRATORY (INHALATION) at 19:07

## 2022-06-09 RX ADMIN — HYDROCODONE BITARTRATE AND ACETAMINOPHEN 1 TABLET: 5; 325 TABLET ORAL at 21:33

## 2022-06-09 RX ADMIN — FUROSEMIDE 40 MG: 10 INJECTION, SOLUTION INTRAMUSCULAR; INTRAVENOUS at 17:03

## 2022-06-09 RX ADMIN — IPRATROPIUM BROMIDE AND ALBUTEROL SULFATE 3 ML: .5; 3 SOLUTION RESPIRATORY (INHALATION) at 07:29

## 2022-06-09 RX ADMIN — Medication 10 ML: at 21:34

## 2022-06-09 RX ADMIN — PREDNISONE 40 MG: 20 TABLET ORAL at 08:18

## 2022-06-09 RX ADMIN — ENOXAPARIN SODIUM 40 MG: 100 INJECTION SUBCUTANEOUS at 21:33

## 2022-06-09 RX ADMIN — ACETAMINOPHEN 650 MG: 325 TABLET ORAL at 08:57

## 2022-06-09 RX ADMIN — ARFORMOTEROL TARTRATE 15 MCG: 15 SOLUTION RESPIRATORY (INHALATION) at 07:28

## 2022-06-09 RX ADMIN — INSULIN LISPRO 2 UNITS: 100 INJECTION, SOLUTION INTRAVENOUS; SUBCUTANEOUS at 11:21

## 2022-06-09 NOTE — PROGRESS NOTES
Reason for consultation asthma  Subjective  Much better  Feels relieved that her breathing is improved  Still with wheezing  Still feels winded      Review of Systems   Constitutional: Positive for activity change and fatigue.   HENT: Positive for congestion, postnasal drip and rhinorrhea.    Eyes: Negative.    Respiratory: Positive for cough, chest tightness, shortness of breath and wheezing.    Cardiovascular: Negative.    Gastrointestinal: Negative.    Endocrine: Negative.    Genitourinary: Negative.    Musculoskeletal: Negative.      Vitals:    06/09/22 1145 06/09/22 1206 06/09/22 1456 06/09/22 1522   BP: 117/59   156/74   BP Location: Left arm   Left arm   Patient Position: Lying   Lying   Pulse: 74 81 93 98   Resp: 20 20 20 20   Temp: 97.9 °F (36.6 °C)   97.9 °F (36.6 °C)   TempSrc: Oral   Oral   SpO2: 96%   98%   Weight:       Height:           Vital Signs Reviewed  General WDWN, Alert, NAD.    HEENT:  PERRL, EOMI.  OP, nares clear, no sinus tenderness  Neck:  Supple, no JVD, no thyromegaly  Chest: Patient with significantly improved respiratory exam from yesterday does still have scattered wheezes but much better   CV: RRR, no MGR, pulses 2+, equal.  Abd:  Soft, NT, ND, + BS, no HSM  Neuro:  A&Ox3, CN grossly intact, no focal deficits.  Skin: No rashes or lesions noted         Assessment  Acute asthma exacerbation with status asthmaticus  Elevated lactate-?  If this is due to all the bronchodilator therapies that she is getting  Obesity with body mass index of 51.1  Obstructive sleep apnea    Plan  We will add MetaNebs with treatments today    Not quite ready for discharge      Continue steroids    Continue LABA LAMA and no corticosteroids      Electronically signed by Tom Mchugh DO, 06/09/22, 4:56 PM EDT.

## 2022-06-09 NOTE — PLAN OF CARE
Problem: Adult Inpatient Plan of Care  Goal: Plan of Care Review  Outcome: Ongoing, Progressing  Flowsheets (Taken 6/8/2022 1100 by Sergio Ramos, PT)  Progress: no change  Plan of Care Reviewed With: patient  Outcome Evaluation: Pt is not candidate for skilled PT services within this facility at this time as she is functioning within baseline levels with functional mobility. PT recommends discharge to outpatient pulmonary rehab facility upon discharge in order to address endurance.     Problem: Adult Inpatient Plan of Care  Goal: Absence of Hospital-Acquired Illness or Injury  Intervention: Identify and Manage Fall Risk  Description: Perform standard risk assessment on admission using a validated tool or comprehensive approach appropriate to the patient; reassess fall risk frequently, with change in status or transfer to another level of care.  Communicate fall injury risk to interprofessional healthcare team.  Determine need for increased observation, equipment and environmental modification, such as low bed, signage and supportive, nonskid footwear.  Adjust safety measures to individual developmental age, stage and identified risk factors.  Reinforce the importance of safety and physical activity with patient and family.  Perform regular intentional rounding to assess need for position change, pain assessment and personal needs, including assistance with toileting.  Recent Flowsheet Documentation  Taken 6/8/2022 2030 by Jessenia Vuong, RN  Safety Promotion/Fall Prevention: safety round/check completed  Intervention: Prevent Skin Injury  Description: Perform a screening for skin injury risk, such as pressure or moisture associated skin damage on admission and at regular intervals throughout hospital stay.  Keep all areas of skin (especially folds) clean and dry.  Maintain adequate skin hydration.  Relieve and redistribute pressure and protect bony prominences; implement measures based on patient-specific risk  factors.  Match turning and repositioning schedule to clinical condition.  Encourage weight shift frequently; assist with reposition if unable to complete independently.  Float heels off bed; avoid pressure on the Achilles tendon.  Keep skin free from extended contact with medical devices.  Encourage functional activity and mobility, as early as tolerated.  Use aids (e.g., slide boards, mechanical lift) during transfer.  Recent Flowsheet Documentation  Taken 6/8/2022 2030 by Jessenia Vuong RN  Body Position: neutral head position  Intervention: Prevent and Manage VTE (Venous Thromboembolism) Risk  Description: Assess for VTE (venous thromboembolism) risk.  Encourage and assist with early ambulation.  Initiate and maintain compression or other therapy, as indicated, based on identified risk in accordance with organizational protocol and provider order.  Encourage both active and passive leg exercises while in bed, if unable to ambulate.  Recent Flowsheet Documentation  Taken 6/8/2022 2030 by Jessenia Vuong RN  Activity Management: up ad josefa  Intervention: Prevent Infection  Description: Maintain skin and mucous membrane integrity; promote hand, oral and pulmonary hygiene.  Optimize fluid balance, nutrition, sleep and glycemic control to maximize infection resistance.  Identify potential sources of infection early to prevent or mitigate progression of infection (e.g., wound, lines, devices).  Evaluate ongoing need for invasive devices; remove promptly when no longer indicated.  Recent Flowsheet Documentation  Taken 6/8/2022 2030 by Jessenia Vuong RN  Infection Prevention: visitors restricted/screened     Problem: Asthma Comorbidity  Goal: Maintenance of Asthma Control  Intervention: Maintain Asthma Symptom Control  Description: Evaluate adherence to self-management (asthma action plan), such as medication, symptom-control, trigger-avoidance and self-monitoring.  Advocate for continuation of home regimen, including  medication, method of delivery, schedule and symptom monitoring; acknowledge preferred modality and routine.  Minimize exposure to potential triggers, such as perfume, cleaning chemicals and all types of smoke.  Assess for proper use of inhaled medication and delivery technique; assist or reinstruct if needed.  Evaluate effectiveness of coping skills; encourage expression of feelings, expectations and concerns related to disease management and quality of life; reinforce education to enhance management plan and wellbeing.  Recent Flowsheet Documentation  Taken 6/8/2022 2030 by Jessenia Vuong RN  Medication Review/Management: medications reviewed     Problem: Behavioral Health Comorbidity  Goal: Maintenance of Behavioral Health Symptom Control  Intervention: Maintain Behavioral Health Symptom Control  Description: Confirm mental health diagnosis and current treatment.  Evaluate adherence to previously identified self-management plan.  Advocate continuation of home strategies, including medication.  Evaluate effectiveness of self-management strategies and coping skills.  Communicate with providers to ensure continuity and follow-up at transition.  Recent Flowsheet Documentation  Taken 6/8/2022 2030 by Jessenia Vuong RN  Medication Review/Management: medications reviewed     Problem: Hypertension Comorbidity  Goal: Blood Pressure in Desired Range  Intervention: Maintain Blood Pressure Management  Description: Evaluate adherence to home antihypertensive regimen (e.g., exercise and activity, diet modification, medication).  Provide scheduled antihypertensive medication; consider administration time and effects (e.g., avoid giving diuretic prior to bedtime).  Monitor response to antihypertensive medication therapy (e.g., blood pressure, electrolyte levels, medication effects).  Minimize risk of orthostatic hypotension; encourage caution with position changes, particularly if elderly.  Recent Flowsheet Documentation  Taken  6/8/2022 2030 by Jessenia Vuong RN  Medication Review/Management: medications reviewed   Goal Outcome Evaluation:

## 2022-06-09 NOTE — PROGRESS NOTES
[facility]   Hospitalist Progress Note       Patient Name: Karey Lopez  : 1975  MRN: [id number]  Primary Care Physician: Sunita Hylton APRN  Date of admission: 2022  Today's Date: 2022  Room / Bed:   [facility]/  Subjective   Chief Complaint: Shortness of breath     Summary: Ms. Karey Lopez is a 47-year-old female who presented to the emergency room with a complaint of cough, shortness of breath, and wheezing that became progressively worse.  Patient has a hx of asthma.  She has been taking more breathing treatments than normal, has been taking steroids, and has been taking augmentin without any relief for 4 days.  Patient can barely walk because she is dyspneic on exertion.  Admitted for asthma exacerbation, started on steroids, scheduled bronchodilators.  Infectious work-up negative, remains on azithromycin alone for inflammation related to asthma.       Interval Followup: 2022   Patient sitting up in bedside resting comfortably.  Patient states she feels her shortness of breath is improved though she still endorses shortness of breath with activity.  Cough becoming more productive.  Afebrile overnight.  Sinus rhythm 80s to 90s on telemetry review.  Blood pressure within normal limits.  Satting well on 2L NC.  Blood sugars fairly well controlled.  No other issues per nursing.      REVIEW OF SYSTEMS: All other systems reviewed and are negative.   GEN: No fevers. No chills. No weight gain. No weight loss.     HEENT:   No dysphagia/odynophagia. No visual disturbance.  Positive headache  GI:    No N/V.  No abd pain. No diarrhea. No constipation.  No bloody/black tarry stools. No hematemesis.   CV: No chest discomfort.  No palps. No lightheadedness. No syncope. No orthopnea/PND. No edema.   RESP:    Positive cough.  Positive wheeze.  No increased sputum. No hemoptysis.  Positive ALVAREZ.  :   No dysuria or suprapubic discomfort. No frequency.No urgency. No hesitancy. No incontinence. No  hematuria. No flank pain.    MS:   No joint stiffness or arthralgias. No myalgias. No muscle weakness.    SKIN:   No painful or pruritic rashes.  No skin discoloration.  NEURO:  No focal numbness or weakness.  Positive headaches.  No ataxia. No slurred speech. No receptive/expressive aphasia.      PSYCH:   No anxiety. No depression.  Positive fatigue  ENDO:  No tremor, hair loss, heat or cold intolerance.  Objective   Temp:  [97.3 °F (36.3 °C)-97.9 °F (36.6 °C)] 97.9 °F (36.6 °C)  Heart Rate:  [] 98  Resp:  [18-20] 20  BP: (117-156)/(59-83) 156/74  Flow (L/min):  [2] 2  PHYSICAL EXAM   Gen. well-developed appearing stated age in no acute distress morbidly obese BMI 51  HEENT: Normocephalic atraumatic moist membranes pupils equal round reactive light, no scleral icterus no conjunctival injection  Cardiovascular: regular rate and rhythm no murmurs rubs or gallops S1-S2, no lower extremity edema appreciated  Pulmonary: Diminished bilaterally with expiratory wheezes no rales or rhonchi symmetric chest expansion, unlabored, no conversational dyspnea appreciated  Gastrointestinal: Soft nontender nondistended positive bowel sounds all 4 quadrants no rebound or guarding  Musculoskeletal: No clubbing cyanosis, warm and well-perfused, calves soft symmetric nontender bilaterally  Skin: Clean dry without rashs  Neuro: Cranial nerves II through XII intact grossly no sensorimotor deficits appreciated bilateral upper and lower extremities  Psych: Patient is calm cooperative and appropriate with exam not responding to internal stimuli  : No Meléndez catheter no bladder distention no suprapubic tenderness    Results from last 7 days   Lab Units 06/08/22  0442 06/07/22  0523 06/06/22  0627 06/05/22  0159 06/04/22  2047   WBC 10*3/mm3 15.17* 17.05* 15.48* 14.66* 14.45*   HEMOGLOBIN g/dL 11.4* 11.7* 11.4* 12.2 12.1   HEMATOCRIT % 36.4 37.3 37.0 39.0 38.2   PLATELETS 10*3/mm3 369 408 376 427 439     Results from last 7 days   Lab  Units 06/08/22  0442 06/07/22  0523 06/06/22  0627 06/05/22  0159 06/04/22  1836   SODIUM mmol/L 134* 137 138 137 137   POTASSIUM mmol/L 4.5 4.6 4.9 4.3 4.3   CO2 mmol/L 21.0* 22.2 20.1* 17.6* 19.4*   CHLORIDE mmol/L 100 100 107 101 101   ANION GAP mmol/L 13.0 14.8 10.9 18.4* 16.6*   BUN mg/dL 24* 20 16 13 11   CREATININE mg/dL 0.66 0.62 0.65 0.82 0.66   GLUCOSE mg/dL 198* 188* 185* 297* 126*           RESULTS REVIEWED:  I have personally reviewed the results from the time of this admission to 6/9/2022 18:26 EDT and agree with these findings:  [x]  Laboratory  [x]  Microbiology  [x]  Radiology  [x]  EKG/Telemetry   []  Cardiology/Vascular   []  Pathology  []  Old records  []  Other:  Assessment / Plan   Assessment:      Acute asthma exacerbation  Acute hypoxemia  Lactic acidosis  Elevated A1c 7.0 with hyperglycemia likely reactive to steroids  Depression  Hypertension  Morbid obesity with BMI 51      Plan:   Patient admitted for further evaluation and treatment  Pulmonology consulted thank you for assistance  Continue prednisone 40 mg daily and taper per pulmonology  Continue sliding scale and accuchecks AC/HS  Continue duo nebs, Pulmicort and Brovana twice daily, with albuterol as needed  Continue scheduled Mucinex twice daily  Start MetaNebs per pulmonology  Infectious work-up negative, discontinue Rocephin  Completed course of azithromycin for anti-inflammatory properties  Continue home cetirizine  Continue fluticasone nasal spray  Continue Singulair  Continue home bupropion and escitalopram  Continue lisinopril  Counseled on the importance of diet and exercise for weight loss once respiratory function is stable    Discussed case with patient's nurse at the bedside.  Discussed case with pulmonology team.  Further inpatient orders recommendations pending clinical course.      DVT prophylaxis:  Medical DVT prophylaxis orders are present.  CODE STATUS:      Level Of Support Discussed With: Patient  Code Status  (Patient has no pulse and is not breathing): CPR (Attempt to Resuscitate)  Medical Interventions (Patient has pulse or is breathing): Full Support

## 2022-06-09 NOTE — PROGRESS NOTES
[facility]   Hospitalist Progress Note       Patient Name: Karey Lopez  : 1975  MRN: [id number]  Primary Care Physician: Sunita Hylton APRN  Date of admission: 2022  Today's Date: 2022  Room / Bed:   [facility]  Subjective   Chief Complaint: Shortness of breath     Summary: Ms. Karey Lopez is a 47-year-old female who presented to the emergency room with a complaint of cough, shortness of breath, and wheezing that became progressively worse.  Patient has a hx of asthma.  She has been taking more breathing treatments than normal, has been taking steroids, and has been taking augmentin without any relief for 4 days.  Patient can barely walk because she is dyspneic on exertion.  Admitted for asthma exacerbation, started on steroids, scheduled bronchodilators.  Infectious work-up negative, remains on azithromycin alone for inflammation related to asthma.       Interval Followup: 2022   Patient sitting up in bedside resting comfortably with family.  Patient states she feels her shortness of breath is somewhat improved though she had an episode earlier in the day where she was very short of breath and used her rescue inhaler as she could not wait for respiratory.  Shortness of breath improved after inhaler and breathing treatment use.  Patient still endorses shortness of breath with activity.  Cough remains nonproductive.  Afebrile overnight.  Sinus rhythm 80s to 140s on telemetry review.  Blood pressure within normal limits.  Satting well on room air.  Blood sugars fairly well controlled.  Serum potassium within normal limits.  Creatinine within normal limits.  BUN trending up slightly likely secondary to steroids.  Mag within normal limits.  White blood cell count remains elevated though trending down.  Hemoglobin stable.  Platelets within normal limits.  Strep pneumo and Legionella urinary antigens negative.  Chest x-ray this afternoon personally reviewed and negative for acute  infiltrate or effusion.  No other issues per nursing.      REVIEW OF SYSTEMS: All other systems reviewed and are negative.   GEN: No fevers. No chills. No weight gain. No weight loss.     HEENT:   No dysphagia/odynophagia. No visual disturbance.  Positive headache  GI:    No N/V.  No abd pain. No diarrhea. No constipation.  No bloody/black tarry stools. No hematemesis.   CV: No chest discomfort.  No palps. No lightheadedness. No syncope. No orthopnea/PND. No edema.   RESP:    Positive cough.  Positive wheeze.  No increased sputum. No hemoptysis.  Positive ALVAREZ.  :   No dysuria or suprapubic discomfort. No frequency.No urgency. No hesitancy. No incontinence. No hematuria. No flank pain.    MS:   No joint stiffness or arthralgias. No myalgias. No muscle weakness.    SKIN:   No painful or pruritic rashes.  No skin discoloration.  NEURO:  No focal numbness or weakness.  Positive headaches.  No ataxia. No slurred speech. No receptive/expressive aphasia.      PSYCH:   No anxiety. No depression.  Positive fatigue  ENDO:  No tremor, hair loss, heat or cold intolerance.  Objective   Temp:  [97.3 °F (36.3 °C)-98.2 °F (36.8 °C)] 97.9 °F (36.6 °C)  Heart Rate:  [] 112  Resp:  [18-20] 18  BP: (118-161)/(59-77) 118/62  Flow (L/min):  [2] 2  PHYSICAL EXAM   Gen. well-developed appearing stated age in no acute distress morbidly obese BMI 51  HEENT: Normocephalic atraumatic moist membranes pupils equal round reactive light, no scleral icterus no conjunctival injection  Cardiovascular: regular rate and rhythm no murmurs rubs or gallops S1-S2, no lower extremity edema appreciated  Pulmonary: Diminished bilaterally with expiratory wheezes no rales or rhonchi symmetric chest expansion, unlabored, no conversational dyspnea appreciated  Gastrointestinal: Soft nontender nondistended positive bowel sounds all 4 quadrants no rebound or guarding  Musculoskeletal: No clubbing cyanosis, warm and well-perfused, calves soft symmetric  nontender bilaterally  Skin: Clean dry without rashs  Neuro: Cranial nerves II through XII intact grossly no sensorimotor deficits appreciated bilateral upper and lower extremities  Psych: Patient is calm cooperative and appropriate with exam not responding to internal stimuli  : No Meléndez catheter no bladder distention no suprapubic tenderness    Results from last 7 days   Lab Units 06/08/22  0442 06/07/22  0523 06/06/22  0627 06/05/22  0159 06/04/22  2047   WBC 10*3/mm3 15.17* 17.05* 15.48* 14.66* 14.45*   HEMOGLOBIN g/dL 11.4* 11.7* 11.4* 12.2 12.1   HEMATOCRIT % 36.4 37.3 37.0 39.0 38.2   PLATELETS 10*3/mm3 369 408 376 427 439     Results from last 7 days   Lab Units 06/08/22  0442 06/07/22  0523 06/06/22  0627 06/05/22  0159 06/04/22  1836   SODIUM mmol/L 134* 137 138 137 137   POTASSIUM mmol/L 4.5 4.6 4.9 4.3 4.3   CO2 mmol/L 21.0* 22.2 20.1* 17.6* 19.4*   CHLORIDE mmol/L 100 100 107 101 101   ANION GAP mmol/L 13.0 14.8 10.9 18.4* 16.6*   BUN mg/dL 24* 20 16 13 11   CREATININE mg/dL 0.66 0.62 0.65 0.82 0.66   GLUCOSE mg/dL 198* 188* 185* 297* 126*           RESULTS REVIEWED:  I have personally reviewed the results from the time of this admission to 6/8/2022 21:09 EDT and agree with these findings:  [x]  Laboratory  [x]  Microbiology  [x]  Radiology  [x]  EKG/Telemetry   []  Cardiology/Vascular   []  Pathology  []  Old records  []  Other:  Assessment / Plan   Assessment:      Acute asthma exacerbation  Acute hypoxemia  Lactic acidosis  Elevated A1c 7.0 with hyperglycemia likely reactive to steroids  Depression  Hypertension  Morbid obesity with BMI 51      Plan:   Patient admitted for further evaluation and treatment  Pulmonology consulted thank you for assistance  Stop Solu-Medrol and start prednisone 40 mg daily  Continue sliding scale and accuchecks AC/HS  Continue duo nebs, Pulmicort and Brovana twice daily, with albuterol as needed  Continue scheduled Mucinex twice daily  Infectious work-up negative,  discontinue Rocephin  Continue azithromycin for anti-inflammatory properties  Continue home cetirizine  Continue fluticasone nasal spray  Continue Singulair  Continue home bupropion and escitalopram  Continue lisinopril  Counseled on the importance of diet and exercise for weight loss once respiratory function is stable    Discussed case with patient's nurse at the bedside.  Discussed case with pulmonology team.  Further inpatient orders recommendations pending clinical course.      DVT prophylaxis:  Medical DVT prophylaxis orders are present.  CODE STATUS:      Level Of Support Discussed With: Patient  Code Status (Patient has no pulse and is not breathing): CPR (Attempt to Resuscitate)  Medical Interventions (Patient has pulse or is breathing): Full Support

## 2022-06-10 ENCOUNTER — READMISSION MANAGEMENT (OUTPATIENT)
Dept: CALL CENTER | Facility: HOSPITAL | Age: 47
End: 2022-06-10

## 2022-06-10 VITALS
WEIGHT: 293 LBS | RESPIRATION RATE: 20 BRPM | SYSTOLIC BLOOD PRESSURE: 159 MMHG | HEIGHT: 64 IN | TEMPERATURE: 97.3 F | OXYGEN SATURATION: 97 % | DIASTOLIC BLOOD PRESSURE: 84 MMHG | HEART RATE: 103 BPM | BODY MASS INDEX: 50.02 KG/M2

## 2022-06-10 PROBLEM — A41.9 SEPSIS, DUE TO UNSPECIFIED ORGANISM, UNSPECIFIED WHETHER ACUTE ORGAN DYSFUNCTION PRESENT: Status: RESOLVED | Noted: 2022-06-04 | Resolved: 2022-06-10

## 2022-06-10 PROBLEM — J45.51 SEVERE PERSISTENT ASTHMA WITH EXACERBATION: Status: ACTIVE | Noted: 2022-06-10

## 2022-06-10 LAB
GLUCOSE BLDC GLUCOMTR-MCNC: 128 MG/DL (ref 70–99)
GLUCOSE BLDC GLUCOMTR-MCNC: 188 MG/DL (ref 70–99)

## 2022-06-10 PROCEDURE — 94799 UNLISTED PULMONARY SVC/PX: CPT

## 2022-06-10 PROCEDURE — 82962 GLUCOSE BLOOD TEST: CPT

## 2022-06-10 PROCEDURE — 99239 HOSP IP/OBS DSCHRG MGMT >30: CPT | Performed by: FAMILY MEDICINE

## 2022-06-10 PROCEDURE — 25010000002 FUROSEMIDE PER 20 MG: Performed by: FAMILY MEDICINE

## 2022-06-10 PROCEDURE — 63710000001 INSULIN LISPRO (HUMAN) PER 5 UNITS: Performed by: PHYSICIAN ASSISTANT

## 2022-06-10 PROCEDURE — 94664 DEMO&/EVAL PT USE INHALER: CPT

## 2022-06-10 PROCEDURE — 63710000001 PREDNISONE PER 1 MG: Performed by: INTERNAL MEDICINE

## 2022-06-10 PROCEDURE — 99232 SBSQ HOSP IP/OBS MODERATE 35: CPT | Performed by: INTERNAL MEDICINE

## 2022-06-10 RX ORDER — BUDESONIDE AND FORMOTEROL FUMARATE DIHYDRATE 160; 4.5 UG/1; UG/1
2 AEROSOL RESPIRATORY (INHALATION)
Qty: 6 G | Refills: 0 | Status: SHIPPED | OUTPATIENT
Start: 2022-06-10 | End: 2022-06-17 | Stop reason: SDUPTHER

## 2022-06-10 RX ORDER — CETIRIZINE HYDROCHLORIDE 10 MG/1
10 TABLET ORAL NIGHTLY
Qty: 30 TABLET | Refills: 0 | Status: SHIPPED | OUTPATIENT
Start: 2022-06-10 | End: 2023-02-02

## 2022-06-10 RX ORDER — PREDNISONE 10 MG/1
TABLET ORAL
Qty: 30 TABLET | Refills: 0 | Status: SHIPPED | OUTPATIENT
Start: 2022-06-10 | End: 2022-06-22

## 2022-06-10 RX ORDER — GUAIFENESIN 600 MG/1
1200 TABLET, EXTENDED RELEASE ORAL EVERY 12 HOURS SCHEDULED
Qty: 120 TABLET | Refills: 0 | Status: SHIPPED | OUTPATIENT
Start: 2022-06-10 | End: 2022-07-10

## 2022-06-10 RX ORDER — FLUTICASONE PROPIONATE 50 MCG
2 SPRAY, SUSPENSION (ML) NASAL DAILY
Qty: 9.9 ML | Refills: 0 | Status: SHIPPED | OUTPATIENT
Start: 2022-06-11 | End: 2023-02-02

## 2022-06-10 RX ADMIN — GUAIFENESIN 1200 MG: 600 TABLET ORAL at 08:00

## 2022-06-10 RX ADMIN — FUROSEMIDE 40 MG: 10 INJECTION, SOLUTION INTRAMUSCULAR; INTRAVENOUS at 08:00

## 2022-06-10 RX ADMIN — ACETAMINOPHEN 650 MG: 325 TABLET ORAL at 00:26

## 2022-06-10 RX ADMIN — IPRATROPIUM BROMIDE AND ALBUTEROL SULFATE 3 ML: .5; 3 SOLUTION RESPIRATORY (INHALATION) at 11:30

## 2022-06-10 RX ADMIN — INSULIN LISPRO 2 UNITS: 100 INJECTION, SOLUTION INTRAVENOUS; SUBCUTANEOUS at 11:42

## 2022-06-10 RX ADMIN — SENNOSIDES AND DOCUSATE SODIUM 2 TABLET: 50; 8.6 TABLET ORAL at 08:00

## 2022-06-10 RX ADMIN — ACETAMINOPHEN 650 MG: 325 TABLET ORAL at 11:46

## 2022-06-10 RX ADMIN — IPRATROPIUM BROMIDE AND ALBUTEROL SULFATE 3 ML: .5; 3 SOLUTION RESPIRATORY (INHALATION) at 07:22

## 2022-06-10 RX ADMIN — PREDNISONE 40 MG: 20 TABLET ORAL at 08:00

## 2022-06-10 RX ADMIN — FLUTICASONE PROPIONATE 2 SPRAY: 50 SPRAY, METERED NASAL at 08:01

## 2022-06-10 RX ADMIN — BUDESONIDE 0.5 MG: 0.5 SUSPENSION RESPIRATORY (INHALATION) at 07:22

## 2022-06-10 RX ADMIN — ARFORMOTEROL TARTRATE 15 MCG: 15 SOLUTION RESPIRATORY (INHALATION) at 07:22

## 2022-06-10 RX ADMIN — Medication 10 ML: at 07:59

## 2022-06-10 RX ADMIN — IPRATROPIUM BROMIDE AND ALBUTEROL SULFATE 3 ML: .5; 3 SOLUTION RESPIRATORY (INHALATION) at 14:33

## 2022-06-10 NOTE — PLAN OF CARE
Problem: Adult Inpatient Plan of Care  Goal: Plan of Care Review  Outcome: Met  Flowsheets (Taken 6/10/2022 1456)  Progress: improving  Plan of Care Reviewed With:   patient   spouse  Outcome Evaluation: discharged home  Goal: Patient-Specific Goal (Individualized)  Outcome: Met  Goal: Absence of Hospital-Acquired Illness or Injury  Outcome: Met  Intervention: Identify and Manage Fall Risk  Recent Flowsheet Documentation  Taken 6/10/2022 1200 by Malorie Gonzalez RN  Safety Promotion/Fall Prevention: safety round/check completed  Taken 6/10/2022 1000 by Malorie Gonzalez RN  Safety Promotion/Fall Prevention: safety round/check completed  Taken 6/10/2022 0800 by Malorie Gonzalez RN  Safety Promotion/Fall Prevention: safety round/check completed  Intervention: Prevent Skin Injury  Recent Flowsheet Documentation  Taken 6/10/2022 0800 by Malorie Gonzalez RN  Body Position: position changed independently  Skin Protection: tubing/devices free from skin contact  Intervention: Prevent and Manage VTE (Venous Thromboembolism) Risk  Recent Flowsheet Documentation  Taken 6/10/2022 1200 by Malorie Gonzalez RN  Activity Management:   up ad josefa   ambulated outside room  Taken 6/10/2022 1000 by Malorie Gonzalez RN  Activity Management: up ad josefa  Taken 6/10/2022 0800 by Malorie Gonzalez RN  Activity Management: up ad josefa  Intervention: Prevent Infection  Recent Flowsheet Documentation  Taken 6/10/2022 1200 by Malorie Gonzalez RN  Infection Prevention:   hand hygiene promoted   single patient room provided  Taken 6/10/2022 1000 by Malorie Gonzalez RN  Infection Prevention:   hand hygiene promoted   single patient room provided  Taken 6/10/2022 0800 by Malorie Gonzalez RN  Infection Prevention:   hand hygiene promoted   single patient room provided  Goal: Optimal Comfort and Wellbeing  Outcome: Met  Intervention: Provide Person-Centered Care  Recent Flowsheet Documentation  Taken 6/10/2022 0800 by Malorie Gonzalez RN  Trust  Relationship/Rapport:   care explained   questions answered  Goal: Readiness for Transition of Care  Outcome: Met     Problem: Asthma Comorbidity  Goal: Maintenance of Asthma Control  Outcome: Met  Intervention: Maintain Asthma Symptom Control  Recent Flowsheet Documentation  Taken 6/10/2022 0800 by Malorie Gonzalez RN  Medication Review/Management: medications reviewed     Problem: Behavioral Health Comorbidity  Goal: Maintenance of Behavioral Health Symptom Control  Outcome: Met  Intervention: Maintain Behavioral Health Symptom Control  Recent Flowsheet Documentation  Taken 6/10/2022 0800 by Malorie Gonzalez RN  Medication Review/Management: medications reviewed     Problem: Hypertension Comorbidity  Goal: Blood Pressure in Desired Range  Outcome: Met  Intervention: Maintain Blood Pressure Management  Recent Flowsheet Documentation  Taken 6/10/2022 0800 by Malorie Gonzalez RN  Medication Review/Management: medications reviewed     Problem: Obstructive Sleep Apnea Risk or Actual Comorbidity Management  Goal: Unobstructed Breathing During Sleep  Outcome: Met   Goal Outcome Evaluation:  Plan of Care Reviewed With: patient, spouse        Progress: improving  Outcome Evaluation: discharged home

## 2022-06-10 NOTE — PLAN OF CARE
Nutrition Note    Patient followed by RD for LOS. Patient is consuming 100% of meals. Patient is at low risk per nutrition risk screening. No acute nutrition concerns or nutrition intervention at this time. RD will continue to follow and monitor per protocol.     Pam Miller RD 10:33 EDT

## 2022-06-10 NOTE — DISCHARGE SUMMARY
[facility]         HOSPITALIST  DISCHARGE SUMMARY    Patient Name: Karey Lopez  : 1975  MRN: [id number]    Date of Admission: 2022  Date of Discharge: 6/10/2022  Primary Care Physician: Sunita Hylton APRN    Consults     Date and Time Order Name Status Description    2022  8:30 AM Inpatient Pulmonology Consult Completed     2022 10:38 PM Inpatient Hospitalist Consult            Active and Resolved Hospital Problems:  Acute asthma exacerbation  Acute hypoxemia  Lactic acidosis  Elevated A1c 7.0 with hyperglycemia likely reactive to steroids  Depression  Hypertension  Morbid obesity with BMI 51    Active Hospital Problems    Diagnosis POA   • Severe persistent asthma with exacerbation [J45.51] Yes      Resolved Hospital Problems    Diagnosis POA   • Sepsis, due to unspecified organism, unspecified whether acute organ dysfunction present (HCC) [A41.9] Yes       Hospital Course     Hospital Course:  Karey Lopez is a 47 y.o. female  who presented to the emergency room with a complaint of cough, shortness of breath, and wheezing that became progressively worse.  Patient has a hx of asthma.  She has been taking more breathing treatments than normal, has been taking steroids, and has been taking augmentin without any relief for 4 days.  Patient.  Barely walk because she is dyspneic on exertion.  Admitted for asthma exacerbation, started on steroids, scheduled bronchodilators.  Pulmonology consulted.  Infectious work-up negative.  Steroids slowly tapered.  Patient was weaned to room air ambulating comfortably.  Blood sugars noted to be elevated thought secondary to steroid use though patient A1c elevated to 7.0 concern for steroid-induced diabetes mellitus.  Started on insulin regimen while inpatient with plans to transition to metformin as an outpatient with close follow-up with her primary care provider.  Patient seen and evaluated on day of discharge and thus stable  for discharge home on Symbicort, albuterol, prolonged prednisone taper to follow-up with her primary care provider and pulmonology in 1 to 2 weeks.        DISCHARGE Follow Up Recommendations for labs and diagnostics: As above      Day of Discharge     Vital Signs:  Temp:  [97.3 °F (36.3 °C)-97.9 °F (36.6 °C)] 97.3 °F (36.3 °C)  Heart Rate:  [] 117  Resp:  [18-22] 22  BP: (131-156)/(59-85) 135/71  Flow (L/min):  [2] 2  Physical Exam:   Gen. well-developed appearing stated age in no acute distress morbidly obese BMI 51  HEENT: Normocephalic atraumatic moist membranes pupils equal round reactive light, no scleral icterus no conjunctival injection  Cardiovascular: regular rate and rhythm no murmurs rubs or gallops S1-S2, no lower extremity edema appreciated  Pulmonary: Diminished bilaterally with expiratory wheezes no rales or rhonchi symmetric chest expansion, unlabored, no conversational dyspnea appreciated  Gastrointestinal: Soft nontender nondistended positive bowel sounds all 4 quadrants no rebound or guarding  Musculoskeletal: No clubbing cyanosis, warm and well-perfused, calves soft symmetric nontender bilaterally  Skin: Clean dry without rashs  Neuro: Cranial nerves II through XII intact grossly no sensorimotor deficits appreciated bilateral upper and lower extremities  Psych: Patient is calm cooperative and appropriate with exam not responding to internal stimuli  : No Meléndez catheter no bladder distention no suprapubic tenderness      Discharge Details        Discharge Medications      New Medications      Instructions Start Date   budesonide-formoterol 160-4.5 MCG/ACT inhaler  Commonly known as: Symbicort  Replaces: budesonide-formoterol 80-4.5 MCG/ACT inhaler   2 puffs, Inhalation, 2 Times Daily - RT      cetirizine 10 MG tablet  Commonly known as: zyrTEC   10 mg, Oral, Nightly      fluticasone 50 MCG/ACT nasal spray  Commonly known as: FLONASE   2 sprays, Each Nare, Daily   Start Date: June 11,  2022     guaiFENesin 600 MG 12 hr tablet  Commonly known as: MUCINEX   1,200 mg, Oral, Every 12 Hours Scheduled      metFORMIN 500 MG tablet  Commonly known as: Glucophage   500 mg, Oral, 2 Times Daily With Meals      predniSONE 10 MG tablet  Commonly known as: DELTASONE   Take 4 tablets by mouth Daily for 3 days, THEN 3 tablets Daily for 3 days, THEN 2 tablets Daily for 3 days, THEN 1 tablet Daily for 3 days.   Start Date: Katya 10, 2022        Continue These Medications      Instructions Start Date   albuterol sulfate  (90 Base) MCG/ACT inhaler  Commonly known as: PROVENTIL HFA;VENTOLIN HFA;PROAIR HFA   2 puffs, Inhalation      buPROPion  MG 24 hr tablet  Commonly known as: WELLBUTRIN XL   150 mg, Oral, Daily      Ergocalciferol 50 MCG (2000 UT) capsule   2,000 Units, Oral, Daily      escitalopram 20 MG tablet  Commonly known as: LEXAPRO   30 mg, Oral, Daily      ibuprofen 800 MG tablet  Commonly known as: ADVIL,MOTRIN   800 mg, Oral, Every 6 Hours PRN      lisinopril 30 MG tablet  Commonly known as: PRINIVIL,ZESTRIL   30 mg, Oral, Daily      montelukast 10 MG tablet  Commonly known as: SINGULAIR   10 mg, Oral, Daily      tiZANidine 4 MG tablet  Commonly known as: Zanaflex   4 mg, Oral, Every 6 Hours PRN         Stop These Medications    budesonide-formoterol 80-4.5 MCG/ACT inhaler  Commonly known as: Symbicort  Replaced by: budesonide-formoterol 160-4.5 MCG/ACT inhaler            Allergies   Allergen Reactions   • Vilazodone Hcl Mental Status Change       Discharge Disposition:  Home or Self Care    Diet:  Hospital:  Diet Order   Procedures   • Diet Regular; Consistent Carbohydrate       Discharge Activity:   Activity Instructions     Gradually Increase Activity Until at Pre-Hospitalization Level            CODE STATUS:  Code Status and Medical Interventions:   Ordered at: 06/04/22 5177     Level Of Support Discussed With:    Patient     Code Status (Patient has no pulse and is not breathing):    CPR  (Attempt to Resuscitate)     Medical Interventions (Patient has pulse or is breathing):    Full Support         Future Appointments   Date Time Provider Department Center   6/17/2022  3:45 PM Sunita Hylton APRN [facility]       Additional Instructions for the Follow-ups that You Need to Schedule     Discharge Follow-up with PCP   As directed       Currently Documented PCP:    Sunita Hylton APRN    PCP Phone Number:    [phone]     Follow Up Details: Hospital discharge follow-up 1 to 2 weeks acute asthma exacerbation, steroid-induced hyperglycemia concern for developing diabetes mellitus         Discharge Follow-up with Specialty: Pulmonology [facility]; 1 Week   As directed      Specialty: Pulmonology [facility]    Follow Up: 1 Week    Follow Up Details: hospital discharge follow up acute asthma exacerbation               Pertinent  and/or Most Recent Results     PROCEDURES:   None    LAB RESULTS:      Lab 06/08/22  0442 06/07/22  1158 06/07/22  0523 06/06/22  0627 06/05/22  0911 06/05/22  0159 06/04/22  2317 06/04/22 2047 06/04/22  1836   WBC 15.17*  --  17.05* 15.48*  --  14.66*  --  14.45*  --    HEMOGLOBIN 11.4*  --  11.7* 11.4*  --  12.2  --  12.1  --    HEMATOCRIT 36.4  --  37.3 37.0  --  39.0  --  38.2  --    PLATELETS 369  --  408 376  --  427  --  439  --    NEUTROS ABS 11.49*  --  15.52* 14.24*  --  12.04*  --  11.27*  --    IMMATURE GRANS (ABS) 0.92*  --   --   --   --  1.07*  --  1.03*  --    LYMPHS ABS 1.74  --   --   --   --  1.32  --  1.49  --    MONOS ABS 0.92*  --   --   --   --  0.17  --  0.58  --    EOS ABS 0.00  --   --   --   --  0.00  --  0.00  --    MCV 85.0  --  85.6 87.3  --  86.5  --  84.5  --    PROCALCITONIN  --   --   --   --   --  0.03  --   --   --    LACTATE  --  4.5*  --  4.0* 4.7* 5.1* 5.2*  --  4.1*   D DIMER QUANT  --   --   --   --   --   --   --   --  <0.27         Lab 06/08/22  0442 06/07/22  0523 06/06/22  0627 06/05/22  0703 06/05/22  0159 06/04/22  1836   SODIUM 134*  137 138  --  137 137   POTASSIUM 4.5 4.6 4.9  --  4.3 4.3   CHLORIDE 100 100 107  --  101 101   CO2 21.0* 22.2 20.1*  --  17.6* 19.4*   ANION GAP 13.0 14.8 10.9  --  18.4* 16.6*   BUN 24* 20 16  --  13 11   CREATININE 0.66 0.62 0.65  --  0.82 0.66   EGFR 109.0 110.7 109.4  --  88.9 109.0   GLUCOSE 198* 188* 185*  --  297* 126*   CALCIUM 9.6 9.5 9.1  --  9.4 10.2   MAGNESIUM 2.3 2.4 2.4 2.5 2.7*  --    PHOSPHORUS 3.9 3.4 2.8 2.2* 2.6  --    HEMOGLOBIN A1C  --   --   --   --  7.00*  --          Lab 06/08/22  0442 06/07/22  0523 06/04/22  1836   TOTAL PROTEIN 6.3 6.8 7.8   ALBUMIN 3.70 3.90 4.40   GLOBULIN  --   --  3.4   ALT (SGPT) 16 21 21   AST (SGOT) 12 15 14   BILIRUBIN <0.2 0.2 0.2   BILIRUBIN DIRECT <0.2 <0.2  --    ALK PHOS 56 63 76         Lab 06/04/22  1836   PROBNP 123.3                 Lab 06/06/22  1127   PH, ARTERIAL 7.401   PCO2, ARTERIAL 35.4   PO2 ART 88.7   O2 SATURATION ART 96.3   FIO2 28   HCO3 ART 21.5*   BASE EXCESS ART -2.7*   CARBOXYHEMOGLOBIN 0.3     Brief Urine Lab Results  (Last result in the past 365 days)      Color   Clarity   Blood   Leuk Est   Nitrite   Protein   CREAT   Urine HCG        06/04/22 2158 Yellow   Clear   Negative   Negative   Negative   Negative               Microbiology Results (last 10 days)     Procedure Component Value - Date/Time    S. Pneumo Ag Urine or CSF - Urine, Urine, Clean Catch [[id number]]  (Normal) Collected: 06/04/22 2158    Lab Status: Final result Specimen: Urine, Clean Catch Updated: 06/05/22 0836     Strep Pneumo Ag Negative    Legionella Antigen, Urine - Urine, Urine, Clean Catch [[id number]]  (Normal) Collected: 06/04/22 2158    Lab Status: Final result Specimen: Urine, Clean Catch Updated: 06/05/22 0836     LEGIONELLA ANTIGEN, URINE Negative    Influenza Antigen, Rapid - Swab, Nasopharynx [[id number]]  (Normal) Collected: 06/04/22 2016    Lab Status: Final result Specimen: Swab from Nasopharynx Updated: 06/04/22 2046     Influenza A Ag, EIA Negative      Influenza B Ag, EIA Negative    COVID-19,APTIMA PANTHER(ALVINO),[facility], NP/OP SWAB IN UTM/VTM/SALINE TRANSPORT MEDIA,24 HR TAT - Swab, Nasopharynx [[id number]]  (Normal) Collected: 06/04/22 2016    Lab Status: Final result Specimen: Swab from Nasopharynx Updated: 06/05/22 0127     COVID19 Not Detected    Narrative:      Fact sheet for providers: https://www.fda.gov/media/751586/download     Fact sheet for patients: https://www.fda.gov/media/348805/download    Test performed by RT PCR.    Blood Culture - Blood, Arm, Left [[id number]]  (Normal) Collected: 06/04/22 1836    Lab Status: Final result Specimen: Blood from Arm, Left Updated: 06/09/22 1903     Blood Culture No growth at 5 days    Blood Culture - Blood, Arm, Left [[id number]]  (Normal) Collected: 06/04/22 1836    Lab Status: Final result Specimen: Blood from Arm, Left Updated: 06/09/22 1903     Blood Culture No growth at 5 days          CT Chest With Contrast Diagnostic    Result Date: 6/4/2022  Impression:  No pulmonary embolism.  No acute infiltrate.  There is a small hiatal hernia, seen previously.     COMMENT:  Part of this note is an electronic transcription of spoken language to printed text. The electronic translation/transcription may permit erroneous, or at times, nonsensical (or even sensical) words or phrases to be inadvertently transcribed or omitted; this  has reviewed the note for such errors (as well as additional errors); however, some may still exist.  ROXANNA ADORNO JR, MD       Electronically Signed and Approved By: ROXANNA ADORNO JR, MD on 6/04/2022 at 22:05             XR Chest 1 View    Result Date: 6/8/2022  Impression:   Negative.  No evidence of acute cardiopulmonary disease.  Stable appearance of the chest over the past 4 days      GRETCHEN SANDERSON MD       Electronically Signed and Approved By: GRETCHEN SANDERSON MD on 6/08/2022 at 12:54             XR Chest 1 View    Result Date: 6/4/2022  Impression:   No acute infiltrate is  appreciated.      COMMENT:  Part of this note is an electronic transcription of spoken language to printed text. The electronic translation/transcription may permit erroneous, or at times, nonsensical (or even sensical) words or phrases to be inadvertently transcribed or omitted; this  has reviewed the note for such errors (as well as additional errors); however, some may still exist.  ROXANNA ADORNO JR, MD       Electronically Signed and Approved By: ROXANNA ADORNO JR, MD on 6/04/2022 at 19:04                            Labs Pending at Discharge:  Pending Labs     Order Current Status    Respiratory Infection Panel A In process            Time spent on Discharge including face to face service: Greater than 35 minutes    Electronically signed by Dave Nichols MD, 06/10/22, 1:47 PM EDT.

## 2022-06-10 NOTE — PROGRESS NOTES
Reason for consultation asthma  Subjective  Much better        Review of Systems   Constitutional: Positive for activity change and fatigue.   HENT: Positive for congestion, postnasal drip and rhinorrhea.    Eyes: Negative.    Respiratory: Positive for cough, chest tightness, shortness of breath and wheezing.    Cardiovascular: Negative.    Gastrointestinal: Negative.    Endocrine: Negative.    Genitourinary: Negative.    Musculoskeletal: Negative.      Vitals:    06/10/22 0759 06/10/22 1130 06/10/22 1210 06/10/22 1434   BP: 135/71  159/84    BP Location: Right arm  Right arm    Patient Position: Lying  Lying    Pulse: 72 117 78 103   Resp: 18 22 22 20   Temp: 97.3 °F (36.3 °C)  97.3 °F (36.3 °C)    TempSrc: Oral  Oral    SpO2: 98% 94% 97% 97%   Weight:       Height:           Vital Signs Reviewed  General WDWN, Alert, NAD.    HEENT:  PERRL, EOMI.  OP, nares clear, no sinus tenderness  Neck:  Supple, no JVD, no thyromegaly  Chest: Patient with significantly improved respiratory exam from yesterday now only with very end expiratory wheezes   CV: RRR, no MGR, pulses 2+, equal.  Abd:  Soft, NT, ND, + BS, no HSM  Neuro:  A&Ox3, CN grossly intact, no focal deficits.  Skin: No rashes or lesions noted         Assessment  Acute asthma exacerbation with status asthmaticus  Elevated lactate-?  If this is due to all the bronchodilator therapies that she is getting  Obesity with body mass index of 51.1  Obstructive sleep apnea    Plan  Doing well    Okay from my standpoint to discharge    Will discharge on Symbicort 160/4.52 puffs twice daily    Albuterol    Singulair    Needs follow-up in our office.tyrell

## 2022-06-10 NOTE — PLAN OF CARE
Problem: Adult Inpatient Plan of Care  Goal: Plan of Care Review  Outcome: Ongoing, Progressing  Flowsheets  Taken 6/10/2022 0639 by Thania Soto RN  Progress: no change  Outcome Evaluation: pt walked around the floor tonight with no difficulty, vss, pt c/o pain in legs and given prn med(see mar), will continue to monitor.  Taken 6/8/2022 1100 by Sregio Ramos PT  Plan of Care Reviewed With: patient  Goal: Patient-Specific Goal (Individualized)  Outcome: Ongoing, Progressing  Goal: Absence of Hospital-Acquired Illness or Injury  Outcome: Ongoing, Progressing  Intervention: Identify and Manage Fall Risk  Recent Flowsheet Documentation  Taken 6/10/2022 0600 by Thania Soto RN  Safety Promotion/Fall Prevention: safety round/check completed  Taken 6/10/2022 0400 by Thania Soto RN  Safety Promotion/Fall Prevention: safety round/check completed  Taken 6/10/2022 0000 by Thania Soto RN  Safety Promotion/Fall Prevention: safety round/check completed  Taken 6/9/2022 2200 by Thania Soto RN  Safety Promotion/Fall Prevention: safety round/check completed  Taken 6/9/2022 2000 by Thania Soto RN  Safety Promotion/Fall Prevention: safety round/check completed  Intervention: Prevent Skin Injury  Recent Flowsheet Documentation  Taken 6/9/2022 2000 by Thania Soto RN  Body Position: position changed independently  Intervention: Prevent and Manage VTE (Venous Thromboembolism) Risk  Recent Flowsheet Documentation  Taken 6/9/2022 2000 by Thania Soto RN  Activity Management: up ad josefa  VTE Prevention/Management: (lovenox) other (see comments)  Intervention: Prevent Infection  Recent Flowsheet Documentation  Taken 6/9/2022 2000 by Thania Soto RN  Infection Prevention:   single patient room provided   personal protective equipment utilized   rest/sleep promoted   hand hygiene promoted   equipment surfaces disinfected  Goal: Optimal Comfort and Wellbeing  Outcome: Ongoing,  Progressing  Intervention: Provide Person-Centered Care  Recent Flowsheet Documentation  Taken 6/9/2022 2000 by Thania Soto RN  Trust Relationship/Rapport:   care explained   choices provided   emotional support provided   empathic listening provided   questions answered   questions encouraged   reassurance provided   thoughts/feelings acknowledged  Goal: Readiness for Transition of Care  Outcome: Ongoing, Progressing     Problem: Asthma Comorbidity  Goal: Maintenance of Asthma Control  Outcome: Ongoing, Progressing  Intervention: Maintain Asthma Symptom Control  Recent Flowsheet Documentation  Taken 6/9/2022 2000 by Thania Soto RN  Medication Review/Management: medications reviewed     Problem: Behavioral Health Comorbidity  Goal: Maintenance of Behavioral Health Symptom Control  Outcome: Ongoing, Progressing  Intervention: Maintain Behavioral Health Symptom Control  Recent Flowsheet Documentation  Taken 6/9/2022 2000 by Thania Soto RN  Medication Review/Management: medications reviewed     Problem: Hypertension Comorbidity  Goal: Blood Pressure in Desired Range  Outcome: Ongoing, Progressing  Intervention: Maintain Blood Pressure Management  Recent Flowsheet Documentation  Taken 6/9/2022 2000 by Thania Soto RN  Medication Review/Management: medications reviewed     Problem: Obstructive Sleep Apnea Risk or Actual Comorbidity Management  Goal: Unobstructed Breathing During Sleep  Outcome: Ongoing, Progressing   Goal Outcome Evaluation:           Progress: no change  Outcome Evaluation: pt walked around the floor tonight with no difficulty, vss, pt c/o pain in legs and given prn med(see mar), will continue to monitor.

## 2022-06-11 NOTE — OUTREACH NOTE
Prep Survey    Flowsheet Row Responses   [facility] facility patient discharged from? [facility]   Is LACE score < 7 ? No   Emergency Room discharge w/ pulse ox? No   Eligibility [facility]   Date of Admission 06/04/22   Date of Discharge 06/10/22   Discharge Disposition Home or Self Care   Discharge diagnosis Asthma exac   Does the patient have one of the following disease processes/diagnoses(primary or secondary)? Other   Does the patient have Home health ordered? No   Is there a DME ordered? No   Prep survey completed? Yes          TOBIAS A - Registered Nurse

## 2022-06-12 RX ORDER — IPRATROPIUM BROMIDE AND ALBUTEROL SULFATE 2.5; .5 MG/3ML; MG/3ML
3 SOLUTION RESPIRATORY (INHALATION) EVERY 4 HOURS PRN
Qty: 360 ML | Refills: 0 | Status: SHIPPED | OUTPATIENT
Start: 2022-06-12 | End: 2022-08-19

## 2022-06-13 ENCOUNTER — TRANSITIONAL CARE MANAGEMENT TELEPHONE ENCOUNTER (OUTPATIENT)
Dept: CALL CENTER | Facility: HOSPITAL | Age: 47
End: 2022-06-13

## 2022-06-13 LAB
CHLAMYDIA IGG SER-ACNC: NORMAL
HADV AB TITR SER CF: NEGATIVE {TITER}
L PNEUMO AB SER IA-ACNC: NORMAL
M PNEUMO IGG SER IA-ACNC: <100 U/ML (ref 0–99)
M PNEUMO IGM SER IA-ACNC: <770 U/ML (ref 0–769)

## 2022-06-13 NOTE — OUTREACH NOTE
Call Center TCM Note    Flowsheet Row Responses   [facility] patient discharged from? [facility]   Does the patient have one of the following disease processes/diagnoses(primary or secondary)? Other   TCM attempt successful? No   Unsuccessful attempts Attempt 2          Misa Wilcox LPN    6/13/2022, 14:41 EDT

## 2022-06-13 NOTE — OUTREACH NOTE
Call Center TCM Note    Flowsheet Row Responses   [facility] patient discharged from? [facility]   Does the patient have one of the following disease processes/diagnoses(primary or secondary)? Other   TCM attempt successful? No   Unsuccessful attempts Attempt 1   Call Status Left message          Misa Wilcox LPN    6/13/2022, 13:26 EDT

## 2022-06-14 ENCOUNTER — TRANSITIONAL CARE MANAGEMENT TELEPHONE ENCOUNTER (OUTPATIENT)
Dept: CALL CENTER | Facility: HOSPITAL | Age: 47
End: 2022-06-14

## 2022-06-14 NOTE — OUTREACH NOTE
Call Center TCM Note    Flowsheet Row Responses   [facility] patient discharged from? [facility]   Does the patient have one of the following disease processes/diagnoses(primary or secondary)? Other   TCM attempt successful? Yes  [verbal release for Enrique,spouse]   Call start time 0850   Call end time 0856   Discharge diagnosis Asthma exac   Meds reviewed with patient/caregiver? Yes   Is the patient having any side effects they believe may be caused by any medication additions or changes? No   Does the patient have all medications ordered at discharge? Yes   Is the patient taking all medications as directed (includes completed medication regime)? Yes   Comments regarding appointments Pulmonary 6/17/22@1000am   Does the patient have a primary care provider?  Yes   Does the patient have an appointment with their PCP within 7 days of discharge? Yes   Comments regarding PCP Hospital PCP FOLLOW UP APPOINTMENT IS 6/17/22@230pm   Has the patient kept scheduled appointments due by today? N/A   Has home health visited the patient within 72 hours of discharge? N/A   Psychosocial issues? No   Did the patient receive a copy of their discharge instructions? Yes   Nursing interventions Reviewed instructions with patient   What is the patient's perception of their health status since discharge? 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 Using her breathing treatments q4h as ordered for wheezing, shortness of air.  Aware of return precautions.]   Is the patient/caregiver able to teach back signs and symptoms related to disease process for when to call PCP? Yes   Is the patient/caregiver able to teach back signs and symptoms related to disease process for when to call 911? Yes   TCM call completed? Yes          Nadya Lo RN    6/14/2022, 08:59 EDT

## 2022-06-17 ENCOUNTER — TELEPHONE (OUTPATIENT)
Dept: FAMILY MEDICINE CLINIC | Age: 47
End: 2022-06-17

## 2022-06-17 ENCOUNTER — OFFICE VISIT (OUTPATIENT)
Dept: PULMONOLOGY | Facility: CLINIC | Age: 47
End: 2022-06-17

## 2022-06-17 ENCOUNTER — OFFICE VISIT (OUTPATIENT)
Dept: FAMILY MEDICINE CLINIC | Age: 47
End: 2022-06-17

## 2022-06-17 ENCOUNTER — LAB (OUTPATIENT)
Dept: LAB | Facility: HOSPITAL | Age: 47
End: 2022-06-17

## 2022-06-17 VITALS
BODY MASS INDEX: 50.02 KG/M2 | HEART RATE: 103 BPM | WEIGHT: 293 LBS | SYSTOLIC BLOOD PRESSURE: 145 MMHG | DIASTOLIC BLOOD PRESSURE: 87 MMHG | HEIGHT: 64 IN | RESPIRATION RATE: 16 BRPM | OXYGEN SATURATION: 98 % | TEMPERATURE: 99.6 F

## 2022-06-17 VITALS
HEART RATE: 82 BPM | DIASTOLIC BLOOD PRESSURE: 85 MMHG | OXYGEN SATURATION: 96 % | SYSTOLIC BLOOD PRESSURE: 135 MMHG | TEMPERATURE: 98.4 F | BODY MASS INDEX: 50.02 KG/M2 | WEIGHT: 293 LBS | HEIGHT: 64 IN

## 2022-06-17 DIAGNOSIS — E66.01 CLASS 3 SEVERE OBESITY WITH SERIOUS COMORBIDITY AND BODY MASS INDEX (BMI) OF 50.0 TO 59.9 IN ADULT, UNSPECIFIED OBESITY TYPE: ICD-10-CM

## 2022-06-17 DIAGNOSIS — F41.9 ANXIETY: ICD-10-CM

## 2022-06-17 DIAGNOSIS — Z13.29 THYROID DISORDER SCREEN: ICD-10-CM

## 2022-06-17 DIAGNOSIS — R06.09 DYSPNEA ON EXERTION: ICD-10-CM

## 2022-06-17 DIAGNOSIS — D72.829 LEUKOCYTOSIS, UNSPECIFIED TYPE: ICD-10-CM

## 2022-06-17 DIAGNOSIS — I10 ESSENTIAL (PRIMARY) HYPERTENSION: Primary | ICD-10-CM

## 2022-06-17 DIAGNOSIS — L85.8 KERATOSIS PILARIS: ICD-10-CM

## 2022-06-17 DIAGNOSIS — J30.9 ALLERGIC RHINITIS, UNSPECIFIED SEASONALITY, UNSPECIFIED TRIGGER: ICD-10-CM

## 2022-06-17 DIAGNOSIS — R73.9 HYPERGLYCEMIA: ICD-10-CM

## 2022-06-17 DIAGNOSIS — F34.9 PERSISTENT MOOD (AFFECTIVE) DISORDER, UNSPECIFIED: ICD-10-CM

## 2022-06-17 DIAGNOSIS — D72.829 LEUKOCYTOSIS, UNSPECIFIED TYPE: Primary | ICD-10-CM

## 2022-06-17 DIAGNOSIS — J45.51 SEVERE PERSISTENT ASTHMA WITH EXACERBATION: Primary | ICD-10-CM

## 2022-06-17 DIAGNOSIS — I10 ESSENTIAL (PRIMARY) HYPERTENSION: ICD-10-CM

## 2022-06-17 DIAGNOSIS — E55.9 VITAMIN D DEFICIENCY: ICD-10-CM

## 2022-06-17 PROBLEM — R05.9 COUGH: Status: RESOLVED | Noted: 2022-04-25 | Resolved: 2022-06-17

## 2022-06-17 PROBLEM — E66.813 CLASS 3 SEVERE OBESITY WITH SERIOUS COMORBIDITY AND BODY MASS INDEX (BMI) OF 50.0 TO 59.9 IN ADULT: Status: ACTIVE | Noted: 2022-06-17

## 2022-06-17 LAB
ALBUMIN SERPL-MCNC: 4.3 G/DL (ref 3.5–5.2)
ALBUMIN/GLOB SERPL: 1.9 G/DL
ALP SERPL-CCNC: 57 U/L (ref 39–117)
ALT SERPL W P-5'-P-CCNC: 24 U/L (ref 1–33)
ANION GAP SERPL CALCULATED.3IONS-SCNC: 16.2 MMOL/L (ref 5–15)
AST SERPL-CCNC: 18 U/L (ref 1–32)
BASOPHILS # BLD AUTO: 0.11 10*3/MM3 (ref 0–0.2)
BASOPHILS NFR BLD AUTO: 0.5 % (ref 0–1.5)
BILIRUB SERPL-MCNC: 0.2 MG/DL (ref 0–1.2)
BUN SERPL-MCNC: 16 MG/DL (ref 6–20)
BUN/CREAT SERPL: 21.9 (ref 7–25)
CALCIUM SPEC-SCNC: 10 MG/DL (ref 8.6–10.5)
CHLORIDE SERPL-SCNC: 96 MMOL/L (ref 98–107)
CHOLEST SERPL-MCNC: 255 MG/DL (ref 0–200)
CO2 SERPL-SCNC: 21.8 MMOL/L (ref 22–29)
CREAT SERPL-MCNC: 0.73 MG/DL (ref 0.57–1)
DEPRECATED RDW RBC AUTO: 52.5 FL (ref 37–54)
EGFRCR SERPLBLD CKD-EPI 2021: 102.2 ML/MIN/1.73
EOSINOPHIL # BLD AUTO: 0.17 10*3/MM3 (ref 0–0.4)
EOSINOPHIL NFR BLD AUTO: 0.7 % (ref 0.3–6.2)
ERYTHROCYTE [DISTWIDTH] IN BLOOD BY AUTOMATED COUNT: 16.9 % (ref 12.3–15.4)
GLOBULIN UR ELPH-MCNC: 2.3 GM/DL
GLUCOSE SERPL-MCNC: 165 MG/DL (ref 65–99)
HCT VFR BLD AUTO: 40.3 % (ref 34–46.6)
HDLC SERPL-MCNC: 73 MG/DL (ref 40–60)
HGB BLD-MCNC: 13 G/DL (ref 12–15.9)
IMM GRANULOCYTES # BLD AUTO: 0.73 10*3/MM3 (ref 0–0.05)
IMM GRANULOCYTES NFR BLD AUTO: 3.2 % (ref 0–0.5)
LDLC SERPL CALC-MCNC: 138 MG/DL (ref 0–100)
LDLC/HDLC SERPL: 1.81 {RATIO}
LYMPHOCYTES # BLD AUTO: 2.44 10*3/MM3 (ref 0.7–3.1)
LYMPHOCYTES NFR BLD AUTO: 10.7 % (ref 19.6–45.3)
MCH RBC QN AUTO: 27.5 PG (ref 26.6–33)
MCHC RBC AUTO-ENTMCNC: 32.3 G/DL (ref 31.5–35.7)
MCV RBC AUTO: 85.4 FL (ref 79–97)
MONOCYTES # BLD AUTO: 1.24 10*3/MM3 (ref 0.1–0.9)
MONOCYTES NFR BLD AUTO: 5.5 % (ref 5–12)
NEUTROPHILS NFR BLD AUTO: 18.05 10*3/MM3 (ref 1.7–7)
NEUTROPHILS NFR BLD AUTO: 79.4 % (ref 42.7–76)
NRBC BLD AUTO-RTO: 0 /100 WBC (ref 0–0.2)
PLAT MORPH BLD: NORMAL
PLATELET # BLD AUTO: 402 10*3/MM3 (ref 140–450)
PMV BLD AUTO: 9.7 FL (ref 6–12)
POTASSIUM SERPL-SCNC: 4.5 MMOL/L (ref 3.5–5.2)
PROT SERPL-MCNC: 6.6 G/DL (ref 6–8.5)
RBC # BLD AUTO: 4.72 10*6/MM3 (ref 3.77–5.28)
RBC MORPH BLD: NORMAL
SODIUM SERPL-SCNC: 134 MMOL/L (ref 136–145)
TRIGL SERPL-MCNC: 249 MG/DL (ref 0–150)
TSH SERPL DL<=0.05 MIU/L-ACNC: 1.58 UIU/ML (ref 0.27–4.2)
VLDLC SERPL-MCNC: 44 MG/DL (ref 5–40)
WBC MORPH BLD: NORMAL
WBC NRBC COR # BLD: 22.74 10*3/MM3 (ref 3.4–10.8)

## 2022-06-17 PROCEDURE — 99213 OFFICE O/P EST LOW 20 MIN: CPT | Performed by: NURSE PRACTITIONER

## 2022-06-17 PROCEDURE — 85007 BL SMEAR W/DIFF WBC COUNT: CPT

## 2022-06-17 PROCEDURE — 36415 COLL VENOUS BLD VENIPUNCTURE: CPT

## 2022-06-17 PROCEDURE — 80050 GENERAL HEALTH PANEL: CPT

## 2022-06-17 PROCEDURE — 80061 LIPID PANEL: CPT | Performed by: NURSE PRACTITIONER

## 2022-06-17 PROCEDURE — 82306 VITAMIN D 25 HYDROXY: CPT

## 2022-06-17 PROCEDURE — 99495 TRANSJ CARE MGMT MOD F2F 14D: CPT | Performed by: NURSE PRACTITIONER

## 2022-06-17 PROCEDURE — 95115 IMMUNOTHERAPY ONE INJECTION: CPT | Performed by: NURSE PRACTITIONER

## 2022-06-17 RX ORDER — BUDESONIDE AND FORMOTEROL FUMARATE DIHYDRATE 160; 4.5 UG/1; UG/1
2 AEROSOL RESPIRATORY (INHALATION)
Qty: 6 G | Refills: 5 | Status: SHIPPED | OUTPATIENT
Start: 2022-06-17

## 2022-06-17 RX ORDER — BLOOD-GLUCOSE METER
1 EACH MISCELLANEOUS DAILY
Qty: 1 EACH | Refills: 0 | Status: SHIPPED | OUTPATIENT
Start: 2022-06-17

## 2022-06-17 RX ORDER — BUPROPION HYDROCHLORIDE 150 MG/1
150 TABLET ORAL DAILY
Qty: 90 TABLET | Refills: 1 | Status: SHIPPED | OUTPATIENT
Start: 2022-06-17 | End: 2023-02-02 | Stop reason: SDUPTHER

## 2022-06-17 RX ORDER — METFORMIN HYDROCHLORIDE 500 MG/1
500 TABLET, EXTENDED RELEASE ORAL 2 TIMES DAILY WITH MEALS
Qty: 60 TABLET | Refills: 1 | Status: SHIPPED | OUTPATIENT
Start: 2022-06-17 | End: 2022-07-20 | Stop reason: SINTOL

## 2022-06-17 RX ORDER — LISINOPRIL 30 MG/1
30 TABLET ORAL DAILY
Qty: 90 TABLET | Refills: 1 | Status: SHIPPED | OUTPATIENT
Start: 2022-06-17 | End: 2023-02-02 | Stop reason: DRUGHIGH

## 2022-06-17 RX ORDER — ALBUTEROL SULFATE 90 UG/1
2 AEROSOL, METERED RESPIRATORY (INHALATION) EVERY 4 HOURS PRN
Qty: 8 G | Refills: 5 | Status: SHIPPED | OUTPATIENT
Start: 2022-06-17

## 2022-06-17 RX ORDER — ESCITALOPRAM OXALATE 20 MG/1
30 TABLET ORAL DAILY
Qty: 135 TABLET | Refills: 1 | Status: SHIPPED | OUTPATIENT
Start: 2022-06-17 | End: 2023-02-02 | Stop reason: SDUPTHER

## 2022-06-17 RX ORDER — MONTELUKAST SODIUM 10 MG/1
10 TABLET ORAL DAILY
Qty: 90 TABLET | Refills: 1 | Status: SHIPPED | OUTPATIENT
Start: 2022-06-17 | End: 2023-02-02 | Stop reason: SDUPTHER

## 2022-06-17 NOTE — TELEPHONE ENCOUNTER
Lab called with a preliminary critical result on pt wbc 22.34 Sunita chen           Please call patient and advise of elevated white blood cell count.  Reading on day of discharge from the hospital on Katya 10 was 15.7.  It is possible that steroids are causing her elevated white blood cell count.  However if she feels as if her symptoms are worsening or if she develops fever or any other concerning symptoms I would recommend she go to the ER for further evaluation this weekend.  Otherwise repeat CBC with differential on Monday.

## 2022-06-17 NOTE — PROGRESS NOTES
"Chief Complaint  Hospital Follow Up Visit, Anxiety (Follow up ), Hypertension, and Vitamin D Deficiency    Subjective  Patient is a 47-year-old female who is here today to follow-up after recent hospitalization for acute asthma exacerbation, acute hypoxemia, lactic acidosis, elevated A1c of 7% with hyperglycemia likely reactive to steroids.  She was at [facility] from June 4 through Katya 10.  Blood and urine cultures were negative.  States her respiratory status is better but she is having fatigue and loose stools on metformin 500 mg twice daily.  She was discharged on Symbicort albuterol and a prolonged prednisone taper.  She saw pulmonology this morning.    Regarding high blood sugar and obesity she would like to see a dietitian and she is interested in any medication that could help her manage blood sugars.        Karey Lopez presents to [facility]          Objective   Vital Signs:   Vitals:    06/17/22 1422   BP: 135/85   BP Location: Right arm   Patient Position: Sitting   Cuff Size: Large Adult   Pulse: 82   Temp: 98.4 °F (36.9 °C)   TempSrc: Oral   SpO2: 96%   Weight: (!) 137 kg (301 lb)   Height: 162.6 cm (64\")      Body mass index is 51.67 kg/m².  Physical Exam  Vitals reviewed.   Constitutional:       General: She is not in acute distress.     Appearance: Normal appearance. She is well-developed.   HENT:      Right Ear: A middle ear effusion is present.      Left Ear: A middle ear effusion is present.      Mouth/Throat:      Mouth: Mucous membranes are moist.      Pharynx: Oropharynx is clear. No oropharyngeal exudate or posterior oropharyngeal erythema.   Cardiovascular:      Rate and Rhythm: Normal rate and regular rhythm.      Pulses:           Posterior tibial pulses are 2+ on the right side and 2+ on the left side.      Heart sounds: Normal heart sounds.   Pulmonary:      Effort: Pulmonary effort is normal.      Breath sounds: Normal breath sounds. "   Musculoskeletal:      Right lower leg: No edema.      Left lower leg: No edema.   Skin:     General: Skin is warm and dry.      Comments: Keratosis Polarus both lower extremities   Neurological:      General: No focal deficit present.      Mental Status: She is alert.   Psychiatric:         Attention and Perception: Attention normal.         Mood and Affect: Mood and affect normal.         Behavior: Behavior normal.          Result Review :     CMP    CMP 6/7/22 6/7/22 6/8/22 6/8/22 6/17/22    0523 0523 0442 [id number]    Glucose 188 (A)   198 (A) 165 (A)   BUN 20   24 (A) 16   Creatinine 0.62   0.66 0.73   Sodium 137   134 (A) 134 (A)   Potassium 4.6   4.5 4.5   Chloride 100   100 96 (A)   Calcium 9.5   9.6 10.0   Albumin  3.90 3.70  4.30   Total Bilirubin  0.2 <0.2  0.2   Alkaline Phosphatase  63 56  57   AST (SGOT)  15 12  18   ALT (SGPT)  21 16  24   (A) Abnormal value       Comments are available for some flowsheets but are not being displayed.           CBC    CBC 6/8/22 6/17/22 6/20/22   WBC 15.17 (A) 22.74 (A) 14.65 (A)   RBC 4.28 4.72 4.50   Hemoglobin 11.4 (A) 13.0 12.2   Hematocrit 36.4 40.3 39.2   MCV 85.0 85.4 87.1   MCH 26.6 27.5 27.1   MCHC 31.3 (A) 32.3 31.1 (A)   RDW 16.6 (A) 16.9 (A) 16.8 (A)   Platelets 369 402 338   (A) Abnormal value            CBC w/diff    CBC w/Diff 6/6/22 6/7/22 6/8/22   WBC 15.48 (A) 17.05 (A) 15.17 (A)   RBC 4.24 4.36 4.28   Hemoglobin 11.4 (A) 11.7 (A) 11.4 (A)   Hematocrit 37.0 37.3 36.4   MCV 87.3 85.6 85.0   MCH 26.9 26.8 26.6   MCHC 30.8 (A) 31.4 (A) 31.3 (A)   RDW 16.9 (A) 16.5 (A) 16.6 (A)   Platelets 376 408 369   Neutrophil Rel %   75.6   Immature Granulocyte Rel %   6.1 (A)   Lymphocyte Rel %   11.5 (A)   Monocyte Rel %   6.1   Eosinophil Rel %   0.0 (A)   Basophil Rel %   0.7   (A) Abnormal value            Lipid Panel    Lipid Panel 6/17/22   Total Cholesterol 255 (A)   Triglycerides 249 (A)   HDL Cholesterol 73 (A)   VLDL Cholesterol 44 (A)   LDL Cholesterol  138  (A)   LDL/HDL Ratio 1.81   (A) Abnormal value            TSH    TSH 6/17/22   TSH 1.580           Most Recent A1C    HGBA1C Most Recent 6/5/22   Hemoglobin A1C 7.00 (A)   (A) Abnormal value                     Assessment and Plan    Diagnoses and all orders for this visit:    1. Essential (primary) hypertension (Primary)  -     lisinopril (PRINIVIL,ZESTRIL) 30 MG tablet; Take 1 tablet by mouth Daily.  Dispense: 90 tablet; Refill: 1    2. Persistent mood (affective) disorder, unspecified (HCC)  -     escitalopram (LEXAPRO) 20 MG tablet; Take 1.5 tablets by mouth Daily.  Dispense: 135 tablet; Refill: 1  -     buPROPion XL (WELLBUTRIN XL) 150 MG 24 hr tablet; Take 1 tablet by mouth Daily for 60 days.  Dispense: 90 tablet; Refill: 1    3. Leukocytosis, unspecified type  -     CBC w AUTO Differential; Future    4. Class 3 severe obesity with serious comorbidity and body mass index (BMI) of 50.0 to 59.9 in adult, unspecified obesity type (HCC)  -     Ambulatory Referral to Nutrition Services    5. Hyperglycemia  Assessment & Plan:  Clinically is not clear how much of this is due to steroids.  I explained that hemoglobin A1c is a 90-day blood sugar reading and does indicate that the higher blood sugar has been going on prior to her recent hospitalization and steroid treatment.  Metformin is reasonable to treat for prediabetes or official diabetes.  Will change from regular release to extended release to see if it will be better tolerated and she will be referred to a dietitian to help implement lifestyle changes of diet and exercise.    Orders:  -     metFORMIN ER (GLUCOPHAGE-XR) 500 MG 24 hr tablet; Take 1 tablet by mouth 2 (Two) Times a Day With Meals for 180 days.  Dispense: 60 tablet; Refill: 1  -     Ambulatory Referral to Nutrition Services  -     Glucose, Random; Future  -     Blood Glucose Monitoring Suppl (ONE TOUCH ULTRA 2) w/Device kit; 1 each Daily.  Dispense: 1 each; Refill: 0    6. Thyroid disorder screen  -      TSH; Future    7. Anxiety  -     escitalopram (LEXAPRO) 20 MG tablet; Take 1.5 tablets by mouth Daily.  Dispense: 135 tablet; Refill: 1  -     buPROPion XL (WELLBUTRIN XL) 150 MG 24 hr tablet; Take 1 tablet by mouth Daily for 60 days.  Dispense: 90 tablet; Refill: 1    8. Allergic rhinitis, unspecified seasonality, unspecified trigger  -     montelukast (SINGULAIR) 10 MG tablet; Take 1 tablet by mouth Daily.  Dispense: 90 tablet; Refill: 1  -     Allergy Serum Injection    9. Keratosis pilaris  Assessment & Plan:  [facility] handout on keratosis Pilaris is mailed to patient after visit today.        Follow Up    No follow-ups on file.  Patient was given instructions and counseling regarding her condition or for health maintenance advice. Please see specific information pulled into the AVS if appropriate.

## 2022-06-17 NOTE — PROGRESS NOTES
Primary Care Provider  Sunita Hylton APRN   Referring Provider  No ref. provider found    Patient Complaint  Hospital Follow Up Visit (Acute asthma exacerbation ) and Shortness of Breath (With ambulation )      SUBJECTIVE    History of Presenting Illness  Karey Lopez is a pleasant 47 y.o. female who presents to [facility] for hospital follow-up.  Patient was in [facility] 6/4 through 6/10/2022 for acute asthma exacerbation.  Infectious work-up was negative.  Patient was discharged home on Symbicort, albuterol, prolonged prednisone taper.  Patient is here with her  today.  Patient states she is doing very well since her hospitalization.    Patient does have some shortness of air with exertion of moderate severity but at present time does not have any coughing, wheezing, headaches, chest pain, weight loss or hemoptysis. Denies fevers, chills and night sweats. Karey Lopez is able to perform ADLs without difficulties and denies any swollen glands/lymph nodes in the head or neck.    I have personally reviewed the review of systems, past family, social, medical and surgical histories; and agree with their findings.    Review of Systems   Constitutional: Negative.    HENT: Negative.    Eyes: Negative.    Respiratory: Positive for shortness of breath. Negative for cough and wheezing.    Cardiovascular: Negative.  Negative for chest pain, palpitations and leg swelling.   Musculoskeletal: Negative.    Skin: Negative.         Family History   Problem Relation Age of Onset   • Asthma Mother    • Heart disease Mother    • Coronary artery disease Mother    • Diabetes type II Mother    • Stroke Father    • Obesity Brother    • Heart attack Brother         MI   • Cervical cancer Maternal Grandmother    • Lung cancer Maternal Grandfather    • Stroke Paternal Grandmother    • Malig Hyperthermia Neg Hx         Social History     Socioeconomic  History   • Marital status:    • Number of children: 2   Tobacco Use   • Smoking status: Never Smoker   • Smokeless tobacco: Never Used   Vaping Use   • Vaping Use: Never used   Substance and Sexual Activity   • Alcohol use: Never     Comment: just rarely   • Drug use: Never   • Sexual activity: Defer        Past Medical History:   Diagnosis Date   • Allergic rhinitis, unspecified    • Anxiety    • Arthritis    • Asthma    • Cough variant asthma    • Dorsalgia, unspecified    • Essential (primary) hypertension    • Family history of ischemic heart disease and other diseases of the circulatory system    • Hypertension    • Left lower quadrant pain    • Leucocytosis 2020    due to chronic inflammation per hematology   • Lichen sclerosus 2019   • Obesity, unspecified    • Other fatigue    • Persistent mood (affective) disorder, unspecified (HCC)    • PONV (postoperative nausea and vomiting)    • Sepsis (HCC)     Right knee Sepsis   • Sleep apnea, unspecified    • Unspecified abdominal pain    • Unspecified ovarian cyst, left side    • Vitamin D deficiency, unspecified         Immunization History   Administered Date(s) Administered   • COVID-19 (PFIZER) PURPLE CAP 03/13/2021, 04/09/2021, 12/09/2021   • Flu Vaccine Split Quad 11/24/2014, 11/12/2015, 12/19/2016, 12/06/2017, 12/20/2019, 11/30/2020   • Fluzone Split Quad (Multi-dose) 01/08/2014   • Influenza Quad Vaccine (Inpatient) 01/08/2014   • Influenza TIV (IM) 10/23/2012, 10/24/2012   • Influenza, Unspecified 11/30/2020, 12/09/2021       Allergies   Allergen Reactions   • Vilazodone Hcl Mental Status Change          Current Outpatient Medications:   •  albuterol sulfate  (90 Base) MCG/ACT inhaler, Inhale 2 puffs., Disp: , Rfl:   •  budesonide-formoterol (Symbicort) 160-4.5 MCG/ACT inhaler, Inhale 2 puffs 2 (Two) Times a Day., Disp: 6 g, Rfl: 0  •  buPROPion XL (WELLBUTRIN XL) 150 MG 24 hr tablet, Take 1 tablet by mouth Daily for 60 days., Disp: 90  "tablet, Rfl: 1  •  cetirizine (zyrTEC) 10 MG tablet, Take 1 tablet by mouth Every Night for 30 days., Disp: 30 tablet, Rfl: 0  •  Ergocalciferol 50 MCG (2000 UT) capsule, Take 2,000 Units by mouth Daily., Disp: , Rfl:   •  escitalopram (LEXAPRO) 20 MG tablet, Take 1.5 tablets by mouth Daily., Disp: 135 tablet, Rfl: 1  •  fluticasone (FLONASE) 50 MCG/ACT nasal spray, 2 sprays by Each Nare route Daily for 30 days., Disp: 9.9 mL, Rfl: 0  •  ibuprofen (ADVIL,MOTRIN) 800 MG tablet, Take 1 tablet by mouth Every 6 (Six) Hours As Needed for Mild Pain ., Disp: 30 tablet, Rfl: 0  •  ipratropium-albuterol (DUO-NEB) 0.5-2.5 mg/3 ml nebulizer, Take 3 mL by nebulization Every 4 (Four) Hours As Needed for Wheezing for up to 30 days., Disp: 360 mL, Rfl: 0  •  lisinopril (PRINIVIL,ZESTRIL) 30 MG tablet, Take 1 tablet by mouth Daily., Disp: 90 tablet, Rfl: 1  •  metFORMIN (Glucophage) 500 MG tablet, Take 1 tablet by mouth 2 (Two) Times a Day With Meals for 30 days., Disp: 60 tablet, Rfl: 0  •  montelukast (SINGULAIR) 10 MG tablet, Take 1 tablet by mouth Daily., Disp: 90 tablet, Rfl: 1  •  predniSONE (DELTASONE) 10 MG tablet, Take 4 tablets by mouth Daily for 3 days, THEN 3 tablets Daily for 3 days, THEN 2 tablets Daily for 3 days, THEN 1 tablet Daily for 3 days., Disp: 30 tablet, Rfl: 0  •  tiZANidine (Zanaflex) 4 MG tablet, Take 1 tablet by mouth Every 6 (Six) Hours As Needed for Muscle Spasms., Disp: 30 tablet, Rfl: 0  •  guaiFENesin (MUCINEX) 600 MG 12 hr tablet, Take 2 tablets by mouth Every 12 (Twelve) Hours for 30 days., Disp: 120 tablet, Rfl: 0       OBJECTIVE    Vital Signs   /87 (BP Location: Left arm, Patient Position: Sitting, Cuff Size: Adult) Comment (BP Location): Forearm  Pulse 103   Temp 99.6 °F (37.6 °C) (Tympanic)   Resp 16   Ht 162.6 cm (64\")   Wt 134 kg (295 lb)   SpO2 98% Comment: Room air  BMI 50.64 kg/m²     Physical Exam  Vitals reviewed.   Constitutional:       Appearance: Normal appearance. She " is obese.   HENT:      Head: Normocephalic and atraumatic.      Nose: Nose normal.      Mouth/Throat:      Mouth: Mucous membranes are moist.      Pharynx: Oropharynx is clear.   Eyes:      Extraocular Movements: Extraocular movements intact.      Conjunctiva/sclera: Conjunctivae normal.      Pupils: Pupils are equal, round, and reactive to light.   Cardiovascular:      Rate and Rhythm: Normal rate and regular rhythm.      Pulses: Normal pulses.      Heart sounds: Normal heart sounds.   Pulmonary:      Effort: Pulmonary effort is normal. No respiratory distress.      Breath sounds: Normal breath sounds. No stridor. No wheezing, rhonchi or rales.   Abdominal:      General: Bowel sounds are normal.   Musculoskeletal:         General: Normal range of motion.      Cervical back: Normal range of motion and neck supple.   Skin:     General: Skin is warm and dry.   Neurological:      General: No focal deficit present.      Mental Status: She is alert and oriented to person, place, and time.   Psychiatric:         Mood and Affect: Mood normal.         Behavior: Behavior normal.       Results Review  I have personally reviewed the hospital records including diagnostic CT from 6/4/2022 and chest x-ray from 6/8/2022 with the following:  CT Chest With Contrast Diagnostic [[id number]] (Order [id number])  Order  Status: Final result     Appointment Information      PACS Images     Radiology Images    Study Result    Narrative & Impression   PROCEDURE:  CT CHEST W CONTRAST DIAGNOSTIC     COMPARISONS:           [facility], CR, XR CHEST 1 VW, 6/04/2022, 18:53.                 [facility], CT, ABD-PELVIS W/ CONTRAST, 5/20/2021, 10:27.     INDICATIONS:  SHORTNESS OF BREATH FOR 2-3 DAYS.     TECHNIQUE:    After obtaining the patient's consent, 911 CT/CTA images were obtained with non-ionic   intravenous contrast material.  There is slight motion artifact on the study.     PROTOCOL:     Standard imaging protocol  performed                 RADIATION:      DLP: 1,017.6 mGy*cm               Automated exposure control was utilized to minimize radiation dose.   CONTRAST:      100 mL Isovue 370 I.V.     FINDINGS:        No pulmonary embolism is seen.  The contrast bolus is somewhat limited in the pulmonary   arteries.  No acute infiltrate.  There may be mild subsegmental atelectasis in the lung bases.    Borderline cardiac enlargement is possible.  There is prominent pericardial fat.  No thoracic   aortic aneurysm or dissection.  There is diffuse hepatic steatosis with hepatomegaly.  No   splenomegaly is suspected.  No definite acute findings are seen in the partially imaged upper   abdomen.  No pneumothorax or pneumomediastinum is seen.  No pleural or pericardial effusion.  No   enlarged mediastinal lymph nodes are appreciated.  There are nonspecific small to moderate sized   mediastinal and hilar lymph nodes.  The central tracheobronchial tree is well aerated without   filling defect.  No suspicious pulmonary nodules are seen.  No acute fracture or aggressive osseous   lesion is suggested.  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  There are suspected mild degenerative changes of   the imaged spine.  There is a small hiatal hernia.  The gallbladder is not clearly identified.  It   may be surgically absent.  There is pulmonary hypoinflation.     IMPRESSION:               No pulmonary embolism.  No acute infiltrate.  There is a small hiatal hernia, seen   previously.             COMMENT:  Part of this note is an electronic transcription of spoken language to printed text. The   electronic translation/transcription may permit erroneous, or at times, nonsensical (or even   sensical) words or phrases to be inadvertently transcribed or omitted; this  has   reviewed the note for such errors (as well as additional errors); however, some may  still exist.     ROXANNA ADORNO JR, MD         Electronically Signed and Approved By: ROXANNA ADORNO JR, MD on 6/04/2022 at 22:05        Study Result    Narrative & Impression   PROCEDURE:  XR CHEST 1 VW     COMPARISON: [facility], CR, XR CHEST 1 VW, 6/04/2022, 18:53.     INDICATIONS:  Pulmonary edema     FINDINGS:          No evidence of pneumonia, pulmonary edema or other acute pulmonary process.  No pneumothorax or   pleural effusion.  No evidence of acute process of the cardiomediastinal structures.     IMPRESSION:                 Negative.  No evidence of acute cardiopulmonary disease.  Stable appearance of the chest over the   past 4 days         ASSESSMENT & PLAN    Patient Active Problem List   Diagnosis   • Allergic rhinitis   • Anxiety   • Chronic postoperative pain   • Sleep apnea, unspecified   • Essential (primary) hypertension   • Family history of ischemic heart disease and other diseases of the circulatory system   • BMI 45.0-49.9, adult ([facility])   • Persistent mood (affective) disorder, unspecified ([facility])   • Vitamin D deficiency   • Chronic left shoulder pain   • Neck pain   • Moderate persistent asthma without complication   • Cough   • Severe persistent asthma with exacerbation       Diagnoses and all orders for this visit:    1. Severe persistent asthma with exacerbation (Primary)    2. Dyspnea on exertion    Plan:  At this time patient to continue with her albuterol Symbicort and duo nebs.  Patient to complete her steroid taper she was sent home with at the hospital.  Recommend patient follow-up in 2 months or sooner if needed.  Refills done today on albuterol and Symbicort for patient with instruction to rinse her mouth out after each use.    Smoking status: Never  Vaccination status: Up-to-date with COVID and flu  Medications personally reviewed    Follow Up  Return in about 2 months (around 8/17/2022).    Patient was given instructions and counseling regarding her condition or for  health maintenance advice. Please see specific information pulled into the AVS if appropriate.

## 2022-06-18 LAB — 25(OH)D3 SERPL-MCNC: 85.8 NG/ML (ref 30–100)

## 2022-06-20 ENCOUNTER — TELEPHONE (OUTPATIENT)
Dept: FAMILY MEDICINE CLINIC | Age: 47
End: 2022-06-20

## 2022-06-20 ENCOUNTER — LAB (OUTPATIENT)
Dept: LAB | Facility: HOSPITAL | Age: 47
End: 2022-06-20

## 2022-06-20 DIAGNOSIS — D72.829 LEUKOCYTOSIS, UNSPECIFIED TYPE: ICD-10-CM

## 2022-06-20 DIAGNOSIS — D72.829 LEUKOCYTOSIS, UNSPECIFIED TYPE: Primary | ICD-10-CM

## 2022-06-20 LAB
BASOPHILS # BLD AUTO: 0.06 10*3/MM3 (ref 0–0.2)
BASOPHILS NFR BLD AUTO: 0.4 % (ref 0–1.5)
DEPRECATED RDW RBC AUTO: 53.2 FL (ref 37–54)
EOSINOPHIL # BLD AUTO: 0.3 10*3/MM3 (ref 0–0.4)
EOSINOPHIL NFR BLD AUTO: 2 % (ref 0.3–6.2)
ERYTHROCYTE [DISTWIDTH] IN BLOOD BY AUTOMATED COUNT: 16.8 % (ref 12.3–15.4)
HCT VFR BLD AUTO: 39.2 % (ref 34–46.6)
HGB BLD-MCNC: 12.2 G/DL (ref 12–15.9)
IMM GRANULOCYTES # BLD AUTO: 0.4 10*3/MM3 (ref 0–0.05)
IMM GRANULOCYTES NFR BLD AUTO: 2.7 % (ref 0–0.5)
LYMPHOCYTES # BLD AUTO: 3.2 10*3/MM3 (ref 0.7–3.1)
LYMPHOCYTES NFR BLD AUTO: 21.8 % (ref 19.6–45.3)
MCH RBC QN AUTO: 27.1 PG (ref 26.6–33)
MCHC RBC AUTO-ENTMCNC: 31.1 G/DL (ref 31.5–35.7)
MCV RBC AUTO: 87.1 FL (ref 79–97)
MONOCYTES # BLD AUTO: 0.95 10*3/MM3 (ref 0.1–0.9)
MONOCYTES NFR BLD AUTO: 6.5 % (ref 5–12)
NEUTROPHILS NFR BLD AUTO: 66.6 % (ref 42.7–76)
NEUTROPHILS NFR BLD AUTO: 9.74 10*3/MM3 (ref 1.7–7)
PLATELET # BLD AUTO: 338 10*3/MM3 (ref 140–450)
PMV BLD AUTO: 9.3 FL (ref 6–12)
RBC # BLD AUTO: 4.5 10*6/MM3 (ref 3.77–5.28)
WBC NRBC COR # BLD: 14.65 10*3/MM3 (ref 3.4–10.8)

## 2022-06-20 PROCEDURE — 36415 COLL VENOUS BLD VENIPUNCTURE: CPT

## 2022-06-20 PROCEDURE — 85025 COMPLETE CBC W/AUTO DIFF WBC: CPT

## 2022-06-20 RX ORDER — LANCETS 30 GAUGE
1 EACH MISCELLANEOUS DAILY
Qty: 100 EACH | Refills: 1 | Status: SHIPPED | OUTPATIENT
Start: 2022-06-20

## 2022-06-20 RX ORDER — LANCETS 30 GAUGE
1 EACH MISCELLANEOUS DAILY
COMMUNITY
End: 2022-06-20 | Stop reason: SDUPTHER

## 2022-06-20 NOTE — TELEPHONE ENCOUNTER
Pt states that she went to the urgent care center this weekend and they dx her with a UTI.  They started her on Macrobid She had her labs repeated today as well.

## 2022-06-21 NOTE — PROGRESS NOTES
Your white blood cell count is much better.  Repeat a cbc in one week or sooner if you develop fever or signs of feeling worse.

## 2022-06-21 NOTE — PROGRESS NOTES
See note regarding elevated wbc last week.    Rechecked cbc today  and level is much improved.  Normal vitamin d and thyroid function.  Discussed blood sugar at time of visit.

## 2022-06-22 PROBLEM — Z13.29 THYROID DISORDER SCREEN: Status: ACTIVE | Noted: 2022-06-22

## 2022-06-22 PROBLEM — L85.8 KERATOSIS PILARIS: Status: ACTIVE | Noted: 2022-06-22

## 2022-06-22 NOTE — ASSESSMENT & PLAN NOTE
Clinically is not clear how much of this is due to steroids.  I explained that hemoglobin A1c is a 90-day blood sugar reading and does indicate that the higher blood sugar has been going on prior to her recent hospitalization and steroid treatment.  Metformin is reasonable to treat for prediabetes or official diabetes.  Will change from regular release to extended release to see if it will be better tolerated and she will be referred to a dietitian to help implement lifestyle changes of diet and exercise.

## 2022-06-22 NOTE — ASSESSMENT & PLAN NOTE
[facility] handout on keratosis Pilaris is mailed to patient after visit today.

## 2022-06-27 ENCOUNTER — PATIENT ROUNDING (BHMG ONLY) (OUTPATIENT)
Dept: PULMONOLOGY | Facility: CLINIC | Age: 47
End: 2022-06-27

## 2022-06-27 NOTE — PROGRESS NOTES
June 27, 2022    Hello, may I speak with Karey Lopez?    My name is Alisson      [facility] am  [facility]  [address]  [address]  [facility] [address] [phone].    Before we get started may I verify your date of birth? 1975    I am calling to officially welcome you to our practice and ask about your recent visit. Is this a good time to talk? My chart message sent for patient rounding.

## 2022-06-29 ENCOUNTER — NUTRITION (OUTPATIENT)
Dept: NUTRITION | Facility: HOSPITAL | Age: 47
End: 2022-06-29

## 2022-06-29 ENCOUNTER — CLINICAL SUPPORT (OUTPATIENT)
Dept: FAMILY MEDICINE CLINIC | Age: 47
End: 2022-06-29

## 2022-06-29 DIAGNOSIS — J30.9 ALLERGIC RHINITIS, UNSPECIFIED SEASONALITY, UNSPECIFIED TRIGGER: Primary | ICD-10-CM

## 2022-06-29 PROCEDURE — 97802 MEDICAL NUTRITION INDIV IN: CPT | Performed by: DIETITIAN, REGISTERED

## 2022-06-29 PROCEDURE — 95115 IMMUNOTHERAPY ONE INJECTION: CPT | Performed by: FAMILY MEDICINE

## 2022-07-11 ENCOUNTER — CLINICAL SUPPORT (OUTPATIENT)
Dept: FAMILY MEDICINE CLINIC | Age: 47
End: 2022-07-11

## 2022-07-11 DIAGNOSIS — J30.9 ALLERGIC RHINITIS, UNSPECIFIED SEASONALITY, UNSPECIFIED TRIGGER: Primary | ICD-10-CM

## 2022-07-11 PROCEDURE — 95115 IMMUNOTHERAPY ONE INJECTION: CPT | Performed by: FAMILY MEDICINE

## 2022-07-12 ENCOUNTER — TELEPHONE (OUTPATIENT)
Dept: FAMILY MEDICINE CLINIC | Age: 47
End: 2022-07-12

## 2022-07-12 VITALS — HEIGHT: 64 IN | BODY MASS INDEX: 50.02 KG/M2 | WEIGHT: 293 LBS

## 2022-07-12 NOTE — CONSULTS
"  Karey Lopez is a 47 y.o. female who presents to [facility] for nutrition consult r/t diagnosis of hyperglycemia, BMI 50-59.9.  Karey Lopez is referred by AMBIKA Arndt.    Past Medical History:   Diagnosis Date   • Allergic rhinitis, unspecified    • Anxiety    • Arthritis    • Asthma    • Cough variant asthma    • Dorsalgia, unspecified    • Essential (primary) hypertension    • Family history of ischemic heart disease and other diseases of the circulatory system    • Hypertension    • Left lower quadrant pain    • Leucocytosis 2020    due to chronic inflammation per hematology   • Lichen sclerosus 2019   • Obesity, unspecified    • Other fatigue    • Persistent mood (affective) disorder, unspecified (HCC)    • PONV (postoperative nausea and vomiting)    • Sepsis (HCC)     Right knee Sepsis   • Sleep apnea, unspecified    • Unspecified abdominal pain    • Unspecified ovarian cyst, left side    • Vitamin D deficiency, unspecified        Anthropometrics    162.6 cm (64\")  (!) 137 kg (302 lb 11.1 oz)  51.96 kg/m²    Diabetes History    Diabetes History  What type of diabetes do you have?: Type 2  Current DM knowledge: good  Have you had diabetes education/teaching in the past?: no  Do you test your blood sugar at home?: no    Education Preferences    Education Preferences  What areas of diabetes would you like to learn about?: diet information    Nutrition Information    Nutrition Information  Enter everything you can remember eating in the last 24 hours (1 day): breakfast- may skip meal or eat egg mcmuffin; lunch- salad/sandwich or something from vending machine at work; dinner- burger, hot dog, chips; snacks- pt states she has trouble sleeping and will snack on chips around 1am; beverages- water, diet Coke (3x/wk), sugar-free Powerade, coffee (2 cups/month)  What is the biggest challenge you have with your diet?: Knowledge    Education Needs    DM Education " Needs  Meter: Has own  Healthy Eating: RD consult, Reviewed meal plan, Basic meal plan provided  Motivation: Engaged  Teaching Method: Explanation, Discussion, Handouts  Patient Response: Verbalized understanding    DM Goals    DM Goals  Healthy Eating - Goal: Today  Being Active - Goal: Today      Medications    Current Outpatient Medications   Medication   • albuterol sulfate HFA   • ONE TOUCH ULTRA 2   • budesonide-formoterol   • buPROPion XL   • cetirizine   • Ergocalciferol   • escitalopram   • fluticasone   • glucose blood   • ibuprofen   • ipratropium-albuterol   • Lancets   • lisinopril   • metFORMIN ER   • montelukast   • tiZANidine     Pt states she was prescribed Metformin but stopped taking the med 1 week ago.      Labs         Lab Results   Component Value Date    CHOL 255 (H) 06/17/2022    TRIG 249 (H) 06/17/2022    HDL 73 (H) 06/17/2022     (H) 06/17/2022 June 5, 2022- A1c 7%      Nutrition counseling provided on carbohydrate counting, portion control, measuring and reading labels.  Discussed eating out and gave suggestions on controlling carbohydrate intake and making healthier food choices.     Meal Plan:     Total Carbohydrates per meal: 2-3 carb servings/meal, at least 3 meals/day  Lean protein with meals.  Limit added fats.  Snacks: 1 carbohydrate serving (</= 22 g) + 1 protein serving.     Daily exercise encouraged (as recommended by healthcare provider). Discussed the benefits of exercise in lowering blood glucose, blood pressure, cholesterol, stress and controlling body weight.     Pt states she has testing supplies, not currently checking glucose.  Discussed daily blood glucose monitoring to assist with understanding of factors affecting blood glucose and assist with management of diabetes.  Discussed and provided with target BG ranges.     Literature provided: Diabetes Nutrition Placemat, Choose Your Foods Booklet    Dietitian contact number provided.  Patient encouraged to call  with questions or concerns.     Time spent with patient: 30 minutes    Ciara Ambriz RDN, LD  06/29/2022

## 2022-07-12 NOTE — TELEPHONE ENCOUNTER
----- Message from Lynn Huang LPN sent at 7/5/2022  8:49 AM EDT -----      ----- Message -----  From: SYSTEM  Sent: 7/2/2022   1:17 AM EDT  To: [facility]

## 2022-07-20 ENCOUNTER — LAB (OUTPATIENT)
Dept: LAB | Facility: HOSPITAL | Age: 47
End: 2022-07-20

## 2022-07-20 ENCOUNTER — OFFICE VISIT (OUTPATIENT)
Dept: FAMILY MEDICINE CLINIC | Age: 47
End: 2022-07-20

## 2022-07-20 VITALS
DIASTOLIC BLOOD PRESSURE: 58 MMHG | HEIGHT: 64 IN | BODY MASS INDEX: 50.02 KG/M2 | SYSTOLIC BLOOD PRESSURE: 133 MMHG | WEIGHT: 293 LBS | OXYGEN SATURATION: 97 % | HEART RATE: 95 BPM

## 2022-07-20 DIAGNOSIS — D72.829 LEUKOCYTOSIS, UNSPECIFIED TYPE: Primary | ICD-10-CM

## 2022-07-20 DIAGNOSIS — R73.9 HYPERGLYCEMIA: ICD-10-CM

## 2022-07-20 DIAGNOSIS — E11.9 TYPE 2 DIABETES MELLITUS WITHOUT COMPLICATION, WITHOUT LONG-TERM CURRENT USE OF INSULIN: ICD-10-CM

## 2022-07-20 DIAGNOSIS — M25.50 ARTHRALGIA, UNSPECIFIED JOINT: ICD-10-CM

## 2022-07-20 DIAGNOSIS — D72.829 LEUKOCYTOSIS, UNSPECIFIED TYPE: ICD-10-CM

## 2022-07-20 DIAGNOSIS — J30.9 ALLERGIC RHINITIS, UNSPECIFIED SEASONALITY, UNSPECIFIED TRIGGER: ICD-10-CM

## 2022-07-20 LAB
BASOPHILS # BLD AUTO: 0.03 10*3/MM3 (ref 0–0.2)
BASOPHILS NFR BLD AUTO: 0.3 % (ref 0–1.5)
DEPRECATED RDW RBC AUTO: 58 FL (ref 37–54)
EOSINOPHIL # BLD AUTO: 0.21 10*3/MM3 (ref 0–0.4)
EOSINOPHIL NFR BLD AUTO: 1.8 % (ref 0.3–6.2)
ERYTHROCYTE [DISTWIDTH] IN BLOOD BY AUTOMATED COUNT: 18.1 % (ref 12.3–15.4)
GLUCOSE SERPL-MCNC: 106 MG/DL (ref 65–99)
HBA1C MFR BLD: 6.5 % (ref 4.8–5.6)
HCT VFR BLD AUTO: 39.1 % (ref 34–46.6)
HGB BLD-MCNC: 12 G/DL (ref 12–15.9)
IMM GRANULOCYTES # BLD AUTO: 0.12 10*3/MM3 (ref 0–0.05)
IMM GRANULOCYTES NFR BLD AUTO: 1 % (ref 0–0.5)
LYMPHOCYTES # BLD AUTO: 2.59 10*3/MM3 (ref 0.7–3.1)
LYMPHOCYTES NFR BLD AUTO: 21.7 % (ref 19.6–45.3)
MCH RBC QN AUTO: 27 PG (ref 26.6–33)
MCHC RBC AUTO-ENTMCNC: 30.7 G/DL (ref 31.5–35.7)
MCV RBC AUTO: 88.1 FL (ref 79–97)
MONOCYTES # BLD AUTO: 0.75 10*3/MM3 (ref 0.1–0.9)
MONOCYTES NFR BLD AUTO: 6.3 % (ref 5–12)
NEUTROPHILS NFR BLD AUTO: 68.9 % (ref 42.7–76)
NEUTROPHILS NFR BLD AUTO: 8.23 10*3/MM3 (ref 1.7–7)
PLATELET # BLD AUTO: 456 10*3/MM3 (ref 140–450)
PMV BLD AUTO: 9.3 FL (ref 6–12)
RBC # BLD AUTO: 4.44 10*6/MM3 (ref 3.77–5.28)
WBC NRBC COR # BLD: 11.93 10*3/MM3 (ref 3.4–10.8)

## 2022-07-20 PROCEDURE — 85025 COMPLETE CBC W/AUTO DIFF WBC: CPT

## 2022-07-20 PROCEDURE — 95115 IMMUNOTHERAPY ONE INJECTION: CPT | Performed by: NURSE PRACTITIONER

## 2022-07-20 PROCEDURE — 99213 OFFICE O/P EST LOW 20 MIN: CPT | Performed by: NURSE PRACTITIONER

## 2022-07-20 PROCEDURE — 83036 HEMOGLOBIN GLYCOSYLATED A1C: CPT

## 2022-07-20 PROCEDURE — 36415 COLL VENOUS BLD VENIPUNCTURE: CPT

## 2022-07-20 PROCEDURE — 82947 ASSAY GLUCOSE BLOOD QUANT: CPT

## 2022-07-20 RX ORDER — IBUPROFEN 800 MG/1
800 TABLET ORAL EVERY 6 HOURS PRN
Qty: 30 TABLET | Refills: 0 | Status: SHIPPED | OUTPATIENT
Start: 2022-07-20

## 2022-07-20 RX ORDER — SEMAGLUTIDE 1.34 MG/ML
0.25 INJECTION, SOLUTION SUBCUTANEOUS WEEKLY
Qty: 1 PEN | Refills: 5 | Status: SHIPPED | OUTPATIENT
Start: 2022-07-20 | End: 2022-08-19 | Stop reason: DRUGHIGH

## 2022-07-20 NOTE — PROGRESS NOTES
"Chief Complaint  Hypertension (1 month ); Persistent mood (affective) disorder, unspecified; Vitamin D Deficiency; Leukocytosis; and Hyperglycemia (Stopped taking metformin)    Subjective  Patient is a 47-year-old female who is in today in follow-up on recent asthma exacerbation and leukocytosis.  Is feeling much better and states her current respiratory status is stable at baseline.  She denies any fever.    Did meet once with dietitian and did not find it very helpful.  Recent hemoglobin A1c at 7% was noted while in the hospital and while on steroids.  She could not tolerate metformin and it contributed to stomach upset and fatigue so has not been taking metformin for a few weeks.  Currently working on lifestyle factors to help lower blood sugar but would like further assistance with treatment for obesity.  She is considering gastric sleeve but she has had complications from previous surgeries for musculoskeletal issues and is hesitant to have surgery.  She would be interested in trying a medication that could help control her blood sugar and however with some weight loss in addition to lifestyle changes.    Requests a refill of ibuprofen to use as needed for arthralgia that is stable.        Karey Lopez presents to [facility]          Objective   Vital Signs:   Vitals:    07/20/22 0806   BP: 133/58   BP Location: Left arm   Patient Position: Sitting   Cuff Size: Large Adult   Pulse: 95   SpO2: 97%   Weight: 136 kg (299 lb)   Height: 162.6 cm (64\")      Body mass index is 51.32 kg/m².  Physical Exam  Vitals reviewed.   Constitutional:       General: She is not in acute distress.     Appearance: Normal appearance. She is well-developed. She is obese.   Cardiovascular:      Rate and Rhythm: Normal rate and regular rhythm.      Heart sounds: Normal heart sounds.   Pulmonary:      Effort: Pulmonary effort is normal.      Breath sounds: Normal breath sounds.   Musculoskeletal:    "   Right lower leg: No edema.      Left lower leg: No edema.   Skin:     General: Skin is warm and dry.   Neurological:      General: No focal deficit present.      Mental Status: She is alert.   Psychiatric:         Attention and Perception: Attention normal.         Mood and Affect: Mood and affect normal.         Behavior: Behavior normal.          Result Review :     CMP    CMP 6/7/22 6/7/22 6/8/22 6/8/22 6/17/22    0523 0523 0442 0442    Glucose 188 (A)   198 (A) 165 (A)   BUN 20   24 (A) 16   Creatinine 0.62   0.66 0.73   Sodium 137   134 (A) 134 (A)   Potassium 4.6   4.5 4.5   Chloride 100   100 96 (A)   Calcium 9.5   9.6 10.0   Albumin  3.90 3.70  4.30   Total Bilirubin  0.2 <0.2  0.2   Alkaline Phosphatase  63 56  57   AST (SGOT)  15 12  18   ALT (SGPT)  21 16  24   (A) Abnormal value       Comments are available for some flowsheets but are not being displayed.           CBC    CBC 6/17/22 6/20/22 7/20/22   WBC 22.74 (A) 14.65 (A) 11.93 (A)   RBC 4.72 4.50 4.44   Hemoglobin 13.0 12.2 12.0   Hematocrit 40.3 39.2 39.1   MCV 85.4 87.1 88.1   MCH 27.5 27.1 27.0   MCHC 32.3 31.1 (A) 30.7 (A)   RDW 16.9 (A) 16.8 (A) 18.1 (A)   Platelets 402 338 456 (A)   (A) Abnormal value            CBC w/diff    CBC w/Diff 6/8/22 6/17/22 6/20/22   WBC 15.17 (A) 22.74 (A) 14.65 (A)   RBC 4.28 4.72 4.50   Hemoglobin 11.4 (A) 13.0 12.2   Hematocrit 36.4 40.3 39.2   MCV 85.0 85.4 87.1   MCH 26.6 27.5 27.1   MCHC 31.3 (A) 32.3 31.1 (A)   RDW 16.6 (A) 16.9 (A) 16.8 (A)   Platelets 369 402 338   Neutrophil Rel % 75.6 79.4 (A) 66.6   Immature Granulocyte Rel % 6.1 (A) 3.2 (A) 2.7 (A)   Lymphocyte Rel % 11.5 (A) 10.7 (A) 21.8   Monocyte Rel % 6.1 5.5 6.5   Eosinophil Rel % 0.0 (A) 0.7 2.0   Basophil Rel % 0.7 0.5 0.4   (A) Abnormal value            Lipid Panel    Lipid Panel 6/17/22   Total Cholesterol 255 (A)   Triglycerides 249 (A)   HDL Cholesterol 73 (A)   VLDL Cholesterol 44 (A)   LDL Cholesterol  138 (A)   LDL/HDL Ratio 1.81   (A)  Abnormal value            TSH    TSH 6/17/22   TSH 1.580           Most Recent A1C    HGBA1C Most Recent 6/5/22   Hemoglobin A1C 7.00 (A)   (A) Abnormal value                     Assessment and Plan    Diagnoses and all orders for this visit:    1. Leukocytosis, unspecified type (Primary)  -     CBC w AUTO Differential; Future    2. Type 2 diabetes mellitus without complication, without long-term current use of insulin ([facility])  Assessment & Plan:  Dose of Ozempic may be increased once per month.  Call with update in 1 month or sooner if concerns.  Ozempic may need a prior authorization.  Previous intolerance to metformin.  Continue with lifestyle changes including increasing exercise and a lower sugar lower fat diet.    Orders:  -     Glucose, Random; Future  -     Hemoglobin A1c; Future  -     Semaglutide,0.25 or 0.5MG/DOS, (Ozempic, 0.25 or 0.5 MG/DOSE,) 2 MG/1.5ML solution pen-injector; Inject 0.25 mg under the skin into the appropriate area as directed 1 (One) Time Per Week.  Dispense: 1 pen; Refill: 5    3. Arthralgia, unspecified joint  -     ibuprofen (ADVIL,MOTRIN) 800 MG tablet; Take 1 tablet by mouth Every 6 (Six) Hours As Needed for Mild Pain .  Dispense: 30 tablet; Refill: 0    4. Allergic rhinitis, unspecified seasonality, unspecified trigger  -     Allergy Serum Injection      Follow Up    Return in about 3 months (around 10/20/2022).  Patient was given instructions and counseling regarding her condition or for health maintenance advice. Please see specific information pulled into the AVS if appropriate.

## 2022-07-20 NOTE — ASSESSMENT & PLAN NOTE
Dose of Ozempic may be increased once per month.  Call with update in 1 month or sooner if concerns.  Ozempic may need a prior authorization.  Previous intolerance to metformin.  Continue with lifestyle changes including increasing exercise and a lower sugar lower fat diet.

## 2022-07-22 NOTE — PROGRESS NOTES
Blood sugar is improved.  Hemoglobin A1c range still represents diabetes.  White blood cell count is improved.  Continue to monitor for and report any fever or signs of illness if they are to occur.  Reviewed hematology consult in 2020 and mild elevations of white blood cells were deemed to be due to chronic inflammation.

## 2022-07-26 DIAGNOSIS — Z12.31 ENCOUNTER FOR SCREENING MAMMOGRAM FOR MALIGNANT NEOPLASM OF BREAST: Primary | ICD-10-CM

## 2022-08-01 ENCOUNTER — CLINICAL SUPPORT (OUTPATIENT)
Dept: FAMILY MEDICINE CLINIC | Age: 47
End: 2022-08-01

## 2022-08-01 DIAGNOSIS — J30.9 ALLERGIC RHINITIS, UNSPECIFIED SEASONALITY, UNSPECIFIED TRIGGER: Primary | ICD-10-CM

## 2022-08-01 PROCEDURE — 95115 IMMUNOTHERAPY ONE INJECTION: CPT | Performed by: FAMILY MEDICINE

## 2022-08-18 NOTE — PROGRESS NOTES
Primary Care Provider  Sunita Hylton APRN   Referring Provider  No ref. provider found    Patient Complaint  Asthma, Sleep Apnea, and Follow-up (2 Month Follow Up )      SUBJECTIVE    History of Presenting Illness  Karey Lopez is a pleasant 47 y.o. female who presents to [facility] for follow-up appointment.  I last saw the patient 6/17/2022 where she had been in [facility] 6/4 through 6/10/2022 for an acute asthma exacerbation.  Patient states she has been doing very well since I last saw her 6/17/2022.  Patient has not been on any steroids or antibiotics for her lungs.  She continues to be on Symbicort but she forgets to use it twice a day she usually uses it once a day.  She does continue to be on DuoNeb and albuterol inhaler as needed for shortness of air or wheezing.  She does have a bit of shortness of air with exertion of moderate severity but improves with rest and use of her albuterol inhaler.    She does not have any coughing, wheezing, headaches, chest pain, weight loss or hemoptysis at this time. Denies fevers, chills and night sweats. Karey Lopez is able to perform ADLs without difficulties and denies any swollen glands/lymph nodes in the head or neck.    I have personally reviewed the review of systems, past family, social, medical and surgical histories; and agree with their findings.    Review of Systems   Constitutional: Negative.  Negative for chills, fatigue and fever.   HENT: Negative.    Eyes: Negative.    Respiratory: Positive for shortness of breath. Negative for cough and wheezing.    Cardiovascular: Negative.  Negative for chest pain, palpitations and leg swelling.   Musculoskeletal: Negative.    Skin: Negative.         Family History   Problem Relation Age of Onset   • Asthma Mother    • Heart disease Mother    • Coronary artery disease Mother    • Diabetes type II Mother    • Stroke Father    • Obesity Brother     • Heart attack Brother         MI   • Cervical cancer Maternal Grandmother    • Lung cancer Maternal Grandfather    • Stroke Paternal Grandmother    • Malig Hyperthermia Neg Hx         Social History     Socioeconomic History   • Marital status:    • Number of children: 2   Tobacco Use   • Smoking status: Never Smoker   • Smokeless tobacco: Never Used   Vaping Use   • Vaping Use: Never used   Substance and Sexual Activity   • Alcohol use: Never     Comment: just rarely   • Drug use: Never   • Sexual activity: Defer        Past Medical History:   Diagnosis Date   • Allergic rhinitis, unspecified    • Anxiety    • Arthritis    • Asthma    • Cough variant asthma    • Dorsalgia, unspecified    • Essential (primary) hypertension    • Family history of ischemic heart disease and other diseases of the circulatory system    • Hypertension    • Left lower quadrant pain    • Leucocytosis 2020    due to chronic inflammation per hematology   • Lichen sclerosus 2019   • Obesity, unspecified    • Other fatigue    • Persistent mood (affective) disorder, unspecified (HCC)    • PONV (postoperative nausea and vomiting)    • Sepsis (HCC)     Right knee Sepsis   • Sleep apnea, unspecified    • Unspecified abdominal pain    • Unspecified ovarian cyst, left side    • Vitamin D deficiency, unspecified         Immunization History   Administered Date(s) Administered   • COVID-19 (PFIZER) PURPLE CAP 03/13/2021, 04/09/2021, 12/09/2021   • Flu Vaccine Split Quad 11/24/2014, 11/12/2015, 12/19/2016, 12/06/2017, 12/20/2019, 11/30/2020   • Fluzone Quad >6mos (Multi-dose) 01/08/2014   • Influenza Quad Vaccine (Inpatient) 01/08/2014   • Influenza TIV (IM) 10/23/2012, 10/24/2012   • Influenza, Unspecified 11/30/2020, 12/09/2021       Allergies   Allergen Reactions   • Metformin Nausea Only   • Vilazodone Hcl Mental Status Change          Current Outpatient Medications:   •  albuterol sulfate  (90 Base) MCG/ACT inhaler, Inhale 2 puffs  Every 4 (Four) Hours As Needed for Wheezing., Disp: 8 g, Rfl: 5  •  Blood Glucose Monitoring Suppl (ONE TOUCH ULTRA 2) w/Device kit, 1 each Daily., Disp: 1 each, Rfl: 0  •  budesonide-formoterol (Symbicort) 160-4.5 MCG/ACT inhaler, Inhale 2 puffs 2 (Two) Times a Day., Disp: 6 g, Rfl: 5  •  Ergocalciferol 50 MCG (2000 UT) capsule, Take 2,000 Units by mouth Daily., Disp: , Rfl:   •  escitalopram (LEXAPRO) 20 MG tablet, Take 1.5 tablets by mouth Daily., Disp: 135 tablet, Rfl: 1  •  glucose blood test strip, 1 each by Other route Daily. USE WITH ONE TOUCH METER, Disp: 100 each, Rfl: 1  •  ibuprofen (ADVIL,MOTRIN) 800 MG tablet, Take 1 tablet by mouth Every 6 (Six) Hours As Needed for Mild Pain ., Disp: 30 tablet, Rfl: 0  •  ipratropium-albuterol (DUO-NEB) 0.5-2.5 mg/3 ml nebulizer, Take 3 mL by nebulization Every 4 (Four) Hours As Needed for Wheezing for up to 30 days., Disp: 360 mL, Rfl: 0  •  Lancets misc, 1 each Daily. USE WITH ONE TOUCH METER, Disp: 100 each, Rfl: 1  •  lisinopril (PRINIVIL,ZESTRIL) 30 MG tablet, Take 1 tablet by mouth Daily., Disp: 90 tablet, Rfl: 1  •  montelukast (SINGULAIR) 10 MG tablet, Take 1 tablet by mouth Daily., Disp: 90 tablet, Rfl: 1  •  Semaglutide,0.25 or 0.5MG/DOS, (Ozempic, 0.25 or 0.5 MG/DOSE,) 2 MG/1.5ML solution pen-injector, Inject 0.25 mg under the skin into the appropriate area as directed 1 (One) Time Per Week., Disp: 1 pen, Rfl: 5  •  tiZANidine (Zanaflex) 4 MG tablet, Take 1 tablet by mouth Every 6 (Six) Hours As Needed for Muscle Spasms., Disp: 30 tablet, Rfl: 0  •  azithromycin (ZITHROMAX) 250 MG tablet, Take 2 by mouth today then 1 daily for 4 days, Disp: 6 tablet, Rfl: 0  •  buPROPion XL (WELLBUTRIN XL) 150 MG 24 hr tablet, Take 1 tablet by mouth Daily for 60 days., Disp: 90 tablet, Rfl: 1  •  cetirizine (zyrTEC) 10 MG tablet, Take 1 tablet by mouth Every Night for 30 days., Disp: 30 tablet, Rfl: 0  •  fluticasone (FLONASE) 50 MCG/ACT nasal spray, 2 sprays by Each Nare  "route Daily for 30 days., Disp: 9.9 mL, Rfl: 0  •  predniSONE (DELTASONE) 10 MG tablet, 6 daily x 2 days, 5 daily x 2 days, 4 daily x 2 days, 3 daily x 2 days, 2 daily x 2 days, 1 daily x 2 days, Disp: 42 tablet, Rfl: 0    Current Facility-Administered Medications:   •  Allergy Serum Injection, 0.5 mL, Subcutaneous, Once, Sunita Hylton APRN       OBJECTIVE    Vital Signs   /83 (BP Location: Left arm, Patient Position: Sitting, Cuff Size: Large Adult)   Pulse 84   Temp 98.4 °F (36.9 °C) (Infrared)   Resp 18   Ht 162.6 cm (64\")   Wt 136 kg (300 lb)   SpO2 97% Comment: Room air  BMI 51.49 kg/m²     Physical Exam  Vitals reviewed.   Constitutional:       General: She is not in acute distress.     Appearance: Normal appearance. She is obese. She is not ill-appearing.   HENT:      Head: Normocephalic and atraumatic.      Nose: Nose normal.   Eyes:      Extraocular Movements: Extraocular movements intact.      Conjunctiva/sclera: Conjunctivae normal.      Pupils: Pupils are equal, round, and reactive to light.   Cardiovascular:      Rate and Rhythm: Normal rate and regular rhythm.      Pulses: Normal pulses.      Heart sounds: Normal heart sounds.   Pulmonary:      Effort: Pulmonary effort is normal. No respiratory distress.      Breath sounds: Normal breath sounds. No stridor. No wheezing, rhonchi or rales.   Abdominal:      General: Bowel sounds are normal.   Musculoskeletal:         General: Normal range of motion.      Cervical back: Normal range of motion and neck supple.   Skin:     General: Skin is warm and dry.   Neurological:      General: No focal deficit present.      Mental Status: She is alert and oriented to person, place, and time.   Psychiatric:         Mood and Affect: Mood normal.         Behavior: Behavior normal.          Results Review  I have personally reviewed the prior office notes and last CT scan and CXR as follows:  CT Chest With Contrast Diagnostic [[id number]] (Order " [id number])  Order  Status: Final result     Appointment Information      PACS Images     Radiology Images    Study Result    Narrative & Impression   PROCEDURE:  CT CHEST W CONTRAST DIAGNOSTIC     COMPARISONS:           [facility], CR, XR CHEST 1 VW, 6/04/2022, 18:53.                 [facility], CT, ABD-PELVIS W/ CONTRAST, 5/20/2021, 10:27.     INDICATIONS:  SHORTNESS OF BREATH FOR 2-3 DAYS.     TECHNIQUE:    After obtaining the patient's consent, 911 CT/CTA images were obtained with non-ionic   intravenous contrast material.  There is slight motion artifact on the study.     PROTOCOL:     Standard imaging protocol performed                 RADIATION:      DLP: 1,017.6 mGy*cm               Automated exposure control was utilized to minimize radiation dose.   CONTRAST:      100 mL Isovue 370 I.V.     FINDINGS:        No pulmonary embolism is seen.  The contrast bolus is somewhat limited in the pulmonary   arteries.  No acute infiltrate.  There may be mild subsegmental atelectasis in the lung bases.    Borderline cardiac enlargement is possible.  There is prominent pericardial fat.  No thoracic   aortic aneurysm or dissection.  There is diffuse hepatic steatosis with hepatomegaly.  No   splenomegaly is suspected.  No definite acute findings are seen in the partially imaged upper   abdomen.  No pneumothorax or pneumomediastinum is seen.  No pleural or pericardial effusion.  No   enlarged mediastinal lymph nodes are appreciated.  There are nonspecific small to moderate sized   mediastinal and hilar lymph nodes.  The central tracheobronchial tree is well aerated without   filling defect.  No suspicious pulmonary nodules are seen.  No acute fracture or aggressive osseous   lesion is suggested.  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  There are suspected mild degenerative changes of   the imaged spine.   There is a small hiatal hernia.  The gallbladder is not clearly identified.  It   may be surgically absent.  There is pulmonary hypoinflation.     IMPRESSION:               No pulmonary embolism.  No acute infiltrate.  There is a small hiatal hernia, seen   previously.             COMMENT:  Part of this note is an electronic transcription of spoken language to printed text. The   electronic translation/transcription may permit erroneous, or at times, nonsensical (or even   sensical) words or phrases to be inadvertently transcribed or omitted; this  has   reviewed the note for such errors (as well as additional errors); however, some may still exist.     ROXANNA ADORNO JR, MD         Electronically Signed and Approved By: ROXANNA ADORNO JR, MD on 6/04/2022 at 22:05        XR Chest 1 View [[id number]] (Order [id number])  Order  Status: Final result                     Appointment Information      PACS Images     Radiology Images    Study Result    Narrative & Impression   PROCEDURE:  XR CHEST 1 VW     COMPARISON: [facility], CR, XR CHEST 1 VW, 6/04/2022, 18:53.     INDICATIONS:  Pulmonary edema     FINDINGS:          No evidence of pneumonia, pulmonary edema or other acute pulmonary process.  No pneumothorax or   pleural effusion.  No evidence of acute process of the cardiomediastinal structures.     IMPRESSION:                 Negative.  No evidence of acute cardiopulmonary disease.  Stable appearance of the chest over the   past 4 days               GRETCHEN SANDERSON MD         Electronically Signed and Approved By: GRETCHEN SANDERSON MD on 6/08/2022 at 12:54              ASSESSMENT & PLAN    Patient Active Problem List   Diagnosis   • Allergic rhinitis   • Anxiety   • Chronic postoperative pain   • Sleep apnea, unspecified   • Essential (primary) hypertension   • Family history of ischemic heart disease and other diseases of the circulatory system   • Persistent mood (affective) disorder, unspecified (HCC)    • Vitamin D deficiency   • Chronic left shoulder pain   • Neck pain   • Moderate persistent asthma without complication   • Severe persistent asthma with exacerbation   • Leukocytosis   • Class 3 severe obesity with serious comorbidity and body mass index (BMI) of 50.0 to 59.9 in adult ([facility])   • Hyperglycemia   • Thyroid disorder screen   • Keratosis pilaris   • Arthralgia   • Type 2 diabetes mellitus without complication, without long-term current use of insulin ([facility])       Diagnoses and all orders for this visit:    1. Severe persistent asthma with exacerbation (Primary)  -     predniSONE (DELTASONE) 10 MG tablet; 6 daily x 2 days, 5 daily x 2 days, 4 daily x 2 days, 3 daily x 2 days, 2 daily x 2 days, 1 daily x 2 days  Dispense: 42 tablet; Refill: 0  -     azithromycin (ZITHROMAX) 250 MG tablet; Take 2 by mouth today then 1 daily for 4 days  Dispense: 6 tablet; Refill: 0    2. Dyspnea on exertion           Plan:  Dean with patient action plan for an exacerbation.  We will send in steroid and antibiotic for patient to have on hand should she have another episode of exacerbation.  Patient instructed to call office should she start the medications to see if she needs further intervention/examination.  Patient to continue with Symbicort and recommended that she does do 2 puffs twice a day rinsing her mouth out after each use to prevent oral thrush.  Patient to continue with her DuoNeb and albuterol inhaler.  Patient to follow-up in 6 months or sooner if needed.    Smoking status:never  Vaccination status:up to date with COVID and flu  Medications personally reviewed    Follow Up  Return in about 6 months (around 2/19/2023).    Patient was given instructions and counseling regarding her condition or for health maintenance advice. Please see specific information pulled into the AVS if appropriate.         08-Jul-2020 08:00

## 2022-08-19 ENCOUNTER — OFFICE VISIT (OUTPATIENT)
Dept: PULMONOLOGY | Facility: CLINIC | Age: 47
End: 2022-08-19

## 2022-08-19 ENCOUNTER — CLINICAL SUPPORT (OUTPATIENT)
Dept: FAMILY MEDICINE CLINIC | Age: 47
End: 2022-08-19

## 2022-08-19 VITALS
DIASTOLIC BLOOD PRESSURE: 83 MMHG | WEIGHT: 293 LBS | SYSTOLIC BLOOD PRESSURE: 150 MMHG | RESPIRATION RATE: 18 BRPM | BODY MASS INDEX: 50.02 KG/M2 | HEIGHT: 64 IN | HEART RATE: 84 BPM | OXYGEN SATURATION: 97 % | TEMPERATURE: 98.4 F

## 2022-08-19 DIAGNOSIS — J45.51 SEVERE PERSISTENT ASTHMA WITH EXACERBATION: Primary | ICD-10-CM

## 2022-08-19 DIAGNOSIS — J30.9 ALLERGIC RHINITIS, UNSPECIFIED SEASONALITY, UNSPECIFIED TRIGGER: Primary | ICD-10-CM

## 2022-08-19 DIAGNOSIS — E11.9 TYPE 2 DIABETES MELLITUS WITHOUT COMPLICATION, WITHOUT LONG-TERM CURRENT USE OF INSULIN: Primary | ICD-10-CM

## 2022-08-19 DIAGNOSIS — R06.09 DYSPNEA ON EXERTION: ICD-10-CM

## 2022-08-19 PROCEDURE — 95115 IMMUNOTHERAPY ONE INJECTION: CPT | Performed by: FAMILY MEDICINE

## 2022-08-19 PROCEDURE — 99213 OFFICE O/P EST LOW 20 MIN: CPT | Performed by: NURSE PRACTITIONER

## 2022-08-19 RX ORDER — SEMAGLUTIDE 1.34 MG/ML
0.5 INJECTION, SOLUTION SUBCUTANEOUS WEEKLY
Qty: 1 PEN | Refills: 5 | Status: SHIPPED | OUTPATIENT
Start: 2022-08-19 | End: 2022-08-22 | Stop reason: SDUPTHER

## 2022-08-19 RX ORDER — AZITHROMYCIN 250 MG/1
TABLET, FILM COATED ORAL
Qty: 6 TABLET | Refills: 0 | Status: SHIPPED | OUTPATIENT
Start: 2022-08-19 | End: 2022-12-01

## 2022-08-19 RX ORDER — PREDNISONE 10 MG/1
TABLET ORAL
Qty: 42 TABLET | Refills: 0 | Status: SHIPPED | OUTPATIENT
Start: 2022-08-19 | End: 2022-12-01

## 2022-08-22 DIAGNOSIS — E11.9 TYPE 2 DIABETES MELLITUS WITHOUT COMPLICATION, WITHOUT LONG-TERM CURRENT USE OF INSULIN: ICD-10-CM

## 2022-08-22 RX ORDER — SEMAGLUTIDE 1.34 MG/ML
0.5 INJECTION, SOLUTION SUBCUTANEOUS WEEKLY
Qty: 3 PEN | Refills: 1 | Status: SHIPPED | OUTPATIENT
Start: 2022-08-22 | End: 2022-10-12 | Stop reason: DRUGHIGH

## 2022-09-02 ENCOUNTER — HOSPITAL ENCOUNTER (OUTPATIENT)
Dept: MAMMOGRAPHY | Facility: HOSPITAL | Age: 47
Discharge: HOME | End: 2022-09-02
Admitting: NURSE PRACTITIONER

## 2022-09-02 ENCOUNTER — CLINICAL SUPPORT (OUTPATIENT)
Dept: FAMILY MEDICINE CLINIC | Age: 47
End: 2022-09-02

## 2022-09-02 DIAGNOSIS — Z12.31 ENCOUNTER FOR SCREENING MAMMOGRAM FOR MALIGNANT NEOPLASM OF BREAST: ICD-10-CM

## 2022-09-02 DIAGNOSIS — J30.9 ALLERGIC RHINITIS, UNSPECIFIED SEASONALITY, UNSPECIFIED TRIGGER: Primary | ICD-10-CM

## 2022-09-02 PROCEDURE — 95115 IMMUNOTHERAPY ONE INJECTION: CPT | Performed by: FAMILY MEDICINE

## 2022-09-02 PROCEDURE — 77063 BREAST TOMOSYNTHESIS BI: CPT

## 2022-09-02 PROCEDURE — 77067 SCR MAMMO BI INCL CAD: CPT

## 2022-09-05 NOTE — PROGRESS NOTES
Your recent mammogram is negative.  Fibroglandular density is not uncommon. Please continue monthly self breast exams and report any changes.

## 2022-09-23 ENCOUNTER — CLINICAL SUPPORT (OUTPATIENT)
Dept: FAMILY MEDICINE CLINIC | Age: 47
End: 2022-09-23

## 2022-09-23 DIAGNOSIS — J30.9 ALLERGIC RHINITIS, UNSPECIFIED SEASONALITY, UNSPECIFIED TRIGGER: Primary | ICD-10-CM

## 2022-09-23 PROCEDURE — 95115 IMMUNOTHERAPY ONE INJECTION: CPT | Performed by: FAMILY MEDICINE

## 2022-10-12 DIAGNOSIS — E11.9 TYPE 2 DIABETES MELLITUS WITHOUT COMPLICATION, WITHOUT LONG-TERM CURRENT USE OF INSULIN: Primary | ICD-10-CM

## 2022-10-13 ENCOUNTER — CLINICAL SUPPORT (OUTPATIENT)
Dept: FAMILY MEDICINE CLINIC | Age: 47
End: 2022-10-13

## 2022-10-13 DIAGNOSIS — J30.9 ALLERGIC RHINITIS, UNSPECIFIED SEASONALITY, UNSPECIFIED TRIGGER: Primary | ICD-10-CM

## 2022-10-13 DIAGNOSIS — E11.9 TYPE 2 DIABETES MELLITUS WITHOUT COMPLICATION, WITHOUT LONG-TERM CURRENT USE OF INSULIN: ICD-10-CM

## 2022-10-13 PROCEDURE — 95115 IMMUNOTHERAPY ONE INJECTION: CPT | Performed by: FAMILY MEDICINE

## 2022-10-18 DIAGNOSIS — E11.9 TYPE 2 DIABETES MELLITUS WITHOUT COMPLICATION, WITHOUT LONG-TERM CURRENT USE OF INSULIN: ICD-10-CM

## 2022-10-20 DIAGNOSIS — E11.9 TYPE 2 DIABETES MELLITUS WITHOUT COMPLICATION, WITHOUT LONG-TERM CURRENT USE OF INSULIN: ICD-10-CM

## 2022-10-25 ENCOUNTER — CLINICAL SUPPORT (OUTPATIENT)
Dept: FAMILY MEDICINE CLINIC | Age: 47
End: 2022-10-25

## 2022-10-25 DIAGNOSIS — J30.9 ALLERGIC RHINITIS, UNSPECIFIED SEASONALITY, UNSPECIFIED TRIGGER: Primary | ICD-10-CM

## 2022-10-25 DIAGNOSIS — R11.0 NAUSEA: Primary | ICD-10-CM

## 2022-10-25 PROCEDURE — 95115 IMMUNOTHERAPY ONE INJECTION: CPT | Performed by: FAMILY MEDICINE

## 2022-10-25 RX ORDER — ONDANSETRON 4 MG/1
4 TABLET, FILM COATED ORAL EVERY 8 HOURS PRN
Qty: 30 TABLET | Refills: 0 | Status: SHIPPED | OUTPATIENT
Start: 2022-10-25 | End: 2023-01-04

## 2022-11-18 ENCOUNTER — CLINICAL SUPPORT (OUTPATIENT)
Dept: FAMILY MEDICINE CLINIC | Age: 47
End: 2022-11-18

## 2022-11-18 DIAGNOSIS — J30.9 ALLERGIC RHINITIS, UNSPECIFIED SEASONALITY, UNSPECIFIED TRIGGER: Primary | ICD-10-CM

## 2022-11-18 PROCEDURE — 95115 IMMUNOTHERAPY ONE INJECTION: CPT | Performed by: FAMILY MEDICINE

## 2022-11-30 ENCOUNTER — CLINICAL SUPPORT (OUTPATIENT)
Dept: FAMILY MEDICINE CLINIC | Age: 47
End: 2022-11-30

## 2022-11-30 DIAGNOSIS — J30.9 ALLERGIC RHINITIS, UNSPECIFIED SEASONALITY, UNSPECIFIED TRIGGER: Primary | ICD-10-CM

## 2022-11-30 PROCEDURE — 95115 IMMUNOTHERAPY ONE INJECTION: CPT | Performed by: FAMILY MEDICINE

## 2022-12-01 ENCOUNTER — OFFICE VISIT (OUTPATIENT)
Dept: FAMILY MEDICINE CLINIC | Age: 47
End: 2022-12-01

## 2022-12-01 VITALS
SYSTOLIC BLOOD PRESSURE: 147 MMHG | WEIGHT: 288 LBS | BODY MASS INDEX: 49.17 KG/M2 | OXYGEN SATURATION: 98 % | HEART RATE: 80 BPM | DIASTOLIC BLOOD PRESSURE: 78 MMHG | HEIGHT: 64 IN | TEMPERATURE: 98.3 F

## 2022-12-01 DIAGNOSIS — Z20.828 EXPOSURE TO THE FLU: ICD-10-CM

## 2022-12-01 DIAGNOSIS — R09.81 NASAL CONGESTION: ICD-10-CM

## 2022-12-01 DIAGNOSIS — R05.9 COUGH, UNSPECIFIED TYPE: Primary | ICD-10-CM

## 2022-12-01 DIAGNOSIS — E11.9 TYPE 2 DIABETES MELLITUS WITHOUT COMPLICATION, WITHOUT LONG-TERM CURRENT USE OF INSULIN: ICD-10-CM

## 2022-12-01 DIAGNOSIS — J45.21 MILD INTERMITTENT ASTHMATIC BRONCHITIS WITH ACUTE EXACERBATION: ICD-10-CM

## 2022-12-01 DIAGNOSIS — E66.01 CLASS 3 SEVERE OBESITY WITH SERIOUS COMORBIDITY AND BODY MASS INDEX (BMI) OF 45.0 TO 49.9 IN ADULT, UNSPECIFIED OBESITY TYPE: ICD-10-CM

## 2022-12-01 PROBLEM — J45.51 SEVERE PERSISTENT ASTHMA WITH EXACERBATION: Status: RESOLVED | Noted: 2022-06-10 | Resolved: 2022-12-01

## 2022-12-01 PROBLEM — J45.40 MODERATE PERSISTENT ASTHMA WITHOUT COMPLICATION: Status: RESOLVED | Noted: 2022-04-25 | Resolved: 2022-12-01

## 2022-12-01 PROBLEM — M54.2 NECK PAIN: Status: RESOLVED | Noted: 2022-04-05 | Resolved: 2022-12-01

## 2022-12-01 PROBLEM — J45.909 ASTHMATIC BRONCHITIS: Status: ACTIVE | Noted: 2021-12-16

## 2022-12-01 PROBLEM — E66.813 CLASS 3 SEVERE OBESITY WITH SERIOUS COMORBIDITY AND BODY MASS INDEX (BMI) OF 50.0 TO 59.9 IN ADULT: Status: RESOLVED | Noted: 2022-06-17 | Resolved: 2022-12-01

## 2022-12-01 PROBLEM — Z13.29 THYROID DISORDER SCREEN: Status: RESOLVED | Noted: 2022-06-22 | Resolved: 2022-12-01

## 2022-12-01 PROBLEM — E66.813 CLASS 3 SEVERE OBESITY WITH SERIOUS COMORBIDITY AND BODY MASS INDEX (BMI) OF 45.0 TO 49.9 IN ADULT: Status: ACTIVE | Noted: 2022-12-01

## 2022-12-01 LAB
EXPIRATION DATE: NORMAL
FLUAV AG UPPER RESP QL IA.RAPID: NOT DETECTED
FLUBV AG UPPER RESP QL IA.RAPID: NOT DETECTED
INTERNAL CONTROL: NORMAL
Lab: NORMAL
SARS-COV-2 AG UPPER RESP QL IA.RAPID: NOT DETECTED

## 2022-12-01 PROCEDURE — 87428 SARSCOV & INF VIR A&B AG IA: CPT | Performed by: NURSE PRACTITIONER

## 2022-12-01 PROCEDURE — 99213 OFFICE O/P EST LOW 20 MIN: CPT | Performed by: NURSE PRACTITIONER

## 2022-12-01 PROCEDURE — 96372 THER/PROPH/DIAG INJ SC/IM: CPT | Performed by: NURSE PRACTITIONER

## 2022-12-01 RX ORDER — MELOXICAM 15 MG/1
15 TABLET ORAL DAILY
COMMUNITY
Start: 2022-10-23

## 2022-12-01 RX ORDER — ALBUTEROL SULFATE 2.5 MG/3ML
2.5 SOLUTION RESPIRATORY (INHALATION) EVERY 4 HOURS PRN
Qty: 60 EACH | Refills: 5 | Status: SHIPPED | OUTPATIENT
Start: 2022-12-01 | End: 2022-12-01

## 2022-12-01 RX ORDER — DEXTROMETHORPHAN HYDROBROMIDE AND PROMETHAZINE HYDROCHLORIDE 15; 6.25 MG/5ML; MG/5ML
5 SYRUP ORAL 4 TIMES DAILY PRN
Qty: 180 ML | Refills: 1 | Status: SHIPPED | OUTPATIENT
Start: 2022-12-01 | End: 2022-12-01

## 2022-12-01 RX ORDER — OSELTAMIVIR PHOSPHATE 75 MG/1
75 CAPSULE ORAL 2 TIMES DAILY
Qty: 10 CAPSULE | Refills: 0 | Status: SHIPPED | OUTPATIENT
Start: 2022-12-01 | End: 2022-12-01

## 2022-12-01 RX ORDER — TRIAMCINOLONE ACETONIDE 40 MG/ML
60 INJECTION, SUSPENSION INTRA-ARTICULAR; INTRAMUSCULAR ONCE
Status: COMPLETED | OUTPATIENT
Start: 2022-12-01 | End: 2022-12-01

## 2022-12-01 RX ORDER — OSELTAMIVIR PHOSPHATE 75 MG/1
75 CAPSULE ORAL 2 TIMES DAILY
Qty: 10 CAPSULE | Refills: 0 | Status: SHIPPED | OUTPATIENT
Start: 2022-12-01 | End: 2023-02-02

## 2022-12-01 RX ORDER — DEXTROMETHORPHAN HYDROBROMIDE AND PROMETHAZINE HYDROCHLORIDE 15; 6.25 MG/5ML; MG/5ML
5 SYRUP ORAL 4 TIMES DAILY PRN
Qty: 180 ML | Refills: 1 | Status: SHIPPED | OUTPATIENT
Start: 2022-12-01 | End: 2023-02-02

## 2022-12-01 RX ORDER — ALBUTEROL SULFATE 2.5 MG/3ML
2.5 SOLUTION RESPIRATORY (INHALATION) EVERY 4 HOURS PRN
Qty: 60 EACH | Refills: 5 | Status: SHIPPED | OUTPATIENT
Start: 2022-12-01

## 2022-12-01 RX ADMIN — TRIAMCINOLONE ACETONIDE 60 MG: 40 INJECTION, SUSPENSION INTRA-ARTICULAR; INTRAMUSCULAR at 11:11

## 2022-12-01 NOTE — ASSESSMENT & PLAN NOTE
Negative for COVID and flu but  currently has a flu.  There is a good chance this was a false negative flu test result.  I do think she is starting into an asthma exacerbation.  We will go ahead and treat with Tamiflu and treat as below.  I do not see any sign of bacterial infection at this time.  Symptoms started yesterday.  May consider antibiotics if needed in the future.  Follow-up if not improving.

## 2022-12-01 NOTE — ASSESSMENT & PLAN NOTE
Ozempic is an adjunct to diet and exercise to assist with weight loss and control her diabetes.  Encourage diet and exercise and consider increasing dose of Ozempic to 2 mg/week.  Patient defers dose increase at this time.  She will consider and we will discuss further at follow-up in February.

## 2022-12-01 NOTE — PROGRESS NOTES
"Chief Complaint  Nasal Congestion (Patient states this started last night,  has the flu ), Cough, and Wheezing    Subjective          Karey Lopez presents to [facility]     Patient is a 47-year-old female who is here today to report nasal congestion cough and wheezing that started suddenly late yesterday.  Her  was diagnosed with flu on Monday.  Patient with history of asthma.  Is currently using albuterol.  Has lots of nasal drainage reports being prone to getting sinus infections.  She is afebrile.    Regarding obesity and diabetes, she is currently taking Ozempic 1 mg once per week.  She has achieved some weight loss but feels like she has plateaued does not wish to increase dose of Ozempic to 2 mg/week at this time.  Her last hemoglobin A1c was controlled at 6.5%.     Objective   Vital Signs:   Vitals:    12/01/22 1019   BP: 147/78   BP Location: Left arm   Patient Position: Sitting   Cuff Size: Large Adult   Pulse: 80   Temp: 98.3 °F (36.8 °C)   TempSrc: Oral   SpO2: 98%   Weight: 131 kg (288 lb)   Height: 162.6 cm (64\")      Body mass index is 49.44 kg/m².  Physical Exam  Vitals reviewed.   Constitutional:       General: She is not in acute distress.     Appearance: Normal appearance. She is well-developed. She is obese.   HENT:      Right Ear: Tympanic membrane normal.      Left Ear: Tympanic membrane normal.   Cardiovascular:      Rate and Rhythm: Normal rate and regular rhythm.      Heart sounds: Normal heart sounds.   Pulmonary:      Effort: Pulmonary effort is normal.      Breath sounds: Normal breath sounds.   Musculoskeletal:      Right lower leg: No edema.      Left lower leg: No edema.   Skin:     General: Skin is warm and dry.   Neurological:      General: No focal deficit present.      Mental Status: She is alert.   Psychiatric:         Attention and Perception: Attention normal.         Mood and Affect: Mood and affect normal.         Behavior: " Behavior normal.           Current Outpatient Medications:   •  albuterol (PROVENTIL) (2.5 MG/3ML) 0.083% nebulizer solution, Take 2.5 mg by nebulization Every 4 (Four) Hours As Needed for Wheezing., Disp: 60 each, Rfl: 5  •  albuterol sulfate  (90 Base) MCG/ACT inhaler, Inhale 2 puffs Every 4 (Four) Hours As Needed for Wheezing., Disp: 8 g, Rfl: 5  •  Blood Glucose Monitoring Suppl (ONE TOUCH ULTRA 2) w/Device kit, 1 each Daily., Disp: 1 each, Rfl: 0  •  budesonide-formoterol (Symbicort) 160-4.5 MCG/ACT inhaler, Inhale 2 puffs 2 (Two) Times a Day., Disp: 6 g, Rfl: 5  •  Ergocalciferol 50 MCG (2000 UT) capsule, Take 2,000 Units by mouth Daily., Disp: , Rfl:   •  escitalopram (LEXAPRO) 20 MG tablet, Take 1.5 tablets by mouth Daily., Disp: 135 tablet, Rfl: 1  •  glucose blood test strip, 1 each by Other route Daily. USE WITH ONE TOUCH METER, Disp: 100 each, Rfl: 1  •  ibuprofen (ADVIL,MOTRIN) 800 MG tablet, Take 1 tablet by mouth Every 6 (Six) Hours As Needed for Mild Pain ., Disp: 30 tablet, Rfl: 0  •  Lancets misc, 1 each Daily. USE WITH ONE TOUCH METER, Disp: 100 each, Rfl: 1  •  lisinopril (PRINIVIL,ZESTRIL) 30 MG tablet, Take 1 tablet by mouth Daily., Disp: 90 tablet, Rfl: 1  •  meloxicam (MOBIC) 15 MG tablet, Take 15 mg by mouth Daily., Disp: , Rfl:   •  montelukast (SINGULAIR) 10 MG tablet, Take 1 tablet by mouth Daily., Disp: 90 tablet, Rfl: 1  •  ondansetron (Zofran) 4 MG tablet, Take 1 tablet by mouth Every 8 (Eight) Hours As Needed for Nausea or Vomiting., Disp: 30 tablet, Rfl: 0  •  oseltamivir (Tamiflu) 75 MG capsule, Take 1 capsule by mouth 2 (Two) Times a Day., Disp: 10 capsule, Rfl: 0  •  promethazine-dextromethorphan (PROMETHAZINE-DM) 6.25-15 MG/5ML syrup, Take 5 mL by mouth 4 (Four) Times a Day As Needed for Cough., Disp: 180 mL, Rfl: 1  •  Semaglutide, 1 MG/DOSE, (OZEMPIC) 2 MG/1.5ML solution pen-injector, Inject 1 mg under the skin into the appropriate area as directed 1 (One) Time Per  Week., Disp: 3 pen, Rfl: 1  •  tiZANidine (Zanaflex) 4 MG tablet, Take 1 tablet by mouth Every 6 (Six) Hours As Needed for Muscle Spasms., Disp: 30 tablet, Rfl: 0  •  buPROPion XL (WELLBUTRIN XL) 150 MG 24 hr tablet, Take 1 tablet by mouth Daily for 60 days., Disp: 90 tablet, Rfl: 1  •  cetirizine (zyrTEC) 10 MG tablet, Take 1 tablet by mouth Every Night for 30 days., Disp: 30 tablet, Rfl: 0  •  fluticasone (FLONASE) 50 MCG/ACT nasal spray, 2 sprays by Each Nare route Daily for 30 days., Disp: 9.9 mL, Rfl: 0  •  ipratropium-albuterol (DUO-NEB) 0.5-2.5 mg/3 ml nebulizer, Take 3 mL by nebulization Every 4 (Four) Hours As Needed for Wheezing for up to 30 days., Disp: 360 mL, Rfl: 0    Current Facility-Administered Medications:   •  Allergy Serum Injection, 0.5 mL, Subcutaneous, Once, Sunita Hylton, AMBIKA       Result Review :     CMP    CMP 6/8/22 6/8/22 6/17/22 7/20/22    0442 [id number]     Glucose  198 (A) 165 (A) 106 (A)   BUN  24 (A) 16    Creatinine  0.66 0.73    Sodium  134 (A) 134 (A)    Potassium  4.5 4.5    Chloride  100 96 (A)    Calcium  9.6 10.0    Albumin 3.70  4.30    Total Bilirubin <0.2  0.2    Alkaline Phosphatase 56  57    AST (SGOT) 12  18    ALT (SGPT) 16  24    (A) Abnormal value       Comments are available for some flowsheets but are not being displayed.           CBC    CBC 6/17/22 6/20/22 7/20/22   WBC 22.74 (A) 14.65 (A) 11.93 (A)   RBC 4.72 4.50 4.44   Hemoglobin 13.0 12.2 12.0   Hematocrit 40.3 39.2 39.1   MCV 85.4 87.1 88.1   MCH 27.5 27.1 27.0   MCHC 32.3 31.1 (A) 30.7 (A)   RDW 16.9 (A) 16.8 (A) 18.1 (A)   Platelets 402 338 456 (A)   (A) Abnormal value            CBC w/diff    CBC w/Diff 6/17/22 6/20/22 7/20/22   WBC 22.74 (A) 14.65 (A) 11.93 (A)   RBC 4.72 4.50 4.44   Hemoglobin 13.0 12.2 12.0   Hematocrit 40.3 39.2 39.1   MCV 85.4 87.1 88.1   MCH 27.5 27.1 27.0   MCHC 32.3 31.1 (A) 30.7 (A)   RDW 16.9 (A) 16.8 (A) 18.1 (A)   Platelets 402 338 456 (A)   Neutrophil Rel % 79.4 (A) 66.6 68.9    Immature Granulocyte Rel % 3.2 (A) 2.7 (A) 1.0 (A)   Lymphocyte Rel % 10.7 (A) 21.8 21.7   Monocyte Rel % 5.5 6.5 6.3   Eosinophil Rel % 0.7 2.0 1.8   Basophil Rel % 0.5 0.4 0.3   (A) Abnormal value            Lipid Panel    Lipid Panel 6/17/22   Total Cholesterol 255 (A)   Triglycerides 249 (A)   HDL Cholesterol 73 (A)   VLDL Cholesterol 44 (A)   LDL Cholesterol  138 (A)   LDL/HDL Ratio 1.81   (A) Abnormal value            TSH    TSH 6/17/22   TSH 1.580           Most Recent A1C    HGBA1C Most Recent 7/20/22   Hemoglobin A1C 6.50 (A)   (A) Abnormal value                     Assessment and Plan    Diagnoses and all orders for this visit:    1. Cough, unspecified type (Primary)  Assessment & Plan:  Negative for COVID and flu but  currently has a flu.  There is a good chance this was a false negative flu test result.  I do think she is starting into an asthma exacerbation.  We will go ahead and treat with Tamiflu and treat as below.  I do not see any sign of bacterial infection at this time.  Symptoms started yesterday.  May consider antibiotics if needed in the future.  Follow-up if not improving.    Orders:  -     POCT SARS-CoV-2 Antigen JESSICA + Flu  -     Discontinue: promethazine-dextromethorphan (PROMETHAZINE-DM) 6.25-15 MG/5ML syrup; Take 5 mL by mouth 4 (Four) Times a Day As Needed for Cough.  Dispense: 180 mL; Refill: 1  -     Discontinue: albuterol (PROVENTIL) (2.5 MG/3ML) 0.083% nebulizer solution; Take 2.5 mg by nebulization Every 4 (Four) Hours As Needed for Wheezing.  Dispense: 60 each; Refill: 5  -     Discontinue: oseltamivir (Tamiflu) 75 MG capsule; Take 1 capsule by mouth 2 (Two) Times a Day.  Dispense: 10 capsule; Refill: 0  -     oseltamivir (Tamiflu) 75 MG capsule; Take 1 capsule by mouth 2 (Two) Times a Day.  Dispense: 10 capsule; Refill: 0  -     promethazine-dextromethorphan (PROMETHAZINE-DM) 6.25-15 MG/5ML syrup; Take 5 mL by mouth 4 (Four) Times a Day As Needed for Cough.  Dispense: 180  mL; Refill: 1  -     albuterol (PROVENTIL) (2.5 MG/3ML) 0.083% nebulizer solution; Take 2.5 mg by nebulization Every 4 (Four) Hours As Needed for Wheezing.  Dispense: 60 each; Refill: 5    2. Nasal congestion  -     POCT SARS-CoV-2 Antigen JESSICA + Flu    3. Exposure to the flu  -     Discontinue: oseltamivir (Tamiflu) 75 MG capsule; Take 1 capsule by mouth 2 (Two) Times a Day.  Dispense: 10 capsule; Refill: 0  -     oseltamivir (Tamiflu) 75 MG capsule; Take 1 capsule by mouth 2 (Two) Times a Day.  Dispense: 10 capsule; Refill: 0    4. Mild intermittent asthmatic bronchitis with acute exacerbation  -     triamcinolone acetonide (KENALOG-40) injection 60 mg    5. Type 2 diabetes mellitus without complication, without long-term current use of insulin (HCC)  Assessment & Plan:  Stable on Ozempic.  Follow-up due in February.      6. Class 3 severe obesity with serious comorbidity and body mass index (BMI) of 45.0 to 49.9 in adult, unspecified obesity type (HCC)  Assessment & Plan:  Ozempic is an adjunct to diet and exercise to assist with weight loss and control her diabetes.  Encourage diet and exercise and consider increasing dose of Ozempic to 2 mg/week.  Patient defers dose increase at this time.  She will consider and we will discuss further at follow-up in February.        Follow Up    No follow-ups on file.  Patient was given instructions and counseling regarding her condition or for health maintenance advice. Please see specific information pulled into the AVS if appropriate.

## 2022-12-02 DIAGNOSIS — J45.51 SEVERE PERSISTENT ASTHMA WITH EXACERBATION: ICD-10-CM

## 2022-12-02 DIAGNOSIS — R06.09 DYSPNEA ON EXERTION: ICD-10-CM

## 2022-12-02 RX ORDER — PREDNISONE 10 MG/1
TABLET ORAL
Qty: 42 TABLET | Refills: 0 | OUTPATIENT
Start: 2022-12-02

## 2022-12-02 RX ORDER — AZITHROMYCIN 250 MG/1
TABLET, FILM COATED ORAL
Qty: 6 TABLET | Refills: 0 | OUTPATIENT
Start: 2022-12-02

## 2022-12-03 ENCOUNTER — TELEPHONE (OUTPATIENT)
Dept: FAMILY MEDICINE CLINIC | Age: 47
End: 2022-12-03

## 2022-12-03 DIAGNOSIS — J45.41 MODERATE PERSISTENT ASTHMATIC BRONCHITIS WITH ACUTE EXACERBATION: Primary | ICD-10-CM

## 2022-12-03 RX ORDER — AZITHROMYCIN 250 MG/1
TABLET, FILM COATED ORAL
Qty: 6 TABLET | Refills: 0 | Status: SHIPPED | OUTPATIENT
Start: 2022-12-03 | End: 2023-02-02

## 2022-12-03 RX ORDER — PREDNISONE 20 MG/1
TABLET ORAL
Qty: 13 TABLET | Refills: 0 | Status: SHIPPED | OUTPATIENT
Start: 2022-12-03 | End: 2023-02-02

## 2022-12-03 NOTE — TELEPHONE ENCOUNTER
Good afternoon, Karey.  It was good to talk with you earlier.  I have sent prescriptions for a Z-Mahamed and prednisone taper to [facility] for you.  Be aware that the dosing on the prednisone taper is a little different than what you may be accustomed to.  Please follow the package directions.  If you do not see improvement with your symptoms over the next couple of days, I would recommend follow-up.  If you have worsening symptoms over the weekend including significant shortness of breath or fever of 100.5 degrees or higher, then I would recommend going to the emergency department as a precaution.  Hopefully, you will see improvement over the next couple of days.  I will forward a copy of this note to Sunita and her team.  Let me know if you have other concerns.    Vijay Rodriguez MD  [facility]  [Sent as [employer] message]

## 2022-12-10 ENCOUNTER — DOCUMENTATION (OUTPATIENT)
Dept: FAMILY MEDICINE CLINIC | Age: 47
End: 2022-12-10

## 2022-12-13 ENCOUNTER — TELEPHONE (OUTPATIENT)
Dept: FAMILY MEDICINE CLINIC | Age: 47
End: 2022-12-13

## 2022-12-13 NOTE — TELEPHONE ENCOUNTER
Patient called answering service on Saturday noting the appearance of white plaques in her mouth after she had to increase use of her steroid inhaler for asthma following a flu diagnosis.  Prescription sent for nystatin swish and swallow to [facility] in [place].  Thanks, LENARD

## 2022-12-28 ENCOUNTER — CLINICAL SUPPORT (OUTPATIENT)
Dept: FAMILY MEDICINE CLINIC | Age: 47
End: 2022-12-28

## 2022-12-28 DIAGNOSIS — J30.9 ALLERGIC RHINITIS, UNSPECIFIED SEASONALITY, UNSPECIFIED TRIGGER: Primary | ICD-10-CM

## 2022-12-28 PROCEDURE — 95115 IMMUNOTHERAPY ONE INJECTION: CPT | Performed by: FAMILY MEDICINE

## 2023-01-04 DIAGNOSIS — R11.0 NAUSEA: ICD-10-CM

## 2023-01-04 RX ORDER — ONDANSETRON 4 MG/1
TABLET, FILM COATED ORAL
Qty: 30 TABLET | Refills: 0 | Status: SHIPPED | OUTPATIENT
Start: 2023-01-04 | End: 2023-02-02 | Stop reason: DRUGHIGH

## 2023-01-27 ENCOUNTER — CLINICAL SUPPORT (OUTPATIENT)
Dept: FAMILY MEDICINE CLINIC | Age: 48
End: 2023-01-27
Payer: COMMERCIAL

## 2023-01-27 DIAGNOSIS — J30.9 ALLERGIC RHINITIS, UNSPECIFIED SEASONALITY, UNSPECIFIED TRIGGER: Primary | ICD-10-CM

## 2023-01-27 PROCEDURE — 95115 IMMUNOTHERAPY ONE INJECTION: CPT | Performed by: FAMILY MEDICINE

## 2023-02-02 ENCOUNTER — OFFICE VISIT (OUTPATIENT)
Dept: FAMILY MEDICINE CLINIC | Age: 48
End: 2023-02-02
Payer: COMMERCIAL

## 2023-02-02 ENCOUNTER — LAB (OUTPATIENT)
Dept: LAB | Facility: HOSPITAL | Age: 48
End: 2023-02-02
Payer: COMMERCIAL

## 2023-02-02 VITALS
SYSTOLIC BLOOD PRESSURE: 140 MMHG | BODY MASS INDEX: 49 KG/M2 | HEIGHT: 64 IN | OXYGEN SATURATION: 97 % | DIASTOLIC BLOOD PRESSURE: 83 MMHG | WEIGHT: 287 LBS | HEART RATE: 84 BPM

## 2023-02-02 DIAGNOSIS — N91.2 AMENORRHEA: ICD-10-CM

## 2023-02-02 DIAGNOSIS — F34.9 PERSISTENT MOOD (AFFECTIVE) DISORDER, UNSPECIFIED: ICD-10-CM

## 2023-02-02 DIAGNOSIS — J30.9 ALLERGIC RHINITIS, UNSPECIFIED SEASONALITY, UNSPECIFIED TRIGGER: ICD-10-CM

## 2023-02-02 DIAGNOSIS — D72.829 LEUKOCYTOSIS, UNSPECIFIED TYPE: ICD-10-CM

## 2023-02-02 DIAGNOSIS — R11.0 NAUSEA: ICD-10-CM

## 2023-02-02 DIAGNOSIS — E11.9 TYPE 2 DIABETES MELLITUS WITHOUT COMPLICATION, WITHOUT LONG-TERM CURRENT USE OF INSULIN: ICD-10-CM

## 2023-02-02 DIAGNOSIS — I10 ESSENTIAL (PRIMARY) HYPERTENSION: ICD-10-CM

## 2023-02-02 DIAGNOSIS — R53.83 OTHER FATIGUE: ICD-10-CM

## 2023-02-02 DIAGNOSIS — E66.01 CLASS 3 SEVERE OBESITY WITH SERIOUS COMORBIDITY AND BODY MASS INDEX (BMI) OF 45.0 TO 49.9 IN ADULT, UNSPECIFIED OBESITY TYPE: ICD-10-CM

## 2023-02-02 DIAGNOSIS — Z23 NEED FOR COVID-19 VACCINE: Primary | ICD-10-CM

## 2023-02-02 DIAGNOSIS — G89.28 CHRONIC POSTOPERATIVE PAIN: ICD-10-CM

## 2023-02-02 PROBLEM — R73.9 HYPERGLYCEMIA: Status: RESOLVED | Noted: 2022-06-17 | Resolved: 2023-02-02

## 2023-02-02 PROBLEM — R05.9 COUGH: Status: RESOLVED | Noted: 2022-04-25 | Resolved: 2023-02-02

## 2023-02-02 PROBLEM — Z20.828 EXPOSURE TO THE FLU: Status: RESOLVED | Noted: 2022-12-01 | Resolved: 2023-02-02

## 2023-02-02 PROBLEM — F41.9 ANXIETY: Status: RESOLVED | Noted: 2021-12-16 | Resolved: 2023-02-02

## 2023-02-02 PROBLEM — R09.81 NASAL CONGESTION: Status: RESOLVED | Noted: 2022-12-01 | Resolved: 2023-02-02

## 2023-02-02 PROBLEM — J45.909 ASTHMATIC BRONCHITIS: Status: RESOLVED | Noted: 2021-12-16 | Resolved: 2023-02-02

## 2023-02-02 LAB
ALBUMIN SERPL-MCNC: 4.3 G/DL (ref 3.5–5.2)
ALBUMIN UR-MCNC: 1.4 MG/DL
ALBUMIN/GLOB SERPL: 1.4 G/DL
ALP SERPL-CCNC: 73 U/L (ref 39–117)
ALT SERPL W P-5'-P-CCNC: 12 U/L (ref 1–33)
ANION GAP SERPL CALCULATED.3IONS-SCNC: 10.3 MMOL/L (ref 5–15)
AST SERPL-CCNC: 15 U/L (ref 1–32)
BASOPHILS # BLD AUTO: 0.04 10*3/MM3 (ref 0–0.2)
BASOPHILS NFR BLD AUTO: 0.3 % (ref 0–1.5)
BILIRUB SERPL-MCNC: 0.2 MG/DL (ref 0–1.2)
BUN SERPL-MCNC: 9 MG/DL (ref 6–20)
BUN/CREAT SERPL: 12 (ref 7–25)
CALCIUM SPEC-SCNC: 10.1 MG/DL (ref 8.6–10.5)
CHLORIDE SERPL-SCNC: 99 MMOL/L (ref 98–107)
CHOLEST SERPL-MCNC: 181 MG/DL (ref 0–200)
CO2 SERPL-SCNC: 27.7 MMOL/L (ref 22–29)
CREAT SERPL-MCNC: 0.75 MG/DL (ref 0.57–1)
CREAT UR-MCNC: 131.8 MG/DL
DEPRECATED RDW RBC AUTO: 53.9 FL (ref 37–54)
EGFRCR SERPLBLD CKD-EPI 2021: 98.3 ML/MIN/1.73
EOSINOPHIL # BLD AUTO: 0.47 10*3/MM3 (ref 0–0.4)
EOSINOPHIL NFR BLD AUTO: 3.7 % (ref 0.3–6.2)
ERYTHROCYTE [DISTWIDTH] IN BLOOD BY AUTOMATED COUNT: 17.3 % (ref 12.3–15.4)
FSH SERPL-ACNC: 2.91 MIU/ML
GLOBULIN UR ELPH-MCNC: 3.1 GM/DL
GLUCOSE SERPL-MCNC: 98 MG/DL (ref 65–99)
HBA1C MFR BLD: 5.8 % (ref 4.8–5.6)
HCG INTACT+B SERPL-ACNC: <1 MIU/ML
HCT VFR BLD AUTO: 39.4 % (ref 34–46.6)
HDLC SERPL-MCNC: 64 MG/DL (ref 40–60)
HGB BLD-MCNC: 12.2 G/DL (ref 12–15.9)
IMM GRANULOCYTES # BLD AUTO: 0.1 10*3/MM3 (ref 0–0.05)
IMM GRANULOCYTES NFR BLD AUTO: 0.8 % (ref 0–0.5)
LDLC SERPL CALC-MCNC: 95 MG/DL (ref 0–100)
LDLC/HDLC SERPL: 1.44 {RATIO}
LH SERPL-ACNC: 3.98 MIU/ML
LYMPHOCYTES # BLD AUTO: 2.45 10*3/MM3 (ref 0.7–3.1)
LYMPHOCYTES NFR BLD AUTO: 19.3 % (ref 19.6–45.3)
MCH RBC QN AUTO: 26.3 PG (ref 26.6–33)
MCHC RBC AUTO-ENTMCNC: 31 G/DL (ref 31.5–35.7)
MCV RBC AUTO: 85.1 FL (ref 79–97)
MICROALBUMIN/CREAT UR: 10.6 MG/G
MONOCYTES # BLD AUTO: 0.64 10*3/MM3 (ref 0.1–0.9)
MONOCYTES NFR BLD AUTO: 5 % (ref 5–12)
NEUTROPHILS NFR BLD AUTO: 70.9 % (ref 42.7–76)
NEUTROPHILS NFR BLD AUTO: 8.99 10*3/MM3 (ref 1.7–7)
PLATELET # BLD AUTO: 501 10*3/MM3 (ref 140–450)
PMV BLD AUTO: 8.9 FL (ref 6–12)
POTASSIUM SERPL-SCNC: 4.3 MMOL/L (ref 3.5–5.2)
PROGEST SERPL-MCNC: 0.32 NG/ML
PROT SERPL-MCNC: 7.4 G/DL (ref 6–8.5)
RBC # BLD AUTO: 4.63 10*6/MM3 (ref 3.77–5.28)
SODIUM SERPL-SCNC: 137 MMOL/L (ref 136–145)
TRIGL SERPL-MCNC: 124 MG/DL (ref 0–150)
VIT B12 BLD-MCNC: 381 PG/ML (ref 211–946)
VLDLC SERPL-MCNC: 22 MG/DL (ref 5–40)
WBC NRBC COR # BLD: 12.69 10*3/MM3 (ref 3.4–10.8)

## 2023-02-02 PROCEDURE — 80061 LIPID PANEL: CPT

## 2023-02-02 PROCEDURE — 80053 COMPREHEN METABOLIC PANEL: CPT

## 2023-02-02 PROCEDURE — 82570 ASSAY OF URINE CREATININE: CPT

## 2023-02-02 PROCEDURE — 82672 ASSAY OF ESTROGEN: CPT

## 2023-02-02 PROCEDURE — 83036 HEMOGLOBIN GLYCOSYLATED A1C: CPT

## 2023-02-02 PROCEDURE — 85025 COMPLETE CBC W/AUTO DIFF WBC: CPT

## 2023-02-02 PROCEDURE — 83002 ASSAY OF GONADOTROPIN (LH): CPT

## 2023-02-02 PROCEDURE — 83001 ASSAY OF GONADOTROPIN (FSH): CPT

## 2023-02-02 PROCEDURE — 91312 COVID-19 (PFIZER) BIVALENT BOOSTER 12+YRS: CPT | Performed by: NURSE PRACTITIONER

## 2023-02-02 PROCEDURE — 0124A COVID-19 (PFIZER) BIVALENT BOOSTER 12+YRS: CPT | Performed by: NURSE PRACTITIONER

## 2023-02-02 PROCEDURE — 99214 OFFICE O/P EST MOD 30 MIN: CPT | Performed by: NURSE PRACTITIONER

## 2023-02-02 PROCEDURE — 36415 COLL VENOUS BLD VENIPUNCTURE: CPT

## 2023-02-02 PROCEDURE — 82607 VITAMIN B-12: CPT

## 2023-02-02 PROCEDURE — 82043 UR ALBUMIN QUANTITATIVE: CPT

## 2023-02-02 PROCEDURE — 84702 CHORIONIC GONADOTROPIN TEST: CPT

## 2023-02-02 PROCEDURE — 84144 ASSAY OF PROGESTERONE: CPT

## 2023-02-02 RX ORDER — MONTELUKAST SODIUM 10 MG/1
10 TABLET ORAL DAILY
Qty: 90 TABLET | Refills: 1 | Status: SHIPPED | OUTPATIENT
Start: 2023-02-02

## 2023-02-02 RX ORDER — LISINOPRIL 40 MG/1
40 TABLET ORAL DAILY
Qty: 90 TABLET | Refills: 1 | Status: SHIPPED | OUTPATIENT
Start: 2023-02-02 | End: 2023-08-01

## 2023-02-02 RX ORDER — ESCITALOPRAM OXALATE 20 MG/1
30 TABLET ORAL DAILY
Qty: 135 TABLET | Refills: 1 | Status: SHIPPED | OUTPATIENT
Start: 2023-02-02 | End: 2023-04-04 | Stop reason: ALTCHOICE

## 2023-02-02 RX ORDER — TIZANIDINE 4 MG/1
4 TABLET ORAL EVERY 6 HOURS PRN
Qty: 30 TABLET | Refills: 1 | Status: SHIPPED | OUTPATIENT
Start: 2023-02-02

## 2023-02-02 RX ORDER — BUPROPION HYDROCHLORIDE 150 MG/1
150 TABLET ORAL DAILY
Qty: 90 TABLET | Refills: 1 | Status: SHIPPED | OUTPATIENT
Start: 2023-02-02 | End: 2023-04-03

## 2023-02-02 RX ORDER — ONDANSETRON 8 MG/1
8 TABLET, ORALLY DISINTEGRATING ORAL EVERY 12 HOURS PRN
Qty: 20 TABLET | Refills: 1 | Status: SHIPPED | OUTPATIENT
Start: 2023-02-02

## 2023-02-02 RX ORDER — SEMAGLUTIDE 2.68 MG/ML
2 INJECTION, SOLUTION SUBCUTANEOUS WEEKLY
Qty: 3 ML | Refills: 2 | Status: SHIPPED | OUTPATIENT
Start: 2023-02-02 | End: 2023-03-24 | Stop reason: SDUPTHER

## 2023-02-02 NOTE — ASSESSMENT & PLAN NOTE
Will increase Ozempic and provide Zofran to use as needed for nausea.  Patient request a stronger strength tablet than 4 mg tablets.  8 mg Zofran tablet will be provided to use as needed.

## 2023-02-02 NOTE — ASSESSMENT & PLAN NOTE
Discuss changing Lexapro to a different SSRI medication such as Celexa.  Patient defers at this time.  Wants to continue current treatment at this time.

## 2023-02-02 NOTE — ASSESSMENT & PLAN NOTE
Continue to encourage lifestyle measures to assist with weight loss in addition to taking Ozempic for diabetes.  Recommend keeping a daily log of caloric intake.  Weight watchers is a good program.  Encouraged her to continue her efforts.

## 2023-02-02 NOTE — ASSESSMENT & PLAN NOTE
Increase dose of lisinopril to 40 mg once daily.  Follow-up if not improving in the next few weeks.  Encouraged that she monitor her blood pressure at home.  Goal for blood pressure is 130/85 or lower.

## 2023-02-02 NOTE — PROGRESS NOTES
"Chief Complaint  Diabetes (Follow up ), Obesity, Hypertension, Anxiety, and Leukocytosis    Subjective          Karey Lopez presents to [facility]     Patient is a 48-year-old female who is here today to follow-up regarding diabetes.  Currently taking Ozempic 1 mg once per week.  Has plateaued on weight loss with this medication.  She is due to have her hemoglobin A1c rechecked today.  She is not routinely monitoring blood sugar levels.  She did experience nausea with previous dosage increases of Ozempic.  States she is starting weight watchers this week.    Does not routinely monitor blood pressure.  For hypertension she takes lisinopril 30 mg once daily.  Denies side effects and requests refills.    Allergies and asthma are stable currently on Singulair 10 mg once daily.    Anxiety and depression are not completely stable on Wellbutrin 150 mg daily and Lexapro 30 mg daily.  She has considered if she should change Lexapro to a different medication.  Stressors include caring for grandchild half the time.     She has not had a menstrual cycle since May 2022.  Wonders if she could get labs to determine if she is in menopause.    To be seeing podiatrist this week regarding her chronic right foot pain.  She takes vitamin D supplement regularly.  Ask if she would be a candidate for vitamin B12 injections due to ongoing fatigue.  Objective   Vital Signs:   Vitals:    02/02/23 0849   BP: 140/83   BP Location: Left arm   Patient Position: Sitting   Cuff Size: Large Adult   Pulse: 84   SpO2: 97%   Weight: 130 kg (287 lb)   Height: 162.6 cm (64\")      Body mass index is 49.26 kg/m².  Physical Exam  Vitals reviewed.   Constitutional:       General: She is not in acute distress.     Appearance: Normal appearance. She is well-developed. She is obese.   Neck:      Thyroid: No thyroid mass, thyromegaly or thyroid tenderness.   Cardiovascular:      Rate and Rhythm: Normal rate and regular " rhythm.      Pulses:           Posterior tibial pulses are 2+ on the left side.      Heart sounds: Normal heart sounds.      Comments: Wearing right foot orthotic boot  Pulmonary:      Effort: Pulmonary effort is normal.      Breath sounds: Normal breath sounds.   Musculoskeletal:      Right lower leg: No edema.      Left lower leg: No edema.   Skin:     General: Skin is warm and dry.   Neurological:      General: No focal deficit present.      Mental Status: She is alert.   Psychiatric:         Attention and Perception: Attention normal.         Mood and Affect: Mood and affect normal.         Behavior: Behavior normal.           Current Outpatient Medications:   •  albuterol (PROVENTIL) (2.5 MG/3ML) 0.083% nebulizer solution, Take 2.5 mg by nebulization Every 4 (Four) Hours As Needed for Wheezing., Disp: 60 each, Rfl: 5  •  albuterol sulfate  (90 Base) MCG/ACT inhaler, Inhale 2 puffs Every 4 (Four) Hours As Needed for Wheezing., Disp: 8 g, Rfl: 5  •  Blood Glucose Monitoring Suppl (ONE TOUCH ULTRA 2) w/Device kit, 1 each Daily., Disp: 1 each, Rfl: 0  •  buPROPion XL (WELLBUTRIN XL) 150 MG 24 hr tablet, Take 1 tablet by mouth Daily for 60 days., Disp: 90 tablet, Rfl: 1  •  Ergocalciferol 50 MCG (2000 UT) capsule, Take 2,000 Units by mouth Daily., Disp: , Rfl:   •  escitalopram (LEXAPRO) 20 MG tablet, Take 1.5 tablets by mouth Daily., Disp: 135 tablet, Rfl: 1  •  glucose blood test strip, 1 each by Other route Daily. USE WITH ONE TOUCH METER, Disp: 100 each, Rfl: 1  •  ibuprofen (ADVIL,MOTRIN) 800 MG tablet, Take 1 tablet by mouth Every 6 (Six) Hours As Needed for Mild Pain ., Disp: 30 tablet, Rfl: 0  •  Lancets misc, 1 each Daily. USE WITH ONE TOUCH METER, Disp: 100 each, Rfl: 1  •  meloxicam (MOBIC) 15 MG tablet, Take 15 mg by mouth Daily., Disp: , Rfl:   •  montelukast (SINGULAIR) 10 MG tablet, Take 1 tablet by mouth Daily., Disp: 90 tablet, Rfl: 1  •  tiZANidine (Zanaflex) 4 MG tablet, Take 1 tablet by  mouth Every 6 (Six) Hours As Needed for Muscle Spasms., Disp: 30 tablet, Rfl: 1  •  budesonide-formoterol (Symbicort) 160-4.5 MCG/ACT inhaler, Inhale 2 puffs 2 (Two) Times a Day., Disp: 6 g, Rfl: 5  •  ipratropium-albuterol (DUO-NEB) 0.5-2.5 mg/3 ml nebulizer, Take 3 mL by nebulization Every 4 (Four) Hours As Needed for Wheezing for up to 30 days., Disp: 360 mL, Rfl: 0  •  lisinopril (PRINIVIL,ZESTRIL) 40 MG tablet, Take 1 tablet by mouth Daily for 180 days., Disp: 90 tablet, Rfl: 1  •  ondansetron ODT (ZOFRAN-ODT) 8 MG disintegrating tablet, Place 1 tablet on the tongue Every 12 (Twelve) Hours As Needed for Nausea or Vomiting., Disp: 20 tablet, Rfl: 1  •  Semaglutide, 2 MG/DOSE, (Ozempic, 2 MG/DOSE,) 8 MG/3ML solution pen-injector, Inject 2 mg under the skin into the appropriate area as directed 1 (One) Time Per Week., Disp: 3 mL, Rfl: 2    Current Facility-Administered Medications:   •  Allergy Serum Injection, 0.5 mL, Subcutaneous, Once, Sunita Hylton, APRN       Result Review :     CMP    CMP 6/8/22 6/8/22 6/17/22 7/20/22    0442 [id number]     Glucose  198 (A) 165 (A) 106 (A)   BUN  24 (A) 16    Creatinine  0.66 0.73    eGFR  109.0 102.2    Sodium  134 (A) 134 (A)    Potassium  4.5 4.5    Chloride  100 96 (A)    Calcium  9.6 10.0    Total Protein 6.3  6.6    Albumin 3.70  4.30    Globulin   2.3    Total Bilirubin <0.2  0.2    Alkaline Phosphatase 56  57    AST (SGOT) 12  18    ALT (SGPT) 16  24    Albumin/Globulin Ratio   1.9    BUN/Creatinine Ratio  36.4 (A) 21.9    Anion Gap  13.0 16.2 (A)    (A) Abnormal value       Comments are available for some flowsheets but are not being displayed.           CBC    CBC 6/20/22 7/20/22 2/2/23   WBC 14.65 (A) 11.93 (A) 12.69 (A)   RBC 4.50 4.44 4.63   Hemoglobin 12.2 12.0 12.2   Hematocrit 39.2 39.1 39.4   MCV 87.1 88.1 85.1   MCH 27.1 27.0 26.3 (A)   MCHC 31.1 (A) 30.7 (A) 31.0 (A)   RDW 16.8 (A) 18.1 (A) 17.3 (A)   Platelets 338 456 (A) 501 (A)   (A) Abnormal value             CBC w/diff    CBC w/Diff 6/20/22 7/20/22 2/2/23   WBC 14.65 (A) 11.93 (A) 12.69 (A)   RBC 4.50 4.44 4.63   Hemoglobin 12.2 12.0 12.2   Hematocrit 39.2 39.1 39.4   MCV 87.1 88.1 85.1   MCH 27.1 27.0 26.3 (A)   MCHC 31.1 (A) 30.7 (A) 31.0 (A)   RDW 16.8 (A) 18.1 (A) 17.3 (A)   Platelets 338 456 (A) 501 (A)   Neutrophil Rel % 66.6 68.9 70.9   Immature Granulocyte Rel % 2.7 (A) 1.0 (A) 0.8 (A)   Lymphocyte Rel % 21.8 21.7 19.3 (A)   Monocyte Rel % 6.5 6.3 5.0   Eosinophil Rel % 2.0 1.8 3.7   Basophil Rel % 0.4 0.3 0.3   (A) Abnormal value            Lipid Panel    Lipid Panel 6/17/22   Total Cholesterol 255 (A)   Triglycerides 249 (A)   HDL Cholesterol 73 (A)   VLDL Cholesterol 44 (A)   LDL Cholesterol  138 (A)   LDL/HDL Ratio 1.81   (A) Abnormal value            TSH    TSH 6/17/22   TSH 1.580           Most Recent A1C    HGBA1C Most Recent 7/20/22   Hemoglobin A1C 6.50 (A)   (A) Abnormal value                     Assessment and Plan    Diagnoses and all orders for this visit:    1. Need for COVID-19 vaccine (Primary)  -     COVID-19 Bivalent Booster (Pfizer) 12+yrs    2. Class 3 severe obesity with serious comorbidity and body mass index (BMI) of 45.0 to 49.9 in adult, unspecified obesity type (HCC)  Assessment & Plan:  Continue to encourage lifestyle measures to assist with weight loss in addition to taking Ozempic for diabetes.  Recommend keeping a daily log of caloric intake.  Weight watchers is a good program.  Encouraged her to continue her efforts.      3. Essential (primary) hypertension  Assessment & Plan:  Increase dose of lisinopril to 40 mg once daily.  Follow-up if not improving in the next few weeks.  Encouraged that she monitor her blood pressure at home.  Goal for blood pressure is 130/85 or lower.    Orders:  -     lisinopril (PRINIVIL,ZESTRIL) 40 MG tablet; Take 1 tablet by mouth Daily for 180 days.  Dispense: 90 tablet; Refill: 1    4. Type 2 diabetes mellitus without complication, without  long-term current use of insulin (HCC)  Assessment & Plan:  Will increase Ozempic and provide Zofran to use as needed for nausea.  Patient request a stronger strength tablet than 4 mg tablets.  8 mg Zofran tablet will be provided to use as needed.    Orders:  -     Comprehensive metabolic panel; Future  -     Hemoglobin A1c; Future  -     Lipid panel; Future  -     Microalbumin / Creatinine Urine Ratio - Urine, Clean Catch; Future  -     Semaglutide, 2 MG/DOSE, (Ozempic, 2 MG/DOSE,) 8 MG/3ML solution pen-injector; Inject 2 mg under the skin into the appropriate area as directed 1 (One) Time Per Week.  Dispense: 3 mL; Refill: 2    5. Leukocytosis, unspecified type  -     CBC w AUTO Differential; Future    6. Nausea  -     ondansetron ODT (ZOFRAN-ODT) 8 MG disintegrating tablet; Place 1 tablet on the tongue Every 12 (Twelve) Hours As Needed for Nausea or Vomiting.  Dispense: 20 tablet; Refill: 1    7. Other fatigue  -     Vitamin B12; Future    8. Persistent mood (affective) disorder, unspecified (HCC)  Assessment & Plan:  Discuss changing Lexapro to a different SSRI medication such as Celexa.  Patient defers at this time.  Wants to continue current treatment at this time.    Orders:  -     buPROPion XL (WELLBUTRIN XL) 150 MG 24 hr tablet; Take 1 tablet by mouth Daily for 60 days.  Dispense: 90 tablet; Refill: 1  -     escitalopram (LEXAPRO) 20 MG tablet; Take 1.5 tablets by mouth Daily.  Dispense: 135 tablet; Refill: 1    9. Allergic rhinitis, unspecified seasonality, unspecified trigger  -     montelukast (SINGULAIR) 10 MG tablet; Take 1 tablet by mouth Daily.  Dispense: 90 tablet; Refill: 1    10. Amenorrhea  Assessment & Plan:  Menopause is confirmed when there is been 12 months with no menstrual cycle.    Orders:  -     Estrogens, Total; Future  -     FSH & LH; Future  -     Progesterone; Future  -     hCG, Quantitative, Pregnancy; Future    11. Chronic postoperative pain  Assessment & Plan:  Patient states that  tizanidine has been effective for her chronic foot pain intermittently previously and denies side effects and requests a refill.    Orders:  -     tiZANidine (Zanaflex) 4 MG tablet; Take 1 tablet by mouth Every 6 (Six) Hours As Needed for Muscle Spasms.  Dispense: 30 tablet; Refill: 1      Follow Up    No follow-ups on file.  Patient was given instructions and counseling regarding her condition or for health maintenance advice. Please see specific information pulled into the AVS if appropriate.

## 2023-02-02 NOTE — ASSESSMENT & PLAN NOTE
Patient states that tizanidine has been effective for her chronic foot pain intermittently previously and denies side effects and requests a refill.

## 2023-02-09 LAB — ESTROGEN SERPL-MCNC: 967 PG/ML

## 2023-02-10 ENCOUNTER — CLINICAL SUPPORT (OUTPATIENT)
Dept: FAMILY MEDICINE CLINIC | Age: 48
End: 2023-02-10
Payer: COMMERCIAL

## 2023-02-10 DIAGNOSIS — J30.9 ALLERGIC RHINITIS, UNSPECIFIED SEASONALITY, UNSPECIFIED TRIGGER: Primary | ICD-10-CM

## 2023-02-10 PROCEDURE — 95115 IMMUNOTHERAPY ONE INJECTION: CPT | Performed by: FAMILY MEDICINE

## 2023-02-10 NOTE — PROGRESS NOTES
Lipid panel and blood sugar look great.  Kidney and liver function are normal.  You are definitely not menopausal.  Estrogen level is a little high.  My next recommendation would be for you to have a Pap smear and an ultrasound of the uterus and ovaries.  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  You have no current anemia.  Make sure you are compliant with using your CPAP for sleep apnea.  Please let me know if you want to schedule a Pap smear with gynecology or here at this office.

## 2023-02-21 ENCOUNTER — CLINICAL SUPPORT (OUTPATIENT)
Dept: FAMILY MEDICINE CLINIC | Age: 48
End: 2023-02-21
Payer: COMMERCIAL

## 2023-02-21 DIAGNOSIS — J30.9 ALLERGIC RHINITIS, UNSPECIFIED SEASONALITY, UNSPECIFIED TRIGGER: Primary | ICD-10-CM

## 2023-02-21 PROCEDURE — 95115 IMMUNOTHERAPY ONE INJECTION: CPT | Performed by: FAMILY MEDICINE

## 2023-03-15 ENCOUNTER — CLINICAL SUPPORT (OUTPATIENT)
Dept: FAMILY MEDICINE CLINIC | Age: 48
End: 2023-03-15
Payer: COMMERCIAL

## 2023-03-15 DIAGNOSIS — J30.9 ALLERGIC RHINITIS, UNSPECIFIED SEASONALITY, UNSPECIFIED TRIGGER: Primary | ICD-10-CM

## 2023-03-15 PROCEDURE — 95115 IMMUNOTHERAPY ONE INJECTION: CPT | Performed by: FAMILY MEDICINE

## 2023-03-24 DIAGNOSIS — E11.9 TYPE 2 DIABETES MELLITUS WITHOUT COMPLICATION, WITHOUT LONG-TERM CURRENT USE OF INSULIN: ICD-10-CM

## 2023-03-24 RX ORDER — SEMAGLUTIDE 2.68 MG/ML
2 INJECTION, SOLUTION SUBCUTANEOUS WEEKLY
Qty: 9 ML | Refills: 1 | Status: SHIPPED | OUTPATIENT
Start: 2023-03-24

## 2023-03-28 ENCOUNTER — CLINICAL SUPPORT (OUTPATIENT)
Dept: FAMILY MEDICINE CLINIC | Age: 48
End: 2023-03-28
Payer: COMMERCIAL

## 2023-03-28 DIAGNOSIS — J30.9 ALLERGIC RHINITIS, UNSPECIFIED SEASONALITY, UNSPECIFIED TRIGGER: Primary | ICD-10-CM

## 2023-03-28 PROCEDURE — 95115 IMMUNOTHERAPY ONE INJECTION: CPT | Performed by: FAMILY MEDICINE

## 2023-04-04 ENCOUNTER — OFFICE VISIT (OUTPATIENT)
Dept: FAMILY MEDICINE CLINIC | Age: 48
End: 2023-04-04
Payer: COMMERCIAL

## 2023-04-04 VITALS
WEIGHT: 291 LBS | OXYGEN SATURATION: 99 % | SYSTOLIC BLOOD PRESSURE: 144 MMHG | BODY MASS INDEX: 49.68 KG/M2 | HEIGHT: 64 IN | DIASTOLIC BLOOD PRESSURE: 78 MMHG | HEART RATE: 75 BPM

## 2023-04-04 DIAGNOSIS — I10 ESSENTIAL (PRIMARY) HYPERTENSION: ICD-10-CM

## 2023-04-04 DIAGNOSIS — J30.9 ALLERGIC RHINITIS, UNSPECIFIED SEASONALITY, UNSPECIFIED TRIGGER: ICD-10-CM

## 2023-04-04 DIAGNOSIS — F32.A DEPRESSION, UNSPECIFIED DEPRESSION TYPE: Primary | ICD-10-CM

## 2023-04-04 RX ORDER — AMLODIPINE BESYLATE 2.5 MG/1
2.5 TABLET ORAL DAILY
Qty: 30 TABLET | Refills: 2 | Status: SHIPPED | OUTPATIENT
Start: 2023-04-04 | End: 2023-10-01

## 2023-04-04 RX ORDER — CITALOPRAM 20 MG/1
20 TABLET ORAL DAILY
Qty: 30 TABLET | Refills: 2 | Status: SHIPPED | OUTPATIENT
Start: 2023-04-04

## 2023-04-04 RX ORDER — LIDOCAINE 50 MG/G
PATCH TOPICAL
COMMUNITY
Start: 2023-02-27

## 2023-04-04 RX ORDER — MINOCYCLINE HYDROCHLORIDE 100 MG/1
CAPSULE ORAL
COMMUNITY
Start: 2023-03-28

## 2023-04-04 NOTE — ASSESSMENT & PLAN NOTE
Continue lisinopril.  Prefers not to take a diuretic.  Add amlodipine 2.5 mg daily.  Monitor blood pressure at home.  Goal for blood pressure is 130/85 or lower.  Follow-up in 1 month or sooner if concerns.

## 2023-04-04 NOTE — PROGRESS NOTES
Chief Complaint  Fatigue (Patient complains of always feeling tired, unsure is its the increase of ozempic, has intermittent episodes of sweaty, achey, and clammy states once she eats she feels a little better )    Subjective          Karey Lopez presents to [facility]     Patient is a 48-year-old female who is here today with her .  Reports increased fatigue and wants to sleep much more often.  On her weekends off she mostly sleeps.  Patient has concern that it could have been related to increasing her Ozempic dose to 2 mg once per week.  She has had a couple of potential low blood sugar episodes that was not determined with a glucometer.  She has started to take her glucometer with her to get better evaluation.  Blood sugar readings at home tend to run about 130-140 fasting.  Symptoms of questionable low blood sugar episodes included feeling sweaty and clammy and improved with eating.  Hemoglobin A1c was 5.8 in February.  Ozempic dose was increased in hopes of helping her further her weight loss.  She has not felt that Ozempic has decreased appetite significantly.    Patient reports increased stress related to adult child who refuses to go up.  Her relationship with her spouse is good.  The only thing she looks forward to is seeing her granddaughter.  She does sleep less when her granddaughter is around.  She never falls asleep while driving.  She becomes tearful during exam today.  She has taken Lexapro for many years now.  Current dose of Lexapro is 30 mg daily.  A couple years ago we added Wellbutrin 150 mg daily.  Medication no longer seems effective.  Has deferred changing medication in the past.  She states she feels frustration that she is having chronic foot pain for which she sees podiatrist.  Pain interferes with her daily functioning and limits her ability to be physically active.  Frustrated with weight gain.    For hypertension she takes lisinopril 40 mg  "daily.  Denies side effects.  No refills needed at this time.  Currently checking blood pressure at home.  Objective   Vital Signs:   Vitals:    04/04/23 1018 04/04/23 1127   BP: 152/70 144/78   BP Location: Left arm Left arm   Patient Position: Sitting Sitting   Cuff Size: Large Adult Large Adult   Pulse: 75    SpO2: 99%    Weight: 132 kg (291 lb)    Height: 162.6 cm (64\")       Body mass index is 49.95 kg/m².  Physical Exam  Vitals reviewed.   Constitutional:       General: She is not in acute distress.     Appearance: Normal appearance. She is well-developed. She is obese.   Cardiovascular:      Rate and Rhythm: Normal rate and regular rhythm.      Heart sounds: Normal heart sounds.   Pulmonary:      Effort: Pulmonary effort is normal.      Breath sounds: Normal breath sounds.   Musculoskeletal:      Right lower leg: No edema.      Left lower leg: No edema.   Skin:     General: Skin is warm and dry.   Neurological:      General: No focal deficit present.      Mental Status: She is alert.   Psychiatric:         Attention and Perception: Attention normal.         Mood and Affect: Mood and affect normal.         Behavior: Behavior normal.      Comments: Tearful           Current Outpatient Medications:   •  albuterol (PROVENTIL) (2.5 MG/3ML) 0.083% nebulizer solution, Take 2.5 mg by nebulization Every 4 (Four) Hours As Needed for Wheezing., Disp: 60 each, Rfl: 5  •  albuterol sulfate  (90 Base) MCG/ACT inhaler, Inhale 2 puffs Every 4 (Four) Hours As Needed for Wheezing., Disp: 8 g, Rfl: 5  •  Blood Glucose Monitoring Suppl (ONE TOUCH ULTRA 2) w/Device kit, 1 each Daily., Disp: 1 each, Rfl: 0  •  Ergocalciferol 50 MCG (2000 UT) capsule, Take 2,000 Units by mouth Daily., Disp: , Rfl:   •  glucose blood test strip, 1 each by Other route Daily. USE WITH ONE TOUCH METER, Disp: 100 each, Rfl: 1  •  ibuprofen (ADVIL,MOTRIN) 800 MG tablet, Take 1 tablet by mouth Every 6 (Six) Hours As Needed for Mild Pain ., Disp: 30 " tablet, Rfl: 0  •  Lancets misc, 1 each Daily. USE WITH ONE TOUCH METER, Disp: 100 each, Rfl: 1  •  lidocaine (LIDODERM) 5 %, APPLY 1 PATCH DAILY TOPICALLY REMOVE AND DISCARD PATCH WITHIN 12 HOURS OR AS DIRECTED., Disp: , Rfl:   •  lisinopril (PRINIVIL,ZESTRIL) 40 MG tablet, Take 1 tablet by mouth Daily for 180 days., Disp: 90 tablet, Rfl: 1  •  meloxicam (MOBIC) 15 MG tablet, Take 1 tablet by mouth Daily., Disp: , Rfl:   •  minocycline (MINOCIN,DYNACIN) 100 MG capsule, TAKE 1 CAPSULE BY MOUTH TWICE DAILY WITH FULL GLASS OF WATER FOR 14 DAYS. DO NOT TAKE WITH FOOD CONTAINING CALCIUM OR IRON, Disp: , Rfl:   •  montelukast (SINGULAIR) 10 MG tablet, Take 1 tablet by mouth Daily., Disp: 90 tablet, Rfl: 1  •  mupirocin (BACTROBAN) 2 % ointment, APPLY TOPICALLY TO CYST TWICE DAILY FOR 2 WEEKS, Disp: , Rfl:   •  ondansetron ODT (ZOFRAN-ODT) 8 MG disintegrating tablet, Place 1 tablet on the tongue Every 12 (Twelve) Hours As Needed for Nausea or Vomiting., Disp: 20 tablet, Rfl: 1  •  Semaglutide, 2 MG/DOSE, (Ozempic, 2 MG/DOSE,) 8 MG/3ML solution pen-injector, Inject 2 mg under the skin into the appropriate area as directed 1 (One) Time Per Week., Disp: 9 mL, Rfl: 1  •  tiZANidine (Zanaflex) 4 MG tablet, Take 1 tablet by mouth Every 6 (Six) Hours As Needed for Muscle Spasms., Disp: 30 tablet, Rfl: 1  •  amLODIPine (NORVASC) 2.5 MG tablet, Take 1 tablet by mouth Daily for 180 days., Disp: 30 tablet, Rfl: 2  •  budesonide-formoterol (Symbicort) 160-4.5 MCG/ACT inhaler, Inhale 2 puffs 2 (Two) Times a Day. (Patient not taking: Reported on 4/4/2023), Disp: 6 g, Rfl: 5  •  buPROPion XL (WELLBUTRIN XL) 150 MG 24 hr tablet, Take 1 tablet by mouth Daily for 60 days., Disp: 90 tablet, Rfl: 1  •  citalopram (CeleXA) 20 MG tablet, Take 1 tablet by mouth Daily., Disp: 30 tablet, Rfl: 2  •  ipratropium-albuterol (DUO-NEB) 0.5-2.5 mg/3 ml nebulizer, Take 3 mL by nebulization Every 4 (Four) Hours As Needed for Wheezing for up to 30 days.,  Disp: 360 mL, Rfl: 0    Current Facility-Administered Medications:   •  Allergy Serum Injection, 0.5 mL, Subcutaneous, Once, Sunita Hylton, APRN   Past Medical History:   Diagnosis Date   • Allergic rhinitis, unspecified    • Anxiety    • Arthritis    • Asthma    • Asthma, intrinsic 1985   • Cough variant asthma    • Diabetes mellitus 06/2022   • Dorsalgia, unspecified    • Essential (primary) hypertension    • Family history of ischemic heart disease and other diseases of the circulatory system    • Hypertension    • Left lower quadrant pain    • Leucocytosis 2020    due to chronic inflammation per hematology   • Lichen sclerosus 2019   • Obesity, unspecified    • Other fatigue    • Persistent mood (affective) disorder, unspecified    • PONV (postoperative nausea and vomiting)    • Sepsis     Right knee Sepsis   • Sleep apnea, obstructive    • Sleep apnea, unspecified    • Unspecified abdominal pain    • Unspecified ovarian cyst, left side    • Vitamin D deficiency, unspecified          Result Review :     CMP    CMP 6/17/22 7/20/22 2/2/23   Glucose 165 (A) 106 (A) 98   BUN 16  9   Creatinine 0.73  0.75   eGFR 102.2  98.3   Sodium 134 (A)  137   Potassium 4.5  4.3   Chloride 96 (A)  99   Calcium 10.0  10.1   Total Protein 6.6  7.4   Albumin 4.30  4.3   Globulin 2.3  3.1   Total Bilirubin 0.2  0.2   Alkaline Phosphatase 57  73   AST (SGOT) 18  15   ALT (SGPT) 24  12   Albumin/Globulin Ratio 1.9  1.4   BUN/Creatinine Ratio 21.9  12.0   Anion Gap 16.2 (A)  10.3   (A) Abnormal value       Comments are available for some flowsheets but are not being displayed.           CBC    CBC 6/20/22 7/20/22 2/2/23   WBC 14.65 (A) 11.93 (A) 12.69 (A)   RBC 4.50 4.44 4.63   Hemoglobin 12.2 12.0 12.2   Hematocrit 39.2 39.1 39.4   MCV 87.1 88.1 85.1   MCH 27.1 27.0 26.3 (A)   MCHC 31.1 (A) 30.7 (A) 31.0 (A)   RDW 16.8 (A) 18.1 (A) 17.3 (A)   Platelets 338 456 (A) 501 (A)   (A) Abnormal value            CBC w/diff    CBC w/Diff  6/20/22 7/20/22 2/2/23   WBC 14.65 (A) 11.93 (A) 12.69 (A)   RBC 4.50 4.44 4.63   Hemoglobin 12.2 12.0 12.2   Hematocrit 39.2 39.1 39.4   MCV 87.1 88.1 85.1   MCH 27.1 27.0 26.3 (A)   MCHC 31.1 (A) 30.7 (A) 31.0 (A)   RDW 16.8 (A) 18.1 (A) 17.3 (A)   Platelets 338 456 (A) 501 (A)   Neutrophil Rel % 66.6 68.9 70.9   Immature Granulocyte Rel % 2.7 (A) 1.0 (A) 0.8 (A)   Lymphocyte Rel % 21.8 21.7 19.3 (A)   Monocyte Rel % 6.5 6.3 5.0   Eosinophil Rel % 2.0 1.8 3.7   Basophil Rel % 0.4 0.3 0.3   (A) Abnormal value            Lipid Panel    Lipid Panel 6/17/22 2/2/23   Total Cholesterol 255 (A) 181   Triglycerides 249 (A) 124   HDL Cholesterol 73 (A) 64 (A)   VLDL Cholesterol 44 (A) 22   LDL Cholesterol  138 (A) 95   LDL/HDL Ratio 1.81 1.44   (A) Abnormal value            TSH    TSH 6/17/22   TSH 1.580           Most Recent A1C    HGBA1C Most Recent 2/2/23   Hemoglobin A1C 5.80 (A)   (A) Abnormal value                     Assessment and Plan    Diagnoses and all orders for this visit:    1. Depression, unspecified depression type (Primary)  Assessment & Plan:  Stop Lexapro.  Changed to citalopram.  Had considered trial of duloxetine due to chronic pain with her foot and anxiety and depression.  I would feel the need to wean off Wellbutrin if she were to take duloxetine.  She has not tried any other SSRI medications other than Lexapro.  Has taken Lexapro long-term.  Therefore we will change SSRI and continue Wellbutrin for now.  Does not need Wellbutrin refills at this time.  Follow-up in 1 month or sooner if concerns.    Orders:  -     citalopram (CeleXA) 20 MG tablet; Take 1 tablet by mouth Daily.  Dispense: 30 tablet; Refill: 2    2. Essential (primary) hypertension  Assessment & Plan:  Continue lisinopril.  Prefers not to take a diuretic.  Add amlodipine 2.5 mg daily.  Monitor blood pressure at home.  Goal for blood pressure is 130/85 or lower.  Follow-up in 1 month or sooner if concerns.    Orders:  -     amLODIPine  (NORVASC) 2.5 MG tablet; Take 1 tablet by mouth Daily for 180 days.  Dispense: 30 tablet; Refill: 2    3. Allergic rhinitis, unspecified seasonality, unspecified trigger  -     Allergy Serum Injection      Follow Up    No follow-ups on file.  Patient was given instructions and counseling regarding her condition or for health maintenance advice. Please see specific information pulled into the AVS if appropriate.

## 2023-04-04 NOTE — ASSESSMENT & PLAN NOTE
Stop Lexapro.  Changed to citalopram.  Had considered trial of duloxetine due to chronic pain with her foot and anxiety and depression.  I would feel the need to wean off Wellbutrin if she were to take duloxetine.  She has not tried any other SSRI medications other than Lexapro.  Has taken Lexapro long-term.  Therefore we will change SSRI and continue Wellbutrin for now.  Does not need Wellbutrin refills at this time.  Follow-up in 1 month or sooner if concerns.

## 2023-04-14 ENCOUNTER — CLINICAL SUPPORT (OUTPATIENT)
Dept: FAMILY MEDICINE CLINIC | Age: 48
End: 2023-04-14
Payer: COMMERCIAL

## 2023-04-14 DIAGNOSIS — J30.9 ALLERGIC RHINITIS, UNSPECIFIED SEASONALITY, UNSPECIFIED TRIGGER: Primary | ICD-10-CM

## 2023-04-14 PROCEDURE — 95115 IMMUNOTHERAPY ONE INJECTION: CPT | Performed by: FAMILY MEDICINE

## 2023-04-21 ENCOUNTER — CLINICAL SUPPORT (OUTPATIENT)
Dept: FAMILY MEDICINE CLINIC | Age: 48
End: 2023-04-21
Payer: COMMERCIAL

## 2023-04-21 DIAGNOSIS — J30.9 ALLERGIC RHINITIS, UNSPECIFIED SEASONALITY, UNSPECIFIED TRIGGER: Primary | ICD-10-CM

## 2023-04-21 PROCEDURE — 95115 IMMUNOTHERAPY ONE INJECTION: CPT | Performed by: FAMILY MEDICINE

## 2023-05-01 ENCOUNTER — CLINICAL SUPPORT (OUTPATIENT)
Dept: FAMILY MEDICINE CLINIC | Age: 48
End: 2023-05-01
Payer: COMMERCIAL

## 2023-05-01 DIAGNOSIS — J30.9 ALLERGIC RHINITIS, UNSPECIFIED SEASONALITY, UNSPECIFIED TRIGGER: Primary | ICD-10-CM

## 2023-05-01 PROCEDURE — 95115 IMMUNOTHERAPY ONE INJECTION: CPT | Performed by: FAMILY MEDICINE

## 2023-05-04 ENCOUNTER — OFFICE VISIT (OUTPATIENT)
Dept: FAMILY MEDICINE CLINIC | Age: 48
End: 2023-05-04
Payer: COMMERCIAL

## 2023-05-04 VITALS
HEIGHT: 64 IN | WEIGHT: 293 LBS | SYSTOLIC BLOOD PRESSURE: 124 MMHG | DIASTOLIC BLOOD PRESSURE: 75 MMHG | OXYGEN SATURATION: 95 % | HEART RATE: 105 BPM | BODY MASS INDEX: 50.02 KG/M2

## 2023-05-04 DIAGNOSIS — J01.00 ACUTE NON-RECURRENT MAXILLARY SINUSITIS: Primary | ICD-10-CM

## 2023-05-04 DIAGNOSIS — E11.9 TYPE 2 DIABETES MELLITUS WITHOUT COMPLICATION, WITHOUT LONG-TERM CURRENT USE OF INSULIN: ICD-10-CM

## 2023-05-04 DIAGNOSIS — F32.A DEPRESSION, UNSPECIFIED DEPRESSION TYPE: ICD-10-CM

## 2023-05-04 DIAGNOSIS — I10 ESSENTIAL (PRIMARY) HYPERTENSION: ICD-10-CM

## 2023-05-04 PROBLEM — R11.0 NAUSEA: Status: RESOLVED | Noted: 2023-02-02 | Resolved: 2023-05-04

## 2023-05-04 PROBLEM — Z23 NEED FOR COVID-19 VACCINE: Status: RESOLVED | Noted: 2023-02-02 | Resolved: 2023-05-04

## 2023-05-04 PROBLEM — D72.829 LEUKOCYTOSIS: Status: RESOLVED | Noted: 2022-06-17 | Resolved: 2023-05-04

## 2023-05-04 RX ORDER — TIRZEPATIDE 2.5 MG/.5ML
2.5 INJECTION, SOLUTION SUBCUTANEOUS WEEKLY
Qty: 2 ML | Refills: 2 | Status: SHIPPED | OUTPATIENT
Start: 2023-05-04

## 2023-05-04 RX ORDER — AZITHROMYCIN 250 MG/1
TABLET, FILM COATED ORAL
Qty: 6 TABLET | Refills: 0 | Status: SHIPPED | OUTPATIENT
Start: 2023-05-04

## 2023-05-04 NOTE — PROGRESS NOTES
"Chief Complaint  Depression (1 month follow up ) and Hypertension    Subjective          Karey Lopez presents to [facility]     Patient is a 48-year-old female who is here today with her  to follow-up regarding depression.  He reports she is feeling better with change from Lexapro to citalopram 20 mg once daily.  She also feels better with discontinuing Ozempic.  Ozempic caused fatigue and nausea.  Last was about 4 weeks ago.  She continues to take Wellbutrin 150 mg daily unchanged.    Routinely seeing podiatrist who recently recommended trial of Lyrica for pain.  She has not been able to  from pharmacy yet.  She did discontinue meloxicam as she felt it was worsening her blood pressure.     Regarding type 2 diabetes, she would like to try Mounjaro.  Patient states her brother takes Mounjaro and is doing well.  She is also wanting assistance with weight loss and is considering consultation for bariatric surgery.    Patient states seasonal allergy symptoms have been more bothersome and she is consistently having a thick yellow-green nasal discharge with consistent sinus pain and pressure consistent with her previous sinus infection.  Defers COVID testing.  Denies fever.    Objective   Vital Signs:   Vitals:    05/04/23 1613   BP: 124/75   BP Location: Left arm   Patient Position: Sitting   Cuff Size: Large Adult   Pulse: 105   SpO2: 95%   Weight: 133 kg (294 lb)   Height: 162.6 cm (64\")      Body mass index is 50.46 kg/m².  Physical Exam  Vitals reviewed.   Constitutional:       General: She is not in acute distress.     Appearance: Normal appearance. She is well-developed. She is obese.   HENT:      Right Ear: A middle ear effusion is present.      Left Ear: A middle ear effusion is present.      Nose:      Right Sinus: Maxillary sinus tenderness present.      Left Sinus: Maxillary sinus tenderness present.   Cardiovascular:      Rate and Rhythm: Normal rate and " regular rhythm.      Heart sounds: Normal heart sounds.   Pulmonary:      Effort: Pulmonary effort is normal.      Breath sounds: Normal breath sounds.   Musculoskeletal:      Right lower leg: No edema.      Left lower leg: No edema.   Skin:     General: Skin is warm and dry.   Neurological:      General: No focal deficit present.      Mental Status: She is alert.   Psychiatric:         Attention and Perception: Attention normal.         Mood and Affect: Mood and affect normal.         Behavior: Behavior normal.           Current Outpatient Medications:   •  albuterol (PROVENTIL) (2.5 MG/3ML) 0.083% nebulizer solution, Take 2.5 mg by nebulization Every 4 (Four) Hours As Needed for Wheezing., Disp: 60 each, Rfl: 5  •  albuterol sulfate  (90 Base) MCG/ACT inhaler, Inhale 2 puffs Every 4 (Four) Hours As Needed for Wheezing., Disp: 8 g, Rfl: 5  •  amLODIPine (NORVASC) 2.5 MG tablet, Take 1 tablet by mouth Daily for 180 days., Disp: 30 tablet, Rfl: 2  •  Blood Glucose Monitoring Suppl (ONE TOUCH ULTRA 2) w/Device kit, 1 each Daily., Disp: 1 each, Rfl: 0  •  citalopram (CeleXA) 20 MG tablet, Take 1 tablet by mouth Daily., Disp: 30 tablet, Rfl: 2  •  Ergocalciferol 50 MCG (2000 UT) capsule, Take 2,000 Units by mouth Daily., Disp: , Rfl:   •  glucose blood test strip, 1 each by Other route Daily. USE WITH ONE TOUCH METER, Disp: 100 each, Rfl: 1  •  ibuprofen (ADVIL,MOTRIN) 800 MG tablet, Take 1 tablet by mouth Every 6 (Six) Hours As Needed for Mild Pain ., Disp: 30 tablet, Rfl: 0  •  Lancets misc, 1 each Daily. USE WITH ONE TOUCH METER, Disp: 100 each, Rfl: 1  •  lidocaine (LIDODERM) 5 %, APPLY 1 PATCH DAILY TOPICALLY REMOVE AND DISCARD PATCH WITHIN 12 HOURS OR AS DIRECTED., Disp: , Rfl:   •  lisinopril (PRINIVIL,ZESTRIL) 40 MG tablet, Take 1 tablet by mouth Daily for 180 days., Disp: 90 tablet, Rfl: 1  •  montelukast (SINGULAIR) 10 MG tablet, Take 1 tablet by mouth Daily., Disp: 90 tablet, Rfl: 1  •  mupirocin  (BACTROBAN) 2 % ointment, APPLY TOPICALLY TO CYST TWICE DAILY FOR 2 WEEKS, Disp: , Rfl:   •  ondansetron ODT (ZOFRAN-ODT) 8 MG disintegrating tablet, Place 1 tablet on the tongue Every 12 (Twelve) Hours As Needed for Nausea or Vomiting., Disp: 20 tablet, Rfl: 1  •  tiZANidine (Zanaflex) 4 MG tablet, Take 1 tablet by mouth Every 6 (Six) Hours As Needed for Muscle Spasms., Disp: 30 tablet, Rfl: 1  •  azithromycin (Zithromax Z-Mahamed) 250 MG tablet, Take 2 tablets by mouth on day 1, then 1 tablet daily on days 2-5, Disp: 6 tablet, Rfl: 0  •  buPROPion XL (WELLBUTRIN XL) 150 MG 24 hr tablet, Take 1 tablet by mouth Daily for 60 days., Disp: 90 tablet, Rfl: 1  •  ipratropium-albuterol (DUO-NEB) 0.5-2.5 mg/3 ml nebulizer, Take 3 mL by nebulization Every 4 (Four) Hours As Needed for Wheezing for up to 30 days., Disp: 360 mL, Rfl: 0  •  Tirzepatide (Mounjaro) 2.5 MG/0.5ML solution pen-injector, Inject 0.5 mL under the skin into the appropriate area as directed 1 (One) Time Per Week., Disp: 2 mL, Rfl: 2    Current Facility-Administered Medications:   •  Allergy Serum Injection, 0.5 mL, Subcutaneous, Once, Sunita Hylton, APRN   Past Medical History:   Diagnosis Date   • Acute non-recurrent maxillary sinusitis 5/4/2023   • Allergic rhinitis, unspecified    • Anxiety    • Arthritis    • Asthma    • Asthma, intrinsic 1985   • Cough variant asthma    • Diabetes mellitus 06/2022   • Dorsalgia, unspecified    • Essential (primary) hypertension    • Family history of ischemic heart disease and other diseases of the circulatory system    • Hypertension    • Left lower quadrant pain    • Leucocytosis 2020    due to chronic inflammation per hematology   • Lichen sclerosus 2019   • Obesity, unspecified    • Other fatigue    • Persistent mood (affective) disorder, unspecified    • PONV (postoperative nausea and vomiting)    • Sepsis     Right knee Sepsis   • Sleep apnea, obstructive    • Sleep apnea, unspecified    • Unspecified abdominal  pain    • Unspecified ovarian cyst, left side    • Vitamin D deficiency, unspecified          Result Review :     CMP        6/17/2022    15:43 7/20/2022    08:47 2/2/2023    09:56   CMP   Glucose 165   106   98     BUN 16    9     Creatinine 0.73    0.75     EGFR 102.2    98.3     Sodium 134    137     Potassium 4.5    4.3     Chloride 96    99     Calcium 10.0    10.1     Total Protein 6.6    7.4     Albumin 4.30    4.3     Globulin 2.3    3.1     Total Bilirubin 0.2    0.2     Alkaline Phosphatase 57    73     AST (SGOT) 18    15     ALT (SGPT) 24    12     Albumin/Globulin Ratio 1.9    1.4     BUN/Creatinine Ratio 21.9    12.0     Anion Gap 16.2    10.3       CBC        6/20/2022    09:22 7/20/2022    08:47 2/2/2023    09:56   CBC   WBC 14.65   11.93   12.69     RBC 4.50   4.44   4.63     Hemoglobin 12.2   12.0   12.2     Hematocrit 39.2   39.1   39.4     MCV 87.1   88.1   85.1     MCH 27.1   27.0   26.3     MCHC 31.1   30.7   31.0     RDW 16.8   18.1   17.3     Platelets 338   456   501       CBC w/diff        6/20/2022    09:22 7/20/2022    08:47 2/2/2023    09:56   CBC w/Diff   WBC 14.65   11.93   12.69     RBC 4.50   4.44   4.63     Hemoglobin 12.2   12.0   12.2     Hematocrit 39.2   39.1   39.4     MCV 87.1   88.1   85.1     MCH 27.1   27.0   26.3     MCHC 31.1   30.7   31.0     RDW 16.8   18.1   17.3     Platelets 338   456   501     Neutrophil Rel % 66.6   68.9   70.9     Immature Granulocyte Rel % 2.7   1.0   0.8     Lymphocyte Rel % 21.8   21.7   19.3     Monocyte Rel % 6.5   6.3   5.0     Eosinophil Rel % 2.0   1.8   3.7     Basophil Rel % 0.4   0.3   0.3       Lipid Panel        6/17/2022    15:43 2/2/2023    09:56   Lipid Panel   Total Cholesterol 255   181     Triglycerides 249   124     HDL Cholesterol 73   64     VLDL Cholesterol 44   22     LDL Cholesterol  138   95     LDL/HDL Ratio 1.81   1.44       TSH        6/17/2022    15:43   TSH   TSH 1.580       Most Recent A1C        2/2/2023    09:56    HGBA1C Most Recent   Hemoglobin A1C 5.80                Assessment and Plan    Diagnoses and all orders for this visit:    1. Acute non-recurrent maxillary sinusitis (Primary)  Assessment & Plan:  Follow-up if symptoms or not improving.  Advise nasal saline rinses.    Orders:  -     azithromycin (Zithromax Z-Mahamed) 250 MG tablet; Take 2 tablets by mouth on day 1, then 1 tablet daily on days 2-5  Dispense: 6 tablet; Refill: 0    2. Type 2 diabetes mellitus without complication, without long-term current use of insulin  Assessment & Plan:  Mounjaro may need a prior authorization.  Noted fatigue and nausea on Ozempic.    Orders:  -     Tirzepatide (Mounjaro) 2.5 MG/0.5ML solution pen-injector; Inject 0.5 mL under the skin into the appropriate area as directed 1 (One) Time Per Week.  Dispense: 2 mL; Refill: 2    3. Essential (primary) hypertension  Assessment & Plan:  Improved.  Continue lisinopril and amlodipine.      4. Depression, unspecified depression type  Assessment & Plan:  Improved.  Continue citalopram and Wellbutrin.        Follow Up    No follow-ups on file.  Patient was given instructions and counseling regarding her condition or for health maintenance advice. Please see specific information pulled into the AVS if appropriate.

## 2023-05-05 ENCOUNTER — PRIOR AUTHORIZATION (OUTPATIENT)
Dept: FAMILY MEDICINE CLINIC | Age: 48
End: 2023-05-05
Payer: COMMERCIAL

## 2023-05-05 RX ORDER — AMLODIPINE BESYLATE 2.5 MG/1
TABLET ORAL
Qty: 90 TABLET | Refills: 0 | Status: SHIPPED | OUTPATIENT
Start: 2023-05-05

## 2023-05-09 DIAGNOSIS — H69.90 EUSTACHIAN TUBE DISORDER, UNSPECIFIED LATERALITY: ICD-10-CM

## 2023-05-09 DIAGNOSIS — H69.90 EUSTACHIAN TUBE DISORDER, UNSPECIFIED LATERALITY: Primary | ICD-10-CM

## 2023-05-09 RX ORDER — FLUTICASONE PROPIONATE 50 MCG
2 SPRAY, SUSPENSION (ML) NASAL DAILY
Qty: 15 ML | Refills: 1 | Status: SHIPPED | OUTPATIENT
Start: 2023-05-09

## 2023-05-10 RX ORDER — FLUTICASONE PROPIONATE 50 MCG
SPRAY, SUSPENSION (ML) NASAL
Qty: 48 G | OUTPATIENT
Start: 2023-05-10

## 2023-05-11 ENCOUNTER — CLINICAL SUPPORT (OUTPATIENT)
Dept: FAMILY MEDICINE CLINIC | Age: 48
End: 2023-05-11
Payer: COMMERCIAL

## 2023-05-11 DIAGNOSIS — J30.9 ALLERGIC RHINITIS, UNSPECIFIED SEASONALITY, UNSPECIFIED TRIGGER: Primary | ICD-10-CM

## 2023-05-11 PROCEDURE — 95115 IMMUNOTHERAPY ONE INJECTION: CPT | Performed by: FAMILY MEDICINE

## 2023-05-26 ENCOUNTER — CLINICAL SUPPORT (OUTPATIENT)
Dept: FAMILY MEDICINE CLINIC | Age: 48
End: 2023-05-26
Payer: COMMERCIAL

## 2023-05-26 DIAGNOSIS — J30.9 ALLERGIC RHINITIS, UNSPECIFIED SEASONALITY, UNSPECIFIED TRIGGER: Primary | ICD-10-CM

## 2023-05-26 PROCEDURE — 95115 IMMUNOTHERAPY ONE INJECTION: CPT | Performed by: FAMILY MEDICINE

## 2023-06-13 ENCOUNTER — CLINICAL SUPPORT (OUTPATIENT)
Dept: FAMILY MEDICINE CLINIC | Age: 48
End: 2023-06-13
Payer: COMMERCIAL

## 2023-06-13 DIAGNOSIS — J30.9 ALLERGIC RHINITIS, UNSPECIFIED SEASONALITY, UNSPECIFIED TRIGGER: Primary | ICD-10-CM

## 2023-06-13 PROCEDURE — 95115 IMMUNOTHERAPY ONE INJECTION: CPT | Performed by: FAMILY MEDICINE

## 2023-06-15 DIAGNOSIS — M25.50 ARTHRALGIA, UNSPECIFIED JOINT: ICD-10-CM

## 2023-06-15 DIAGNOSIS — G89.28 CHRONIC POSTOPERATIVE PAIN: ICD-10-CM

## 2023-06-16 RX ORDER — TIZANIDINE 4 MG/1
TABLET ORAL
Qty: 30 TABLET | Refills: 1 | Status: SHIPPED | OUTPATIENT
Start: 2023-06-16

## 2023-06-16 RX ORDER — IBUPROFEN 800 MG/1
TABLET ORAL
Qty: 30 TABLET | Refills: 1 | Status: SHIPPED | OUTPATIENT
Start: 2023-06-16

## 2023-06-23 PROBLEM — R20.0 NUMBNESS AND TINGLING: Status: ACTIVE | Noted: 2023-06-23

## 2023-06-23 PROBLEM — M25.569 KNEE PAIN: Status: ACTIVE | Noted: 2023-06-23

## 2023-06-23 PROBLEM — N92.6 MENSTRUAL IRREGULARITY: Status: ACTIVE | Noted: 2023-06-23

## 2023-06-23 PROBLEM — R32 LEAKING OF URINE: Status: ACTIVE | Noted: 2023-06-23

## 2023-06-23 PROBLEM — M25.519 SHOULDER PAIN: Status: ACTIVE | Noted: 2023-06-23

## 2023-06-23 PROBLEM — M79.673 FOOT PAIN: Status: ACTIVE | Noted: 2023-06-23

## 2023-06-23 PROBLEM — R61 NIGHT SWEATS: Status: ACTIVE | Noted: 2023-06-23

## 2023-06-23 PROBLEM — M25.473 ANKLE SWELLING: Status: ACTIVE | Noted: 2023-06-23

## 2023-06-23 PROBLEM — M79.10 MUSCLE PAIN: Status: ACTIVE | Noted: 2023-06-23

## 2023-06-23 PROBLEM — M54.9 BACK PAIN: Status: ACTIVE | Noted: 2023-06-23

## 2023-06-23 PROBLEM — T78.40XA ALLERGIES: Status: ACTIVE | Noted: 2023-06-23

## 2023-06-23 PROBLEM — M54.30 SCIATICA: Status: ACTIVE | Noted: 2023-06-23

## 2023-06-23 PROBLEM — R20.2 NUMBNESS AND TINGLING: Status: ACTIVE | Noted: 2023-06-23

## 2023-06-26 PROBLEM — J01.00 ACUTE NON-RECURRENT MAXILLARY SINUSITIS: Status: RESOLVED | Noted: 2023-05-04 | Resolved: 2023-06-26

## 2023-06-26 PROBLEM — Z82.49 FAMILY HISTORY OF ISCHEMIC HEART DISEASE AND OTHER DISEASES OF THE CIRCULATORY SYSTEM: Status: RESOLVED | Noted: 2018-03-21 | Resolved: 2023-06-26

## 2023-06-26 PROBLEM — E66.01 CLASS 3 SEVERE OBESITY WITH SERIOUS COMORBIDITY AND BODY MASS INDEX (BMI) OF 45.0 TO 49.9 IN ADULT: Status: RESOLVED | Noted: 2022-12-01 | Resolved: 2023-06-26

## 2023-06-26 PROBLEM — E66.813 CLASS 3 SEVERE OBESITY WITH SERIOUS COMORBIDITY AND BODY MASS INDEX (BMI) OF 45.0 TO 49.9 IN ADULT: Status: RESOLVED | Noted: 2022-12-01 | Resolved: 2023-06-26

## 2023-06-27 PROBLEM — K21.9 GERD (GASTROESOPHAGEAL REFLUX DISEASE): Status: ACTIVE | Noted: 2023-06-27

## 2023-07-24 NOTE — PLAN OF CARE
"Goal Outcome Evaluation:  Plan of Care Reviewed With: patient        Progress: no change  Outcome Evaluation: At the begining of the shift, pt tearful and frustrated due to \"feeling like I'm not getting any better.\" Educated pt that she has not been here for 24 hours and medications needing repeated administrations and time to work. Fluids continue to infuse due to high lactic acid. Pt found to have home inhaler at bedside by respiratory and re-educated pt that home medications should not be used. Continues to be dyspnic on excursion. Home CPAP used intermittently. No other changes at this time. Continue plan of care.  " Case Management Discharge Planning Note    Patient name Natasha Saeed  Location / MRN [id number]  : 1973 Date 2023       Current Admission Date: 2023  Current Admission Diagnosis:Acute encephalopathy   Patient Active Problem List    Diagnosis Date Noted   • Passive suicidal ideations 2023   • Acute encephalopathy 2023   • Systemic lupus erythematosus, unspecified SLE type, unspecified organ involvement status ([address]) 2023   • Bladder tumor 2023   • Seizure ([address])    • Immunosuppression due to chronic steroid use ([address]) 2022   • Atrial tachycardia ([address]) 2022   • Nephrolithiasis 2021   • Anaphylactic reaction 2021   • Intractable nausea and vomiting 2021   • Heart palpitations 2021   • Inflammatory bowel disease 2021   • Current chronic use of systemic steroids 2021   • MS (multiple sclerosis) ([address]) 2021   • Migraine    • Overweight (BMI 25.0-29.9) 2021   • Anorexia 10/17/2020   • Crohn's colitis ([address]) 10/17/2020   • Mild protein-calorie malnutrition ([address]) 2019   • Constipation 09/10/2019   • Mass of left axilla 2019   • Immunosuppressed status ([address]) 2019   • Thrombocytosis 2019   • Proctitis 2019   • Thrush 2019   • Chronic back pain 2019   • Primary localized osteoarthrosis of ankle and foot 2018   • Fibromyalgia 2017   • CHF (congestive heart failure) ([address]) 2017   • Meniere's disease 2017   • Leukocytosis 09/10/2017   • Hypomagnesemia 2017   • Generalized anxiety disorder 2017   • Vomiting and diarrhea 2017   • SIRS (systemic inflammatory response syndrome) ([address]) 2017   • Vitamin D deficiency 2017   • Hypokalemia 2017   • Throat tightness 2017   • Abdominal pain 2017   • Headache 2017   • Disorder of synovium 2017   • Chondromalacia 2017   • Chronic idiopathic urticaria 05/02/2017   • 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   • Hypothyroidism (acquired) 01/13/2014      LOS (days): 4  Geometric Mean LOS (GMLOS) (days): 3.00  Days to GMLOS:-1.1     OBJECTIVE:  Risk of Unplanned Readmission Score: 23.38         Current admission status: Inpatient   Preferred Pharmacy:   [address], [address]  [address]  [facility] [address]  Phone: [phone] Fax: [phone]    [address] [facility], [address] [facility] [address] [place] [address]  [address] [facility]  [address] [facility] [address]  Phone: [phone] Fax: [phone]    [facility] ([facility]) [address] [place] [address] [address] [facility]  [address] [facility] [address]  Phone: [phone] Fax: [phone]    Primary Care Provider: Sujey Garcia DO    Primary Insurance: [facility]  Secondary Insurance: MEDICARE    DISCHARGE DETAILS:    Discharge Destination Plan[de-identified] Inpatient [facility] ([address])  Transport at Discharge : [facility]        Transported by Jean and Unit #): Letty  ETA of Transport (Date): 07/24/23  ETA of Transport (Time): 1630      Nursing to call report to [phone].

## 2023-07-26 ENCOUNTER — OFFICE VISIT (OUTPATIENT)
Dept: BARIATRICS/WEIGHT MGMT | Facility: CLINIC | Age: 48
End: 2023-07-26
Payer: COMMERCIAL

## 2023-07-26 VITALS
HEIGHT: 64 IN | TEMPERATURE: 98.1 F | BODY MASS INDEX: 50.02 KG/M2 | HEART RATE: 105 BPM | WEIGHT: 293 LBS | SYSTOLIC BLOOD PRESSURE: 147 MMHG | DIASTOLIC BLOOD PRESSURE: 86 MMHG

## 2023-07-26 DIAGNOSIS — K21.9 GASTROESOPHAGEAL REFLUX DISEASE, UNSPECIFIED WHETHER ESOPHAGITIS PRESENT: ICD-10-CM

## 2023-07-26 DIAGNOSIS — I10 ESSENTIAL (PRIMARY) HYPERTENSION: ICD-10-CM

## 2023-07-26 DIAGNOSIS — N92.6 MENSTRUAL IRREGULARITY: ICD-10-CM

## 2023-07-26 DIAGNOSIS — E66.01 CLASS 3 SEVERE OBESITY WITH BODY MASS INDEX (BMI) OF 50.0 TO 59.9 IN ADULT, UNSPECIFIED OBESITY TYPE, UNSPECIFIED WHETHER SERIOUS COMORBIDITY PRESENT: Primary | ICD-10-CM

## 2023-07-26 DIAGNOSIS — E11.9 TYPE 2 DIABETES MELLITUS WITHOUT COMPLICATION, WITHOUT LONG-TERM CURRENT USE OF INSULIN: ICD-10-CM

## 2023-07-26 PROCEDURE — 99214 OFFICE O/P EST MOD 30 MIN: CPT | Performed by: NURSE PRACTITIONER

## 2023-07-26 NOTE — PROGRESS NOTES
[facility]  [address] [address]  [phone]  [address] [address]  [phone]  Dept: [phone]  7/26/2023      Karey Lopez.  [id number]  [id number]  1975  female      Chief Complaint   Patient presents with    Nutrition Counseling     Diet 3/4       The patient is here for month 3/4 of their pre-operative physician supervised diet. Today's weight is 133 kg (293 lb) pounds and had a loss of 4 lbs. The patient states that she is following the recommendations given by our office and dietician including a high lean protein, low carb and low fat diet. We recommended adequate fruits and vegetable intake along with limited portion sizes. Patient is working on eliminating fast foods, fried foods, sweets and soda. Karey Lopez has been increasing her daily water intake. .    Patient states they have made positive changes including getting three meals most days. She has been taking something into work to eat for breakfast and lunch. She has cut out sodas this last month. She has been increasing her water intake. She doesn't drink coffee. She does usually eat dinner at dinner.   Diet: deli meat and cheese sticks for breakfast and lunch (has stopped eating fast foods). She isn't a big snacker.     Review of Systems   Constitutional:  Positive for appetite change. Negative for fatigue and unexpected weight change.   HENT: Negative.     Eyes: Negative.    Respiratory: Negative.     Cardiovascular: Negative.  Negative for leg swelling.   Gastrointestinal:  Negative for abdominal distention, abdominal pain, constipation, diarrhea, nausea and vomiting.   Genitourinary:  Negative for difficulty urinating, frequency and urgency.   Musculoskeletal:  Negative for back pain.   Skin: Negative.    Psychiatric/Behavioral: Negative.     All other systems reviewed and are negative.    Vitals:    07/26/23  1334   BP: 147/86   Pulse: 105   Temp: 98.1 °F (36.7 °C)     Patient Active Problem List   Diagnosis    Allergic rhinitis    Sleep apnea, unspecified    Essential (primary) hypertension    Depression    Vitamin D deficiency    Chronic left shoulder pain    Keratosis pilaris    Arthralgia    Type 2 diabetes mellitus without complication, without long-term current use of insulin    Class 3 severe obesity with body mass index (BMI) of 50.0 to 59.9 in adult    Other fatigue    Amenorrhea    Night sweats    Allergies    Ankle swelling    Leaking of urine    Menstrual irregularity    Shoulder pain    Foot pain    Knee pain    Sciatica    Back pain    Muscle pain    Numbness and tingling    GERD (gastroesophageal reflux disease)     Body mass index is 50.27 kg/m².    The following portions of the patient's history were reviewed and updated as appropriate: active problem list, medication list, family history, health maintenance    Physical Exam  Vitals and nursing note reviewed.   Constitutional:       Appearance: She is well-developed.   Neck:      Thyroid: No thyromegaly.   Cardiovascular:      Rate and Rhythm: Normal rate.   Pulmonary:      Effort: Pulmonary effort is normal. No respiratory distress.   Abdominal:      Palpations: Abdomen is soft.   Musculoskeletal:         General: No tenderness.   Skin:     General: Skin is warm and dry.   Neurological:      Mental Status: She is alert.   Psychiatric:         Behavior: Behavior normal.       Discussion/Plan:  The following preoperative testing and documentation was reviewed and has been completed:     ECG 12 Lead (06/26/2023 11:32)    XR Chest 2 View (06/26/2023 11:49)    Hemoglobin A1c (02/02/2023 09:56)    Comprehensive metabolic panel (02/02/2023 09:56)    Hemoglobin A1c (02/02/2023 09:56)    CBC w AUTO Differential (02/02/2023 09:56)    TSH (06/26/2023 11:28)    Obesity/Morbid Obesity: Currently the patient's weight is decreasing. There are no medications  prescribed.Treatment plan includes prescribed diet, prescribed exercise regimen and behavior modification.    I reviewed the appropriate dietary choices with the patient and encouraged the necessary changes. Recommended at least 70 grams of protein per day, around 35 grams of fats and less than 100 grams of carbohydrates. Reviewed calorie intake if patient wanted to calorie count and/or had BMR. Instructed patient to drink half of body weight in ounces per day and exercise a minimum of 150 minutes per week including both cardio and strength training. Discussed the option of keeping a food journal which will help patient become more aware of the nutritional value of foods so they are more prepared after surgery.    The patient was given written materials from our office for education.   I answered all of the patients questions regarding dietary changes, exercise or surgical options.  The patient will follow up in 1 month. The total time spent during this encounter was 35 minutes    Encounter Diagnoses   Name Primary?    Class 3 severe obesity with body mass index (BMI) of 50.0 to 59.9 in adult, unspecified obesity type, unspecified whether serious comorbidity present Yes    Essential (primary) hypertension     Type 2 diabetes mellitus without complication, without long-term current use of insulin     Gastroesophageal reflux disease, unspecified whether esophagitis present     Menstrual irregularity

## 2023-08-01 ENCOUNTER — TELEPHONE (OUTPATIENT)
Dept: BARIATRICS/WEIGHT MGMT | Facility: CLINIC | Age: 48
End: 2023-08-01

## 2023-08-01 ENCOUNTER — HOSPITAL ENCOUNTER (OUTPATIENT)
Facility: HOSPITAL | Age: 48
Setting detail: HOSPITAL OUTPATIENT SURGERY
Discharge: HOME | End: 2023-08-01
Attending: SURGERY | Admitting: SURGERY
Payer: COMMERCIAL

## 2023-08-01 ENCOUNTER — ANESTHESIA (OUTPATIENT)
Dept: GASTROENTEROLOGY | Facility: HOSPITAL | Age: 48
End: 2023-08-01
Payer: COMMERCIAL

## 2023-08-01 ENCOUNTER — ANESTHESIA EVENT (OUTPATIENT)
Dept: GASTROENTEROLOGY | Facility: HOSPITAL | Age: 48
End: 2023-08-01
Payer: COMMERCIAL

## 2023-08-01 VITALS
SYSTOLIC BLOOD PRESSURE: 151 MMHG | BODY MASS INDEX: 49.68 KG/M2 | DIASTOLIC BLOOD PRESSURE: 74 MMHG | OXYGEN SATURATION: 97 % | HEART RATE: 96 BPM | HEIGHT: 64 IN | WEIGHT: 291 LBS | RESPIRATION RATE: 16 BRPM

## 2023-08-01 DIAGNOSIS — I10 ESSENTIAL (PRIMARY) HYPERTENSION: ICD-10-CM

## 2023-08-01 DIAGNOSIS — E11.9 TYPE 2 DIABETES MELLITUS WITHOUT COMPLICATION, WITHOUT LONG-TERM CURRENT USE OF INSULIN: ICD-10-CM

## 2023-08-01 DIAGNOSIS — E66.01 CLASS 3 SEVERE OBESITY WITH BODY MASS INDEX (BMI) OF 50.0 TO 59.9 IN ADULT, UNSPECIFIED OBESITY TYPE, UNSPECIFIED WHETHER SERIOUS COMORBIDITY PRESENT: ICD-10-CM

## 2023-08-01 DIAGNOSIS — K21.9 GERD (GASTROESOPHAGEAL REFLUX DISEASE): ICD-10-CM

## 2023-08-01 PROBLEM — K25.3 ACUTE GASTRIC ULCER WITHOUT HEMORRHAGE OR PERFORATION: Status: ACTIVE | Noted: 2023-08-01

## 2023-08-01 LAB
B-HCG UR QL: NEGATIVE
EXPIRATION DATE: NORMAL
GLUCOSE BLDC GLUCOMTR-MCNC: 121 MG/DL (ref 70–130)
INTERNAL NEGATIVE CONTROL: NORMAL
INTERNAL POSITIVE CONTROL: NORMAL
Lab: NORMAL

## 2023-08-01 PROCEDURE — 88342 IMHCHEM/IMCYTCHM 1ST ANTB: CPT | Performed by: SURGERY

## 2023-08-01 PROCEDURE — 82948 REAGENT STRIP/BLOOD GLUCOSE: CPT

## 2023-08-01 PROCEDURE — S0260 H&P FOR SURGERY: HCPCS | Performed by: SURGERY

## 2023-08-01 PROCEDURE — 25010000002 PROPOFOL 10 MG/ML EMULSION: Performed by: ANESTHESIOLOGY

## 2023-08-01 PROCEDURE — 43239 EGD BIOPSY SINGLE/MULTIPLE: CPT | Performed by: SURGERY

## 2023-08-01 PROCEDURE — 81025 URINE PREGNANCY TEST: CPT | Performed by: SURGERY

## 2023-08-01 PROCEDURE — 88305 TISSUE EXAM BY PATHOLOGIST: CPT | Performed by: SURGERY

## 2023-08-01 RX ORDER — PANTOPRAZOLE SODIUM 40 MG/1
40 TABLET, DELAYED RELEASE ORAL 2 TIMES DAILY
Qty: 60 TABLET | Refills: 3 | Status: SHIPPED | OUTPATIENT
Start: 2023-08-01

## 2023-08-01 RX ORDER — SODIUM CHLORIDE, SODIUM LACTATE, POTASSIUM CHLORIDE, CALCIUM CHLORIDE 600; 310; 30; 20 MG/100ML; MG/100ML; MG/100ML; MG/100ML
30 INJECTION, SOLUTION INTRAVENOUS CONTINUOUS PRN
Status: DISCONTINUED | OUTPATIENT
Start: 2023-08-01 | End: 2023-08-01 | Stop reason: HOSPADM

## 2023-08-01 RX ORDER — SODIUM CHLORIDE 0.9 % (FLUSH) 0.9 %
10 SYRINGE (ML) INJECTION EVERY 12 HOURS SCHEDULED
Status: DISCONTINUED | OUTPATIENT
Start: 2023-08-01 | End: 2023-08-01 | Stop reason: HOSPADM

## 2023-08-01 RX ORDER — PROPOFOL 10 MG/ML
VIAL (ML) INTRAVENOUS AS NEEDED
Status: DISCONTINUED | OUTPATIENT
Start: 2023-08-01 | End: 2023-08-01 | Stop reason: SURG

## 2023-08-01 RX ORDER — LIDOCAINE HYDROCHLORIDE 20 MG/ML
INJECTION, SOLUTION INFILTRATION; PERINEURAL AS NEEDED
Status: DISCONTINUED | OUTPATIENT
Start: 2023-08-01 | End: 2023-08-01 | Stop reason: SURG

## 2023-08-01 RX ORDER — SUCRALFATE 1 G/1
1 TABLET ORAL 4 TIMES DAILY
Qty: 120 TABLET | Refills: 3 | Status: SHIPPED | OUTPATIENT
Start: 2023-08-01

## 2023-08-01 RX ORDER — SODIUM CHLORIDE, SODIUM LACTATE, POTASSIUM CHLORIDE, CALCIUM CHLORIDE 600; 310; 30; 20 MG/100ML; MG/100ML; MG/100ML; MG/100ML
30 INJECTION, SOLUTION INTRAVENOUS CONTINUOUS
Status: DISCONTINUED | OUTPATIENT
Start: 2023-08-01 | End: 2023-08-01 | Stop reason: HOSPADM

## 2023-08-01 RX ORDER — SODIUM CHLORIDE 0.9 % (FLUSH) 0.9 %
10 SYRINGE (ML) INJECTION AS NEEDED
Status: DISCONTINUED | OUTPATIENT
Start: 2023-08-01 | End: 2023-08-01 | Stop reason: HOSPADM

## 2023-08-01 RX ADMIN — SODIUM CHLORIDE, POTASSIUM CHLORIDE, SODIUM LACTATE AND CALCIUM CHLORIDE: 600; 310; 30; 20 INJECTION, SOLUTION INTRAVENOUS at 08:21

## 2023-08-01 RX ADMIN — PROPOFOL 120 MG: 10 INJECTION, EMULSION INTRAVENOUS at 08:21

## 2023-08-01 RX ADMIN — SODIUM CHLORIDE, POTASSIUM CHLORIDE, SODIUM LACTATE AND CALCIUM CHLORIDE 30 ML/HR: 600; 310; 30; 20 INJECTION, SOLUTION INTRAVENOUS at 08:11

## 2023-08-01 RX ADMIN — LIDOCAINE HYDROCHLORIDE 60 MG: 20 INJECTION, SOLUTION INFILTRATION; PERINEURAL at 08:21

## 2023-08-01 NOTE — OP NOTE
Surgeon: Reyes Ngo Jr., M.D.    Preoperative Diagnosis: #1 dyspepsia #2 status post lap band removal    Postoperative Diagnosis: #1 large gastric antral ulcer #2 gastritis    Procedure Performed: Transoral esophagogastroduodenoscopy with gastric antral ulcer biopsies    Indications: 48-year-old female status post lap band removal 2019 at [place] who presents for preoperative evaluation.  Patient does not take H2 blocker or PPI on regular basis    Procedure:     The procedure, indications, preparation and potential complications were explained to the patient, who indicated understanding and signed the corresponding consent forms.  The patient was identified, taken to the endoscopy suite, and placed on the left side down decubitus position.  The patient underwent a MAC anesthesia and was appropriately monitored through the case by the anesthesia personnel with continuous pulse oximetry, blood pressure, and cardiac monitoring.  A bite block was placed.  After adequate IV sedation and using a transoral technique a lubed flexible endoscope was placed in the hypopharynx and advanced to the second portion of the duodenum without difficulty. The scope was then withdrawn back into the antrum of the stomach.  Cold forcep biopsies of the antrum were taken to rule out Helicobacter pylori.  The scope was retroflexed noting the body, fundus and cardia.  The scope was then withdrawn back into the esophagus after decompressing the stomach.  The Z line was noted and GE junction measured from the incisors.  The scope was then completely withdrawn.  The patient tolerated the procedure well and left the endoscopy suite in stable condition.  The findings are listed below.    Duodenum: Unremarkable  Antrum: Gastritis; large fairly deep ulcer without visible vessel or active bleeding noted.  Multiple cold forcep biopsies taken to rule out dysplasia and Helicobacter pylori.  No bleeding noted  Body/Fundus: Unremarkable  Cardia:  Unremarkable  Esophagus: Z-line regular, no erosive esophagitis    Recommendations:     Stop any nonsteroidal medication.  Start on PPI and Carafate and await biopsy results and follow-up in the office.  We will plan for repeat endoscopy in 8 weeks

## 2023-08-01 NOTE — DISCHARGE INSTRUCTIONS
For the next 24 hours patient needs to be with a responsible adult.    For 24 hours DO NOT drive, operate machinery, appliances, drink alcohol, make important decisions or sign legal documents.    Start with a light or bland diet if you are feeling sick to your stomach otherwise advance to regular diet as tolerated.    Follow recommendations on procedure report if provided by your doctor.    Call Dr Ngo for problems [phone]    Problems may include but not limited to: large amounts of bleeding, trouble breathing, repeated vomiting, severe unrelieved pain, fever or chills.

## 2023-08-01 NOTE — H&P
Patient Care Team:  Sunita Hylton APRN as PCP - General (Nurse Practitioner)  Misa Thao APRN as Nurse Practitioner (Nurse Practitioner)  Tico Weiner DPM (Podiatry)    Chief complaint Need of preoperative clearance prior to surgery    Subjective     Patient is a 48 y.o. female who is a patient of ours and has undergone our extensive initial evaluation for bariatric surgery and needs to proceed with upper endoscopy for preoperative clearance prior to proceeding with surgery.  Please see the initial history and physical for further detailed information.      Review of Systems   Pertinent items are noted in HPI and no changes since last visit.    Past Medical History:   Diagnosis Date    Acute non-recurrent maxillary sinusitis 5/4/2023    Allergic rhinitis, unspecified     Anxiety     Arthritis     Asthma     Asthma, intrinsic 1985    Cough variant asthma     Diabetes mellitus 06/2022    Dorsalgia, unspecified     Essential (primary) hypertension     Family history of ischemic heart disease and other diseases of the circulatory system     GERD (gastroesophageal reflux disease) 6/27/2023    Hypertension     Left lower quadrant pain     Leucocytosis 2020    due to chronic inflammation per hematology    Lichen sclerosus 2019    Obesity, unspecified     Other fatigue     Persistent mood (affective) disorder, unspecified     PONV (postoperative nausea and vomiting)     Sepsis     Right knee Sepsis    Sleep apnea, obstructive     Sleep apnea, unspecified     Unspecified abdominal pain     Unspecified ovarian cyst, left side     Vitamin D deficiency, unspecified      Past Surgical History:   Procedure Laterality Date    COLONOSCOPY  01/2010    normal    ENDOSCOPY  01/2010, 01/2018 1/2010- small hernia and 01/2018 mild gastitis    FOOT SURGERY Right 11/2015, 4/28/2017    FRACTURE SURGERY Right 11/2014, 3/2015    ankle    GALLBLADDER SURGERY  1992    GASTRIC BANDING REMOVAL  2019    KNEE ARTHROPLASTY Right  12/10/2019    KNEE ARTHROSCOPY Right 01/30/2020    Procedure: KNEE ARTHROSCOPY WITH INCISION AND DRAINAGE;  Surgeon: Fareed Chacon MD;  Location: [facility] OR;  Service: Orthopedics    LAPAROSCOPIC GASTRIC BANDING  07/2010    and was removed july 2019; scar tissue    LAPAROSCOPIC TUBAL LIGATION      MOUTH SURGERY      wisdom teeth removal    TUBAL ABDOMINAL LIGATION       Family History   Problem Relation Age of Onset    Asthma Mother     Heart disease Mother     Coronary artery disease Mother     Diabetes type II Mother     Diabetes Mother     Hypertension Mother     Stroke Father     Hypertension Father     Obesity Brother     Heart attack Brother         MI    Hypertension Brother     Cervical cancer Maternal Grandmother     Lung cancer Maternal Grandfather     Stroke Paternal Grandmother     Malig Hyperthermia Neg Hx      Social History     Tobacco Use    Smoking status: Never     Passive exposure: Never    Smokeless tobacco: Never   Vaping Use    Vaping Use: Never used   Substance Use Topics    Alcohol use: Never     Comment: just rarely    Drug use: Never     Facility-Administered Medications Prior to Admission   Medication Dose Route Frequency Provider Last Rate Last Admin    Allergy Serum Injection  0.5 mL Subcutaneous Once Sunita Hylton APRN         Medications Prior to Admission   Medication Sig Dispense Refill Last Dose    albuterol (PROVENTIL) (2.5 MG/3ML) 0.083% nebulizer solution Take 2.5 mg by nebulization Every 4 (Four) Hours As Needed for Wheezing. 60 each 5     albuterol sulfate  (90 Base) MCG/ACT inhaler Inhale 2 puffs Every 4 (Four) Hours As Needed for Wheezing. 8 g 5     amLODIPine (NORVASC) 2.5 MG tablet TAKE 1 TABLET BY MOUTH DAILY 90 tablet 0     Blood Glucose Monitoring Suppl (ONE TOUCH ULTRA 2) w/Device kit 1 each Daily. 1 each 0     buPROPion XL (WELLBUTRIN XL) 150 MG 24 hr tablet Take 1 tablet by mouth Daily for 60 days. 90 tablet 1     citalopram (CeleXA) 20 MG tablet  TAKE 1 TABLET BY MOUTH DAILY 30 tablet 2     dapagliflozin (Farxiga) 5 MG tablet tablet Take 1 tablet by mouth Daily. 30 tablet 2     Ergocalciferol 50 MCG (2000 UT) capsule Take 2,000 Units by mouth Daily.       glucose blood test strip 1 each by Other route Daily. USE WITH ONE TOUCH METER 100 each 1     ibuprofen (ADVIL,MOTRIN) 800 MG tablet TAKE 1 TABLET BY MOUTH EVERY 6 HOURS AS NEEDED FOR MILD PAIN 30 tablet 1     ipratropium-albuterol (DUO-NEB) 0.5-2.5 mg/3 ml nebulizer Take 3 mL by nebulization Every 4 (Four) Hours As Needed for Wheezing for up to 30 days. 360 mL 0     Lancets misc 1 each Daily. USE WITH ONE TOUCH METER 100 each 1     lidocaine (LIDODERM) 5 % APPLY 1 PATCH DAILY TOPICALLY REMOVE AND DISCARD PATCH WITHIN 12 HOURS OR AS DIRECTED.       lisinopril (PRINIVIL,ZESTRIL) 40 MG tablet Take 1 tablet by mouth Daily for 180 days. 90 tablet 1     minocycline (MINOCIN,DYNACIN) 100 MG capsule Take 1 capsule by mouth 2 (Two) Times a Day.       montelukast (SINGULAIR) 10 MG tablet Take 1 tablet by mouth Daily. 90 tablet 1     mupirocin (BACTROBAN) 2 % ointment APPLY TOPICALLY TO CYST TWICE DAILY FOR 2 WEEKS       ondansetron ODT (ZOFRAN-ODT) 8 MG disintegrating tablet Place 1 tablet on the tongue Every 12 (Twelve) Hours As Needed for Nausea or Vomiting. 20 tablet 1     tiZANidine (ZANAFLEX) 4 MG tablet TAKE 1 TABLET BY MOUTH EVERY 6 HOURS AS NEEDED FOR MUSCLE SPASMS 30 tablet 1      Allergies:  Ozempic (0.25 or 0.5 mg-dose) [semaglutide(0.25 or 0.5mg-dos)], Metformin, and Vilazodone hcl    Vital Signs  See PreOp record            Physical Exam:   Heart: RR  Lungs: CTA B  Abd: soft and NT/ND  Ext: no clubbing, cyanosis    Results Review:    I have reviewed the patient's clinical results      Essential (primary) hypertension    Type 2 diabetes mellitus without complication, without long-term current use of insulin    Class 3 severe obesity with body mass index (BMI) of 50.0 to 59.9 in adult    GERD  (gastroesophageal reflux disease)      The risks and benefits of the procedure were discussed with the patient in detail and all questions were answered.  Possibility of perforation, bleeding, aspiration, anoxic brain injury, respiratory and/or cardiac arrest and death were discussed.  Consent will be signed and witnessed..     Reyes Ngo MD  08/01/23  07:23 EDT  Time: Approximately 15 minutes was spent with the patient.  All of the patients questions were answered.

## 2023-08-04 ENCOUNTER — OFFICE VISIT (OUTPATIENT)
Dept: FAMILY MEDICINE CLINIC | Age: 48
End: 2023-08-04
Payer: COMMERCIAL

## 2023-08-04 ENCOUNTER — LAB (OUTPATIENT)
Dept: LAB | Facility: HOSPITAL | Age: 48
End: 2023-08-04
Payer: COMMERCIAL

## 2023-08-04 VITALS
WEIGHT: 291 LBS | HEIGHT: 64 IN | OXYGEN SATURATION: 96 % | HEART RATE: 71 BPM | SYSTOLIC BLOOD PRESSURE: 110 MMHG | BODY MASS INDEX: 49.68 KG/M2 | DIASTOLIC BLOOD PRESSURE: 69 MMHG

## 2023-08-04 DIAGNOSIS — J30.9 ALLERGIC RHINITIS, UNSPECIFIED SEASONALITY, UNSPECIFIED TRIGGER: ICD-10-CM

## 2023-08-04 DIAGNOSIS — E11.9 TYPE 2 DIABETES MELLITUS WITHOUT COMPLICATION, WITHOUT LONG-TERM CURRENT USE OF INSULIN: Primary | ICD-10-CM

## 2023-08-04 DIAGNOSIS — E11.9 TYPE 2 DIABETES MELLITUS WITHOUT COMPLICATION, WITHOUT LONG-TERM CURRENT USE OF INSULIN: ICD-10-CM

## 2023-08-04 DIAGNOSIS — R06.00 DYSPNEA, UNSPECIFIED TYPE: ICD-10-CM

## 2023-08-04 DIAGNOSIS — K25.9 GASTRIC ULCER WITHOUT HEMORRHAGE OR PERFORATION, UNSPECIFIED CHRONICITY: ICD-10-CM

## 2023-08-04 DIAGNOSIS — F34.9 PERSISTENT MOOD (AFFECTIVE) DISORDER, UNSPECIFIED: ICD-10-CM

## 2023-08-04 DIAGNOSIS — M79.10 MUSCLE PAIN: ICD-10-CM

## 2023-08-04 DIAGNOSIS — I10 ESSENTIAL (PRIMARY) HYPERTENSION: ICD-10-CM

## 2023-08-04 DIAGNOSIS — R60.0 LOCALIZED EDEMA: ICD-10-CM

## 2023-08-04 PROBLEM — R20.0 NUMBNESS AND TINGLING: Status: RESOLVED | Noted: 2023-06-23 | Resolved: 2023-08-04

## 2023-08-04 PROBLEM — E66.01 CLASS 3 SEVERE OBESITY WITH BODY MASS INDEX (BMI) OF 50.0 TO 59.9 IN ADULT: Status: RESOLVED | Noted: 2022-12-01 | Resolved: 2023-08-04

## 2023-08-04 PROBLEM — E66.813 CLASS 3 SEVERE OBESITY WITH BODY MASS INDEX (BMI) OF 50.0 TO 59.9 IN ADULT: Status: RESOLVED | Noted: 2022-12-01 | Resolved: 2023-08-04

## 2023-08-04 PROBLEM — R20.2 NUMBNESS AND TINGLING: Status: RESOLVED | Noted: 2023-06-23 | Resolved: 2023-08-04

## 2023-08-04 LAB
ALBUMIN SERPL-MCNC: 3.9 G/DL (ref 3.5–5.2)
ALBUMIN/GLOB SERPL: 1.3 G/DL
ALP SERPL-CCNC: 55 U/L (ref 39–117)
ALT SERPL W P-5'-P-CCNC: 8 U/L (ref 1–33)
ANION GAP SERPL CALCULATED.3IONS-SCNC: 12.5 MMOL/L (ref 5–15)
AST SERPL-CCNC: 17 U/L (ref 1–32)
BILIRUB SERPL-MCNC: 0.3 MG/DL (ref 0–1.2)
BUN SERPL-MCNC: 9 MG/DL (ref 6–20)
BUN/CREAT SERPL: 13.2 (ref 7–25)
CALCIUM SPEC-SCNC: 10.2 MG/DL (ref 8.6–10.5)
CHLORIDE SERPL-SCNC: 101 MMOL/L (ref 98–107)
CHOLEST SERPL-MCNC: 164 MG/DL (ref 0–200)
CHROMATIN AB SERPL-ACNC: <10 IU/ML (ref 0–14)
CK SERPL-CCNC: 34 U/L (ref 20–180)
CO2 SERPL-SCNC: 23.5 MMOL/L (ref 22–29)
CREAT SERPL-MCNC: 0.68 MG/DL (ref 0.57–1)
CRP SERPL-MCNC: 4.15 MG/DL (ref 0–0.5)
EGFRCR SERPLBLD CKD-EPI 2021: 107.6 ML/MIN/1.73
ERYTHROCYTE [SEDIMENTATION RATE] IN BLOOD: 6 MM/HR (ref 0–20)
GLOBULIN UR ELPH-MCNC: 2.9 GM/DL
GLUCOSE SERPL-MCNC: 118 MG/DL (ref 65–99)
HBA1C MFR BLD: 6.8 % (ref 4.8–5.6)
HDLC SERPL-MCNC: 39 MG/DL (ref 40–60)
LAB AP CASE REPORT: NORMAL
LDLC SERPL CALC-MCNC: 92 MG/DL (ref 0–100)
LDLC/HDLC SERPL: 2.22 {RATIO}
NT-PROBNP SERPL-MCNC: 77.6 PG/ML (ref 0–450)
PATH REPORT.ADDENDUM SPEC: NORMAL
PATH REPORT.FINAL DX SPEC: NORMAL
PATH REPORT.GROSS SPEC: NORMAL
POTASSIUM SERPL-SCNC: 4.4 MMOL/L (ref 3.5–5.2)
PROT SERPL-MCNC: 6.8 G/DL (ref 6–8.5)
SODIUM SERPL-SCNC: 137 MMOL/L (ref 136–145)
TRIGL SERPL-MCNC: 192 MG/DL (ref 0–150)
VLDLC SERPL-MCNC: 33 MG/DL (ref 5–40)

## 2023-08-04 PROCEDURE — 86235 NUCLEAR ANTIGEN ANTIBODY: CPT

## 2023-08-04 PROCEDURE — 86140 C-REACTIVE PROTEIN: CPT

## 2023-08-04 PROCEDURE — 86225 DNA ANTIBODY NATIVE: CPT

## 2023-08-04 PROCEDURE — 86431 RHEUMATOID FACTOR QUANT: CPT

## 2023-08-04 PROCEDURE — 80061 LIPID PANEL: CPT

## 2023-08-04 PROCEDURE — 83516 IMMUNOASSAY NONANTIBODY: CPT

## 2023-08-04 PROCEDURE — 86038 ANTINUCLEAR ANTIBODIES: CPT

## 2023-08-04 PROCEDURE — 82550 ASSAY OF CK (CPK): CPT

## 2023-08-04 PROCEDURE — 85652 RBC SED RATE AUTOMATED: CPT

## 2023-08-04 PROCEDURE — 83880 ASSAY OF NATRIURETIC PEPTIDE: CPT

## 2023-08-04 PROCEDURE — 36415 COLL VENOUS BLD VENIPUNCTURE: CPT

## 2023-08-04 PROCEDURE — 83036 HEMOGLOBIN GLYCOSYLATED A1C: CPT

## 2023-08-04 PROCEDURE — 80053 COMPREHEN METABOLIC PANEL: CPT

## 2023-08-04 RX ORDER — LISINOPRIL 40 MG/1
40 TABLET ORAL DAILY
Qty: 90 TABLET | Refills: 1 | Status: SHIPPED | OUTPATIENT
Start: 2023-08-04 | End: 2024-01-31

## 2023-08-04 RX ORDER — CITALOPRAM 20 MG/1
20 TABLET ORAL DAILY
Qty: 90 TABLET | Refills: 1 | Status: SHIPPED | OUTPATIENT
Start: 2023-08-04

## 2023-08-04 RX ORDER — ATENOLOL 25 MG/1
TABLET ORAL
Qty: 15 TABLET | Refills: 5 | Status: SHIPPED | OUTPATIENT
Start: 2023-08-04

## 2023-08-04 RX ORDER — BUPROPION HYDROCHLORIDE 150 MG/1
150 TABLET ORAL DAILY
Qty: 90 TABLET | Refills: 1 | Status: SHIPPED | OUTPATIENT
Start: 2023-08-04 | End: 2023-10-03

## 2023-08-07 DIAGNOSIS — J30.9 ALLERGIC RHINITIS, UNSPECIFIED SEASONALITY, UNSPECIFIED TRIGGER: ICD-10-CM

## 2023-08-07 LAB
ANA SER QL: POSITIVE
CENTROMERE B AB SER-ACNC: <0.2 AI (ref 0–0.9)
CHROMATIN AB SERPL-ACNC: <0.2 AI (ref 0–0.9)
DSDNA AB SER-ACNC: <1 IU/ML (ref 0–9)
ENA JO1 AB SER-ACNC: <0.2 AI (ref 0–0.9)
ENA RNP AB SER-ACNC: 1 AI (ref 0–0.9)
ENA SCL70 AB SER-ACNC: <0.2 AI (ref 0–0.9)
ENA SM AB SER-ACNC: <0.2 AI (ref 0–0.9)
ENA SM+RNP AB SER-ACNC: <0.2 AI (ref 0–0.9)
ENA SS-A AB SER-ACNC: <0.2 AI (ref 0–0.9)
ENA SS-B AB SER-ACNC: <0.2 AI (ref 0–0.9)
Lab: ABNORMAL
RIBOSOMAL P AB SER-ACNC: <0.2 AI (ref 0–0.9)

## 2023-08-07 RX ORDER — MONTELUKAST SODIUM 10 MG/1
10 TABLET ORAL DAILY
Qty: 90 TABLET | Refills: 1 | Status: SHIPPED | OUTPATIENT
Start: 2023-08-07

## 2023-08-08 NOTE — PROGRESS NOTES
Please call patient.  One of her inflammation tests is elevated (CRP) and one is normal (sed rate).  MESFIN is positive and could indicate autoimmune cause to her pain.  I recommend referral to rheumatology for further evaluation.    Diabetes is controlled but A1c higher than previous.

## 2023-08-09 DIAGNOSIS — R76.8 ANTI-RNP ANTIBODIES PRESENT: Primary | ICD-10-CM

## 2023-08-09 DIAGNOSIS — R76.8 ANA POSITIVE: ICD-10-CM

## 2023-08-09 DIAGNOSIS — M25.50 ARTHRALGIA, UNSPECIFIED JOINT: ICD-10-CM

## 2023-08-11 ENCOUNTER — OFFICE VISIT (OUTPATIENT)
Dept: FAMILY MEDICINE CLINIC | Age: 48
End: 2023-08-11
Payer: COMMERCIAL

## 2023-08-11 VITALS
TEMPERATURE: 98.4 F | HEIGHT: 64 IN | HEART RATE: 74 BPM | WEIGHT: 289.2 LBS | DIASTOLIC BLOOD PRESSURE: 71 MMHG | SYSTOLIC BLOOD PRESSURE: 144 MMHG | OXYGEN SATURATION: 96 % | BODY MASS INDEX: 49.37 KG/M2

## 2023-08-11 DIAGNOSIS — M79.10 MYALGIA: ICD-10-CM

## 2023-08-11 DIAGNOSIS — R76.8 ANTI-RNP ANTIBODIES PRESENT: ICD-10-CM

## 2023-08-11 DIAGNOSIS — M25.50 ARTHRALGIA, UNSPECIFIED JOINT: ICD-10-CM

## 2023-08-11 DIAGNOSIS — R76.8 ANA POSITIVE: ICD-10-CM

## 2023-08-11 DIAGNOSIS — R05.1 ACUTE COUGH: ICD-10-CM

## 2023-08-11 DIAGNOSIS — R50.9 FEVER, UNSPECIFIED FEVER CAUSE: Primary | ICD-10-CM

## 2023-08-11 RX ORDER — TRIAMCINOLONE ACETONIDE 40 MG/ML
60 INJECTION, SUSPENSION INTRA-ARTICULAR; INTRAMUSCULAR ONCE
Status: COMPLETED | OUTPATIENT
Start: 2023-08-11 | End: 2023-08-11

## 2023-08-11 RX ORDER — TRAMADOL HYDROCHLORIDE 50 MG/1
50 TABLET ORAL EVERY 12 HOURS PRN
Qty: 20 TABLET | Refills: 0 | Status: SHIPPED | OUTPATIENT
Start: 2023-08-11

## 2023-08-11 RX ORDER — AZITHROMYCIN 250 MG/1
TABLET, FILM COATED ORAL
Qty: 6 TABLET | Refills: 0 | Status: SHIPPED | OUTPATIENT
Start: 2023-08-11

## 2023-08-11 RX ADMIN — TRIAMCINOLONE ACETONIDE 60 MG: 40 INJECTION, SUSPENSION INTRA-ARTICULAR; INTRAMUSCULAR at 16:14

## 2023-08-11 NOTE — ASSESSMENT & PLAN NOTE
I am unable to prescribe controlled substances long-term.  She cannot take NSAIDs due to an active ulcer.  Recommend continued conservative measures with Tylenol and any topical pain treatments available.  We could send in a prescription for topical pain prescription to prescription alternatives if she would like.  Use tramadol for more severe pain.  Patient is currently reporting severe pain.  Consider referral to pain management if rheumatology consultation does not reveal underlying cause of pain or alternate treatment.

## 2023-08-11 NOTE — PROGRESS NOTES
"Chief Complaint  Follow-up (Follow up on labwork completed 8/4/2023. Patient states she has had sinus pressure, low grade fever, cough, and body aches that started Tuesday. )    Subjective          Karey Lopez presents to [facility]     Patient is a 48-year-old female who is here today with 3-day onset of temperature 99.5 max, body aches, sinus pressure, cough, and wheezing.  Is using albuterol every 4 hours.  Denies ill contacts.    Positive MESFIN with elevated CRP and normal sed rate being referred to rheumatology.  She had to suddenly stop NSAID use due to ulcer being noted on endoscopy in preparation for bariatric surgery.  Patient states she sees on her MyChart that she tested positive for H. pylori but has not been notified by that office to perform the endoscopy yet.  Tylenol is not effective for pain and she states that he call over and is tearful in office today.  Her bariatric surgeon stated she might need to take tramadol.  Patient would rather not take pain medication.  Provider previously started her on gabapentin but it caused dizziness and she could not take it during the day.  Lyrica was considered but was never called in for her.  She reports pain is severe.  she denies injury.     Objective   Vital Signs:   Vitals:    08/11/23 1532   BP: 144/71   BP Location: Left arm   Patient Position: Sitting   Cuff Size: Large Adult   Pulse: 74   Temp: 98.4 øF (36.9 øC)   SpO2: 96%   Weight: 131 kg (289 lb 3.2 oz)   Height: 162.6 cm (64\")      Body mass index is 49.64 kg/mý.           Physical Exam  Vitals reviewed.   Constitutional:       General: She is not in acute distress.     Appearance: Normal appearance. She is well-developed. She is obese.   HENT:      Right Ear: Tympanic membrane normal.      Left Ear: Tympanic membrane normal.   Cardiovascular:      Rate and Rhythm: Normal rate and regular rhythm.      Heart sounds: Normal heart sounds.   Pulmonary:      Effort: " Pulmonary effort is normal.      Breath sounds: Wheezing present.   Musculoskeletal:      Right lower leg: No edema.      Left lower leg: No edema.   Skin:     General: Skin is warm and dry.   Neurological:      General: No focal deficit present.      Mental Status: She is alert.   Psychiatric:         Attention and Perception: Attention normal.         Mood and Affect: Mood and affect normal.         Behavior: Behavior normal.         Current Outpatient Medications:     albuterol (PROVENTIL) (2.5 MG/3ML) 0.083% nebulizer solution, Take 2.5 mg by nebulization Every 4 (Four) Hours As Needed for Wheezing., Disp: 60 each, Rfl: 5    albuterol sulfate  (90 Base) MCG/ACT inhaler, Inhale 2 puffs Every 4 (Four) Hours As Needed for Wheezing., Disp: 8 g, Rfl: 5    atenolol (Tenormin) 25 MG tablet, Take one half tablet once daily, Disp: 15 tablet, Rfl: 5    Blood Glucose Monitoring Suppl (ONE TOUCH ULTRA 2) w/Device kit, 1 each Daily., Disp: 1 each, Rfl: 0    buPROPion XL (WELLBUTRIN XL) 150 MG 24 hr tablet, Take 1 tablet by mouth Daily for 60 days., Disp: 90 tablet, Rfl: 1    citalopram (CeleXA) 20 MG tablet, Take 1 tablet by mouth Daily., Disp: 90 tablet, Rfl: 1    Ergocalciferol 50 MCG (2000 UT) capsule, Take 2,000 Units by mouth Daily., Disp: , Rfl:     glucose blood test strip, 1 each by Other route Daily. USE WITH ONE TOUCH METER, Disp: 100 each, Rfl: 1    Lancets misc, 1 each Daily. USE WITH ONE TOUCH METER, Disp: 100 each, Rfl: 1    lidocaine (LIDODERM) 5 %, APPLY 1 PATCH DAILY TOPICALLY REMOVE AND DISCARD PATCH WITHIN 12 HOURS OR AS DIRECTED., Disp: , Rfl:     lisinopril (PRINIVIL,ZESTRIL) 40 MG tablet, Take 1 tablet by mouth Daily for 180 days., Disp: 90 tablet, Rfl: 1    montelukast (SINGULAIR) 10 MG tablet, TAKE 1 TABLET BY MOUTH DAILY, Disp: 90 tablet, Rfl: 1    ondansetron ODT (ZOFRAN-ODT) 8 MG disintegrating tablet, Place 1 tablet on the tongue Every 12 (Twelve) Hours As Needed for Nausea or Vomiting.,  Disp: 20 tablet, Rfl: 1    pantoprazole (PROTONIX) 40 MG EC tablet, Take 1 tablet by mouth 2 (Two) Times a Day., Disp: 60 tablet, Rfl: 3    sucralfate (Carafate) 1 g tablet, Take 1 tablet by mouth 4 (Four) Times a Day., Disp: 120 tablet, Rfl: 3    tiZANidine (ZANAFLEX) 4 MG tablet, TAKE 1 TABLET BY MOUTH EVERY 6 HOURS AS NEEDED FOR MUSCLE SPASMS, Disp: 30 tablet, Rfl: 1    azithromycin (Zithromax Z-Mahamed) 250 MG tablet, Take 2 tablets by mouth on day 1, then 1 tablet daily on days 2-5, Disp: 6 tablet, Rfl: 0    ipratropium-albuterol (DUO-NEB) 0.5-2.5 mg/3 ml nebulizer, Take 3 mL by nebulization Every 4 (Four) Hours As Needed for Wheezing for up to 30 days., Disp: 360 mL, Rfl: 0    traMADol (ULTRAM) 50 MG tablet, Take 1 tablet by mouth Every 12 (Twelve) Hours As Needed for Moderate Pain., Disp: 20 tablet, Rfl: 0    Current Facility-Administered Medications:     Allergy Serum Injection, 0.5 mL, Subcutaneous, Once, Sunita Hylton APRN   Past Medical History:   Diagnosis Date    Acute non-recurrent maxillary sinusitis 5/4/2023    Allergic rhinitis, unspecified     Anxiety     Arthritis     Asthma     Asthma, intrinsic 1985    Cough variant asthma     Diabetes mellitus 06/2022    Dorsalgia, unspecified     Essential (primary) hypertension     Family history of ischemic heart disease and other diseases of the circulatory system     GERD (gastroesophageal reflux disease) 6/27/2023    Hypertension     Left lower quadrant pain     Leucocytosis 2020    due to chronic inflammation per hematology    Lichen sclerosus 2019    Obesity, unspecified     Other fatigue     Persistent mood (affective) disorder, unspecified     PONV (postoperative nausea and vomiting)     Sepsis     Right knee Sepsis    Sleep apnea, obstructive     Sleep apnea, unspecified     Unspecified abdominal pain     Unspecified ovarian cyst, left side     Vitamin D deficiency, unspecified      Allergies   Allergen Reactions    Ozempic (0.25 Or 0.5 Mg-Dose)  [Semaglutide(0.25 Or 0.5mg-Dos)] Other (See Comments)     Fatigue and nausea    Metformin Nausea Only    Vilazodone Hcl Mental Status Change           Result Review :     CMP          2/2/2023    09:56 8/4/2023    08:59   CMP   Glucose 98  118    BUN 9  9    Creatinine 0.75  0.68    EGFR 98.3  107.6    Sodium 137  137    Potassium 4.3  4.4    Chloride 99  101    Calcium 10.1  10.2    Total Protein 7.4  6.8    Albumin 4.3  3.9    Globulin 3.1  2.9    Total Bilirubin 0.2  0.3    Alkaline Phosphatase 73  55    AST (SGOT) 15  17    ALT (SGPT) 12  8    Albumin/Globulin Ratio 1.4  1.3    BUN/Creatinine Ratio 12.0  13.2    Anion Gap 10.3  12.5      CBC          2/2/2023    09:56   CBC   WBC 12.69    RBC 4.63    Hemoglobin 12.2    Hematocrit 39.4    MCV 85.1    MCH 26.3    MCHC 31.0    RDW 17.3    Platelets 501      CBC w/diff          2/2/2023    09:56   CBC w/Diff   WBC 12.69    RBC 4.63    Hemoglobin 12.2    Hematocrit 39.4    MCV 85.1    MCH 26.3    MCHC 31.0    RDW 17.3    Platelets 501    Neutrophil Rel % 70.9    Immature Granulocyte Rel % 0.8    Lymphocyte Rel % 19.3    Monocyte Rel % 5.0    Eosinophil Rel % 3.7    Basophil Rel % 0.3      Lipid Panel          2/2/2023    09:56 8/4/2023    08:59   Lipid Panel   Total Cholesterol 181  164    Triglycerides 124  192    HDL Cholesterol 64  39    VLDL Cholesterol 22  33    LDL Cholesterol  95  92    LDL/HDL Ratio 1.44  2.22      TSH          6/26/2023    11:28   TSH   TSH 2.130      Most Recent A1C          8/4/2023    08:59   HGBA1C Most Recent   Hemoglobin A1C 6.80               Assessment and Plan    Diagnoses and all orders for this visit:    1. Fever, unspecified fever cause (Primary)  Assessment & Plan:  Afebrile here in office.  COVID and flu are negative.  Follow-up if not improving.    Orders:  -     POCT SARS-CoV-2 Antigen JESSICA + Flu    2. Acute cough  -     POCT SARS-CoV-2 Antigen JESSICA + Flu  -     triamcinolone acetonide (KENALOG-40) injection 60 mg  -      azithromycin (Zithromax Z-Mahamed) 250 MG tablet; Take 2 tablets by mouth on day 1, then 1 tablet daily on days 2-5  Dispense: 6 tablet; Refill: 0    3. Arthralgia, unspecified joint  -     traMADol (ULTRAM) 50 MG tablet; Take 1 tablet by mouth Every 12 (Twelve) Hours As Needed for Moderate Pain.  Dispense: 20 tablet; Refill: 0    4. Anti-RNP antibodies present    5. MESFIN positive    6. Myalgia  Assessment & Plan:  I am unable to prescribe controlled substances long-term.  She cannot take NSAIDs due to an active ulcer.  Recommend continued conservative measures with Tylenol and any topical pain treatments available.  We could send in a prescription for topical pain prescription to prescription alternatives if she would like.  Use tramadol for more severe pain.  Patient is currently reporting severe pain.  Consider referral to pain management if rheumatology consultation does not reveal underlying cause of pain or alternate treatment.    Orders:  -     traMADol (ULTRAM) 50 MG tablet; Take 1 tablet by mouth Every 12 (Twelve) Hours As Needed for Moderate Pain.  Dispense: 20 tablet; Refill: 0        Follow Up    No follow-ups on file.  Patient was given instructions and counseling regarding her condition or for health maintenance advice. Please see specific information pulled into the AVS if appropriate.

## 2023-08-14 DIAGNOSIS — A04.8 BACTERIAL INFECTION DUE TO HELICOBACTER PYLORI: Primary | ICD-10-CM

## 2023-08-14 DIAGNOSIS — A04.8 BACTERIAL INFECTION DUE TO H. PYLORI: Primary | ICD-10-CM

## 2023-08-14 RX ORDER — CLARITHROMYCIN 500 MG/1
500 TABLET, COATED ORAL 2 TIMES DAILY
Qty: 28 TABLET | Refills: 0 | Status: SHIPPED | OUTPATIENT
Start: 2023-08-14

## 2023-08-14 RX ORDER — AMOXICILLIN 500 MG/1
1000 CAPSULE ORAL 2 TIMES DAILY
Qty: 56 CAPSULE | Refills: 0 | Status: SHIPPED | OUTPATIENT
Start: 2023-08-14 | End: 2023-08-28

## 2023-08-18 ENCOUNTER — CLINICAL SUPPORT (OUTPATIENT)
Dept: FAMILY MEDICINE CLINIC | Age: 48
End: 2023-08-18
Payer: COMMERCIAL

## 2023-08-18 DIAGNOSIS — J30.9 ALLERGIC RHINITIS, UNSPECIFIED SEASONALITY, UNSPECIFIED TRIGGER: Primary | ICD-10-CM

## 2023-08-18 PROCEDURE — 95115 IMMUNOTHERAPY ONE INJECTION: CPT | Performed by: FAMILY MEDICINE

## 2023-08-24 ENCOUNTER — OFFICE VISIT (OUTPATIENT)
Dept: BARIATRICS/WEIGHT MGMT | Facility: CLINIC | Age: 48
End: 2023-08-24
Payer: COMMERCIAL

## 2023-08-24 ENCOUNTER — TELEPHONE (OUTPATIENT)
Dept: BARIATRICS/WEIGHT MGMT | Facility: CLINIC | Age: 48
End: 2023-08-24

## 2023-08-24 VITALS
BODY MASS INDEX: 47.8 KG/M2 | TEMPERATURE: 98.1 F | DIASTOLIC BLOOD PRESSURE: 80 MMHG | HEART RATE: 73 BPM | WEIGHT: 280 LBS | SYSTOLIC BLOOD PRESSURE: 155 MMHG | HEIGHT: 64 IN

## 2023-08-24 DIAGNOSIS — K21.9 GASTROESOPHAGEAL REFLUX DISEASE, UNSPECIFIED WHETHER ESOPHAGITIS PRESENT: ICD-10-CM

## 2023-08-24 DIAGNOSIS — K25.3 ACUTE GASTRIC ULCER WITHOUT HEMORRHAGE OR PERFORATION: ICD-10-CM

## 2023-08-24 DIAGNOSIS — E66.01 CLASS 3 SEVERE OBESITY WITH SERIOUS COMORBIDITY AND BODY MASS INDEX (BMI) OF 45.0 TO 49.9 IN ADULT, UNSPECIFIED OBESITY TYPE: Primary | ICD-10-CM

## 2023-08-24 DIAGNOSIS — E11.9 TYPE 2 DIABETES MELLITUS WITHOUT COMPLICATION, WITHOUT LONG-TERM CURRENT USE OF INSULIN: ICD-10-CM

## 2023-08-24 DIAGNOSIS — M54.30 SCIATICA, UNSPECIFIED LATERALITY: ICD-10-CM

## 2023-08-24 DIAGNOSIS — E55.9 VITAMIN D DEFICIENCY: ICD-10-CM

## 2023-08-24 DIAGNOSIS — I10 ESSENTIAL (PRIMARY) HYPERTENSION: ICD-10-CM

## 2023-08-24 DIAGNOSIS — F32.A DEPRESSION, UNSPECIFIED DEPRESSION TYPE: ICD-10-CM

## 2023-08-24 DIAGNOSIS — G47.30 SLEEP APNEA, UNSPECIFIED TYPE: ICD-10-CM

## 2023-08-24 PROBLEM — R05.1 ACUTE COUGH: Status: RESOLVED | Noted: 2023-08-11 | Resolved: 2023-08-24

## 2023-08-24 RX ORDER — SODIUM CHLORIDE, SODIUM LACTATE, POTASSIUM CHLORIDE, CALCIUM CHLORIDE 600; 310; 30; 20 MG/100ML; MG/100ML; MG/100ML; MG/100ML
30 INJECTION, SOLUTION INTRAVENOUS CONTINUOUS
OUTPATIENT
Start: 2023-08-24

## 2023-08-24 NOTE — TELEPHONE ENCOUNTER
----- Message from AMBIKA Perez sent at 8/24/2023  9:06 AM EDT -----  Regarding: diet 4/4  Patient has completed her last diet visit. All she has left on her to-do list is BSF from PCP and per [facility] needs repeat EGD due to gastric ulcer and h.pylori. She is nearly done with h.pylori treatment, new EGD was ordered today, meredith has her scheduled in October. Once we have BSF should be able to submit patient to insurance for pre-approval.

## 2023-08-24 NOTE — PROGRESS NOTES
[facility]  [address] [address]  [phone]  [address] [address]  [phone]  Dept: [phone]  8/24/2023      Karey Lopez.  [id number]  [id number]  1975  female      Chief Complaint   Patient presents with    Follow-up     Fup diet 4 of 4       The patient is here for month 4/4 of their pre-operative physician supervised diet. Today's weight is 127 kg (280 lb) pounds and had a loss of 13 lbs. The patient states that she is following the recommendations given by our office and dietician including a high lean protein, low carb and low fat diet. We recommended adequate fruits and vegetable intake along with limited portion sizes. Patient is working on eliminating fast foods, fried foods, sweets and soda. Karey Lopez has been increasing her daily water intake.     Patient states they have made positive changes including getting three meals per day. She is cooking all of her meals at home. She has cut out all soda, coffee. She is only drinking water. She is getting protein at each meal. She gets plenty of produce and gets veggies at lunch and dinner. She does eat fresh fruit with breakfast. She hasn't been snacking.     She is taking all of the medications down for h.pylori and is nearly done.     Review of Systems   Constitutional:  Positive for appetite change. Negative for fatigue.   HENT: Negative.     Respiratory: Negative.     Cardiovascular: Negative.    Gastrointestinal: Negative.    Neurological: Negative.    Psychiatric/Behavioral: Negative.     Vitals:    08/24/23 0742   BP: 155/80   Pulse: 73   Temp: 98.1 øF (36.7 øC)     Patient Active Problem List   Diagnosis    Allergic rhinitis    Sleep apnea, unspecified    Essential (primary) hypertension    Depression    Vitamin D deficiency    Chronic left shoulder pain    Keratosis pilaris    Arthralgia    Type 2 diabetes  mellitus without complication, without long-term current use of insulin    Class 3 severe obesity with serious comorbidity and body mass index (BMI) of 45.0 to 49.9 in adult    Other fatigue    Amenorrhea    Night sweats    Allergies    Ankle swelling    Leaking of urine    Menstrual irregularity    Shoulder pain    Foot pain    Knee pain    Sciatica    Back pain    Muscle pain    GERD (gastroesophageal reflux disease)    Acute gastric ulcer without hemorrhage or perforation    Gastric ulcer without hemorrhage or perforation    Persistent mood (affective) disorder, unspecified    Dyspnea    Localized edema    MESFIN positive    Anti-RNP antibodies present    Fever    Myalgia     Body mass index is 48.04 kg/mý.    The following portions of the patient's history were reviewed and updated as appropriate: active problem list, medication list, family history, health maintenance, notes from last encounter    Physical Exam  Vitals and nursing note reviewed.   Constitutional:       Appearance: She is well-developed. She is not diaphoretic.   Pulmonary:      Effort: Pulmonary effort is normal.   Neurological:      Mental Status: She is alert and oriented to person, place, and time.   Psychiatric:         Behavior: Behavior normal.       Discussion/Plan:  Pending pre-op documentation and testing: BSF from PCP, repeat EGD to evaluate gastric ulcer      Obesity/Morbid Obesity: Currently the patient's weight is decreasing. There are no medications prescribed.Treatment plan includes prescribed diet, prescribed exercise regimen and behavior modification.    I reviewed the appropriate dietary choices with the patient and encouraged the necessary changes. Recommended at least 70 grams of protein per day, around 35 grams of fats and less than 100 grams of carbohydrates. Reviewed calorie intake if patient wanted to calorie count and/or had BMR. Instructed patient to drink half of body weight in ounces per day and exercise a minimum of  150 minutes per week including both cardio and strength training. Discussed the option of keeping a food journal which will help patient become more aware of the nutritional value of foods so they are more prepared after surgery.    The patient was given written materials from our office for education.   I answered all of the patients questions regarding dietary changes, exercise or surgical options.  The patient will follow up in 1 month. The total time spent during this encounter was 35 minutes    Encounter Diagnoses   Name Primary?    Class 3 severe obesity with serious comorbidity and body mass index (BMI) of 45.0 to 49.9 in adult, unspecified obesity type Yes    Essential (primary) hypertension     Type 2 diabetes mellitus without complication, without long-term current use of insulin     Gastroesophageal reflux disease, unspecified whether esophagitis present     Depression, unspecified depression type     Sciatica, unspecified laterality     Sleep apnea, unspecified type     Vitamin D deficiency

## 2023-08-25 ENCOUNTER — CLINICAL SUPPORT (OUTPATIENT)
Dept: FAMILY MEDICINE CLINIC | Age: 48
End: 2023-08-25
Payer: COMMERCIAL

## 2023-08-25 ENCOUNTER — TRANSCRIBE ORDERS (OUTPATIENT)
Dept: GENERAL RADIOLOGY | Facility: HOSPITAL | Age: 48
End: 2023-08-25
Payer: COMMERCIAL

## 2023-08-25 ENCOUNTER — HOSPITAL ENCOUNTER (OUTPATIENT)
Dept: GENERAL RADIOLOGY | Facility: HOSPITAL | Age: 48
Discharge: HOME | End: 2023-08-25
Admitting: INTERNAL MEDICINE
Payer: COMMERCIAL

## 2023-08-25 DIAGNOSIS — M79.643 PAIN OF HAND, UNSPECIFIED LATERALITY: Primary | ICD-10-CM

## 2023-08-25 DIAGNOSIS — J30.9 ALLERGIC RHINITIS, UNSPECIFIED SEASONALITY, UNSPECIFIED TRIGGER: Primary | ICD-10-CM

## 2023-08-25 DIAGNOSIS — M79.643 PAIN OF HAND, UNSPECIFIED LATERALITY: ICD-10-CM

## 2023-08-25 PROCEDURE — 95115 IMMUNOTHERAPY ONE INJECTION: CPT | Performed by: FAMILY MEDICINE

## 2023-08-25 PROCEDURE — 73130 X-RAY EXAM OF HAND: CPT

## 2023-09-11 ENCOUNTER — CLINICAL SUPPORT (OUTPATIENT)
Dept: FAMILY MEDICINE CLINIC | Age: 48
End: 2023-09-11
Payer: COMMERCIAL

## 2023-09-11 DIAGNOSIS — J30.9 ALLERGIC RHINITIS, UNSPECIFIED SEASONALITY, UNSPECIFIED TRIGGER: Primary | ICD-10-CM

## 2023-09-11 PROCEDURE — 95115 IMMUNOTHERAPY ONE INJECTION: CPT | Performed by: FAMILY MEDICINE

## 2023-09-25 ENCOUNTER — CLINICAL SUPPORT (OUTPATIENT)
Dept: FAMILY MEDICINE CLINIC | Age: 48
End: 2023-09-25

## 2023-09-25 DIAGNOSIS — J30.9 ALLERGIC RHINITIS, UNSPECIFIED SEASONALITY, UNSPECIFIED TRIGGER: Primary | ICD-10-CM

## 2023-09-25 PROCEDURE — 95115 IMMUNOTHERAPY ONE INJECTION: CPT | Performed by: FAMILY MEDICINE

## 2023-09-27 ENCOUNTER — OFFICE VISIT (OUTPATIENT)
Dept: BARIATRICS/WEIGHT MGMT | Facility: CLINIC | Age: 48
End: 2023-09-27
Payer: COMMERCIAL

## 2023-09-27 VITALS
SYSTOLIC BLOOD PRESSURE: 142 MMHG | DIASTOLIC BLOOD PRESSURE: 82 MMHG | WEIGHT: 278 LBS | HEART RATE: 54 BPM | TEMPERATURE: 98.7 F | BODY MASS INDEX: 47.46 KG/M2 | HEIGHT: 64 IN

## 2023-09-27 DIAGNOSIS — M25.473 ANKLE SWELLING, UNSPECIFIED LATERALITY: ICD-10-CM

## 2023-09-27 DIAGNOSIS — K21.9 GASTROESOPHAGEAL REFLUX DISEASE, UNSPECIFIED WHETHER ESOPHAGITIS PRESENT: ICD-10-CM

## 2023-09-27 DIAGNOSIS — G47.30 SLEEP APNEA, UNSPECIFIED TYPE: ICD-10-CM

## 2023-09-27 DIAGNOSIS — I10 ESSENTIAL (PRIMARY) HYPERTENSION: ICD-10-CM

## 2023-09-27 DIAGNOSIS — E11.9 TYPE 2 DIABETES MELLITUS WITHOUT COMPLICATION, WITHOUT LONG-TERM CURRENT USE OF INSULIN: ICD-10-CM

## 2023-09-27 DIAGNOSIS — F32.A DEPRESSION, UNSPECIFIED DEPRESSION TYPE: ICD-10-CM

## 2023-09-27 DIAGNOSIS — E66.01 CLASS 3 SEVERE OBESITY WITH SERIOUS COMORBIDITY AND BODY MASS INDEX (BMI) OF 45.0 TO 49.9 IN ADULT, UNSPECIFIED OBESITY TYPE: Primary | ICD-10-CM

## 2023-09-27 RX ORDER — CITALOPRAM 20 MG/1
1 TABLET ORAL DAILY
COMMUNITY

## 2023-09-27 RX ORDER — ATENOLOL 25 MG/1
TABLET ORAL EVERY 24 HOURS
COMMUNITY

## 2023-09-27 RX ORDER — SENNOSIDES 8.6 MG
CAPSULE ORAL EVERY 8 HOURS
COMMUNITY

## 2023-09-27 RX ORDER — ONDANSETRON HYDROCHLORIDE 8 MG/1
TABLET, FILM COATED ORAL EVERY 8 HOURS
COMMUNITY

## 2023-09-27 RX ORDER — BUPROPION HYDROCHLORIDE 150 MG/1
150 TABLET ORAL DAILY
COMMUNITY

## 2023-09-27 NOTE — PROGRESS NOTES
[facility] BARIATRIC [facility] BARIATRIC SURGERY  [address] [facility] [address]  [facility] [address]  [phone]  [address] [id number] [phone]  Dept: [phone]  9/27/2023      Karey Lopez.  [id number]  [id number]  1975  female      Chief Complaint   Patient presents with    Nutrition Counseling     Diet visir       The patient is here for month 5/5 of their pre-operative physician supervised diet. Today's weight is 126 kg (278 lb) pounds and had a loss of 2 lbs. The patient states that she is following the recommendations given by our office and dietician including a high lean protein, low carb and low fat diet. We recommended adequate fruits and vegetable intake along with limited portion sizes. Patient is working on eliminating fast foods, fried foods, sweets and soda. Karey Lopez has been increasing her daily water intake. She has been exercising: walking most days.    Patient states they have made positive changes including leading with protein first at each meal. She has been snacking on string cheese, deli meat. They grill at lot at home and has been leading with protein and veggies at dinnertime as well. Only drinking water and getting at least 64oz.   The patient admits to be struggling with that she was without a fridge for two weeks and couldn't cook at much as home as she typically does. She reports that she has been having a lot of joint pain, but her workup with rheumatology was negative. She has undergone a midfoot fusion in the past and is having a lot of pain and trouble walking long distances. She reports toe pain.     Review of Systems   Constitutional:  Positive for appetite change. Negative for fatigue and unexpected weight change.   HENT: Negative.     Eyes: Negative.    Respiratory: Negative.     Cardiovascular: Negative.  Negative for leg swelling.   Gastrointestinal:  Negative for abdominal distention, abdominal pain,  constipation, diarrhea, nausea and vomiting.   Genitourinary:  Negative for difficulty urinating, frequency and urgency.   Musculoskeletal:  Negative for back pain.   Skin: Negative.    Psychiatric/Behavioral: Negative.     All other systems reviewed and are negative.  Vitals:    09/27/23 0936   BP: 142/82   Pulse: 54   Temp: 98.7 °F (37.1 °C)     Patient Active Problem List   Diagnosis    Allergic rhinitis    Sleep apnea, unspecified    Essential (primary) hypertension    Depression    Vitamin D deficiency    Chronic left shoulder pain    Keratosis pilaris    Arthralgia    Type 2 diabetes mellitus without complication, without long-term current use of insulin    Class 3 severe obesity with serious comorbidity and body mass index (BMI) of 45.0 to 49.9 in adult    Other fatigue    Amenorrhea    Night sweats    Allergies    Ankle swelling    Leaking of urine    Menstrual irregularity    Shoulder pain    Foot pain    Knee pain    Sciatica    Back pain    Muscle pain    GERD (gastroesophageal reflux disease)    Acute gastric ulcer without hemorrhage or perforation    Gastric ulcer without hemorrhage or perforation    Persistent mood (affective) disorder, unspecified    Dyspnea    Localized edema    MESFIN positive    Anti-RNP antibodies present    Fever    Myalgia     Body mass index is 47.69 kg/m².    The following portions of the patient's history were reviewed and updated as appropriate: active problem list, allergies, social history, health maintenance    Physical Exam  Vitals and nursing note reviewed.   Constitutional:       Appearance: She is well-developed.   Neck:      Thyroid: No thyromegaly.   Cardiovascular:      Rate and Rhythm: Normal rate.   Pulmonary:      Effort: Pulmonary effort is normal. No respiratory distress.   Abdominal:      Palpations: Abdomen is soft.   Musculoskeletal:         General: No tenderness.   Skin:     General: Skin is warm and dry.   Neurological:      Mental Status: She is alert.    Psychiatric:         Behavior: Behavior normal.       Discussion/Plan:  The following preoperative testing and documentation was reviewed and has been completed:       Obesity/Morbid Obesity: Currently the patient's weight is decreasing. There are no medications prescribed.Treatment plan includes prescribed diet, prescribed exercise regimen and behavior modification.    I reviewed the appropriate dietary choices with the patient and encouraged the necessary changes. Recommended at least 70 grams of protein per day, around 35 grams of fats and less than 100 grams of carbohydrates. Reviewed calorie intake if patient wanted to calorie count and/or had BMR. Instructed patient to drink half of body weight in ounces per day and exercise a minimum of 150 minutes per week including both cardio and strength training. Discussed the option of keeping a food journal which will help patient become more aware of the nutritional value of foods so they are more prepared after surgery.    The patient was given written materials from our office for education.   I answered all of the patients questions regarding dietary changes, exercise or surgical options.  The patient will follow up in 1 month. The total time spent during this encounter was 25 minutes    Encounter Diagnoses   Name Primary?    Class 3 severe obesity with serious comorbidity and body mass index (BMI) of 45.0 to 49.9 in adult, unspecified obesity type Yes    Essential (primary) hypertension     Type 2 diabetes mellitus without complication, without long-term current use of insulin     Gastroesophageal reflux disease, unspecified whether esophagitis present     Ankle swelling, unspecified laterality     Depression, unspecified depression type     Sleep apnea, unspecified type

## 2023-10-09 ENCOUNTER — CLINICAL SUPPORT (OUTPATIENT)
Dept: FAMILY MEDICINE CLINIC | Age: 48
End: 2023-10-09
Payer: COMMERCIAL

## 2023-10-09 DIAGNOSIS — J30.9 ALLERGIC RHINITIS, UNSPECIFIED SEASONALITY, UNSPECIFIED TRIGGER: Primary | ICD-10-CM

## 2023-10-09 PROCEDURE — 95115 IMMUNOTHERAPY ONE INJECTION: CPT | Performed by: FAMILY MEDICINE

## 2023-10-11 DIAGNOSIS — G89.28 CHRONIC POSTOPERATIVE PAIN: ICD-10-CM

## 2023-10-11 RX ORDER — TIZANIDINE 4 MG/1
TABLET ORAL
Qty: 30 TABLET | Refills: 1 | Status: SHIPPED | OUTPATIENT
Start: 2023-10-11

## 2023-10-18 ENCOUNTER — OFFICE VISIT (OUTPATIENT)
Dept: FAMILY MEDICINE CLINIC | Age: 48
End: 2023-10-18
Payer: COMMERCIAL

## 2023-10-18 VITALS
SYSTOLIC BLOOD PRESSURE: 129 MMHG | WEIGHT: 279.6 LBS | BODY MASS INDEX: 47.73 KG/M2 | HEART RATE: 94 BPM | HEIGHT: 64 IN | DIASTOLIC BLOOD PRESSURE: 56 MMHG

## 2023-10-18 DIAGNOSIS — H61.21 IMPACTED CERUMEN OF RIGHT EAR: ICD-10-CM

## 2023-10-18 DIAGNOSIS — R61 NIGHT SWEATS: Primary | ICD-10-CM

## 2023-10-18 PROBLEM — M19.071 ARTHRITIS OF RIGHT FOOT: Status: ACTIVE | Noted: 2023-10-10

## 2023-10-18 PROBLEM — M77.51 BURSITIS OF INTERMETATARSAL BURSA OF RIGHT FOOT: Status: ACTIVE | Noted: 2023-10-10

## 2023-10-18 PROCEDURE — 99214 OFFICE O/P EST MOD 30 MIN: CPT | Performed by: NURSE PRACTITIONER

## 2023-10-18 RX ORDER — PREGABALIN 75 MG/1
75 CAPSULE ORAL 2 TIMES DAILY
COMMUNITY
Start: 2023-10-10

## 2023-10-18 NOTE — ASSESSMENT & PLAN NOTE
Patient has had a colonoscopy done a little over 10 years ago that was negative.  It was done to further investigate history of anemia.  She is aware that she is due updated colon cancer screening and she will let me know when she wishes to proceed with referral.  Due to chronically elevated CRP level it could be recommended to update imaging of the chest and abdomen.  However those have not been done long ago.  Since it could be a side effect of citalopram we will have her go back to Lexapro 30 mg daily to see if Lexapro will be more effective and if it reduces her night sweats.  If not improving we will need to follow-up and decide further testing to be done.

## 2023-10-18 NOTE — PROGRESS NOTES
"Chief Complaint  Night Sweats    Subjective          Karey Lopez presents to [facility]     Patient is a 48-year-old female who is here today to discuss night sweats for the last 3 to 4 months.  History of asthma currently stable.  Denies cough or fever.  States that she generalize drenching night sweats.  SSRI was changed from Lexapro to citalopram approximately 3 to 4 months ago.  Patient is concerned this could be a side effect.  She did well on Lexapro and Wellbutrin for many years.  Previously tried   viibryd but did not tolerate it.  Citalopram 20 mg daily has been working well otherwise.  Liver function and thyroid function have been normal.  No exposures to TB.  Chronically elevated CRP level for the last 4 years that has been stable.  Recently saw a rheumatology and no changes to her current plan have been recommended.  She has had a chest x-ray as recently as June 26 with no concerns noted.  She had CT of the chest with contrast June 2022 with no concerns noted.  She had CT abdomen and pelvis 2 years ago with no concerns noted.        Objective   Vital Signs:   Vitals:    10/18/23 1624   BP: 129/56   BP Location: Right arm   Patient Position: Sitting   Pulse: 94   Weight: 127 kg (279 lb 9.6 oz)   Height: 162.6 cm (64.02\")       Wt Readings from Last 3 Encounters:   10/18/23 127 kg (279 lb 9.6 oz)   09/27/23 126 kg (278 lb)   08/24/23 127 kg (280 lb)      BP Readings from Last 3 Encounters:   10/18/23 129/56   09/27/23 142/82   08/24/23 155/80       Body mass index is 47.96 kg/m².             Physical Exam  Vitals reviewed.   Constitutional:       General: She is not in acute distress.     Appearance: Normal appearance. She is well-developed. She is obese.   HENT:      Right Ear: There is impacted cerumen.      Left Ear: Tympanic membrane normal.      Mouth/Throat:      Pharynx: Oropharynx is clear. No oropharyngeal exudate or posterior oropharyngeal erythema.   Neck: "      Thyroid: No thyromegaly.   Cardiovascular:      Rate and Rhythm: Normal rate and regular rhythm.      Heart sounds: Normal heart sounds.   Pulmonary:      Effort: Pulmonary effort is normal.      Breath sounds: Normal breath sounds.   Musculoskeletal:      Right lower leg: No edema.      Left lower leg: No edema.   Skin:     General: Skin is warm and dry.   Neurological:      General: No focal deficit present.      Mental Status: She is alert.   Psychiatric:         Attention and Perception: Attention normal.         Mood and Affect: Mood and affect normal.         Behavior: Behavior normal.           Current Outpatient Medications:     acetaminophen (TYLENOL) 650 MG 8 hr tablet, Take  by mouth Every 8 (Eight) Hours., Disp: , Rfl:     albuterol (PROVENTIL) (2.5 MG/3ML) 0.083% nebulizer solution, Take 2.5 mg by nebulization Every 4 (Four) Hours As Needed for Wheezing., Disp: 60 each, Rfl: 5    albuterol sulfate  (90 Base) MCG/ACT inhaler, Inhale 2 puffs Every 4 (Four) Hours As Needed for Wheezing., Disp: 8 g, Rfl: 5    atenolol (TENORMIN) 25 MG tablet, Take 0.5 tablets by mouth Daily., Disp: , Rfl:     Blood Glucose Monitoring Suppl (ONE TOUCH ULTRA 2) w/Device kit, 1 each Daily., Disp: 1 each, Rfl: 0    buPROPion XL (Wellbutrin XL) 150 MG 24 hr tablet, Take 1 tablet by mouth Daily., Disp: , Rfl:     Cholecalciferol 125 MCG (5000 UT) tablet, Take  by mouth Daily., Disp: , Rfl:     citalopram (CeleXA) 20 MG tablet, Take 1 tablet by mouth Daily., Disp: , Rfl:     glucose blood test strip, 1 each by Other route Daily. USE WITH ONE TOUCH METER, Disp: 100 each, Rfl: 1    Lancets misc, 1 each Daily. USE WITH ONE TOUCH METER, Disp: 100 each, Rfl: 1    lidocaine (LIDODERM) 5 %, APPLY 1 PATCH DAILY TOPICALLY REMOVE AND DISCARD PATCH WITHIN 12 HOURS OR AS DIRECTED., Disp: , Rfl:     lisinopril (PRINIVIL,ZESTRIL) 40 MG tablet, Take 1 tablet by mouth Daily for 180 days., Disp: 90 tablet, Rfl: 1    montelukast  (SINGULAIR) 10 MG tablet, TAKE 1 TABLET BY MOUTH DAILY, Disp: 90 tablet, Rfl: 1    ondansetron (ZOFRAN) 8 MG tablet, Take  by mouth Every 8 (Eight) Hours., Disp: , Rfl:     pantoprazole (PROTONIX) 40 MG EC tablet, Take 1 tablet by mouth 2 (Two) Times a Day., Disp: 60 tablet, Rfl: 3    pregabalin (LYRICA) 75 MG capsule, Take 1 capsule by mouth 2 (Two) Times a Day., Disp: , Rfl:     sucralfate (Carafate) 1 g tablet, Take 1 tablet by mouth 4 (Four) Times a Day., Disp: 120 tablet, Rfl: 3    tiZANidine (ZANAFLEX) 4 MG tablet, TAKE 1 TABLET BY MOUTH EVERY 6 HOURS AS NEEDED FOR MUSCLE SPASMS, Disp: 30 tablet, Rfl: 1    ipratropium-albuterol (DUO-NEB) 0.5-2.5 mg/3 ml nebulizer, Take 3 mL by nebulization Every 4 (Four) Hours As Needed for Wheezing for up to 30 days., Disp: 360 mL, Rfl: 0    Current Facility-Administered Medications:     Allergy Serum Injection, 0.5 mL, Subcutaneous, Once, Sunita Hylton, AMBIKA   Past Medical History:   Diagnosis Date    Acute non-recurrent maxillary sinusitis 5/4/2023    Allergic rhinitis, unspecified     Anxiety     Arthritis     Asthma     Asthma, intrinsic 1985    Cough variant asthma     Diabetes mellitus 06/2022    Dorsalgia, unspecified     Essential (primary) hypertension     Family history of ischemic heart disease and other diseases of the circulatory system     GERD (gastroesophageal reflux disease) 6/27/2023    Hypertension     Left lower quadrant pain     Leucocytosis 2020    due to chronic inflammation per hematology    Lichen sclerosus 2019    Obesity, unspecified     Other fatigue     Persistent mood (affective) disorder, unspecified     PONV (postoperative nausea and vomiting)     Sepsis     Right knee Sepsis    Sleep apnea, obstructive     Sleep apnea, unspecified     Unspecified abdominal pain     Unspecified ovarian cyst, left side     Vitamin D deficiency, unspecified      Allergies   Allergen Reactions    Ozempic (0.25 Or 0.5 Mg-Dose) [Semaglutide(0.25 Or 0.5mg-Dos)]  Other (See Comments)     Fatigue and nausea    Metformin Nausea Only    Vilazodone Hcl Mental Status Change               Result Review :     Common labs          2/2/2023    09:56 8/4/2023    08:59   Common Labs   Glucose 98  118    BUN 9  9    Creatinine 0.75  0.68    Sodium 137  137    Potassium 4.3  4.4    Chloride 99  101    Calcium 10.1  10.2    Albumin 4.3  3.9    Total Bilirubin 0.2  0.3    Alkaline Phosphatase 73  55    AST (SGOT) 15  17    ALT (SGPT) 12  8    WBC 12.69     Hemoglobin 12.2     Hematocrit 39.4     Platelets 501     Total Cholesterol 181  164    Triglycerides 124  192    HDL Cholesterol 64  39    LDL Cholesterol  95  92    Hemoglobin A1C 5.80  6.80    Microalbumin, Urine 1.4          XR Hand 3+ View Left    Result Date: 8/25/2023   Normal left hand      Reyes Carbajal MD       Electronically Signed and Approved By: Reyes Carbajal MD on 8/25/2023 at 13:20             XR Hand 3+ View Right    Result Date: 8/25/2023   Normal right hand      Reyes Carbajal MD       Electronically Signed and Approved By: Reyes Carbajal MD on 8/25/2023 at 13:19                  Ear Cerumen Removal    Date/Time: 10/24/2023 8:48 AM    Performed by: Sunita Hylton APRN  Authorized by: Sunita Hylton APRN    Anesthesia:  Local Anesthetic: none  Location details: right ear  Patient tolerance: patient tolerated the procedure well with no immediate complications  Procedure type: instrumentation, curette   Sedation:  Patient sedated: no           Social History     Tobacco Use   Smoking Status Never    Passive exposure: Never   Smokeless Tobacco Never           Assessment and Plan    Diagnoses and all orders for this visit:    1. Night sweats (Primary)  Assessment & Plan:  Patient has had a colonoscopy done a little over 10 years ago that was negative.  It was done to further investigate history of anemia.  She is aware that she is due updated colon cancer screening and she will let me know when she wishes to proceed  with referral.  Due to chronically elevated CRP level it could be recommended to update imaging of the chest and abdomen.  However those have not been done long ago.  Since it could be a side effect of citalopram we will have her go back to Lexapro 30 mg daily to see if Lexapro will be more effective and if it reduces her night sweats.  If not improving we will need to follow-up and decide further testing to be done.      2. Impacted cerumen of right ear  Assessment & Plan:  TM right is normal after cerumen removal    Orders:  -     Ear Cerumen Removal        Follow Up    No follow-ups on file.  Patient was given instructions and counseling regarding her condition or for health maintenance advice. Please see specific information pulled into the AVS if appropriate.

## 2023-10-19 DIAGNOSIS — R09.89 SINUS COMPLAINT: Primary | ICD-10-CM

## 2023-10-19 RX ORDER — AZITHROMYCIN 250 MG/1
TABLET, FILM COATED ORAL
Qty: 6 TABLET | Refills: 0 | Status: SHIPPED | OUTPATIENT
Start: 2023-10-19

## 2023-10-20 RX ORDER — BLOOD SUGAR DIAGNOSTIC
STRIP MISCELLANEOUS
Qty: 100 EACH | Refills: 3 | Status: SHIPPED | OUTPATIENT
Start: 2023-10-20

## 2023-10-23 ENCOUNTER — TRANSCRIBE ORDERS (OUTPATIENT)
Dept: ADMINISTRATIVE | Facility: HOSPITAL | Age: 48
End: 2023-10-23
Payer: COMMERCIAL

## 2023-10-23 DIAGNOSIS — Z12.31 SCREENING MAMMOGRAM FOR BREAST CANCER: Primary | ICD-10-CM

## 2023-10-24 PROCEDURE — 69210 REMOVE IMPACTED EAR WAX UNI: CPT | Performed by: NURSE PRACTITIONER

## 2023-10-26 RX ORDER — LANCETS 30 GAUGE
1 EACH MISCELLANEOUS DAILY
Qty: 100 EACH | Refills: 1 | Status: SHIPPED | OUTPATIENT
Start: 2023-10-26

## 2023-10-27 ENCOUNTER — CLINICAL SUPPORT (OUTPATIENT)
Dept: FAMILY MEDICINE CLINIC | Age: 48
End: 2023-10-27
Payer: COMMERCIAL

## 2023-10-27 DIAGNOSIS — J30.9 ALLERGIC RHINITIS, UNSPECIFIED SEASONALITY, UNSPECIFIED TRIGGER: Primary | ICD-10-CM

## 2023-10-27 PROCEDURE — 95115 IMMUNOTHERAPY ONE INJECTION: CPT | Performed by: FAMILY MEDICINE

## 2023-10-30 DIAGNOSIS — R11.0 NAUSEA: ICD-10-CM

## 2023-10-30 RX ORDER — MULTIVITAMIN WITH IRON
1 TABLET ORAL DAILY
COMMUNITY

## 2023-10-31 ENCOUNTER — ANESTHESIA EVENT (OUTPATIENT)
Dept: GASTROENTEROLOGY | Facility: HOSPITAL | Age: 48
End: 2023-10-31
Payer: COMMERCIAL

## 2023-10-31 ENCOUNTER — ANESTHESIA (OUTPATIENT)
Dept: GASTROENTEROLOGY | Facility: HOSPITAL | Age: 48
End: 2023-10-31
Payer: COMMERCIAL

## 2023-10-31 ENCOUNTER — HOSPITAL ENCOUNTER (OUTPATIENT)
Facility: HOSPITAL | Age: 48
Setting detail: HOSPITAL OUTPATIENT SURGERY
Discharge: HOME | End: 2023-10-31
Attending: SURGERY | Admitting: SURGERY
Payer: COMMERCIAL

## 2023-10-31 VITALS
SYSTOLIC BLOOD PRESSURE: 142 MMHG | HEIGHT: 64 IN | OXYGEN SATURATION: 98 % | HEART RATE: 68 BPM | DIASTOLIC BLOOD PRESSURE: 70 MMHG | WEIGHT: 279 LBS | BODY MASS INDEX: 47.63 KG/M2 | RESPIRATION RATE: 20 BRPM

## 2023-10-31 DIAGNOSIS — K25.3 ACUTE GASTRIC ULCER WITHOUT HEMORRHAGE OR PERFORATION: ICD-10-CM

## 2023-10-31 LAB
B-HCG UR QL: NEGATIVE
EXPIRATION DATE: NORMAL
GLUCOSE BLDC GLUCOMTR-MCNC: 117 MG/DL (ref 70–130)
INTERNAL NEGATIVE CONTROL: NEGATIVE
INTERNAL POSITIVE CONTROL: POSITIVE
Lab: NORMAL

## 2023-10-31 PROCEDURE — 25010000002 PROPOFOL 10 MG/ML EMULSION: Performed by: ANESTHESIOLOGY

## 2023-10-31 PROCEDURE — 82948 REAGENT STRIP/BLOOD GLUCOSE: CPT

## 2023-10-31 PROCEDURE — 25810000003 LACTATED RINGERS PER 1000 ML: Performed by: NURSE PRACTITIONER

## 2023-10-31 PROCEDURE — 81025 URINE PREGNANCY TEST: CPT | Performed by: SURGERY

## 2023-10-31 RX ORDER — SODIUM CHLORIDE 0.9 % (FLUSH) 0.9 %
10 SYRINGE (ML) INJECTION EVERY 12 HOURS SCHEDULED
Status: DISCONTINUED | OUTPATIENT
Start: 2023-10-31 | End: 2023-10-31 | Stop reason: HOSPADM

## 2023-10-31 RX ORDER — LIDOCAINE HYDROCHLORIDE 20 MG/ML
INJECTION, SOLUTION INFILTRATION; PERINEURAL AS NEEDED
Status: DISCONTINUED | OUTPATIENT
Start: 2023-10-31 | End: 2023-10-31 | Stop reason: SURG

## 2023-10-31 RX ORDER — SODIUM CHLORIDE, SODIUM LACTATE, POTASSIUM CHLORIDE, CALCIUM CHLORIDE 600; 310; 30; 20 MG/100ML; MG/100ML; MG/100ML; MG/100ML
30 INJECTION, SOLUTION INTRAVENOUS CONTINUOUS
Status: DISCONTINUED | OUTPATIENT
Start: 2023-10-31 | End: 2023-10-31 | Stop reason: HOSPADM

## 2023-10-31 RX ORDER — ONDANSETRON 8 MG/1
TABLET, ORALLY DISINTEGRATING ORAL
Qty: 20 TABLET | Refills: 1 | Status: SHIPPED | OUTPATIENT
Start: 2023-10-31

## 2023-10-31 RX ORDER — PROPOFOL 10 MG/ML
VIAL (ML) INTRAVENOUS AS NEEDED
Status: DISCONTINUED | OUTPATIENT
Start: 2023-10-31 | End: 2023-10-31 | Stop reason: SURG

## 2023-10-31 RX ADMIN — PROPOFOL 200 MCG/KG/MIN: 10 INJECTION, EMULSION INTRAVENOUS at 08:47

## 2023-10-31 RX ADMIN — LIDOCAINE HYDROCHLORIDE 60 MG: 20 INJECTION, SOLUTION INFILTRATION; PERINEURAL at 08:47

## 2023-10-31 RX ADMIN — PROPOFOL 200 MG: 10 INJECTION, EMULSION INTRAVENOUS at 08:47

## 2023-10-31 RX ADMIN — SODIUM CHLORIDE, POTASSIUM CHLORIDE, SODIUM LACTATE AND CALCIUM CHLORIDE 30 ML/HR: 600; 310; 30; 20 INJECTION, SOLUTION INTRAVENOUS at 08:23

## 2023-10-31 NOTE — OP NOTE
Surgeon: Reyes Ngo Jr., M.D.    Preoperative Diagnosis: #1 history of gastric ulcer #2 status post lap band removal    Postoperative Diagnosis: Gastritis #2 questionable small hiatal hernia versus band scarring    Procedure Performed: Transoral esophagogastroduodenoscopy    Indications: 48-year-old female who underwent upper endoscopy preoperatively noted to have a large gastric antral ulcer.  Patient stopped NSAIDs and has been taking medication.    Procedure:     The procedure, indications, preparation and potential complications were explained to the patient, who indicated understanding and signed the corresponding consent forms.  The patient was identified, taken to the endoscopy suite, and placed on the left side down decubitus position.  The patient underwent a MAC anesthesia and was appropriately monitored through the case by the anesthesia personnel with continuous pulse oximetry, blood pressure, and cardiac monitoring.  A bite block was placed.  After adequate IV sedation and using a transoral technique a lubed flexible endoscope was placed in the hypopharynx and advanced to the second portion of the duodenum without difficulty. The scope was then withdrawn back into the antrum of the stomach.  Cold forcep biopsies of the antrum were taken to rule out Helicobacter pylori.  The scope was retroflexed noting the body, fundus and cardia.  The scope was then withdrawn back into the esophagus after decompressing the stomach.  The Z line was noted and GE junction measured from the incisors.  The scope was then completely withdrawn.  The patient tolerated the procedure well and left the endoscopy suite in stable condition.  The findings are listed below.    Duodenum: Unremarkable  Antrum: Patchy erythema; ulcer has healed  Body/Fundus: Unremarkable  Cardia: Questionable small defect versus band scarring  Esophagus: Z-line regular, no erosive esophagitis    Recommendations:     Follow-up in the office as  scheduled

## 2023-10-31 NOTE — H&P
Patient Care Team:  Sunita Hylton APRN as PCP - General (Nurse Practitioner)  Misa Thao APRN as Nurse Practitioner (Nurse Practitioner)  Tico Weiner DPM (Podiatry)    Chief complaint Need of preoperative clearance prior to surgery    Subjective     Patient is a 48 y.o. female who is a patient of ours and has undergone our extensive initial evaluation for bariatric surgery and needs to proceed with upper endoscopy for preoperative clearance prior to proceeding with surgery.  Please see the initial history and physical for further detailed information.      Review of Systems   Pertinent items are noted in HPI and no changes since last visit.    Past Medical History:   Diagnosis Date    Acute non-recurrent maxillary sinusitis 05/04/2023    Allergic rhinitis, unspecified     Anxiety     Arthritis     Asthma     Asthma, intrinsic 1985    Cough variant asthma     Diabetes mellitus 06/2022    Dorsalgia, unspecified     Essential (primary) hypertension     Family history of ischemic heart disease and other diseases of the circulatory system     GERD (gastroesophageal reflux disease) 06/27/2023    History of gastric ulcer 08/2023    History of Helicobacter pylori infection 08/2023    Hypertension     Left lower quadrant pain     Leucocytosis 2020    due to chronic inflammation per hematology    Lichen sclerosus 2019    Obesity, unspecified     Other fatigue     Persistent mood (affective) disorder, unspecified     PONV (postoperative nausea and vomiting)     Sepsis     Right knee Sepsis    Sleep apnea, obstructive     WEARS CPAP    Unspecified abdominal pain     Unspecified ovarian cyst, left side     Vitamin D deficiency, unspecified      Past Surgical History:   Procedure Laterality Date    COLONOSCOPY  01/2010    normal    ENDOSCOPY  01/2010, 01/2018 1/2010- small hernia and 01/2018 mild gastitis    ENDOSCOPY N/A 08/01/2023    Procedure: ESOPHAGOGASTRODUODENOSCOPY WITH BIOPSY;  Surgeon: Reyes Ngo  Dallin Rodriguez MD;  Location:  [facility];  Service: General;  Laterality: N/A;  PRE- DYSPEPSIA, H/O BAND REMOVAL  POST- GASTRITIS, GASTRIC ULCER    FOOT SURGERY Right 11/2015, 4/28/2017    FRACTURE SURGERY Right 11/2014, 3/2015    ankle    GALLBLADDER SURGERY  1992    GASTRIC BANDING REMOVAL  2019    KNEE ARTHROPLASTY Right 12/10/2019    KNEE ARTHROSCOPY Right 01/30/2020    Procedure: KNEE ARTHROSCOPY WITH INCISION AND DRAINAGE;  Surgeon: Fareed Chacon MD;  Location: [facility] MAIN OR;  Service: Orthopedics    LAPAROSCOPIC GASTRIC BANDING  07/2010    and was removed july 2019; scar tissue    LAPAROSCOPIC TUBAL LIGATION      MOUTH SURGERY      wisdom teeth removal    TUBAL ABDOMINAL LIGATION       Family History   Problem Relation Age of Onset    Asthma Mother     Heart disease Mother     Coronary artery disease Mother     Diabetes type II Mother     Diabetes Mother     Hypertension Mother     Stroke Father     Hypertension Father     Obesity Brother     Heart attack Brother         MI    Hypertension Brother     Cervical cancer Maternal Grandmother     Lung cancer Maternal Grandfather     Stroke Paternal Grandmother     Malig Hyperthermia Neg Hx      Social History     Tobacco Use    Smoking status: Never     Passive exposure: Never    Smokeless tobacco: Never   Vaping Use    Vaping Use: Never used   Substance Use Topics    Alcohol use: Never     Comment: just rarely    Drug use: Never     Facility-Administered Medications Prior to Admission   Medication Dose Route Frequency Provider Last Rate Last Admin    Allergy Serum Injection  0.5 mL Subcutaneous Once Sunita Hylton APRN         Medications Prior to Admission   Medication Sig Dispense Refill Last Dose    atenolol (TENORMIN) 25 MG tablet Take 0.5 tablets by mouth Daily.       buPROPion XL (Wellbutrin XL) 150 MG 24 hr tablet Take 1 tablet by mouth Daily.       lisinopril (PRINIVIL,ZESTRIL) 40 MG tablet Take 1 tablet by mouth Daily for 180 days. 90 tablet 1      Magnesium 250 MG tablet Take 1 tablet by mouth Daily.       metFORMIN (GLUCOPHAGE) 500 MG tablet Take 0.5 tablets by mouth 2 (Two) Times a Day With Meals.       montelukast (SINGULAIR) 10 MG tablet TAKE 1 TABLET BY MOUTH DAILY 90 tablet 1     pantoprazole (PROTONIX) 40 MG EC tablet Take 1 tablet by mouth 2 (Two) Times a Day. 60 tablet 3     sucralfate (Carafate) 1 g tablet Take 1 tablet by mouth 4 (Four) Times a Day. 120 tablet 3     vitamin D3 125 MCG (5000 UT) capsule capsule Take 2 capsules by mouth Daily.       acetaminophen (TYLENOL) 650 MG 8 hr tablet Take  by mouth Every 8 (Eight) Hours.       albuterol (PROVENTIL) (2.5 MG/3ML) 0.083% nebulizer solution Take 2.5 mg by nebulization Every 4 (Four) Hours As Needed for Wheezing. 60 each 5     albuterol sulfate  (90 Base) MCG/ACT inhaler Inhale 2 puffs Every 4 (Four) Hours As Needed for Wheezing. 8 g 5     Blood Glucose Monitoring Suppl (ONE TOUCH ULTRA 2) w/Device kit 1 each Daily. 1 each 0     glucose blood (OneTouch Ultra) test strip USE 1 EACH TO TEST BLOOD SUGAR DAILY. 100 each 3     ipratropium-albuterol (DUO-NEB) 0.5-2.5 mg/3 ml nebulizer Take 3 mL by nebulization Every 4 (Four) Hours As Needed for Wheezing for up to 30 days. 360 mL 0     Lancets misc Use 1 each Daily. USE WITH ONE TOUCH METER 100 each 1     lidocaine (LIDODERM) 5 % APPLY 1 PATCH DAILY TOPICALLY REMOVE AND DISCARD PATCH WITHIN 12 HOURS OR AS DIRECTED.       ondansetron (ZOFRAN) 8 MG tablet Take  by mouth Every 8 (Eight) Hours.       pregabalin (LYRICA) 75 MG capsule Take 1 capsule by mouth 2 (Two) Times a Day.       tiZANidine (ZANAFLEX) 4 MG tablet TAKE 1 TABLET BY MOUTH EVERY 6 HOURS AS NEEDED FOR MUSCLE SPASMS 30 tablet 1      Allergies:  Ozempic (0.25 or 0.5 mg-dose) [semaglutide(0.25 or 0.5mg-dos)], Metformin, and Vilazodone hcl    Vital Signs  See PreOp record            Physical Exam:   Heart: RR  Lungs: CTA B  Abd: soft and NT/ND  Ext: no clubbing, cyanosis    Results  Review:    I have reviewed the patient's clinical results      Acute gastric ulcer without hemorrhage or perforation    Plan for repeat endoscopy to evaluate ulcer healing.  The risks and benefits of the procedure were discussed with the patient in detail and all questions were answered.  Possibility of perforation, bleeding, aspiration, anoxic brain injury, respiratory and/or cardiac arrest and death were discussed.  Consent will be signed and witnessed..     Reyes Ngo MD  10/31/23  07:25 EDT  Time: Approximately 15 minutes was spent with the patient.  All of the patients questions were answered.

## 2023-10-31 NOTE — ANESTHESIA PREPROCEDURE EVALUATION
Anesthesia Evaluation     Patient summary reviewed and Nursing notes reviewed   history of anesthetic complications:   NPO Solid Status: > 8 hours  NPO Liquid Status: > 4 hours           Airway   Mallampati: III  TM distance: >3 FB  Neck ROM: full  Possible difficult intubation and Large neck circumference  Dental - normal exam     Pulmonary - normal exam   (+) asthma,shortness of breath, sleep apnea  Cardiovascular - normal exam    ECG reviewed    (+) hypertension 2 medications or greater      Neuro/Psych  (+) numbness, psychiatric history Depression and Anxiety  GI/Hepatic/Renal/Endo    (+) obesity, morbid obesity, GERD, PUD, GI bleeding , diabetes mellitus type 2    Musculoskeletal     (+) back pain, myalgias  Abdominal   (+) obese   Substance History      OB/GYN          Other   arthritis,                   Anesthesia Plan    ASA 3     MAC     intravenous induction     Anesthetic plan, risks, benefits, and alternatives have been provided, discussed and informed consent has been obtained with: patient.      CODE STATUS:

## 2023-10-31 NOTE — ANESTHESIA POSTPROCEDURE EVALUATION
Patient: Karey Lopez    Procedure Summary       Date: 10/31/23 Room / [facility]:  [facility]    Anesthesia Start: 0838 Anesthesia Stop: 0857    Procedure: ESOPHAGOGASTRODUODENOSCOPY WITH BIOPSY (Esophagus) Diagnosis:       Acute gastric ulcer without hemorrhage or perforation      (Acute gastric ulcer without hemorrhage or perforation [K25.3])    Surgeons: Reyes Ngo Jr., MD Provider: David Nj MD    Anesthesia Type: MAC ASA Status: 3            Anesthesia Type: MAC    Vitals  Vitals Value Taken Time   /70 10/31/23 0926   Temp     Pulse 67 10/31/23 0932   Resp 20 10/31/23 0915   SpO2 98 % 10/31/23 0932   Vitals shown include unfiled device data.        Post Anesthesia Care and Evaluation    Patient location during evaluation: PHASE II  Patient participation: complete - patient participated  Level of consciousness: awake and alert  Pain management: adequate    Airway patency: patent  Anesthetic complications: No anesthetic complications  PONV Status: none  Cardiovascular status: acceptable and hemodynamically stable  Respiratory status: acceptable, nonlabored ventilation and spontaneous ventilation  Hydration status: acceptable

## 2023-11-02 ENCOUNTER — PREP FOR SURGERY (OUTPATIENT)
Dept: OTHER | Facility: HOSPITAL | Age: 48
End: 2023-11-02
Payer: COMMERCIAL

## 2023-11-02 ENCOUNTER — CONSULT (OUTPATIENT)
Dept: BARIATRICS/WEIGHT MGMT | Facility: CLINIC | Age: 48
End: 2023-11-02
Payer: COMMERCIAL

## 2023-11-02 VITALS
BODY MASS INDEX: 47.97 KG/M2 | DIASTOLIC BLOOD PRESSURE: 80 MMHG | SYSTOLIC BLOOD PRESSURE: 147 MMHG | TEMPERATURE: 98.3 F | HEIGHT: 64 IN | WEIGHT: 281 LBS | HEART RATE: 57 BPM

## 2023-11-02 DIAGNOSIS — E11.9 TYPE 2 DIABETES MELLITUS WITHOUT COMPLICATION, WITHOUT LONG-TERM CURRENT USE OF INSULIN: ICD-10-CM

## 2023-11-02 DIAGNOSIS — E66.01 OBESITY, CLASS III, BMI 40-49.9 (MORBID OBESITY): Primary | ICD-10-CM

## 2023-11-02 DIAGNOSIS — Z98.84 HISTORY OF REMOVAL OF LAPAROSCOPIC GASTRIC BANDING DEVICE: ICD-10-CM

## 2023-11-02 DIAGNOSIS — I10 ESSENTIAL (PRIMARY) HYPERTENSION: ICD-10-CM

## 2023-11-02 DIAGNOSIS — E66.01 CLASS 3 SEVERE OBESITY DUE TO EXCESS CALORIES WITH SERIOUS COMORBIDITY AND BODY MASS INDEX (BMI) OF 50.0 TO 59.9 IN ADULT: Primary | ICD-10-CM

## 2023-11-02 DIAGNOSIS — K21.9 GASTROESOPHAGEAL REFLUX DISEASE, UNSPECIFIED WHETHER ESOPHAGITIS PRESENT: ICD-10-CM

## 2023-11-02 DIAGNOSIS — K25.3 ACUTE GASTRIC ULCER WITHOUT HEMORRHAGE OR PERFORATION: ICD-10-CM

## 2023-11-02 DIAGNOSIS — G47.30 SLEEP APNEA, UNSPECIFIED TYPE: ICD-10-CM

## 2023-11-02 PROBLEM — E66.813 CLASS 3 SEVERE OBESITY DUE TO EXCESS CALORIES WITH SERIOUS COMORBIDITY AND BODY MASS INDEX (BMI) OF 50.0 TO 59.9 IN ADULT: Status: ACTIVE | Noted: 2023-11-02

## 2023-11-02 PROBLEM — E66.813 CLASS 3 SEVERE OBESITY WITH SERIOUS COMORBIDITY AND BODY MASS INDEX (BMI) OF 45.0 TO 49.9 IN ADULT: Status: RESOLVED | Noted: 2022-12-01 | Resolved: 2023-11-02

## 2023-11-02 PROBLEM — E66.813 CLASS 3 SEVERE OBESITY DUE TO EXCESS CALORIES WITH SERIOUS COMORBIDITY AND BODY MASS INDEX (BMI) OF 50.0 TO 59.9 IN ADULT: Status: RESOLVED | Noted: 2023-11-02 | Resolved: 2023-11-02

## 2023-11-02 PROBLEM — E66.813 OBESITY, CLASS III, BMI 40-49.9 (MORBID OBESITY): Status: ACTIVE | Noted: 2018-01-03

## 2023-11-02 RX ORDER — SODIUM CHLORIDE, SODIUM LACTATE, POTASSIUM CHLORIDE, CALCIUM CHLORIDE 600; 310; 30; 20 MG/100ML; MG/100ML; MG/100ML; MG/100ML
100 INJECTION, SOLUTION INTRAVENOUS CONTINUOUS
OUTPATIENT
Start: 2023-11-02

## 2023-11-02 RX ORDER — PANTOPRAZOLE SODIUM 40 MG/10ML
40 INJECTION, POWDER, LYOPHILIZED, FOR SOLUTION INTRAVENOUS ONCE
OUTPATIENT
Start: 2023-11-02 | End: 2023-11-02

## 2023-11-02 RX ORDER — SCOLOPAMINE TRANSDERMAL SYSTEM 1 MG/1
1 PATCH, EXTENDED RELEASE TRANSDERMAL CONTINUOUS
OUTPATIENT
Start: 2023-11-02 | End: 2023-11-05

## 2023-11-02 RX ORDER — PROMETHAZINE HYDROCHLORIDE 12.5 MG/1
12.5 TABLET ORAL ONCE AS NEEDED
OUTPATIENT
Start: 2023-11-02

## 2023-11-02 RX ORDER — GABAPENTIN 300 MG/1
300 CAPSULE ORAL ONCE
OUTPATIENT
Start: 2023-11-02 | End: 2023-11-02

## 2023-11-02 RX ORDER — SODIUM CHLORIDE 9 MG/ML
40 INJECTION, SOLUTION INTRAVENOUS AS NEEDED
OUTPATIENT
Start: 2023-11-02

## 2023-11-02 RX ORDER — METOCLOPRAMIDE HYDROCHLORIDE 5 MG/ML
10 INJECTION INTRAMUSCULAR; INTRAVENOUS ONCE
OUTPATIENT
Start: 2023-11-02 | End: 2023-11-02

## 2023-11-02 RX ORDER — ENOXAPARIN SODIUM 100 MG/ML
40 INJECTION SUBCUTANEOUS EVERY 12 HOURS SCHEDULED
Qty: 11.2 ML | Refills: 0 | Status: SHIPPED | OUTPATIENT
Start: 2023-11-02 | End: 2023-11-16

## 2023-11-02 RX ORDER — PROMETHAZINE HYDROCHLORIDE 25 MG/1
25 SUPPOSITORY RECTAL ONCE AS NEEDED
OUTPATIENT
Start: 2023-11-02

## 2023-11-02 RX ORDER — CHLORHEXIDINE GLUCONATE ORAL RINSE 1.2 MG/ML
15 SOLUTION DENTAL SEE ADMIN INSTRUCTIONS
OUTPATIENT
Start: 2023-11-02

## 2023-11-02 RX ORDER — SODIUM CHLORIDE 0.9 % (FLUSH) 0.9 %
3-10 SYRINGE (ML) INJECTION AS NEEDED
OUTPATIENT
Start: 2023-11-02

## 2023-11-02 RX ORDER — SODIUM CHLORIDE 0.9 % (FLUSH) 0.9 %
3 SYRINGE (ML) INJECTION EVERY 12 HOURS SCHEDULED
OUTPATIENT
Start: 2023-11-02

## 2023-11-02 RX ORDER — CEFAZOLIN SODIUM IN 0.9 % NACL 3 G/100 ML
3 INTRAVENOUS SOLUTION, PIGGYBACK (ML) INTRAVENOUS ONCE
OUTPATIENT
Start: 2023-11-02 | End: 2023-11-02

## 2023-11-02 NOTE — H&P
Bariatric Consult:  Referred by Sunita Hylton APRN    Karey Lopez is here today for consult on Consult      History of Present Illness:     Karey Lopez is a 48 y.o. female with morbid obesity with co-morbidities including sleep apnea, diabetes, hypertension, dyslipidemia, osteoarthritis, back pain, knee pain, and GERD who presents for surgical consultation for the above procedure. Karey has completed the initial intake visit and has been examined by our nurse practitioner, dietician, psychologist and underwent the extensive educational teaching process under the guidance of our bariatric coordinator and myself. Karey also has seen or if has not yet will see the educational video FARIBA on the surgical procedure if available. Karey attended today more educational teaching from our bariatric coordinator and myself. Kaery has had an extensive medical workup including a visit with their primary care physician, EKG, chest radiograph, blood work, EGD or UGI and possibly further testing. These have been reviewed by me and discussed with the patient. Karey is now ready to proceed with surgery. Karey presently denies nausea, vomiting, fever, chills, chest pain, shortness of air, melena, hematochezia, hemetemesis, dysuria, frequency, hematuria.     Past Medical History:   Diagnosis Date    Acute non-recurrent maxillary sinusitis 05/04/2023    Allergic rhinitis, unspecified     Anxiety     Arthritis     Asthma     Asthma, intrinsic 1985    Cough variant asthma     Diabetes mellitus 06/2022    Dorsalgia, unspecified     Essential (primary) hypertension     Family history of ischemic heart disease and other diseases of the circulatory system     GERD (gastroesophageal reflux disease) 06/27/2023    History of gastric ulcer 08/2023    History of Helicobacter pylori infection 08/2023    Hypertension     Left lower quadrant pain     Leucocytosis 2020    due to chronic inflammation per hematology    Lichen  sclerosus 2019    Obesity, unspecified     Other fatigue     Persistent mood (affective) disorder, unspecified     PONV (postoperative nausea and vomiting)     Sepsis     Right knee Sepsis    Sleep apnea, obstructive     WEARS CPAP    Unspecified abdominal pain     Unspecified ovarian cyst, left side     Vitamin D deficiency, unspecified        Encounter Diagnoses   Name Primary?    Obesity, Class III, BMI 40-49.9 (morbid obesity) Yes    Essential (primary) hypertension     Type 2 diabetes mellitus without complication, without long-term current use of insulin     Sleep apnea, unspecified type     Gastroesophageal reflux disease, unspecified whether esophagitis present     History of removal of laparoscopic gastric banding device     Acute gastric ulcer without hemorrhage or perforation        Past Surgical History:   Procedure Laterality Date    COLONOSCOPY  01/2010    normal    ENDOSCOPY  01/2010, 01/2018 1/2010- small hernia and 01/2018 mild gastitis    ENDOSCOPY N/A 08/01/2023    Procedure: ESOPHAGOGASTRODUODENOSCOPY WITH BIOPSY;  Surgeon: Reyes Ngo Jr., MD;  Location: [facility];  Service: General;  Laterality: N/A;  PRE- DYSPEPSIA, H/O BAND REMOVAL  POST- GASTRITIS, GASTRIC ULCER    ENDOSCOPY N/A 10/31/2023    Procedure: ESOPHAGOGASTRODUODENOSCOPY WITH BIOPSY;  Surgeon: Reyes Ngo Jr., MD;  Location: [facility] ENDOSCOPY;  Service: General;  Laterality: N/A;  PRE- H/O GASTRIC ULCER   POST- GASTRITIS    FOOT SURGERY Right 11/2015, 4/28/2017    FRACTURE SURGERY Right 11/2014, 3/2015    ankle    GALLBLADDER SURGERY  1992    GASTRIC BANDING REMOVAL  2019    KNEE ARTHROPLASTY Right 12/10/2019    KNEE ARTHROSCOPY Right 01/30/2020    Procedure: KNEE ARTHROSCOPY WITH INCISION AND DRAINAGE;  Surgeon: Fareed Chacon MD;  Location: [facility] OR;  Service: Orthopedics    LAPAROSCOPIC GASTRIC BANDING  07/2010    and was removed july 2019; scar tissue    LAPAROSCOPIC TUBAL LIGATION      MOUTH  SURGERY      wisdom teeth removal    TUBAL ABDOMINAL LIGATION           * No active hospital problems. *      Allergies   Allergen Reactions    Ozempic (0.25 Or 0.5 Mg-Dose) [Semaglutide(0.25 Or 0.5mg-Dos)] Other (See Comments)     Fatigue and nausea    Metformin Nausea Only     CAN TAKE SMALL DOSES    Vilazodone Hcl Mental Status Change         Current Outpatient Medications:     acetaminophen (TYLENOL) 650 MG 8 hr tablet, Take  by mouth Every 8 (Eight) Hours., Disp: , Rfl:     albuterol (PROVENTIL) (2.5 MG/3ML) 0.083% nebulizer solution, Take 2.5 mg by nebulization Every 4 (Four) Hours As Needed for Wheezing., Disp: 60 each, Rfl: 5    albuterol sulfate  (90 Base) MCG/ACT inhaler, Inhale 2 puffs Every 4 (Four) Hours As Needed for Wheezing., Disp: 8 g, Rfl: 5    atenolol (TENORMIN) 25 MG tablet, Take 0.5 tablets by mouth Daily., Disp: , Rfl:     Blood Glucose Monitoring Suppl (ONE TOUCH ULTRA 2) w/Device kit, 1 each Daily., Disp: 1 each, Rfl: 0    buPROPion XL (Wellbutrin XL) 150 MG 24 hr tablet, Take 1 tablet by mouth Daily., Disp: , Rfl:     glucose blood (OneTouch Ultra) test strip, USE 1 EACH TO TEST BLOOD SUGAR DAILY., Disp: 100 each, Rfl: 3    Lancets misc, Use 1 each Daily. USE WITH ONE TOUCH METER, Disp: 100 each, Rfl: 1    lidocaine (LIDODERM) 5 %, APPLY 1 PATCH DAILY TOPICALLY REMOVE AND DISCARD PATCH WITHIN 12 HOURS OR AS DIRECTED., Disp: , Rfl:     lisinopril (PRINIVIL,ZESTRIL) 40 MG tablet, Take 1 tablet by mouth Daily for 180 days., Disp: 90 tablet, Rfl: 1    Magnesium 250 MG tablet, Take 1 tablet by mouth Daily., Disp: , Rfl:     metFORMIN (GLUCOPHAGE) 500 MG tablet, Take 0.5 tablets by mouth 2 (Two) Times a Day With Meals., Disp: , Rfl:     montelukast (SINGULAIR) 10 MG tablet, TAKE 1 TABLET BY MOUTH DAILY, Disp: 90 tablet, Rfl: 1    ondansetron ODT (ZOFRAN-ODT) 8 MG disintegrating tablet, DISSOLVE 1 TABLET ON THE TONGUE EVERY 12 HOURS AS NEEDED FOR NAUSEA OR VOMITING, Disp: 20 tablet, Rfl:  1    pantoprazole (PROTONIX) 40 MG EC tablet, Take 1 tablet by mouth 2 (Two) Times a Day., Disp: 60 tablet, Rfl: 3    tiZANidine (ZANAFLEX) 4 MG tablet, TAKE 1 TABLET BY MOUTH EVERY 6 HOURS AS NEEDED FOR MUSCLE SPASMS, Disp: 30 tablet, Rfl: 1    vitamin D3 125 MCG (5000 UT) capsule capsule, Take 2 capsules by mouth Daily., Disp: , Rfl:     Enoxaparin Sodium (LOVENOX) 40 MG/0.4ML solution prefilled syringe syringe, Inject 0.4 mL under the skin into the appropriate area as directed Every 12 (Twelve) Hours for 14 days. Start after surgery unless instructed otherwise, Disp: 11.2 mL, Rfl: 0    folic acid-vit B6-vit B12 (FOLBEE) 2.5-25-1 MG tablet tablet, Take 1 tablet by mouth Daily., Disp: 40 tablet, Rfl: 0    ipratropium-albuterol (DUO-NEB) 0.5-2.5 mg/3 ml nebulizer, Take 3 mL by nebulization Every 4 (Four) Hours As Needed for Wheezing for up to 30 days., Disp: 360 mL, Rfl: 0    pregabalin (LYRICA) 75 MG capsule, Take 1 capsule by mouth 2 (Two) Times a Day., Disp: , Rfl:     sucralfate (Carafate) 1 g tablet, Take 1 tablet by mouth 4 (Four) Times a Day., Disp: 120 tablet, Rfl: 3    Current Facility-Administered Medications:     Allergy Serum Injection, 0.5 mL, Subcutaneous, Once, Sunita Hylton, APRN    Social History     Socioeconomic History    Marital status:     Number of children: 2   Tobacco Use    Smoking status: Never     Passive exposure: Never    Smokeless tobacco: Never   Vaping Use    Vaping Use: Never used   Substance and Sexual Activity    Alcohol use: Never     Comment: just rarely    Drug use: Never    Sexual activity: Not Currently     Birth control/protection: Tubal ligation       Family History   Problem Relation Age of Onset    Asthma Mother     Heart disease Mother     Coronary artery disease Mother     Diabetes type II Mother     Diabetes Mother     Hypertension Mother     Stroke Father     Hypertension Father     Obesity Brother     Heart attack Brother         MI    Hypertension Brother      Cervical cancer Maternal Grandmother     Lung cancer Maternal Grandfather     Stroke Paternal Grandmother     Malig Hyperthermia Neg Hx        Review of Systems:  Review of Systems   Constitutional:  Positive for fatigue.   Musculoskeletal:  Positive for arthralgias and back pain.   All other systems reviewed and are negative.      Physical Exam:  Vital Signs:  Weight: 127 kg (281 lb)   Body mass index is 48.21 kg/m².  Temp: 98.3 °F (36.8 °C)   Heart Rate: 57   BP: 147/80     Physical Exam  Vitals reviewed.   HENT:      Head: Normocephalic and atraumatic.      Mouth/Throat:      Mouth: Mucous membranes are moist.      Pharynx: Oropharynx is clear.   Eyes:      General: No scleral icterus.     Extraocular Movements: Extraocular movements intact.      Conjunctiva/sclera: Conjunctivae normal.      Pupils: Pupils are equal, round, and reactive to light.   Neck:      Thyroid: No thyromegaly.   Cardiovascular:      Rate and Rhythm: Normal rate.   Pulmonary:      Effort: Pulmonary effort is normal. No respiratory distress.      Breath sounds: Normal breath sounds. No stridor. No wheezing or rhonchi.   Abdominal:      General: Bowel sounds are normal.      Palpations: Abdomen is soft.      Tenderness: There is no abdominal tenderness. There is no right CVA tenderness, left CVA tenderness, guarding or rebound.      Hernia: No hernia is present.   Musculoskeletal:         General: Normal range of motion.      Cervical back: Normal range of motion and neck supple.   Lymphadenopathy:      Cervical: No cervical adenopathy.   Skin:     General: Skin is warm and dry.      Findings: No erythema.   Neurological:      Mental Status: She is alert and oriented to person, place, and time.   Psychiatric:         Mood and Affect: Mood normal.         Behavior: Behavior normal.         Thought Content: Thought content normal.         Judgment: Judgment normal.         Assessment:    Karey Lopez is a 48 y.o. year old female with  medically complicated severe obesity with a BMI of Body mass index is 48.21 kg/m². and multiple co-morbidities listed in the encounter diagnosis.    I think she is an appropriate candidate for this surgery, and is ready to proceed.    Plan/Discussion/Summary:  Questionable hiatal hernia versus band scarring by me.  Patient asymptomatic.  Ulcer antrum healed with repeat endoscopy in still on medications.  Patient did stop NSAIDs.  Patient understands that they are at an increased risk for complications because of this being a revisional surgery/conversion  to another procedure.  The patient understands the increased risk of gastric injury/leak, bleeding, etc because of the scarring and previous surgery.  Also increased risk of other complications that are listed because of increased OR time.    The patient has returned to the office for a surgical consultation and has requested to proceed with gastric sleeve.  I have had the opportunity to obtain a history, examine the patient and review the patient's chart. The procedure could be done laparoscopically and or robotically.    The patient understands that surgery is a tool and that weight loss is not guaranteed but only seen in the context of appropriate use, regular follow up, exercise and making appropriate food choices.     I personally discussed the potential complications of the laparoscopic gastric sleeve with this patient.  The patient is well aware of potential complications of the surgery that include but not limited to bleeding, infections, deep vein thrombosis, pulmonary embolism, pulmonary complications such as pneumonia, cardiac event, hernias, small bowel obstruction, damage to the spleen or other organs, bowel injury, disfiguring scars, failure to lose weight, need for additional surgery, conversion to an open procedure and death.  The patient is also aware of complications which apply in particular to the gastric sleeve and can include but not limited  to the leakage of gastric contents at the staple line, the development of an intra-abdominal abscess, gastroesophageal reflux disease, Franco's esophagus, ulcers, vitamin/mineral deficiencies, strictures, and the possibility of converting this procedure to a John-en-Y gastric bypass. The patient also understands the possibility of requiring an acid reducer medication for the rest of their life.    The risks, benefits, potential complications and alternative therapies were discussed at great length as outlined in our extensive consent forms, online consent and educational teaching processes.    The patient has confirmed the participation in the programs extensive educational activities.    All questions and concerns were answered to patient's satisfaction.  The patient now wishes to proceed with surgery.     The patient has agreed to a postoperative course of anitcoagulant therapy.      I instructed patient that the surgery could be laparoscopically and/or robotically.    I instructed patient to start on a H2 blocker or proton pump inhibitor if not already on one of these medications.    I explained in detail the procedures that are in the consent.  All of these procedures have a chance to convert to open if any technical challenges or complications do occur.  Bariatric surgery is not cosmetic surgery but rather a tool to help a patient make a life-long commitment lifestyle change including diet, exercise, behavior changes, and taking supplemental vitamins and minerals.    Problems after surgery may require more operations to correct them.    The risks, benefits, alternatives, and potential complications of all of the procedures were explained in detail including, but not limited to death, anesthesia and medication adverse effect, deep venous thrombosis, pulmonary embolism, trocar site/incisional hernia, wound infection, abdominal infection, bleeding, failure to lose weight, gain weight, a change in body image,  metabolic complications with vitamin deficiences and anemia.    Weight loss expectations were discussed with the patient in detail. The weight loss operations most commonly performed are the sleeve gastrectomy and the John-en-Y gastric bypass. These operations result in weight losses up to approximately 25-35% of initial body weight 12 to 24 months after surgery with the gastric bypass usually the higher percent of weight loss but depends on patient using the tool.    For the gastric bypass and loop duodenal switch (DONNY-S) the risks include but not limited to the following early complications:  Anastomotic leak/peritonitis, John/Alimentary/biliopancreatic limb obstruction, severe & minor wound infection/seroma, and nausea/vomiting.  Late complications can include but are not limited to malnutrition, vitamin deficiencies, frequent loose stools,  stomal stenosis, marginal ulcer, bowel obstruction, intussusception, internal, and incisional hernia.    Regarding the gastric sleeve, there is higher risk of dysphagia and reflux leading to possible Franco's esophagus compared to a gastric bypass, as well as risk of internal visceral/organ injury, splenectomy, bleeding, infection, leak (which could require further intervention possible conversion to John-en-Y gastric bypass), stenosis and possibility of regaining weight.    Karey was counseled regarding diagnostic results, instructions for management, risk factor reductions, prognosis, patient and family education, impressions, risks and benefits of treatment options and importance of compliance with treatment. Total time of the encounter was over 45 minutes counseling the patient regarding the procedure as above and reviewing as well as ordering labs, medications and the procedure.  The chart was also reviewed prior to seeing the patient reviewing previous testing, studies and labs.    Karey understands the surgical procedures and the different surgical options that are  available.  She understands the lifestyle changes that are required after surgery and has agreed to follow the guidelines outlined in the weight management program.  She also expressed understanding of the risks involved and had all of female questions answered and desires to proceed.      Reyes Ngo MD  11/2/2023

## 2023-11-02 NOTE — PATIENT INSTRUCTIONS
Bariatric Manual    You were provided a manual specific to the procedure that you have chosen.  Please refer to that with any questions or call the office at [phone]

## 2023-11-02 NOTE — H&P (VIEW-ONLY)
Bariatric Consult:  Referred by Sunita Hylton APRN    Karey Lopez is here today for consult on Consult      History of Present Illness:     Karey Lopez is a 48 y.o. female with morbid obesity with co-morbidities including sleep apnea, diabetes, hypertension, dyslipidemia, osteoarthritis, back pain, knee pain, and GERD who presents for surgical consultation for the above procedure. Karey has completed the initial intake visit and has been examined by our nurse practitioner, dietician, psychologist and underwent the extensive educational teaching process under the guidance of our bariatric coordinator and myself. Karey also has seen or if has not yet will see the educational video FARIBA on the surgical procedure if available. Karey attended today more educational teaching from our bariatric coordinator and myself. Karey has had an extensive medical workup including a visit with their primary care physician, EKG, chest radiograph, blood work, EGD or UGI and possibly further testing. These have been reviewed by me and discussed with the patient. Karey is now ready to proceed with surgery. Karey presently denies nausea, vomiting, fever, chills, chest pain, shortness of air, melena, hematochezia, hemetemesis, dysuria, frequency, hematuria.     Past Medical History:   Diagnosis Date    Acute non-recurrent maxillary sinusitis 05/04/2023    Allergic rhinitis, unspecified     Anxiety     Arthritis     Asthma     Asthma, intrinsic 1985    Cough variant asthma     Diabetes mellitus 06/2022    Dorsalgia, unspecified     Essential (primary) hypertension     Family history of ischemic heart disease and other diseases of the circulatory system     GERD (gastroesophageal reflux disease) 06/27/2023    History of gastric ulcer 08/2023    History of Helicobacter pylori infection 08/2023    Hypertension     Left lower quadrant pain     Leucocytosis 2020    due to chronic inflammation per hematology    Lichen  sclerosus 2019    Obesity, unspecified     Other fatigue     Persistent mood (affective) disorder, unspecified     PONV (postoperative nausea and vomiting)     Sepsis     Right knee Sepsis    Sleep apnea, obstructive     WEARS CPAP    Unspecified abdominal pain     Unspecified ovarian cyst, left side     Vitamin D deficiency, unspecified        Encounter Diagnoses   Name Primary?    Obesity, Class III, BMI 40-49.9 (morbid obesity) Yes    Essential (primary) hypertension     Type 2 diabetes mellitus without complication, without long-term current use of insulin     Sleep apnea, unspecified type     Gastroesophageal reflux disease, unspecified whether esophagitis present     History of removal of laparoscopic gastric banding device     Acute gastric ulcer without hemorrhage or perforation        Past Surgical History:   Procedure Laterality Date    COLONOSCOPY  01/2010    normal    ENDOSCOPY  01/2010, 01/2018 1/2010- small hernia and 01/2018 mild gastitis    ENDOSCOPY N/A 08/01/2023    Procedure: ESOPHAGOGASTRODUODENOSCOPY WITH BIOPSY;  Surgeon: Reyes Ngo Jr., MD;  Location: [facility] ENDOSCOPY;  Service: General;  Laterality: N/A;  PRE- DYSPEPSIA, H/O BAND REMOVAL  POST- GASTRITIS, GASTRIC ULCER    ENDOSCOPY N/A 10/31/2023    Procedure: ESOPHAGOGASTRODUODENOSCOPY WITH BIOPSY;  Surgeon: Reyes Ngo Jr., MD;  Location: [facility] ENDOSCOPY;  Service: General;  Laterality: N/A;  PRE- H/O GASTRIC ULCER   POST- GASTRITIS    FOOT SURGERY Right 11/2015, 4/28/2017    FRACTURE SURGERY Right 11/2014, 3/2015    ankle    GALLBLADDER SURGERY  1992    GASTRIC BANDING REMOVAL  2019    KNEE ARTHROPLASTY Right 12/10/2019    KNEE ARTHROSCOPY Right 01/30/2020    Procedure: KNEE ARTHROSCOPY WITH INCISION AND DRAINAGE;  Surgeon: Fareed Chacon MD;  Location: [facility] OR;  Service: Orthopedics    LAPAROSCOPIC GASTRIC BANDING  07/2010    and was removed july 2019; scar tissue    LAPAROSCOPIC TUBAL LIGATION      MOUTH  SURGERY      wisdom teeth removal    TUBAL ABDOMINAL LIGATION           * No active hospital problems. *      Allergies   Allergen Reactions    Ozempic (0.25 Or 0.5 Mg-Dose) [Semaglutide(0.25 Or 0.5mg-Dos)] Other (See Comments)     Fatigue and nausea    Metformin Nausea Only     CAN TAKE SMALL DOSES    Vilazodone Hcl Mental Status Change         Current Outpatient Medications:     acetaminophen (TYLENOL) 650 MG 8 hr tablet, Take  by mouth Every 8 (Eight) Hours., Disp: , Rfl:     albuterol (PROVENTIL) (2.5 MG/3ML) 0.083% nebulizer solution, Take 2.5 mg by nebulization Every 4 (Four) Hours As Needed for Wheezing., Disp: 60 each, Rfl: 5    albuterol sulfate  (90 Base) MCG/ACT inhaler, Inhale 2 puffs Every 4 (Four) Hours As Needed for Wheezing., Disp: 8 g, Rfl: 5    atenolol (TENORMIN) 25 MG tablet, Take 0.5 tablets by mouth Daily., Disp: , Rfl:     Blood Glucose Monitoring Suppl (ONE TOUCH ULTRA 2) w/Device kit, 1 each Daily., Disp: 1 each, Rfl: 0    buPROPion XL (Wellbutrin XL) 150 MG 24 hr tablet, Take 1 tablet by mouth Daily., Disp: , Rfl:     glucose blood (OneTouch Ultra) test strip, USE 1 EACH TO TEST BLOOD SUGAR DAILY., Disp: 100 each, Rfl: 3    Lancets misc, Use 1 each Daily. USE WITH ONE TOUCH METER, Disp: 100 each, Rfl: 1    lidocaine (LIDODERM) 5 %, APPLY 1 PATCH DAILY TOPICALLY REMOVE AND DISCARD PATCH WITHIN 12 HOURS OR AS DIRECTED., Disp: , Rfl:     lisinopril (PRINIVIL,ZESTRIL) 40 MG tablet, Take 1 tablet by mouth Daily for 180 days., Disp: 90 tablet, Rfl: 1    Magnesium 250 MG tablet, Take 1 tablet by mouth Daily., Disp: , Rfl:     metFORMIN (GLUCOPHAGE) 500 MG tablet, Take 0.5 tablets by mouth 2 (Two) Times a Day With Meals., Disp: , Rfl:     montelukast (SINGULAIR) 10 MG tablet, TAKE 1 TABLET BY MOUTH DAILY, Disp: 90 tablet, Rfl: 1    ondansetron ODT (ZOFRAN-ODT) 8 MG disintegrating tablet, DISSOLVE 1 TABLET ON THE TONGUE EVERY 12 HOURS AS NEEDED FOR NAUSEA OR VOMITING, Disp: 20 tablet, Rfl:  1    pantoprazole (PROTONIX) 40 MG EC tablet, Take 1 tablet by mouth 2 (Two) Times a Day., Disp: 60 tablet, Rfl: 3    tiZANidine (ZANAFLEX) 4 MG tablet, TAKE 1 TABLET BY MOUTH EVERY 6 HOURS AS NEEDED FOR MUSCLE SPASMS, Disp: 30 tablet, Rfl: 1    vitamin D3 125 MCG (5000 UT) capsule capsule, Take 2 capsules by mouth Daily., Disp: , Rfl:     Enoxaparin Sodium (LOVENOX) 40 MG/0.4ML solution prefilled syringe syringe, Inject 0.4 mL under the skin into the appropriate area as directed Every 12 (Twelve) Hours for 14 days. Start after surgery unless instructed otherwise, Disp: 11.2 mL, Rfl: 0    folic acid-vit B6-vit B12 (FOLBEE) 2.5-25-1 MG tablet tablet, Take 1 tablet by mouth Daily., Disp: 40 tablet, Rfl: 0    ipratropium-albuterol (DUO-NEB) 0.5-2.5 mg/3 ml nebulizer, Take 3 mL by nebulization Every 4 (Four) Hours As Needed for Wheezing for up to 30 days., Disp: 360 mL, Rfl: 0    pregabalin (LYRICA) 75 MG capsule, Take 1 capsule by mouth 2 (Two) Times a Day., Disp: , Rfl:     sucralfate (Carafate) 1 g tablet, Take 1 tablet by mouth 4 (Four) Times a Day., Disp: 120 tablet, Rfl: 3    Current Facility-Administered Medications:     Allergy Serum Injection, 0.5 mL, Subcutaneous, Once, Sunita Hylton, APRN    Social History     Socioeconomic History    Marital status:     Number of children: 2   Tobacco Use    Smoking status: Never     Passive exposure: Never    Smokeless tobacco: Never   Vaping Use    Vaping Use: Never used   Substance and Sexual Activity    Alcohol use: Never     Comment: just rarely    Drug use: Never    Sexual activity: Not Currently     Birth control/protection: Tubal ligation       Family History   Problem Relation Age of Onset    Asthma Mother     Heart disease Mother     Coronary artery disease Mother     Diabetes type II Mother     Diabetes Mother     Hypertension Mother     Stroke Father     Hypertension Father     Obesity Brother     Heart attack Brother         MI    Hypertension Brother      Cervical cancer Maternal Grandmother     Lung cancer Maternal Grandfather     Stroke Paternal Grandmother     Malig Hyperthermia Neg Hx        Review of Systems:  Review of Systems   Constitutional:  Positive for fatigue.   Musculoskeletal:  Positive for arthralgias and back pain.   All other systems reviewed and are negative.      Physical Exam:  Vital Signs:  Weight: 127 kg (281 lb)   Body mass index is 48.21 kg/m².  Temp: 98.3 °F (36.8 °C)   Heart Rate: 57   BP: 147/80     Physical Exam  Vitals reviewed.   HENT:      Head: Normocephalic and atraumatic.      Mouth/Throat:      Mouth: Mucous membranes are moist.      Pharynx: Oropharynx is clear.   Eyes:      General: No scleral icterus.     Extraocular Movements: Extraocular movements intact.      Conjunctiva/sclera: Conjunctivae normal.      Pupils: Pupils are equal, round, and reactive to light.   Neck:      Thyroid: No thyromegaly.   Cardiovascular:      Rate and Rhythm: Normal rate.   Pulmonary:      Effort: Pulmonary effort is normal. No respiratory distress.      Breath sounds: Normal breath sounds. No stridor. No wheezing or rhonchi.   Abdominal:      General: Bowel sounds are normal.      Palpations: Abdomen is soft.      Tenderness: There is no abdominal tenderness. There is no right CVA tenderness, left CVA tenderness, guarding or rebound.      Hernia: No hernia is present.   Musculoskeletal:         General: Normal range of motion.      Cervical back: Normal range of motion and neck supple.   Lymphadenopathy:      Cervical: No cervical adenopathy.   Skin:     General: Skin is warm and dry.      Findings: No erythema.   Neurological:      Mental Status: She is alert and oriented to person, place, and time.   Psychiatric:         Mood and Affect: Mood normal.         Behavior: Behavior normal.         Thought Content: Thought content normal.         Judgment: Judgment normal.         Assessment:    Karey Lopez is a 48 y.o. year old female with  medically complicated severe obesity with a BMI of Body mass index is 48.21 kg/m². and multiple co-morbidities listed in the encounter diagnosis.    I think she is an appropriate candidate for this surgery, and is ready to proceed.    Plan/Discussion/Summary:  Questionable hiatal hernia versus band scarring by me.  Patient asymptomatic.  Ulcer antrum healed with repeat endoscopy in still on medications.  Patient did stop NSAIDs.  Patient understands that they are at an increased risk for complications because of this being a revisional surgery/conversion  to another procedure.  The patient understands the increased risk of gastric injury/leak, bleeding, etc because of the scarring and previous surgery.  Also increased risk of other complications that are listed because of increased OR time.    The patient has returned to the office for a surgical consultation and has requested to proceed with gastric sleeve.  I have had the opportunity to obtain a history, examine the patient and review the patient's chart. The procedure could be done laparoscopically and or robotically.    The patient understands that surgery is a tool and that weight loss is not guaranteed but only seen in the context of appropriate use, regular follow up, exercise and making appropriate food choices.     I personally discussed the potential complications of the laparoscopic gastric sleeve with this patient.  The patient is well aware of potential complications of the surgery that include but not limited to bleeding, infections, deep vein thrombosis, pulmonary embolism, pulmonary complications such as pneumonia, cardiac event, hernias, small bowel obstruction, damage to the spleen or other organs, bowel injury, disfiguring scars, failure to lose weight, need for additional surgery, conversion to an open procedure and death.  The patient is also aware of complications which apply in particular to the gastric sleeve and can include but not limited  to the leakage of gastric contents at the staple line, the development of an intra-abdominal abscess, gastroesophageal reflux disease, Franco's esophagus, ulcers, vitamin/mineral deficiencies, strictures, and the possibility of converting this procedure to a John-en-Y gastric bypass. The patient also understands the possibility of requiring an acid reducer medication for the rest of their life.    The risks, benefits, potential complications and alternative therapies were discussed at great length as outlined in our extensive consent forms, online consent and educational teaching processes.    The patient has confirmed the participation in the programs extensive educational activities.    All questions and concerns were answered to patient's satisfaction.  The patient now wishes to proceed with surgery.     The patient has agreed to a postoperative course of anitcoagulant therapy.      I instructed patient that the surgery could be laparoscopically and/or robotically.    I instructed patient to start on a H2 blocker or proton pump inhibitor if not already on one of these medications.    I explained in detail the procedures that are in the consent.  All of these procedures have a chance to convert to open if any technical challenges or complications do occur.  Bariatric surgery is not cosmetic surgery but rather a tool to help a patient make a life-long commitment lifestyle change including diet, exercise, behavior changes, and taking supplemental vitamins and minerals.    Problems after surgery may require more operations to correct them.    The risks, benefits, alternatives, and potential complications of all of the procedures were explained in detail including, but not limited to death, anesthesia and medication adverse effect, deep venous thrombosis, pulmonary embolism, trocar site/incisional hernia, wound infection, abdominal infection, bleeding, failure to lose weight, gain weight, a change in body image,  metabolic complications with vitamin deficiences and anemia.    Weight loss expectations were discussed with the patient in detail. The weight loss operations most commonly performed are the sleeve gastrectomy and the John-en-Y gastric bypass. These operations result in weight losses up to approximately 25-35% of initial body weight 12 to 24 months after surgery with the gastric bypass usually the higher percent of weight loss but depends on patient using the tool.    For the gastric bypass and loop duodenal switch (DONNY-S) the risks include but not limited to the following early complications:  Anastomotic leak/peritonitis, John/Alimentary/biliopancreatic limb obstruction, severe & minor wound infection/seroma, and nausea/vomiting.  Late complications can include but are not limited to malnutrition, vitamin deficiencies, frequent loose stools,  stomal stenosis, marginal ulcer, bowel obstruction, intussusception, internal, and incisional hernia.    Regarding the gastric sleeve, there is higher risk of dysphagia and reflux leading to possible Franco's esophagus compared to a gastric bypass, as well as risk of internal visceral/organ injury, splenectomy, bleeding, infection, leak (which could require further intervention possible conversion to John-en-Y gastric bypass), stenosis and possibility of regaining weight.    Karey was counseled regarding diagnostic results, instructions for management, risk factor reductions, prognosis, patient and family education, impressions, risks and benefits of treatment options and importance of compliance with treatment. Total time of the encounter was over 45 minutes counseling the patient regarding the procedure as above and reviewing as well as ordering labs, medications and the procedure.  The chart was also reviewed prior to seeing the patient reviewing previous testing, studies and labs.    Karey understands the surgical procedures and the different surgical options that are  available.  She understands the lifestyle changes that are required after surgery and has agreed to follow the guidelines outlined in the weight management program.  She also expressed understanding of the risks involved and had all of female questions answered and desires to proceed.      Reyes Ngo MD  11/2/2023

## 2023-11-03 PROBLEM — E66.01 CLASS 3 SEVERE OBESITY DUE TO EXCESS CALORIES WITH SERIOUS COMORBIDITY AND BODY MASS INDEX (BMI) OF 50.0 TO 59.9 IN ADULT: Status: ACTIVE | Noted: 2023-11-02

## 2023-11-03 PROBLEM — E66.813 CLASS 3 SEVERE OBESITY DUE TO EXCESS CALORIES WITH SERIOUS COMORBIDITY AND BODY MASS INDEX (BMI) OF 50.0 TO 59.9 IN ADULT: Status: ACTIVE | Noted: 2023-11-02

## 2023-11-06 ENCOUNTER — TELEPHONE (OUTPATIENT)
Dept: BARIATRICS/WEIGHT MGMT | Facility: CLINIC | Age: 48
End: 2023-11-06
Payer: COMMERCIAL

## 2023-11-06 ENCOUNTER — LAB (OUTPATIENT)
Dept: LAB | Facility: HOSPITAL | Age: 48
End: 2023-11-06
Payer: COMMERCIAL

## 2023-11-06 DIAGNOSIS — E66.01 CLASS 3 SEVERE OBESITY DUE TO EXCESS CALORIES WITH SERIOUS COMORBIDITY AND BODY MASS INDEX (BMI) OF 50.0 TO 59.9 IN ADULT: ICD-10-CM

## 2023-11-06 LAB
ALBUMIN SERPL-MCNC: 4.4 G/DL (ref 3.5–5.2)
ALBUMIN/GLOB SERPL: 1.3 G/DL
ALP SERPL-CCNC: 66 U/L (ref 39–117)
ALT SERPL W P-5'-P-CCNC: 23 U/L (ref 1–33)
ANION GAP SERPL CALCULATED.3IONS-SCNC: 16.3 MMOL/L (ref 5–15)
AST SERPL-CCNC: 28 U/L (ref 1–32)
BILIRUB SERPL-MCNC: 0.3 MG/DL (ref 0–1.2)
BUN SERPL-MCNC: 13 MG/DL (ref 6–20)
BUN/CREAT SERPL: 17.1 (ref 7–25)
CALCIUM SPEC-SCNC: 10.2 MG/DL (ref 8.6–10.5)
CHLORIDE SERPL-SCNC: 99 MMOL/L (ref 98–107)
CO2 SERPL-SCNC: 21.7 MMOL/L (ref 22–29)
CREAT SERPL-MCNC: 0.76 MG/DL (ref 0.57–1)
DEPRECATED RDW RBC AUTO: 51 FL (ref 37–54)
EGFRCR SERPLBLD CKD-EPI 2021: 96.8 ML/MIN/1.73
ERYTHROCYTE [DISTWIDTH] IN BLOOD BY AUTOMATED COUNT: 17.7 % (ref 12.3–15.4)
GLOBULIN UR ELPH-MCNC: 3.5 GM/DL
GLUCOSE SERPL-MCNC: 126 MG/DL (ref 65–99)
HCT VFR BLD AUTO: 42 % (ref 34–46.6)
HGB BLD-MCNC: 12.8 G/DL (ref 12–15.9)
MCH RBC QN AUTO: 24.2 PG (ref 26.6–33)
MCHC RBC AUTO-ENTMCNC: 30.5 G/DL (ref 31.5–35.7)
MCV RBC AUTO: 79.4 FL (ref 79–97)
PLATELET # BLD AUTO: 536 10*3/MM3 (ref 140–450)
PMV BLD AUTO: 9.5 FL (ref 6–12)
POTASSIUM SERPL-SCNC: 4.1 MMOL/L (ref 3.5–5.2)
PROT SERPL-MCNC: 7.9 G/DL (ref 6–8.5)
RBC # BLD AUTO: 5.29 10*6/MM3 (ref 3.77–5.28)
SODIUM SERPL-SCNC: 137 MMOL/L (ref 136–145)
WBC NRBC COR # BLD: 10.15 10*3/MM3 (ref 3.4–10.8)

## 2023-11-06 PROCEDURE — 36415 COLL VENOUS BLD VENIPUNCTURE: CPT

## 2023-11-06 PROCEDURE — 85027 COMPLETE CBC AUTOMATED: CPT

## 2023-11-06 PROCEDURE — 80053 COMPREHEN METABOLIC PANEL: CPT

## 2023-11-06 NOTE — TELEPHONE ENCOUNTER
Dr. Ngo talked to patient on the phone and wrote the following outcome: talked to patient on the phone regarding her labs. Her platelet count has been elevated and and she has seen hematology and rheumatology in the past. Work-up was negative. She will also talk to her PCP and let them know platelet count still elevated. Blood pressure been running elevated and will talk with PCP regarding elevated blood pressure as well.

## 2023-11-10 ENCOUNTER — HOSPITAL ENCOUNTER (OUTPATIENT)
Dept: MAMMOGRAPHY | Facility: HOSPITAL | Age: 48
Discharge: HOME | End: 2023-11-10
Admitting: NURSE PRACTITIONER
Payer: COMMERCIAL

## 2023-11-10 DIAGNOSIS — Z12.31 SCREENING MAMMOGRAM FOR BREAST CANCER: ICD-10-CM

## 2023-11-10 PROCEDURE — 77067 SCR MAMMO BI INCL CAD: CPT

## 2023-11-10 PROCEDURE — 77063 BREAST TOMOSYNTHESIS BI: CPT

## 2023-11-13 NOTE — PROGRESS NOTES
Your recent mammogram is negative/ normal.  Fibroglandular density is not uncommon.  Please continue monthly self breast exams and report any changes.

## 2023-11-15 ENCOUNTER — HOSPITAL ENCOUNTER (INPATIENT)
Facility: HOSPITAL | Age: 48
LOS: 1 days | Discharge: HOME | End: 2023-11-16
Attending: SURGERY | Admitting: SURGERY
Payer: COMMERCIAL

## 2023-11-15 ENCOUNTER — ANESTHESIA (OUTPATIENT)
Dept: PERIOP | Facility: HOSPITAL | Age: 48
End: 2023-11-15
Payer: COMMERCIAL

## 2023-11-15 ENCOUNTER — ANESTHESIA EVENT (OUTPATIENT)
Dept: PERIOP | Facility: HOSPITAL | Age: 48
End: 2023-11-15
Payer: COMMERCIAL

## 2023-11-15 DIAGNOSIS — E66.01 CLASS 3 SEVERE OBESITY DUE TO EXCESS CALORIES WITH SERIOUS COMORBIDITY AND BODY MASS INDEX (BMI) OF 50.0 TO 59.9 IN ADULT: ICD-10-CM

## 2023-11-15 LAB
B-HCG UR QL: NEGATIVE
EXPIRATION DATE: NORMAL
GLUCOSE BLDC GLUCOMTR-MCNC: 91 MG/DL (ref 70–130)
INTERNAL NEGATIVE CONTROL: NEGATIVE
INTERNAL POSITIVE CONTROL: POSITIVE
Lab: NORMAL

## 2023-11-15 PROCEDURE — 25010000002 FENTANYL CITRATE (PF) 50 MCG/ML SOLUTION: Performed by: REGISTERED NURSE

## 2023-11-15 PROCEDURE — 0DP64CZ REMOVAL OF EXTRALUMINAL DEVICE FROM STOMACH, PERCUTANEOUS ENDOSCOPIC APPROACH: ICD-10-PCS | Performed by: SURGERY

## 2023-11-15 PROCEDURE — 25010000002 DEXAMETHASONE PER 1 MG: Performed by: REGISTERED NURSE

## 2023-11-15 PROCEDURE — 25810000003 SODIUM CHLORIDE PER 500 ML: Performed by: SURGERY

## 2023-11-15 PROCEDURE — 25810000003 LACTATED RINGERS SOLUTION: Performed by: SURGERY

## 2023-11-15 PROCEDURE — 25010000002 METOCLOPRAMIDE PER 10 MG: Performed by: SURGERY

## 2023-11-15 PROCEDURE — 25010000002 KETOROLAC TROMETHAMINE PER 15 MG: Performed by: SURGERY

## 2023-11-15 PROCEDURE — 88307 TISSUE EXAM BY PATHOLOGIST: CPT | Performed by: SURGERY

## 2023-11-15 PROCEDURE — 25010000002 THIAMINE HCL 200 MG/2ML SOLUTION 2 ML VIAL: Performed by: SURGERY

## 2023-11-15 PROCEDURE — 25810000003 LACTATED RINGERS PER 1000 ML: Performed by: SURGERY

## 2023-11-15 PROCEDURE — 43775 LAP SLEEVE GASTRECTOMY: CPT | Performed by: NURSE PRACTITIONER

## 2023-11-15 PROCEDURE — 25010000002 HYDROMORPHONE PER 4 MG: Performed by: REGISTERED NURSE

## 2023-11-15 PROCEDURE — 25010000002 MAGNESIUM SULFATE PER 500 MG OF MAGNESIUM: Performed by: REGISTERED NURSE

## 2023-11-15 PROCEDURE — 25010000002 PROPOFOL 10 MG/ML EMULSION: Performed by: REGISTERED NURSE

## 2023-11-15 PROCEDURE — 25010000002 EPINEPHRINE 1 MG/ML SOLUTION 30 ML VIAL: Performed by: SURGERY

## 2023-11-15 PROCEDURE — 0DB64Z3 EXCISION OF STOMACH, PERCUTANEOUS ENDOSCOPIC APPROACH, VERTICAL: ICD-10-PCS | Performed by: SURGERY

## 2023-11-15 PROCEDURE — 25810000003 SODIUM CHLORIDE 0.9 % SOLUTION 1,000 ML FLEX CONT: Performed by: SURGERY

## 2023-11-15 PROCEDURE — 25010000002 CLONIDINE PER 1 MG: Performed by: SURGERY

## 2023-11-15 PROCEDURE — 25010000002 CEFAZOLIN PER 500 MG: Performed by: SURGERY

## 2023-11-15 PROCEDURE — 25010000002 ACETAMINOPHEN 10 MG/ML SOLUTION: Performed by: REGISTERED NURSE

## 2023-11-15 PROCEDURE — 81025 URINE PREGNANCY TEST: CPT | Performed by: SURGERY

## 2023-11-15 PROCEDURE — 25010000002 ONDANSETRON PER 1 MG: Performed by: REGISTERED NURSE

## 2023-11-15 PROCEDURE — 25010000002 HYDROMORPHONE PER 4 MG: Performed by: SURGERY

## 2023-11-15 PROCEDURE — 25010000002 SUGAMMADEX 200 MG/2ML SOLUTION: Performed by: REGISTERED NURSE

## 2023-11-15 PROCEDURE — 43775 LAP SLEEVE GASTRECTOMY: CPT | Performed by: SURGERY

## 2023-11-15 PROCEDURE — 82948 REAGENT STRIP/BLOOD GLUCOSE: CPT

## 2023-11-15 PROCEDURE — 0DN64ZZ RELEASE STOMACH, PERCUTANEOUS ENDOSCOPIC APPROACH: ICD-10-PCS | Performed by: SURGERY

## 2023-11-15 PROCEDURE — C1889 IMPLANT/INSERT DEVICE, NOC: HCPCS | Performed by: SURGERY

## 2023-11-15 PROCEDURE — 25010000002 ROPIVACAINE PER 1 MG: Performed by: SURGERY

## 2023-11-15 DEVICE — ENDOPATH ECHELON ENDOSCOPIC LINEAR CUTTER RELOADS, GOLD, 60MM
Type: IMPLANTABLE DEVICE | Site: ABDOMEN | Status: FUNCTIONAL
Brand: ECHELON ENDOPATH

## 2023-11-15 DEVICE — FLOSEAL WITH RECOTHROM - 5ML
Type: IMPLANTABLE DEVICE | Site: ABDOMEN | Status: FUNCTIONAL
Brand: FLOSEAL HEMOSTATIC MATRIX

## 2023-11-15 DEVICE — STPL/LN REINF PERISTRIP DRY VERITAS THN: Type: IMPLANTABLE DEVICE | Site: ABDOMEN | Status: FUNCTIONAL

## 2023-11-15 DEVICE — ENDOPATH ECHELON ENDOSCOPIC LINEAR CUTTER RELOADS, GREEN, 60MM
Type: IMPLANTABLE DEVICE | Site: ABDOMEN | Status: FUNCTIONAL
Brand: ECHELON ENDOPATH

## 2023-11-15 RX ORDER — OXYCODONE AND ACETAMINOPHEN 7.5; 325 MG/1; MG/1
1 TABLET ORAL EVERY 4 HOURS PRN
Status: DISCONTINUED | OUTPATIENT
Start: 2023-11-15 | End: 2023-11-15 | Stop reason: HOSPADM

## 2023-11-15 RX ORDER — NALOXONE HCL 0.4 MG/ML
0.1 VIAL (ML) INJECTION
Status: DISCONTINUED | OUTPATIENT
Start: 2023-11-15 | End: 2023-11-16 | Stop reason: HOSPADM

## 2023-11-15 RX ORDER — HYDRALAZINE HYDROCHLORIDE 20 MG/ML
5 INJECTION INTRAMUSCULAR; INTRAVENOUS
Status: DISCONTINUED | OUTPATIENT
Start: 2023-11-15 | End: 2023-11-15 | Stop reason: HOSPADM

## 2023-11-15 RX ORDER — FAMOTIDINE 10 MG/ML
20 INJECTION, SOLUTION INTRAVENOUS EVERY 12 HOURS SCHEDULED
Status: DISCONTINUED | OUTPATIENT
Start: 2023-11-15 | End: 2023-11-16 | Stop reason: HOSPADM

## 2023-11-15 RX ORDER — PROCHLORPERAZINE EDISYLATE 5 MG/ML
10 INJECTION INTRAMUSCULAR; INTRAVENOUS EVERY 6 HOURS PRN
Status: DISCONTINUED | OUTPATIENT
Start: 2023-11-15 | End: 2023-11-16 | Stop reason: HOSPADM

## 2023-11-15 RX ORDER — FENTANYL CITRATE 50 UG/ML
50 INJECTION, SOLUTION INTRAMUSCULAR; INTRAVENOUS
Status: DISCONTINUED | OUTPATIENT
Start: 2023-11-15 | End: 2023-11-15 | Stop reason: HOSPADM

## 2023-11-15 RX ORDER — CEFAZOLIN SODIUM IN 0.9 % NACL 3 G/100 ML
3 INTRAVENOUS SOLUTION, PIGGYBACK (ML) INTRAVENOUS ONCE
Status: COMPLETED | OUTPATIENT
Start: 2023-11-15 | End: 2023-11-15

## 2023-11-15 RX ORDER — ATENOLOL 25 MG/1
12.5 TABLET ORAL EVERY 24 HOURS
Status: DISCONTINUED | OUTPATIENT
Start: 2023-11-15 | End: 2023-11-16 | Stop reason: HOSPADM

## 2023-11-15 RX ORDER — HYDROMORPHONE HYDROCHLORIDE 2 MG/1
2 TABLET ORAL EVERY 4 HOURS PRN
Status: DISCONTINUED | OUTPATIENT
Start: 2023-11-15 | End: 2023-11-16 | Stop reason: HOSPADM

## 2023-11-15 RX ORDER — DIPHENHYDRAMINE HYDROCHLORIDE 50 MG/ML
12.5 INJECTION INTRAMUSCULAR; INTRAVENOUS
Status: DISCONTINUED | OUTPATIENT
Start: 2023-11-15 | End: 2023-11-15 | Stop reason: HOSPADM

## 2023-11-15 RX ORDER — ONDANSETRON 4 MG/1
4 TABLET, FILM COATED ORAL EVERY 4 HOURS PRN
Status: DISCONTINUED | OUTPATIENT
Start: 2023-11-15 | End: 2023-11-16 | Stop reason: HOSPADM

## 2023-11-15 RX ORDER — PANTOPRAZOLE SODIUM 40 MG/10ML
40 INJECTION, POWDER, LYOPHILIZED, FOR SOLUTION INTRAVENOUS ONCE
Status: COMPLETED | OUTPATIENT
Start: 2023-11-15 | End: 2023-11-15

## 2023-11-15 RX ORDER — ONDANSETRON 2 MG/ML
4 INJECTION INTRAMUSCULAR; INTRAVENOUS ONCE AS NEEDED
Status: DISCONTINUED | OUTPATIENT
Start: 2023-11-15 | End: 2023-11-15 | Stop reason: HOSPADM

## 2023-11-15 RX ORDER — PROPOFOL 10 MG/ML
VIAL (ML) INTRAVENOUS AS NEEDED
Status: DISCONTINUED | OUTPATIENT
Start: 2023-11-15 | End: 2023-11-15 | Stop reason: SURG

## 2023-11-15 RX ORDER — KETAMINE HCL IN NACL, ISO-OSM 100MG/10ML
SYRINGE (ML) INJECTION AS NEEDED
Status: DISCONTINUED | OUTPATIENT
Start: 2023-11-15 | End: 2023-11-15 | Stop reason: SURG

## 2023-11-15 RX ORDER — DROPERIDOL 2.5 MG/ML
0.62 INJECTION, SOLUTION INTRAMUSCULAR; INTRAVENOUS
Status: DISCONTINUED | OUTPATIENT
Start: 2023-11-15 | End: 2023-11-15 | Stop reason: HOSPADM

## 2023-11-15 RX ORDER — MAGNESIUM SULFATE HEPTAHYDRATE 500 MG/ML
INJECTION, SOLUTION INTRAMUSCULAR; INTRAVENOUS AS NEEDED
Status: DISCONTINUED | OUTPATIENT
Start: 2023-11-15 | End: 2023-11-15 | Stop reason: SURG

## 2023-11-15 RX ORDER — SODIUM CHLORIDE 9 MG/ML
40 INJECTION, SOLUTION INTRAVENOUS AS NEEDED
Status: DISCONTINUED | OUTPATIENT
Start: 2023-11-15 | End: 2023-11-15 | Stop reason: HOSPADM

## 2023-11-15 RX ORDER — ONDANSETRON 4 MG/1
4 TABLET, ORALLY DISINTEGRATING ORAL EVERY 4 HOURS PRN
Status: DISCONTINUED | OUTPATIENT
Start: 2023-11-15 | End: 2023-11-16 | Stop reason: HOSPADM

## 2023-11-15 RX ORDER — EPHEDRINE SULFATE 50 MG/ML
5 INJECTION, SOLUTION INTRAVENOUS ONCE AS NEEDED
Status: DISCONTINUED | OUTPATIENT
Start: 2023-11-15 | End: 2023-11-15 | Stop reason: HOSPADM

## 2023-11-15 RX ORDER — PROMETHAZINE HYDROCHLORIDE 25 MG/1
25 SUPPOSITORY RECTAL ONCE AS NEEDED
Status: DISCONTINUED | OUTPATIENT
Start: 2023-11-15 | End: 2023-11-15 | Stop reason: HOSPADM

## 2023-11-15 RX ORDER — DIPHENHYDRAMINE HYDROCHLORIDE 50 MG/ML
25 INJECTION INTRAMUSCULAR; INTRAVENOUS EVERY 4 HOURS PRN
Status: DISCONTINUED | OUTPATIENT
Start: 2023-11-15 | End: 2023-11-16 | Stop reason: HOSPADM

## 2023-11-15 RX ORDER — METOCLOPRAMIDE HYDROCHLORIDE 5 MG/ML
10 INJECTION INTRAMUSCULAR; INTRAVENOUS EVERY 6 HOURS
Status: DISCONTINUED | OUTPATIENT
Start: 2023-11-15 | End: 2023-11-16 | Stop reason: HOSPADM

## 2023-11-15 RX ORDER — DEXAMETHASONE SODIUM PHOSPHATE 4 MG/ML
INJECTION, SOLUTION INTRA-ARTICULAR; INTRALESIONAL; INTRAMUSCULAR; INTRAVENOUS; SOFT TISSUE AS NEEDED
Status: DISCONTINUED | OUTPATIENT
Start: 2023-11-15 | End: 2023-11-15 | Stop reason: SURG

## 2023-11-15 RX ORDER — MIRTAZAPINE 15 MG/1
15 TABLET, ORALLY DISINTEGRATING ORAL NIGHTLY PRN
Status: DISCONTINUED | OUTPATIENT
Start: 2023-11-15 | End: 2023-11-16 | Stop reason: HOSPADM

## 2023-11-15 RX ORDER — SODIUM CHLORIDE 9 MG/ML
INJECTION, SOLUTION INTRAVENOUS AS NEEDED
Status: DISCONTINUED | OUTPATIENT
Start: 2023-11-15 | End: 2023-11-15 | Stop reason: HOSPADM

## 2023-11-15 RX ORDER — SODIUM CHLORIDE, SODIUM LACTATE, POTASSIUM CHLORIDE, CALCIUM CHLORIDE 600; 310; 30; 20 MG/100ML; MG/100ML; MG/100ML; MG/100ML
100 INJECTION, SOLUTION INTRAVENOUS CONTINUOUS
Status: DISCONTINUED | OUTPATIENT
Start: 2023-11-15 | End: 2023-11-15

## 2023-11-15 RX ORDER — LORAZEPAM 1 MG/1
1 TABLET ORAL EVERY 12 HOURS PRN
Status: DISCONTINUED | OUTPATIENT
Start: 2023-11-15 | End: 2023-11-16 | Stop reason: HOSPADM

## 2023-11-15 RX ORDER — GABAPENTIN 300 MG/1
300 CAPSULE ORAL ONCE
Status: COMPLETED | OUTPATIENT
Start: 2023-11-15 | End: 2023-11-15

## 2023-11-15 RX ORDER — METOCLOPRAMIDE HYDROCHLORIDE 5 MG/ML
10 INJECTION INTRAMUSCULAR; INTRAVENOUS ONCE
Status: COMPLETED | OUTPATIENT
Start: 2023-11-15 | End: 2023-11-15

## 2023-11-15 RX ORDER — NALOXONE HCL 0.4 MG/ML
0.2 VIAL (ML) INJECTION AS NEEDED
Status: DISCONTINUED | OUTPATIENT
Start: 2023-11-15 | End: 2023-11-15 | Stop reason: HOSPADM

## 2023-11-15 RX ORDER — IPRATROPIUM BROMIDE AND ALBUTEROL SULFATE 2.5; .5 MG/3ML; MG/3ML
3 SOLUTION RESPIRATORY (INHALATION) ONCE AS NEEDED
Status: DISCONTINUED | OUTPATIENT
Start: 2023-11-15 | End: 2023-11-15 | Stop reason: HOSPADM

## 2023-11-15 RX ORDER — ONDANSETRON 2 MG/ML
INJECTION INTRAMUSCULAR; INTRAVENOUS AS NEEDED
Status: DISCONTINUED | OUTPATIENT
Start: 2023-11-15 | End: 2023-11-15 | Stop reason: SURG

## 2023-11-15 RX ORDER — SCOLOPAMINE TRANSDERMAL SYSTEM 1 MG/1
1 PATCH, EXTENDED RELEASE TRANSDERMAL CONTINUOUS
Status: DISCONTINUED | OUTPATIENT
Start: 2023-11-15 | End: 2023-11-16 | Stop reason: HOSPADM

## 2023-11-15 RX ORDER — PROMETHAZINE HYDROCHLORIDE 12.5 MG/1
12.5 SUPPOSITORY RECTAL EVERY 4 HOURS PRN
Status: DISCONTINUED | OUTPATIENT
Start: 2023-11-15 | End: 2023-11-16 | Stop reason: HOSPADM

## 2023-11-15 RX ORDER — PROMETHAZINE HYDROCHLORIDE 25 MG/1
12.5 TABLET ORAL EVERY 4 HOURS PRN
Status: DISCONTINUED | OUTPATIENT
Start: 2023-11-15 | End: 2023-11-16 | Stop reason: HOSPADM

## 2023-11-15 RX ORDER — PROMETHAZINE HYDROCHLORIDE 25 MG/1
25 TABLET ORAL ONCE AS NEEDED
Status: DISCONTINUED | OUTPATIENT
Start: 2023-11-15 | End: 2023-11-15 | Stop reason: HOSPADM

## 2023-11-15 RX ORDER — PHENYLEPHRINE HCL IN 0.9% NACL 1 MG/10 ML
SYRINGE (ML) INTRAVENOUS AS NEEDED
Status: DISCONTINUED | OUTPATIENT
Start: 2023-11-15 | End: 2023-11-15 | Stop reason: SURG

## 2023-11-15 RX ORDER — LISINOPRIL 20 MG/1
40 TABLET ORAL NIGHTLY
Status: DISCONTINUED | OUTPATIENT
Start: 2023-11-15 | End: 2023-11-16 | Stop reason: HOSPADM

## 2023-11-15 RX ORDER — ACETAMINOPHEN 160 MG/5ML
975 SOLUTION ORAL EVERY 6 HOURS
Status: DISCONTINUED | OUTPATIENT
Start: 2023-11-15 | End: 2023-11-16 | Stop reason: HOSPADM

## 2023-11-15 RX ORDER — ALBUTEROL SULFATE 2.5 MG/3ML
2.5 SOLUTION RESPIRATORY (INHALATION) EVERY 4 HOURS PRN
Status: DISCONTINUED | OUTPATIENT
Start: 2023-11-15 | End: 2023-11-16 | Stop reason: HOSPADM

## 2023-11-15 RX ORDER — LIDOCAINE HYDROCHLORIDE 20 MG/ML
INJECTION, SOLUTION INFILTRATION; PERINEURAL AS NEEDED
Status: DISCONTINUED | OUTPATIENT
Start: 2023-11-15 | End: 2023-11-15 | Stop reason: SURG

## 2023-11-15 RX ORDER — CYANOCOBALAMIN 1000 UG/ML
1000 INJECTION, SOLUTION INTRAMUSCULAR; SUBCUTANEOUS ONCE
Status: COMPLETED | OUTPATIENT
Start: 2023-11-16 | End: 2023-11-16

## 2023-11-15 RX ORDER — TRAMADOL HYDROCHLORIDE 50 MG/1
50 TABLET ORAL EVERY 4 HOURS PRN
Status: DISCONTINUED | OUTPATIENT
Start: 2023-11-15 | End: 2023-11-16 | Stop reason: HOSPADM

## 2023-11-15 RX ORDER — LABETALOL HYDROCHLORIDE 5 MG/ML
5 INJECTION, SOLUTION INTRAVENOUS
Status: DISCONTINUED | OUTPATIENT
Start: 2023-11-15 | End: 2023-11-15 | Stop reason: HOSPADM

## 2023-11-15 RX ORDER — ONDANSETRON 2 MG/ML
4 INJECTION INTRAMUSCULAR; INTRAVENOUS EVERY 4 HOURS PRN
Status: DISCONTINUED | OUTPATIENT
Start: 2023-11-15 | End: 2023-11-16 | Stop reason: HOSPADM

## 2023-11-15 RX ORDER — ENOXAPARIN SODIUM 100 MG/ML
40 INJECTION SUBCUTANEOUS ONCE
Status: DISCONTINUED | OUTPATIENT
Start: 2023-11-16 | End: 2023-11-16

## 2023-11-15 RX ORDER — HYDROMORPHONE HYDROCHLORIDE 1 MG/ML
0.5 INJECTION, SOLUTION INTRAMUSCULAR; INTRAVENOUS; SUBCUTANEOUS
Status: DISCONTINUED | OUTPATIENT
Start: 2023-11-15 | End: 2023-11-16 | Stop reason: HOSPADM

## 2023-11-15 RX ORDER — SODIUM CHLORIDE, SODIUM LACTATE, POTASSIUM CHLORIDE, CALCIUM CHLORIDE 600; 310; 30; 20 MG/100ML; MG/100ML; MG/100ML; MG/100ML
150 INJECTION, SOLUTION INTRAVENOUS CONTINUOUS
Status: DISCONTINUED | OUTPATIENT
Start: 2023-11-15 | End: 2023-11-16 | Stop reason: HOSPADM

## 2023-11-15 RX ORDER — FLUMAZENIL 0.1 MG/ML
0.2 INJECTION INTRAVENOUS AS NEEDED
Status: DISCONTINUED | OUTPATIENT
Start: 2023-11-15 | End: 2023-11-15 | Stop reason: HOSPADM

## 2023-11-15 RX ORDER — MAGNESIUM HYDROXIDE 1200 MG/15ML
LIQUID ORAL AS NEEDED
Status: DISCONTINUED | OUTPATIENT
Start: 2023-11-15 | End: 2023-11-15 | Stop reason: HOSPADM

## 2023-11-15 RX ORDER — HYDRALAZINE HYDROCHLORIDE 20 MG/ML
10 INJECTION INTRAMUSCULAR; INTRAVENOUS
Status: DISCONTINUED | OUTPATIENT
Start: 2023-11-15 | End: 2023-11-16 | Stop reason: HOSPADM

## 2023-11-15 RX ORDER — ROCURONIUM BROMIDE 10 MG/ML
INJECTION, SOLUTION INTRAVENOUS AS NEEDED
Status: DISCONTINUED | OUTPATIENT
Start: 2023-11-15 | End: 2023-11-15 | Stop reason: SURG

## 2023-11-15 RX ORDER — PROMETHAZINE HYDROCHLORIDE 25 MG/1
12.5 TABLET ORAL ONCE AS NEEDED
Status: DISCONTINUED | OUTPATIENT
Start: 2023-11-15 | End: 2023-11-15 | Stop reason: HOSPADM

## 2023-11-15 RX ORDER — SODIUM CHLORIDE 0.9 % (FLUSH) 0.9 %
3 SYRINGE (ML) INJECTION EVERY 12 HOURS SCHEDULED
Status: DISCONTINUED | OUTPATIENT
Start: 2023-11-15 | End: 2023-11-16 | Stop reason: HOSPADM

## 2023-11-15 RX ORDER — HYDROMORPHONE HYDROCHLORIDE 1 MG/ML
0.5 INJECTION, SOLUTION INTRAMUSCULAR; INTRAVENOUS; SUBCUTANEOUS
Status: DISCONTINUED | OUTPATIENT
Start: 2023-11-15 | End: 2023-11-15 | Stop reason: HOSPADM

## 2023-11-15 RX ORDER — ACETAMINOPHEN 500 MG
1000 TABLET ORAL EVERY 6 HOURS
Status: DISCONTINUED | OUTPATIENT
Start: 2023-11-15 | End: 2023-11-16 | Stop reason: HOSPADM

## 2023-11-15 RX ORDER — HYDROCODONE BITARTRATE AND ACETAMINOPHEN 5; 325 MG/1; MG/1
1 TABLET ORAL ONCE AS NEEDED
Status: DISCONTINUED | OUTPATIENT
Start: 2023-11-15 | End: 2023-11-15 | Stop reason: HOSPADM

## 2023-11-15 RX ORDER — CHLORHEXIDINE GLUCONATE ORAL RINSE 1.2 MG/ML
15 SOLUTION DENTAL SEE ADMIN INSTRUCTIONS
Status: COMPLETED | OUTPATIENT
Start: 2023-11-15 | End: 2023-11-15

## 2023-11-15 RX ORDER — ACETAMINOPHEN 10 MG/ML
INJECTION, SOLUTION INTRAVENOUS AS NEEDED
Status: DISCONTINUED | OUTPATIENT
Start: 2023-11-15 | End: 2023-11-15 | Stop reason: SURG

## 2023-11-15 RX ORDER — SODIUM CHLORIDE 0.9 % (FLUSH) 0.9 %
3-10 SYRINGE (ML) INJECTION AS NEEDED
Status: DISCONTINUED | OUTPATIENT
Start: 2023-11-15 | End: 2023-11-15 | Stop reason: HOSPADM

## 2023-11-15 RX ADMIN — ACETAMINOPHEN 1000 MG: 500 TABLET ORAL at 16:03

## 2023-11-15 RX ADMIN — Medication 3 ML: at 20:47

## 2023-11-15 RX ADMIN — SODIUM CHLORIDE, POTASSIUM CHLORIDE, SODIUM LACTATE AND CALCIUM CHLORIDE 500 ML: 600; 310; 30; 20 INJECTION, SOLUTION INTRAVENOUS at 10:22

## 2023-11-15 RX ADMIN — SODIUM CHLORIDE, POTASSIUM CHLORIDE, SODIUM LACTATE AND CALCIUM CHLORIDE 150 ML/HR: 600; 310; 30; 20 INJECTION, SOLUTION INTRAVENOUS at 20:37

## 2023-11-15 RX ADMIN — LISINOPRIL 40 MG: 20 TABLET ORAL at 20:33

## 2023-11-15 RX ADMIN — ONDANSETRON 4 MG: 2 INJECTION INTRAMUSCULAR; INTRAVENOUS at 12:35

## 2023-11-15 RX ADMIN — Medication 10 MG: at 13:13

## 2023-11-15 RX ADMIN — HYDROMORPHONE HYDROCHLORIDE 0.5 MG: 1 INJECTION, SOLUTION INTRAMUSCULAR; INTRAVENOUS; SUBCUTANEOUS at 14:13

## 2023-11-15 RX ADMIN — THIAMINE HYDROCHLORIDE 250 ML/HR: 100 INJECTION, SOLUTION INTRAMUSCULAR; INTRAVENOUS at 23:34

## 2023-11-15 RX ADMIN — Medication 40 MG: at 12:25

## 2023-11-15 RX ADMIN — ROCURONIUM BROMIDE 50 MG: 10 INJECTION, SOLUTION INTRAVENOUS at 12:27

## 2023-11-15 RX ADMIN — GABAPENTIN 300 MG: 300 CAPSULE ORAL at 10:05

## 2023-11-15 RX ADMIN — DEXAMETHASONE SODIUM PHOSPHATE 10 MG: 4 INJECTION, SOLUTION INTRA-ARTICULAR; INTRALESIONAL; INTRAMUSCULAR; INTRAVENOUS; SOFT TISSUE at 12:35

## 2023-11-15 RX ADMIN — PROPOFOL 150 MG: 10 INJECTION, EMULSION INTRAVENOUS at 12:25

## 2023-11-15 RX ADMIN — LIDOCAINE HYDROCHLORIDE 100 MG: 20 INJECTION, SOLUTION INFILTRATION; PERINEURAL at 12:25

## 2023-11-15 RX ADMIN — METOCLOPRAMIDE 10 MG: 5 INJECTION, SOLUTION INTRAMUSCULAR; INTRAVENOUS at 10:06

## 2023-11-15 RX ADMIN — HYDROMORPHONE HYDROCHLORIDE 0.5 MG: 1 INJECTION, SOLUTION INTRAMUSCULAR; INTRAVENOUS; SUBCUTANEOUS at 16:03

## 2023-11-15 RX ADMIN — ATENOLOL 12.5 MG: 25 TABLET ORAL at 20:33

## 2023-11-15 RX ADMIN — 0.12% CHLORHEXIDINE GLUCONATE 15 ML: 1.2 RINSE ORAL at 10:06

## 2023-11-15 RX ADMIN — ACETAMINOPHEN 1000 MG: 500 TABLET ORAL at 20:35

## 2023-11-15 RX ADMIN — PANTOPRAZOLE SODIUM 40 MG: 40 INJECTION, POWDER, FOR SOLUTION INTRAVENOUS at 10:05

## 2023-11-15 RX ADMIN — PROPOFOL 150 MCG/KG/MIN: 10 INJECTION, EMULSION INTRAVENOUS at 12:25

## 2023-11-15 RX ADMIN — ROCURONIUM BROMIDE 10 MG: 10 INJECTION, SOLUTION INTRAVENOUS at 13:13

## 2023-11-15 RX ADMIN — MAGNESIUM SULFATE HEPTAHYDRATE 2 G: 500 INJECTION, SOLUTION INTRAMUSCULAR; INTRAVENOUS at 12:27

## 2023-11-15 RX ADMIN — METOCLOPRAMIDE 10 MG: 5 INJECTION, SOLUTION INTRAMUSCULAR; INTRAVENOUS at 20:35

## 2023-11-15 RX ADMIN — HYDROMORPHONE HYDROCHLORIDE 0.5 MG: 1 INJECTION, SOLUTION INTRAMUSCULAR; INTRAVENOUS; SUBCUTANEOUS at 20:32

## 2023-11-15 RX ADMIN — SUGAMMADEX 400 MG: 100 INJECTION, SOLUTION INTRAVENOUS at 13:32

## 2023-11-15 RX ADMIN — SODIUM CHLORIDE, POTASSIUM CHLORIDE, SODIUM LACTATE AND CALCIUM CHLORIDE: 600; 310; 30; 20 INJECTION, SOLUTION INTRAVENOUS at 12:17

## 2023-11-15 RX ADMIN — SCOPALAMINE 1 PATCH: 1 PATCH, EXTENDED RELEASE TRANSDERMAL at 10:08

## 2023-11-15 RX ADMIN — FENTANYL CITRATE 50 MCG: 50 INJECTION, SOLUTION INTRAMUSCULAR; INTRAVENOUS at 13:51

## 2023-11-15 RX ADMIN — Medication 100 MCG: at 13:21

## 2023-11-15 RX ADMIN — ACETAMINOPHEN 1000 MG: 10 INJECTION, SOLUTION INTRAVENOUS at 13:21

## 2023-11-15 RX ADMIN — HYDROMORPHONE HYDROCHLORIDE 0.5 MG: 1 INJECTION, SOLUTION INTRAMUSCULAR; INTRAVENOUS; SUBCUTANEOUS at 23:32

## 2023-11-15 RX ADMIN — METOCLOPRAMIDE 10 MG: 5 INJECTION, SOLUTION INTRAMUSCULAR; INTRAVENOUS at 16:03

## 2023-11-15 RX ADMIN — TRAMADOL HYDROCHLORIDE 50 MG: 50 TABLET, COATED ORAL at 19:19

## 2023-11-15 RX ADMIN — CEFAZOLIN 3 G: 10 INJECTION, POWDER, FOR SOLUTION INTRAVENOUS at 12:14

## 2023-11-15 NOTE — PLAN OF CARE
Goal Outcome Evaluation:  Plan of Care Reviewed With: patient      Patient A&O x4, VSS, POD #0 from gastric sleeve procedure. Pain controlled with PRN pain meds. Patient stand-by/independent with ambulation. IVF infusing per order. Patient hopeful to DC home tomorrow.   Progress: improving

## 2023-11-15 NOTE — OP NOTE
PREOPERATIVE DIAGNOSIS:  Morbid obesity with multiple comorbidities as referenced in the most recent history and physical.  Patient status post laparoscopic adjustable gastric band removal and now presents for revision    POSTOPERATIVE DIAGNOSIS:  Morbid obesity with multiple comorbidities as referenced in the most recent history and physical.  Status post laparoscopic adjustable gastric band removal with adhesions    PROCEDURES PERFORMED:  1.  Laparoscopic sleeve gastrectomy conversion from previous adjustable gastric band  2.  Laparoscopic takedown of gastric plication  2.  Extensive laparoscopic lysis of adhesions    Approximately 25 minutes was spent taking down the gastric plication as well as extensive adhesions. This is a conversion procedure therefore the complexity of the case was increased.  Patient had extensive adhesions from previous bariatric surgery increasing the difficulty of this procedure as well as the length of time of the procedure..    SURGEON:  Reyes Ngo Jr., MD    ASSISTANT: ARLYN Teran      Surgery assisted and facilitated by a certified physician assistant, who directly resulted in a decreased operative time, anesthetic time, wound exposure, and possibly of an operative wound infection, thereby decreasing patient morbidity and ultimately total expenditures. The surgical assistant assisted in placement of trochars, take down of the gastrocolic omentum, short gastric vessels and dissection at the angle of His.  Also assisted in retraction of the stomach during stapling so as not to kink the gastric sleeve.  Also assisted in removing of the gastric specimen, closure of the fascial defect as well as closure of the skin incisions.    ANESTHESIA:  General endotracheal and laparoscopic TAP block with Ropivacaine mixture    ESTIMATED BLOOD LOSS:   Less than 25 mL unless dictated below.    FLUIDS:  Crystalloids.    SPECIMENS:  Gastric remnant    DRAINS:  None.    COUNTS:   Correct.    COMPLICATIONS:  None.    INDICATIONS:  This patient with morbid obesity and associated comorbidities presents for elective laparoscopic, possible open sleeve gastrectomy revision.  The patient has received medical clearance to proceed.  The patient has undergone our extensive educational process and consent process and wishes to proceed.    DESCRIPTION OF PROCEDURE:  The patient was brought to the operating room and placed supine upon the operating room table. SCD hose were placed.  The patient underwent uneventful general endotracheal anesthesia per the anesthesiology staff. The abdomen was prepped with ChloraPrep and draped in the usual sterile fashion.  An Ioban was used as well if not allergic. Anesthesia staff then passed a 18 [place] orogastric tube into the stomach to decompress.  A 5-10 mm transverse incision was made a few centimeters above and to the left of the umbilicus and the peritoneal cavity entered under direct camera visualization using a 5 or 10 mm 0° laparoscope and an Optiview trocar.  The abdomen was then insufflated to a pressure of 12 mmHg with CO2 gas.  Exploratory laparoscopy revealed no evidence of injury from the entrance technique and no significant abnormalities unless addendum dictated below.  An angled laparoscope was then used.  The patient was placed in reverse Trendelenburg position.  Under direct camera visualization a 12 mm trocar was placed in the right lateral subcostal position.  A 12 mm trocar was placed in the right midabdominal position.  A 5 mm trocar was placed in the left midabdominal position. A Koko retractor was placed through an epigastric incision and used to elevate the left lobe of the liver.  Patient did have extensive adhesions in the upper abdomen from previous adjustable gastric band.  Prior to placing the liver retractor the adhesions from the stomach to the left lobe of the liver were taking down using electrocautery, scissors and Enseal  device.  The gastric plication from previous adjustable gastric band was taking down using sharp dissection with the scissors thus exposing the left chris.  Any remaining sutures were removed or made sure that they were lateral in the gastric specimen prior to dividing the stomach forming the gastric sleeve.  At this point, approximately MCC along the greater curvature, the gastrocolic omentum was divided with the Enseal and this proceeded superiorly to the angle of His taking down the short gastric vessels.  All posterior attachments of the lesser sac and posterior aspect of the stomach to the pancreas were taken down as well.  The left chris was exposed along its length.  Dissection then proceeded medially taking down the greater curvature with an Enseal until just proximal to the pylorus.The standard clamp and 18 French balloon bougie were used to size the gastric sleeve. The stomach was marked in the 3 positions using indelible ink.  The 3 markings were at the angle of Hiss, the incisura and antrum using 1cm, 3cm and 4-5cm respectively. The 1st load was green on the [place] Flex stapler with single side Veritas Nahed-Strip and this was placed 4-5 cm proximal to the pylorus.  The next several loads were gold placed with single side absorbable Veritas Nahed-Strips depending on the size of the stomach.  Careful attention was made to stay 1 cm from the esophagus.  Areas of the reinforced staple line were oversewn with absorbable sutures as needed for bleeding or questionable staple lines. At this point, the gastrectomy specimen was withdrawn through the 12 mm trocar site incision. The specimen staple line was inspected and was intact. The specimen was then sent off to pathology.  At this point, the sleeve was submerged under saline and using the orogastric tube to insufflate the stomach, a leak test was performed.  This revealed the sleeve to be watertight, no air bubbles, no leak, and no bleeding seen from the  staple lines and no significant abnormalities.  Irrigation fluid from the abdomen was then suctioned free.  The gastric sleeve staple line was then treated with Tisseel fibrin glue. The fascia at the 12 mm trocar site incision was closed with a single 0 Vicryl suture in a figure-of-eight fashion placed under direct laparoscopic camera visualization with a suture passer and tying the knot extracorporeally.  The fascia in the area was infiltrated with local anesthesia. All incisions were then infiltrated with local anesthetic. The remaining trocars were removed under direct camera visualization with no bleeding noted from their sites.  The abdomen was desufflated of gas. The skin in each incision was closed using 4-0 antibiotic impregnated Monocryl in a subcuticular stitch followed by Dermabond.  The patient tolerated the procedure well without complication and was taken to the recovery room in stable condition.  All sponge, needle, and instrument counts were correct.    The hiatus was checked for a hernia and no hernia was detected.

## 2023-11-15 NOTE — ANESTHESIA PROCEDURE NOTES
Airway  Urgency: elective    Date/Time: 11/15/2023 12:29 PM  Airway not difficult    General Information and Staff    Patient location during procedure: OR  Anesthesiologist: Lito Cota MD  CRNA/CAA: Cruz Brody CRNA    Indications and Patient Condition  Indications for airway management: airway protection    Preoxygenated: yes  MILS not maintained throughout  Mask difficulty assessment: 1 - vent by mask    Final Airway Details  Final airway type: endotracheal airway      Successful airway: ETT  Cuffed: yes   Successful intubation technique: direct laryngoscopy  Facilitating devices/methods: intubating stylet  Endotracheal tube insertion site: oral  Blade: Jennifer  Blade size: 3  ETT size (mm): 7.0  Cormack-Lehane Classification: grade I - full view of glottis  Placement verified by: chest auscultation and capnometry   Cuff volume (mL): 10  Measured from: teeth  ETT/EBT  to teeth (cm): 22  Number of attempts at approach: 1  Assessment: lips, teeth, and gum same as pre-op and atraumatic intubation

## 2023-11-15 NOTE — ANESTHESIA POSTPROCEDURE EVALUATION
Patient: Karey Lopez    Procedure Summary       Date: 11/15/23 Room / Location:  [facility]  [facility]    Anesthesia Start: 1217 Anesthesia Stop: 1346    Procedure: GASTRIC SLEEVE LAPAROSCOPIC Conversion; Lysis of Adhesions (Abdomen) Diagnosis:       Class 3 severe obesity due to excess calories with serious comorbidity and body mass index (BMI) of 50.0 to 59.9 in adult      (Class 3 severe obesity due to excess calories with serious comorbidity and body mass index (BMI) of 50.0 to 59.9 in adult [E66.01, Z68.43])    Surgeons: Reyes Ngo Jr., MD Provider: Lito Cota MD    Anesthesia Type: general ASA Status: 3            Anesthesia Type: general    Vitals  Vitals Value Taken Time   /73 11/15/23 1515   Temp 36.6 °C (97.8 °F) 11/15/23 1345   Pulse 70 11/15/23 1527   Resp 16 11/15/23 1515   SpO2 99 % 11/15/23 1527   Vitals shown include unfiled device data.        Post Anesthesia Care and Evaluation    Level of consciousness: awake and alert  Pain management: adequate    Airway patency: patent  Anesthetic complications: No anesthetic complications  PONV Status: controlled  Cardiovascular status: blood pressure returned to baseline and acceptable  Respiratory status: acceptable  Hydration status: acceptable

## 2023-11-15 NOTE — ANESTHESIA PREPROCEDURE EVALUATION
Anesthesia Evaluation     Patient summary reviewed and Nursing notes reviewed   history of anesthetic complications (Has also had issues w/asthma flare ups after anesthesia.):  PONV  NPO Solid Status: > 6 hours  NPO Liquid Status: > 2 hours           Airway   Mallampati: III  TM distance: >3 FB  Neck ROM: full  Dental - normal exam     Pulmonary     breath sounds clear to auscultation  (+) asthma (PRN inhaler use.),sleep apnea on CPAP  (-) COPD, not a smoker  Cardiovascular   Exercise tolerance: good (4-7 METS)    ECG reviewed  Rhythm: regular  Rate: normal    (+) hypertension      Neuro/Psych  (+) psychiatric history Anxiety and Depression  (-) seizures, CVA  GI/Hepatic/Renal/Endo    (+) morbid obesity, GERD well controlled, PUD, diabetes mellitus type 2  (-) liver disease, no renal disease, no thyroid disorder    Musculoskeletal     (+) back pain  Abdominal    Substance History      OB/GYN          Other   arthritis,                 Anesthesia Plan    ASA 3     general       Anesthetic plan, risks, benefits, and alternatives have been provided, discussed and informed consent has been obtained with: patient.    CODE STATUS:

## 2023-11-16 ENCOUNTER — READMISSION MANAGEMENT (OUTPATIENT)
Dept: CALL CENTER | Facility: HOSPITAL | Age: 48
End: 2023-11-16
Payer: COMMERCIAL

## 2023-11-16 VITALS
BODY MASS INDEX: 46.1 KG/M2 | OXYGEN SATURATION: 99 % | RESPIRATION RATE: 16 BRPM | TEMPERATURE: 97.2 F | DIASTOLIC BLOOD PRESSURE: 64 MMHG | WEIGHT: 270 LBS | HEART RATE: 58 BPM | HEIGHT: 64 IN | SYSTOLIC BLOOD PRESSURE: 125 MMHG

## 2023-11-16 LAB
ALBUMIN SERPL-MCNC: 3.6 G/DL (ref 3.5–5.2)
ALBUMIN/GLOB SERPL: 1.3 G/DL
ALP SERPL-CCNC: 60 U/L (ref 39–117)
ALT SERPL W P-5'-P-CCNC: 29 U/L (ref 1–33)
ANION GAP SERPL CALCULATED.3IONS-SCNC: 13.1 MMOL/L (ref 5–15)
AST SERPL-CCNC: 37 U/L (ref 1–32)
BASOPHILS # BLD AUTO: 0.01 10*3/MM3 (ref 0–0.2)
BASOPHILS NFR BLD AUTO: 0.1 % (ref 0–1.5)
BILIRUB SERPL-MCNC: 0.3 MG/DL (ref 0–1.2)
BUN SERPL-MCNC: 8 MG/DL (ref 6–20)
BUN/CREAT SERPL: 12.1 (ref 7–25)
CALCIUM SPEC-SCNC: 9.2 MG/DL (ref 8.6–10.5)
CHLORIDE SERPL-SCNC: 102 MMOL/L (ref 98–107)
CO2 SERPL-SCNC: 21.9 MMOL/L (ref 22–29)
CREAT SERPL-MCNC: 0.66 MG/DL (ref 0.57–1)
DEPRECATED RDW RBC AUTO: 47.7 FL (ref 37–54)
EGFRCR SERPLBLD CKD-EPI 2021: 108.4 ML/MIN/1.73
EOSINOPHIL # BLD AUTO: 0 10*3/MM3 (ref 0–0.4)
EOSINOPHIL NFR BLD AUTO: 0 % (ref 0.3–6.2)
ERYTHROCYTE [DISTWIDTH] IN BLOOD BY AUTOMATED COUNT: 16.8 % (ref 12.3–15.4)
GLOBULIN UR ELPH-MCNC: 2.8 GM/DL
GLUCOSE SERPL-MCNC: 133 MG/DL (ref 65–99)
HCT VFR BLD AUTO: 32.6 % (ref 34–46.6)
HGB BLD-MCNC: 10.5 G/DL (ref 12–15.9)
IMM GRANULOCYTES # BLD AUTO: 0.14 10*3/MM3 (ref 0–0.05)
IMM GRANULOCYTES NFR BLD AUTO: 1.2 % (ref 0–0.5)
LAB AP CASE REPORT: NORMAL
LYMPHOCYTES # BLD AUTO: 1.22 10*3/MM3 (ref 0.7–3.1)
LYMPHOCYTES NFR BLD AUTO: 10.3 % (ref 19.6–45.3)
MAGNESIUM SERPL-MCNC: 2.2 MG/DL (ref 1.6–2.6)
MCH RBC QN AUTO: 25 PG (ref 26.6–33)
MCHC RBC AUTO-ENTMCNC: 32.2 G/DL (ref 31.5–35.7)
MCV RBC AUTO: 77.6 FL (ref 79–97)
MONOCYTES # BLD AUTO: 0.54 10*3/MM3 (ref 0.1–0.9)
MONOCYTES NFR BLD AUTO: 4.6 % (ref 5–12)
NEUTROPHILS NFR BLD AUTO: 83.8 % (ref 42.7–76)
NEUTROPHILS NFR BLD AUTO: 9.9 10*3/MM3 (ref 1.7–7)
NRBC BLD AUTO-RTO: 0 /100 WBC (ref 0–0.2)
PATH REPORT.FINAL DX SPEC: NORMAL
PATH REPORT.GROSS SPEC: NORMAL
PHOSPHATE SERPL-MCNC: 4 MG/DL (ref 2.5–4.5)
PLATELET # BLD AUTO: 411 10*3/MM3 (ref 140–450)
PMV BLD AUTO: 9.9 FL (ref 6–12)
POTASSIUM SERPL-SCNC: 4.9 MMOL/L (ref 3.5–5.2)
PROT SERPL-MCNC: 6.4 G/DL (ref 6–8.5)
RBC # BLD AUTO: 4.2 10*6/MM3 (ref 3.77–5.28)
SODIUM SERPL-SCNC: 137 MMOL/L (ref 136–145)
WBC NRBC COR # BLD: 11.81 10*3/MM3 (ref 3.4–10.8)

## 2023-11-16 PROCEDURE — 25010000002 CYANOCOBALAMIN PER 1000 MCG: Performed by: SURGERY

## 2023-11-16 PROCEDURE — 85025 COMPLETE CBC W/AUTO DIFF WBC: CPT | Performed by: SURGERY

## 2023-11-16 PROCEDURE — 80053 COMPREHEN METABOLIC PANEL: CPT | Performed by: SURGERY

## 2023-11-16 PROCEDURE — 84100 ASSAY OF PHOSPHORUS: CPT | Performed by: SURGERY

## 2023-11-16 PROCEDURE — 83735 ASSAY OF MAGNESIUM: CPT | Performed by: SURGERY

## 2023-11-16 PROCEDURE — 25010000002 THIAMINE PER 100 MG: Performed by: NURSE PRACTITIONER

## 2023-11-16 PROCEDURE — 25010000002 METOCLOPRAMIDE PER 10 MG: Performed by: SURGERY

## 2023-11-16 PROCEDURE — 25010000002 HYDROMORPHONE PER 4 MG: Performed by: SURGERY

## 2023-11-16 RX ORDER — TRAMADOL HYDROCHLORIDE 50 MG/1
50 TABLET ORAL EVERY 6 HOURS PRN
Qty: 12 TABLET | Refills: 0 | Status: SHIPPED | OUTPATIENT
Start: 2023-11-16

## 2023-11-16 RX ORDER — ONDANSETRON 4 MG/1
4 TABLET, ORALLY DISINTEGRATING ORAL EVERY 8 HOURS PRN
Qty: 20 TABLET | Refills: 0 | Status: SHIPPED | OUTPATIENT
Start: 2023-11-16

## 2023-11-16 RX ORDER — THIAMINE HYDROCHLORIDE 100 MG/ML
100 INJECTION, SOLUTION INTRAMUSCULAR; INTRAVENOUS ONCE
Status: COMPLETED | OUTPATIENT
Start: 2023-11-16 | End: 2023-11-16

## 2023-11-16 RX ADMIN — HYDROMORPHONE HYDROCHLORIDE 0.5 MG: 1 INJECTION, SOLUTION INTRAMUSCULAR; INTRAVENOUS; SUBCUTANEOUS at 03:58

## 2023-11-16 RX ADMIN — METOCLOPRAMIDE 10 MG: 5 INJECTION, SOLUTION INTRAMUSCULAR; INTRAVENOUS at 03:57

## 2023-11-16 RX ADMIN — ACETAMINOPHEN 1000 MG: 500 TABLET ORAL at 03:57

## 2023-11-16 RX ADMIN — METOCLOPRAMIDE 10 MG: 5 INJECTION, SOLUTION INTRAMUSCULAR; INTRAVENOUS at 09:06

## 2023-11-16 RX ADMIN — CYANOCOBALAMIN 1000 MCG: 1000 INJECTION INTRAMUSCULAR; SUBCUTANEOUS at 09:06

## 2023-11-16 RX ADMIN — THIAMINE HYDROCHLORIDE 100 MG: 100 INJECTION, SOLUTION INTRAMUSCULAR; INTRAVENOUS at 10:45

## 2023-11-16 RX ADMIN — TRAMADOL HYDROCHLORIDE 50 MG: 50 TABLET, COATED ORAL at 05:33

## 2023-11-16 RX ADMIN — ACETAMINOPHEN 1000 MG: 500 TABLET ORAL at 09:06

## 2023-11-16 RX ADMIN — TRAMADOL HYDROCHLORIDE 50 MG: 50 TABLET, COATED ORAL at 09:43

## 2023-11-16 RX ADMIN — FAMOTIDINE 20 MG: 10 INJECTION INTRAVENOUS at 09:06

## 2023-11-16 RX ADMIN — Medication 3 ML: at 09:13

## 2023-11-16 NOTE — OUTREACH NOTE
Prep Survey      Flowsheet Row Responses   [facility] patient discharged from? [place]   Is LACE score < 7 ? No   Eligibility [facility]   Date of Admission 11/15/23   Date of Discharge 11/16/23   Discharge Disposition Home or Self Care   Discharge diagnosis GASTRIC SLEEVE LAPAROSCOPIC Conversion,  Lysis of Adhesions   Does the patient have one of the following disease processes/diagnoses(primary or secondary)? General Surgery   Does the patient have Home health ordered? No   Is there a DME ordered? No   Prep survey completed? Yes            Catalina HEADLEY - Registered Nurse

## 2023-11-16 NOTE — PLAN OF CARE
Goal Outcome Evaluation:            Patient had a restful night, vss, free of falls, adequate pain control and compliant to nursing care.     Ambulated throughout hallways       Problem: Adult Inpatient Plan of Care  Goal: Plan of Care Review  Outcome: Ongoing, Progressing  Goal: Patient-Specific Goal (Individualized)  Outcome: Ongoing, Progressing  Goal: Absence of Hospital-Acquired Illness or Injury  Outcome: Ongoing, Progressing  Intervention: Identify and Manage Fall Risk  Recent Flowsheet Documentation  Taken 11/15/2023 2033 by Devin Harding RN  Safety Promotion/Fall Prevention:   assistive device/personal items within reach   clutter free environment maintained   gait belt   lighting adjusted   fall prevention program maintained  Intervention: Prevent Skin Injury  Recent Flowsheet Documentation  Taken 11/15/2023 2033 by Devin Harding RN  Body Position: position changed independently  Intervention: Prevent and Manage VTE (Venous Thromboembolism) Risk  Recent Flowsheet Documentation  Taken 11/16/2023 0425 by Devin Harding RN  VTE Prevention/Management: sequential compression devices on  Taken 11/16/2023 0300 by Devin Harding RN  Activity Management:   ambulated outside room   ambulated in room   up ad josefa  Taken 11/16/2023 0200 by Devin Harding RN  VTE Prevention/Management: sequential compression devices on  Taken 11/15/2023 2033 by Devin Harding RN  Activity Management:   activity minimized   activity encouraged  VTE Prevention/Management: sequential compression devices on  Range of Motion:   active ROM (range of motion) encouraged   ROM (range of motion) performed  Intervention: Prevent Infection  Recent Flowsheet Documentation  Taken 11/15/2023 2033 by Devin Harding, RN  Infection Prevention: single patient room provided  Taken 11/15/2023 1925 by Devin Harding RN  Infection Prevention:   rest/sleep promoted   hand hygiene promoted  Goal: Optimal Comfort and Wellbeing  Outcome: Ongoing, Progressing  Intervention: Monitor Pain  and Promote Comfort  Recent Flowsheet Documentation  Taken 11/16/2023 0425 by Devin Harding RN  Pain Management Interventions: no interventions per patient request  Taken 11/16/2023 0200 by Devin Harding RN  Pain Management Interventions: see MAR  Taken 11/15/2023 2033 by Devin Harding RN  Pain Management Interventions: see MAR  Intervention: Provide Person-Centered Care  Recent Flowsheet Documentation  Taken 11/15/2023 2033 by Devin Harding RN  Trust Relationship/Rapport:   care explained   choices provided   emotional support provided   empathic listening provided   questions encouraged   questions answered  Goal: Readiness for Transition of Care  Outcome: Ongoing, Progressing     Problem: Pain Acute  Goal: Acceptable Pain Control and Functional Ability  Outcome: Ongoing, Progressing  Intervention: Develop Pain Management Plan  Recent Flowsheet Documentation  Taken 11/16/2023 0425 by Devin Harding RN  Pain Management Interventions: no interventions per patient request  Taken 11/16/2023 0200 by Devin Harding RN  Pain Management Interventions: see MAR  Taken 11/15/2023 2033 by Devin Harding RN  Pain Management Interventions: see MAR  Intervention: Optimize Psychosocial Wellbeing  Recent Flowsheet Documentation  Taken 11/15/2023 2033 by Devin Harding RN  Supportive Measures: active listening utilized

## 2023-11-16 NOTE — PLAN OF CARE
Problem: Adult Inpatient Plan of Care  Goal: Plan of Care Review  Outcome: Adequate for Care Transition  Goal: Patient-Specific Goal (Individualized)  Outcome: Adequate for Care Transition  Goal: Absence of Hospital-Acquired Illness or Injury  Outcome: Adequate for Care Transition  Intervention: Identify and Manage Fall Risk  Description: Perform standard risk assessment on admission using a validated tool or comprehensive approach appropriate to the patient; reassess fall risk frequently, with change in status or transfer to another level of care.  Communicate fall injury risk to interprofessional healthcare team.  Determine need for increased observation, equipment and environmental modification, such as low bed, signage and supportive, nonskid footwear.  Adjust safety measures to individual developmental age, stage and identified risk factors.  Reinforce the importance of safety and physical activity with patient and family.  Perform regular intentional rounding to assess need for position change, pain assessment and personal needs, including assistance with toileting.  Recent Flowsheet Documentation  Taken 11/16/2023 1013 by Tiffanie Rosenthal RN  Safety Promotion/Fall Prevention:   safety round/check completed   room organization consistent  Intervention: Prevent Skin Injury  Description: Perform a screening for skin injury risk, such as pressure or moisture associated skin damage on admission and at regular intervals throughout hospital stay.  Keep all areas of skin (especially folds) clean and dry.  Maintain adequate skin hydration.  Relieve and redistribute pressure and protect bony prominences; implement measures based on patient-specific risk factors.  Match turning and repositioning schedule to clinical condition.  Encourage weight shift frequently; assist with reposition if unable to complete independently.  Float heels off bed; avoid pressure on the Achilles tendon.  Keep skin free from extended  contact with medical devices.  Encourage functional activity and mobility, as early as tolerated.  Use aids (e.g., slide boards, mechanical lift) during transfer.  Recent Flowsheet Documentation  Taken 11/16/2023 1013 by Tiffanie Rosenthal RN  Body Position: position changed independently  Skin Protection:   adhesive use limited   incontinence pads utilized   tubing/devices free from skin contact  Intervention: Prevent and Manage VTE (Venous Thromboembolism) Risk  Description: Assess for VTE (venous thromboembolism) risk.  Encourage and assist with early ambulation.  Initiate and maintain compression or other therapy, as indicated, based on identified risk in accordance with organizational protocol and provider order.  Encourage both active and passive leg exercises while in bed, if unable to ambulate.  Recent Flowsheet Documentation  Taken 11/16/2023 1013 by Tiffanie Rosenthal RN  Activity Management:   activity encouraged   ambulated outside room  VTE Prevention/Management: (patient has been up walking the halls) sequential compression devices off  Range of Motion: active ROM (range of motion) encouraged  Intervention: Prevent Infection  Description: Maintain skin and mucous membrane integrity; promote hand, oral and pulmonary hygiene.  Optimize fluid balance, nutrition, sleep and glycemic control to maximize infection resistance.  Identify potential sources of infection early to prevent or mitigate progression of infection (e.g., wound, lines, devices).  Evaluate ongoing need for invasive devices; remove promptly when no longer indicated.  Recent Flowsheet Documentation  Taken 11/16/2023 1000 by Tiffanie Rosenthal RN  Infection Prevention:   hand hygiene promoted   personal protective equipment utilized   single patient room provided   visitors restricted/screened  Taken 11/16/2023 0800 by Tiffanie Rosenthal RN  Infection Prevention:   hand hygiene promoted   personal protective equipment utilized   single  patient room provided   visitors restricted/screened  Goal: Optimal Comfort and Wellbeing  Outcome: Adequate for Care Transition  Intervention: Provide Person-Centered Care  Description: Use a family-focused approach to care.  Develop trust and rapport by proactively providing information, encouraging questions, addressing concerns and offering reassurance.  Acknowledge emotional response to hospitalization.  Recognize and utilize personal coping strategies.  Honor spiritual and cultural preferences.  Recent Flowsheet Documentation  Taken 11/16/2023 1013 by Tiffanie Rosenthal RN  Trust Relationship/Rapport:   care explained   choices provided   emotional support provided   empathic listening provided   questions answered   questions encouraged   reassurance provided   thoughts/feelings acknowledged  Goal: Readiness for Transition of Care  Outcome: Adequate for Care Transition   Goal Outcome Evaluation:  Plan of Care Reviewed With: patient        Progress: improving

## 2023-11-16 NOTE — PAYOR COMM NOTE
"Karey Lopez (48 y.o. Female)      PATIENT DISCHARGED 11/16/23:  REF#  [id number]      DEPT: [phone],  [phone]    [facility]: NPI [id number]  [facility]# [id number]    KATI LEDEZMA RN,[facility]      Date of Birth   1975    Social Security Number       Address   [address] [place] [address]    Home Phone   [phone]    MRN   [id number]       [employer]    Marital Status                               Admission Date   11/15/23    Admission Type   Elective    Admitting Provider   Reyes Ngo Jr., MD    Attending Provider       Department, Room/Bed   [address], P797/1       Discharge Date   11/16/2023    Discharge Disposition   Home or Self Care    Discharge Destination                                 Attending Provider: (none)   Allergies: Ozempic (0.25 Or 0.5 Mg-dose) [Semaglutide(0.25 Or 0.5mg-dos)], Vilazodone Hcl    Isolation: None   Infection: None   Code Status: CPR    Ht: 162.6 cm (64\")   Wt: 122 kg (270 lb)    Admission Cmt: None   Principal Problem: Class 3 severe obesity due to excess calories with serious comorbidity and body mass index (BMI) of 50.0 to 59.9 in adult [E66.01,Z68.43]                   Active Insurance as of 11/15/2023       Primary Coverage       Payor Plan Insurance Group Employer/Plan Group    [id number]       Payor Plan Address Payor Plan Phone Number Payor Plan Fax Number Effective Dates    [address]   1/5/2008 - None Entered    [place] [address]         Subscriber Name Subscriber Birth Date Member ID       ABMAR LOPEZ JR 3/16/1971 [id number]                     Emergency Contacts        (Rel.) Home Phone Work Phone Mobile Phone    AMBAR LOPEZ (Spouse) [phone] -- --                 History & Physical        Reyes Ngo Jr., MD at 11/15/23 0846            H&P reviewed. The patient was examined and there are no changes to the " H&P.          Electronically signed by Reyes Ngo Jr., MD at 11/15/23 0846   Source Note          Bariatric Consult:  Referred by Sunita Hylton APRN    Karey Lopez is here today for consult on Consult      History of Present Illness:     Karey Lopez is a 48 y.o. female with morbid obesity with co-morbidities including sleep apnea, diabetes, hypertension, dyslipidemia, osteoarthritis, back pain, knee pain, and GERD who presents for surgical consultation for the above procedure. Karey has completed the initial intake visit and has been examined by our nurse practitioner, dietician, psychologist and underwent the extensive educational teaching process under the guidance of our bariatric coordinator and myself. Karey also has seen or if has not yet will see the educational video FARIBA on the surgical procedure if available. Karey attended today more educational teaching from our bariatric coordinator and myself. Karey has had an extensive medical workup including a visit with their primary care physician, EKG, chest radiograph, blood work, EGD or UGI and possibly further testing. These have been reviewed by me and discussed with the patient. Karey is now ready to proceed with surgery. Karey presently denies nausea, vomiting, fever, chills, chest pain, shortness of air, melena, hematochezia, hemetemesis, dysuria, frequency, hematuria.     Past Medical History:   Diagnosis Date    Acute non-recurrent maxillary sinusitis 05/04/2023    Allergic rhinitis, unspecified     Anxiety     Arthritis     Asthma     Asthma, intrinsic 1985    Cough variant asthma     Diabetes mellitus 06/2022    Dorsalgia, unspecified     Essential (primary) hypertension     Family history of ischemic heart disease and other diseases of the circulatory system     GERD (gastroesophageal reflux disease) 06/27/2023    History of gastric ulcer 08/2023    History of Helicobacter pylori infection 08/2023    Hypertension      Left lower quadrant pain     Leucocytosis 2020    due to chronic inflammation per hematology    Lichen sclerosus 2019    Obesity, unspecified     Other fatigue     Persistent mood (affective) disorder, unspecified     PONV (postoperative nausea and vomiting)     Sepsis     Right knee Sepsis    Sleep apnea, obstructive     WEARS CPAP    Unspecified abdominal pain     Unspecified ovarian cyst, left side     Vitamin D deficiency, unspecified      Encounter Diagnoses   Name Primary?    Obesity, Class III, BMI 40-49.9 (morbid obesity) Yes    Essential (primary) hypertension     Type 2 diabetes mellitus without complication, without long-term current use of insulin     Sleep apnea, unspecified type     Gastroesophageal reflux disease, unspecified whether esophagitis present     History of removal of laparoscopic gastric banding device     Acute gastric ulcer without hemorrhage or perforation      Past Surgical History:   Procedure Laterality Date    COLONOSCOPY  01/2010    normal    ENDOSCOPY  01/2010, 01/2018 1/2010- small hernia and 01/2018 mild gastitis    ENDOSCOPY N/A 08/01/2023    Procedure: ESOPHAGOGASTRODUODENOSCOPY WITH BIOPSY;  Surgeon: Reyes Ngo Jr., MD;  Location: [facility] ENDOSCOPY;  Service: General;  Laterality: N/A;  PRE- DYSPEPSIA, H/O BAND REMOVAL  POST- GASTRITIS, GASTRIC ULCER    ENDOSCOPY N/A 10/31/2023    Procedure: ESOPHAGOGASTRODUODENOSCOPY WITH BIOPSY;  Surgeon: Reyes Ngo Jr., MD;  Location: [facility] ENDOSCOPY;  Service: General;  Laterality: N/A;  PRE- H/O GASTRIC ULCER   POST- GASTRITIS    FOOT SURGERY Right 11/2015, 4/28/2017    FRACTURE SURGERY Right 11/2014, 3/2015    ankle    GALLBLADDER SURGERY  1992    GASTRIC BANDING REMOVAL  2019    KNEE ARTHROPLASTY Right 12/10/2019    KNEE ARTHROSCOPY Right 01/30/2020    Procedure: KNEE ARTHROSCOPY WITH INCISION AND DRAINAGE;  Surgeon: Fareed Chacon MD;  Location: [facility] MAIN OR;  Service: Orthopedics    LAPAROSCOPIC GASTRIC  BANDING  07/2010    and was removed july 2019; scar tissue    LAPAROSCOPIC TUBAL LIGATION      MOUTH SURGERY      wisdom teeth removal    TUBAL ABDOMINAL LIGATION         * No active hospital problems. *      Allergies   Allergen Reactions    Ozempic (0.25 Or 0.5 Mg-Dose) [Semaglutide(0.25 Or 0.5mg-Dos)] Other (See Comments)     Fatigue and nausea    Metformin Nausea Only     CAN TAKE SMALL DOSES    Vilazodone Hcl Mental Status Change       Current Outpatient Medications:     acetaminophen (TYLENOL) 650 MG 8 hr tablet, Take  by mouth Every 8 (Eight) Hours., Disp: , Rfl:     albuterol (PROVENTIL) (2.5 MG/3ML) 0.083% nebulizer solution, Take 2.5 mg by nebulization Every 4 (Four) Hours As Needed for Wheezing., Disp: 60 each, Rfl: 5    albuterol sulfate  (90 Base) MCG/ACT inhaler, Inhale 2 puffs Every 4 (Four) Hours As Needed for Wheezing., Disp: 8 g, Rfl: 5    atenolol (TENORMIN) 25 MG tablet, Take 0.5 tablets by mouth Daily., Disp: , Rfl:     Blood Glucose Monitoring Suppl (ONE TOUCH ULTRA 2) w/Device kit, 1 each Daily., Disp: 1 each, Rfl: 0    buPROPion XL (Wellbutrin XL) 150 MG 24 hr tablet, Take 1 tablet by mouth Daily., Disp: , Rfl:     glucose blood (OneTouch Ultra) test strip, USE 1 EACH TO TEST BLOOD SUGAR DAILY., Disp: 100 each, Rfl: 3    Lancets misc, Use 1 each Daily. USE WITH ONE TOUCH METER, Disp: 100 each, Rfl: 1    lidocaine (LIDODERM) 5 %, APPLY 1 PATCH DAILY TOPICALLY REMOVE AND DISCARD PATCH WITHIN 12 HOURS OR AS DIRECTED., Disp: , Rfl:     lisinopril (PRINIVIL,ZESTRIL) 40 MG tablet, Take 1 tablet by mouth Daily for 180 days., Disp: 90 tablet, Rfl: 1    Magnesium 250 MG tablet, Take 1 tablet by mouth Daily., Disp: , Rfl:     metFORMIN (GLUCOPHAGE) 500 MG tablet, Take 0.5 tablets by mouth 2 (Two) Times a Day With Meals., Disp: , Rfl:     montelukast (SINGULAIR) 10 MG tablet, TAKE 1 TABLET BY MOUTH DAILY, Disp: 90 tablet, Rfl: 1    ondansetron ODT (ZOFRAN-ODT) 8 MG disintegrating tablet, DISSOLVE  1 TABLET ON THE TONGUE EVERY 12 HOURS AS NEEDED FOR NAUSEA OR VOMITING, Disp: 20 tablet, Rfl: 1    pantoprazole (PROTONIX) 40 MG EC tablet, Take 1 tablet by mouth 2 (Two) Times a Day., Disp: 60 tablet, Rfl: 3    tiZANidine (ZANAFLEX) 4 MG tablet, TAKE 1 TABLET BY MOUTH EVERY 6 HOURS AS NEEDED FOR MUSCLE SPASMS, Disp: 30 tablet, Rfl: 1    vitamin D3 125 MCG (5000 UT) capsule capsule, Take 2 capsules by mouth Daily., Disp: , Rfl:     Enoxaparin Sodium (LOVENOX) 40 MG/0.4ML solution prefilled syringe syringe, Inject 0.4 mL under the skin into the appropriate area as directed Every 12 (Twelve) Hours for 14 days. Start after surgery unless instructed otherwise, Disp: 11.2 mL, Rfl: 0    folic acid-vit B6-vit B12 (FOLBEE) 2.5-25-1 MG tablet tablet, Take 1 tablet by mouth Daily., Disp: 40 tablet, Rfl: 0    ipratropium-albuterol (DUO-NEB) 0.5-2.5 mg/3 ml nebulizer, Take 3 mL by nebulization Every 4 (Four) Hours As Needed for Wheezing for up to 30 days., Disp: 360 mL, Rfl: 0    pregabalin (LYRICA) 75 MG capsule, Take 1 capsule by mouth 2 (Two) Times a Day., Disp: , Rfl:     sucralfate (Carafate) 1 g tablet, Take 1 tablet by mouth 4 (Four) Times a Day., Disp: 120 tablet, Rfl: 3    Current Facility-Administered Medications:     Allergy Serum Injection, 0.5 mL, Subcutaneous, Once, Sunita Hylton, AMBIKA    Social History     Socioeconomic History    Marital status:     Number of children: 2   Tobacco Use    Smoking status: Never     Passive exposure: Never    Smokeless tobacco: Never   Vaping Use    Vaping Use: Never used   Substance and Sexual Activity    Alcohol use: Never     Comment: just rarely    Drug use: Never    Sexual activity: Not Currently     Birth control/protection: Tubal ligation     Family History   Problem Relation Age of Onset    Asthma Mother     Heart disease Mother     Coronary artery disease Mother     Diabetes type II Mother     Diabetes Mother     Hypertension Mother     Stroke Father      Hypertension Father     Obesity Brother     Heart attack Brother         MI    Hypertension Brother     Cervical cancer Maternal Grandmother     Lung cancer Maternal Grandfather     Stroke Paternal Grandmother     Malig Hyperthermia Neg Hx      Review of Systems:  Review of Systems   Constitutional:  Positive for fatigue.   Musculoskeletal:  Positive for arthralgias and back pain.   All other systems reviewed and are negative.      Physical Exam:  Vital Signs:  Weight: 127 kg (281 lb)   Body mass index is 48.21 kg/m².  Temp: 98.3 °F (36.8 °C)   Heart Rate: 57   BP: 147/80     Physical Exam  Vitals reviewed.   HENT:      Head: Normocephalic and atraumatic.      Mouth/Throat:      Mouth: Mucous membranes are moist.      Pharynx: Oropharynx is clear.   Eyes:      General: No scleral icterus.     Extraocular Movements: Extraocular movements intact.      Conjunctiva/sclera: Conjunctivae normal.      Pupils: Pupils are equal, round, and reactive to light.   Neck:      Thyroid: No thyromegaly.   Cardiovascular:      Rate and Rhythm: Normal rate.   Pulmonary:      Effort: Pulmonary effort is normal. No respiratory distress.      Breath sounds: Normal breath sounds. No stridor. No wheezing or rhonchi.   Abdominal:      General: Bowel sounds are normal.      Palpations: Abdomen is soft.      Tenderness: There is no abdominal tenderness. There is no right CVA tenderness, left CVA tenderness, guarding or rebound.      Hernia: No hernia is present.   Musculoskeletal:         General: Normal range of motion.      Cervical back: Normal range of motion and neck supple.   Lymphadenopathy:      Cervical: No cervical adenopathy.   Skin:     General: Skin is warm and dry.      Findings: No erythema.   Neurological:      Mental Status: She is alert and oriented to person, place, and time.   Psychiatric:         Mood and Affect: Mood normal.         Behavior: Behavior normal.         Thought Content: Thought content normal.          Judgment: Judgment normal.         Assessment:    Karey Lopez is a 48 y.o. year old female with medically complicated severe obesity with a BMI of Body mass index is 48.21 kg/m². and multiple co-morbidities listed in the encounter diagnosis.    I think she is an appropriate candidate for this surgery, and is ready to proceed.    Plan/Discussion/Summary:  Questionable hiatal hernia versus band scarring by me.  Patient asymptomatic.  Ulcer antrum healed with repeat endoscopy in still on medications.  Patient did stop NSAIDs.  Patient understands that they are at an increased risk for complications because of this being a revisional surgery/conversion  to another procedure.  The patient understands the increased risk of gastric injury/leak, bleeding, etc because of the scarring and previous surgery.  Also increased risk of other complications that are listed because of increased OR time.    The patient has returned to the office for a surgical consultation and has requested to proceed with gastric sleeve.  I have had the opportunity to obtain a history, examine the patient and review the patient's chart. The procedure could be done laparoscopically and or robotically.    The patient understands that surgery is a tool and that weight loss is not guaranteed but only seen in the context of appropriate use, regular follow up, exercise and making appropriate food choices.     I personally discussed the potential complications of the laparoscopic gastric sleeve with this patient.  The patient is well aware of potential complications of the surgery that include but not limited to bleeding, infections, deep vein thrombosis, pulmonary embolism, pulmonary complications such as pneumonia, cardiac event, hernias, small bowel obstruction, damage to the spleen or other organs, bowel injury, disfiguring scars, failure to lose weight, need for additional surgery, conversion to an open procedure and death.  The patient is also  aware of complications which apply in particular to the gastric sleeve and can include but not limited to the leakage of gastric contents at the staple line, the development of an intra-abdominal abscess, gastroesophageal reflux disease, Franco's esophagus, ulcers, vitamin/mineral deficiencies, strictures, and the possibility of converting this procedure to a John-en-Y gastric bypass. The patient also understands the possibility of requiring an acid reducer medication for the rest of their life.    The risks, benefits, potential complications and alternative therapies were discussed at great length as outlined in our extensive consent forms, online consent and educational teaching processes.    The patient has confirmed the participation in the programs extensive educational activities.    All questions and concerns were answered to patient's satisfaction.  The patient now wishes to proceed with surgery.     The patient has agreed to a postoperative course of anitcoagulant therapy.      I instructed patient that the surgery could be laparoscopically and/or robotically.    I instructed patient to start on a H2 blocker or proton pump inhibitor if not already on one of these medications.    I explained in detail the procedures that are in the consent.  All of these procedures have a chance to convert to open if any technical challenges or complications do occur.  Bariatric surgery is not cosmetic surgery but rather a tool to help a patient make a life-long commitment lifestyle change including diet, exercise, behavior changes, and taking supplemental vitamins and minerals.    Problems after surgery may require more operations to correct them.    The risks, benefits, alternatives, and potential complications of all of the procedures were explained in detail including, but not limited to death, anesthesia and medication adverse effect, deep venous thrombosis, pulmonary embolism, trocar site/incisional hernia, wound  infection, abdominal infection, bleeding, failure to lose weight, gain weight, a change in body image, metabolic complications with vitamin deficiences and anemia.    Weight loss expectations were discussed with the patient in detail. The weight loss operations most commonly performed are the sleeve gastrectomy and the John-en-Y gastric bypass. These operations result in weight losses up to approximately 25-35% of initial body weight 12 to 24 months after surgery with the gastric bypass usually the higher percent of weight loss but depends on patient using the tool.    For the gastric bypass and loop duodenal switch (DONNY-S) the risks include but not limited to the following early complications:  Anastomotic leak/peritonitis, John/Alimentary/biliopancreatic limb obstruction, severe & minor wound infection/seroma, and nausea/vomiting.  Late complications can include but are not limited to malnutrition, vitamin deficiencies, frequent loose stools,  stomal stenosis, marginal ulcer, bowel obstruction, intussusception, internal, and incisional hernia.    Regarding the gastric sleeve, there is higher risk of dysphagia and reflux leading to possible Franco's esophagus compared to a gastric bypass, as well as risk of internal visceral/organ injury, splenectomy, bleeding, infection, leak (which could require further intervention possible conversion to John-en-Y gastric bypass), stenosis and possibility of regaining weight.    Karey was counseled regarding diagnostic results, instructions for management, risk factor reductions, prognosis, patient and family education, impressions, risks and benefits of treatment options and importance of compliance with treatment. Total time of the encounter was over 45 minutes counseling the patient regarding the procedure as above and reviewing as well as ordering labs, medications and the procedure.  The chart was also reviewed prior to seeing the patient reviewing previous testing,  studies and labs.    Karey understands the surgical procedures and the different surgical options that are available.  She understands the lifestyle changes that are required after surgery and has agreed to follow the guidelines outlined in the weight management program.  She also expressed understanding of the risks involved and had all of female questions answered and desires to proceed.      Reyes Ngo MD  11/2/2023    Electronically signed by Reyes Ngo Jr., MD at 11/02/23 0738                 Reyes Ngo Jr., MD at 11/02/23 0715          Bariatric Consult:  Referred by Sunita Hylton APRN    Karey Lopez is here today for consult on Consult      History of Present Illness:     Karey Lopez is a 48 y.o. female with morbid obesity with co-morbidities including sleep apnea, diabetes, hypertension, dyslipidemia, osteoarthritis, back pain, knee pain, and GERD who presents for surgical consultation for the above procedure. Karey has completed the initial intake visit and has been examined by our nurse practitioner, dietician, psychologist and underwent the extensive educational teaching process under the guidance of our bariatric coordinator and myself. Karey also has seen or if has not yet will see the educational video FARIBA on the surgical procedure if available. Karey attended today more educational teaching from our bariatric coordinator and myself. Karey has had an extensive medical workup including a visit with their primary care physician, EKG, chest radiograph, blood work, EGD or UGI and possibly further testing. These have been reviewed by me and discussed with the patient. Karey is now ready to proceed with surgery. Karey presently denies nausea, vomiting, fever, chills, chest pain, shortness of air, melena, hematochezia, hemetemesis, dysuria, frequency, hematuria.     Past Medical History:   Diagnosis Date    Acute non-recurrent maxillary sinusitis 05/04/2023     Allergic rhinitis, unspecified     Anxiety     Arthritis     Asthma     Asthma, intrinsic 1985    Cough variant asthma     Diabetes mellitus 06/2022    Dorsalgia, unspecified     Essential (primary) hypertension     Family history of ischemic heart disease and other diseases of the circulatory system     GERD (gastroesophageal reflux disease) 06/27/2023    History of gastric ulcer 08/2023    History of Helicobacter pylori infection 08/2023    Hypertension     Left lower quadrant pain     Leucocytosis 2020    due to chronic inflammation per hematology    Lichen sclerosus 2019    Obesity, unspecified     Other fatigue     Persistent mood (affective) disorder, unspecified     PONV (postoperative nausea and vomiting)     Sepsis     Right knee Sepsis    Sleep apnea, obstructive     WEARS CPAP    Unspecified abdominal pain     Unspecified ovarian cyst, left side     Vitamin D deficiency, unspecified        Encounter Diagnoses   Name Primary?    Obesity, Class III, BMI 40-49.9 (morbid obesity) Yes    Essential (primary) hypertension     Type 2 diabetes mellitus without complication, without long-term current use of insulin     Sleep apnea, unspecified type     Gastroesophageal reflux disease, unspecified whether esophagitis present     History of removal of laparoscopic gastric banding device     Acute gastric ulcer without hemorrhage or perforation        Past Surgical History:   Procedure Laterality Date    COLONOSCOPY  01/2010    normal    ENDOSCOPY  01/2010, 01/2018 1/2010- small hernia and 01/2018 mild gastitis    ENDOSCOPY N/A 08/01/2023    Procedure: ESOPHAGOGASTRODUODENOSCOPY WITH BIOPSY;  Surgeon: Reyes Ngo Jr., MD;  Location: [facility] ENDOSCOPY;  Service: General;  Laterality: N/A;  PRE- DYSPEPSIA, H/O BAND REMOVAL  POST- GASTRITIS, GASTRIC ULCER    ENDOSCOPY N/A 10/31/2023    Procedure: ESOPHAGOGASTRODUODENOSCOPY WITH BIOPSY;  Surgeon: Reyes Ngo Jr., MD;  Location: [facility] ENDOSCOPY;   Service: General;  Laterality: N/A;  PRE- H/O GASTRIC ULCER   POST- GASTRITIS    FOOT SURGERY Right 11/2015, 4/28/2017    FRACTURE SURGERY Right 11/2014, 3/2015    ankle    GALLBLADDER SURGERY  1992    GASTRIC BANDING REMOVAL  2019    KNEE ARTHROPLASTY Right 12/10/2019    KNEE ARTHROSCOPY Right 01/30/2020    Procedure: KNEE ARTHROSCOPY WITH INCISION AND DRAINAGE;  Surgeon: Fareed Chacon MD;  Location: [facility] OR;  Service: Orthopedics    LAPAROSCOPIC GASTRIC BANDING  07/2010    and was removed july 2019; scar tissue    LAPAROSCOPIC TUBAL LIGATION      MOUTH SURGERY      wisdom teeth removal    TUBAL ABDOMINAL LIGATION           * No active hospital problems. *      Allergies   Allergen Reactions    Ozempic (0.25 Or 0.5 Mg-Dose) [Semaglutide(0.25 Or 0.5mg-Dos)] Other (See Comments)     Fatigue and nausea    Metformin Nausea Only     CAN TAKE SMALL DOSES    Vilazodone Hcl Mental Status Change         Current Outpatient Medications:     acetaminophen (TYLENOL) 650 MG 8 hr tablet, Take  by mouth Every 8 (Eight) Hours., Disp: , Rfl:     albuterol (PROVENTIL) (2.5 MG/3ML) 0.083% nebulizer solution, Take 2.5 mg by nebulization Every 4 (Four) Hours As Needed for Wheezing., Disp: 60 each, Rfl: 5    albuterol sulfate  (90 Base) MCG/ACT inhaler, Inhale 2 puffs Every 4 (Four) Hours As Needed for Wheezing., Disp: 8 g, Rfl: 5    atenolol (TENORMIN) 25 MG tablet, Take 0.5 tablets by mouth Daily., Disp: , Rfl:     Blood Glucose Monitoring Suppl (ONE TOUCH ULTRA 2) w/Device kit, 1 each Daily., Disp: 1 each, Rfl: 0    buPROPion XL (Wellbutrin XL) 150 MG 24 hr tablet, Take 1 tablet by mouth Daily., Disp: , Rfl:     glucose blood (OneTouch Ultra) test strip, USE 1 EACH TO TEST BLOOD SUGAR DAILY., Disp: 100 each, Rfl: 3    Lancets misc, Use 1 each Daily. USE WITH ONE TOUCH METER, Disp: 100 each, Rfl: 1    lidocaine (LIDODERM) 5 %, APPLY 1 PATCH DAILY TOPICALLY REMOVE AND DISCARD PATCH WITHIN 12 HOURS OR AS DIRECTED., Disp:  , Rfl:     lisinopril (PRINIVIL,ZESTRIL) 40 MG tablet, Take 1 tablet by mouth Daily for 180 days., Disp: 90 tablet, Rfl: 1    Magnesium 250 MG tablet, Take 1 tablet by mouth Daily., Disp: , Rfl:     metFORMIN (GLUCOPHAGE) 500 MG tablet, Take 0.5 tablets by mouth 2 (Two) Times a Day With Meals., Disp: , Rfl:     montelukast (SINGULAIR) 10 MG tablet, TAKE 1 TABLET BY MOUTH DAILY, Disp: 90 tablet, Rfl: 1    ondansetron ODT (ZOFRAN-ODT) 8 MG disintegrating tablet, DISSOLVE 1 TABLET ON THE TONGUE EVERY 12 HOURS AS NEEDED FOR NAUSEA OR VOMITING, Disp: 20 tablet, Rfl: 1    pantoprazole (PROTONIX) 40 MG EC tablet, Take 1 tablet by mouth 2 (Two) Times a Day., Disp: 60 tablet, Rfl: 3    tiZANidine (ZANAFLEX) 4 MG tablet, TAKE 1 TABLET BY MOUTH EVERY 6 HOURS AS NEEDED FOR MUSCLE SPASMS, Disp: 30 tablet, Rfl: 1    vitamin D3 125 MCG (5000 UT) capsule capsule, Take 2 capsules by mouth Daily., Disp: , Rfl:     Enoxaparin Sodium (LOVENOX) 40 MG/0.4ML solution prefilled syringe syringe, Inject 0.4 mL under the skin into the appropriate area as directed Every 12 (Twelve) Hours for 14 days. Start after surgery unless instructed otherwise, Disp: 11.2 mL, Rfl: 0    folic acid-vit B6-vit B12 (FOLBEE) 2.5-25-1 MG tablet tablet, Take 1 tablet by mouth Daily., Disp: 40 tablet, Rfl: 0    ipratropium-albuterol (DUO-NEB) 0.5-2.5 mg/3 ml nebulizer, Take 3 mL by nebulization Every 4 (Four) Hours As Needed for Wheezing for up to 30 days., Disp: 360 mL, Rfl: 0    pregabalin (LYRICA) 75 MG capsule, Take 1 capsule by mouth 2 (Two) Times a Day., Disp: , Rfl:     sucralfate (Carafate) 1 g tablet, Take 1 tablet by mouth 4 (Four) Times a Day., Disp: 120 tablet, Rfl: 3    Current Facility-Administered Medications:     Allergy Serum Injection, 0.5 mL, Subcutaneous, Once, Warner, Sunita H, APRN    Social History     Socioeconomic History    Marital status:     Number of children: 2   Tobacco Use    Smoking status: Never     Passive exposure: Never     Smokeless tobacco: Never   Vaping Use    Vaping Use: Never used   Substance and Sexual Activity    Alcohol use: Never     Comment: just rarely    Drug use: Never    Sexual activity: Not Currently     Birth control/protection: Tubal ligation       Family History   Problem Relation Age of Onset    Asthma Mother     Heart disease Mother     Coronary artery disease Mother     Diabetes type II Mother     Diabetes Mother     Hypertension Mother     Stroke Father     Hypertension Father     Obesity Brother     Heart attack Brother         MI    Hypertension Brother     Cervical cancer Maternal Grandmother     Lung cancer Maternal Grandfather     Stroke Paternal Grandmother     Malig Hyperthermia Neg Hx        Review of Systems:  Review of Systems   Constitutional:  Positive for fatigue.   Musculoskeletal:  Positive for arthralgias and back pain.   All other systems reviewed and are negative.      Physical Exam:  Vital Signs:  Weight: 127 kg (281 lb)   Body mass index is 48.21 kg/m².  Temp: 98.3 °F (36.8 °C)   Heart Rate: 57   BP: 147/80     Physical Exam  Vitals reviewed.   HENT:      Head: Normocephalic and atraumatic.      Mouth/Throat:      Mouth: Mucous membranes are moist.      Pharynx: Oropharynx is clear.   Eyes:      General: No scleral icterus.     Extraocular Movements: Extraocular movements intact.      Conjunctiva/sclera: Conjunctivae normal.      Pupils: Pupils are equal, round, and reactive to light.   Neck:      Thyroid: No thyromegaly.   Cardiovascular:      Rate and Rhythm: Normal rate.   Pulmonary:      Effort: Pulmonary effort is normal. No respiratory distress.      Breath sounds: Normal breath sounds. No stridor. No wheezing or rhonchi.   Abdominal:      General: Bowel sounds are normal.      Palpations: Abdomen is soft.      Tenderness: There is no abdominal tenderness. There is no right CVA tenderness, left CVA tenderness, guarding or rebound.      Hernia: No hernia is present.   Musculoskeletal:          General: Normal range of motion.      Cervical back: Normal range of motion and neck supple.   Lymphadenopathy:      Cervical: No cervical adenopathy.   Skin:     General: Skin is warm and dry.      Findings: No erythema.   Neurological:      Mental Status: She is alert and oriented to person, place, and time.   Psychiatric:         Mood and Affect: Mood normal.         Behavior: Behavior normal.         Thought Content: Thought content normal.         Judgment: Judgment normal.         Assessment:    Karey Lopez is a 48 y.o. year old female with medically complicated severe obesity with a BMI of Body mass index is 48.21 kg/m². and multiple co-morbidities listed in the encounter diagnosis.    I think she is an appropriate candidate for this surgery, and is ready to proceed.    Plan/Discussion/Summary:  Questionable hiatal hernia versus band scarring by me.  Patient asymptomatic.  Ulcer antrum healed with repeat endoscopy in still on medications.  Patient did stop NSAIDs.  Patient understands that they are at an increased risk for complications because of this being a revisional surgery/conversion  to another procedure.  The patient understands the increased risk of gastric injury/leak, bleeding, etc because of the scarring and previous surgery.  Also increased risk of other complications that are listed because of increased OR time.    The patient has returned to the office for a surgical consultation and has requested to proceed with gastric sleeve.  I have had the opportunity to obtain a history, examine the patient and review the patient's chart. The procedure could be done laparoscopically and or robotically.    The patient understands that surgery is a tool and that weight loss is not guaranteed but only seen in the context of appropriate use, regular follow up, exercise and making appropriate food choices.     I personally discussed the potential complications of the laparoscopic gastric sleeve  with this patient.  The patient is well aware of potential complications of the surgery that include but not limited to bleeding, infections, deep vein thrombosis, pulmonary embolism, pulmonary complications such as pneumonia, cardiac event, hernias, small bowel obstruction, damage to the spleen or other organs, bowel injury, disfiguring scars, failure to lose weight, need for additional surgery, conversion to an open procedure and death.  The patient is also aware of complications which apply in particular to the gastric sleeve and can include but not limited to the leakage of gastric contents at the staple line, the development of an intra-abdominal abscess, gastroesophageal reflux disease, Franco's esophagus, ulcers, vitamin/mineral deficiencies, strictures, and the possibility of converting this procedure to a John-en-Y gastric bypass. The patient also understands the possibility of requiring an acid reducer medication for the rest of their life.    The risks, benefits, potential complications and alternative therapies were discussed at great length as outlined in our extensive consent forms, online consent and educational teaching processes.    The patient has confirmed the participation in the programs extensive educational activities.    All questions and concerns were answered to patient's satisfaction.  The patient now wishes to proceed with surgery.     The patient has agreed to a postoperative course of anitcoagulant therapy.      I instructed patient that the surgery could be laparoscopically and/or robotically.    I instructed patient to start on a H2 blocker or proton pump inhibitor if not already on one of these medications.    I explained in detail the procedures that are in the consent.  All of these procedures have a chance to convert to open if any technical challenges or complications do occur.  Bariatric surgery is not cosmetic surgery but rather a tool to help a patient make a life-long  commitment lifestyle change including diet, exercise, behavior changes, and taking supplemental vitamins and minerals.    Problems after surgery may require more operations to correct them.    The risks, benefits, alternatives, and potential complications of all of the procedures were explained in detail including, but not limited to death, anesthesia and medication adverse effect, deep venous thrombosis, pulmonary embolism, trocar site/incisional hernia, wound infection, abdominal infection, bleeding, failure to lose weight, gain weight, a change in body image, metabolic complications with vitamin deficiences and anemia.    Weight loss expectations were discussed with the patient in detail. The weight loss operations most commonly performed are the sleeve gastrectomy and the John-en-Y gastric bypass. These operations result in weight losses up to approximately 25-35% of initial body weight 12 to 24 months after surgery with the gastric bypass usually the higher percent of weight loss but depends on patient using the tool.    For the gastric bypass and loop duodenal switch (DONNY-S) the risks include but not limited to the following early complications:  Anastomotic leak/peritonitis, John/Alimentary/biliopancreatic limb obstruction, severe & minor wound infection/seroma, and nausea/vomiting.  Late complications can include but are not limited to malnutrition, vitamin deficiencies, frequent loose stools,  stomal stenosis, marginal ulcer, bowel obstruction, intussusception, internal, and incisional hernia.    Regarding the gastric sleeve, there is higher risk of dysphagia and reflux leading to possible Franco's esophagus compared to a gastric bypass, as well as risk of internal visceral/organ injury, splenectomy, bleeding, infection, leak (which could require further intervention possible conversion to John-en-Y gastric bypass), stenosis and possibility of regaining weight.    Karey was counseled regarding diagnostic  results, instructions for management, risk factor reductions, prognosis, patient and family education, impressions, risks and benefits of treatment options and importance of compliance with treatment. Total time of the encounter was over 45 minutes counseling the patient regarding the procedure as above and reviewing as well as ordering labs, medications and the procedure.  The chart was also reviewed prior to seeing the patient reviewing previous testing, studies and labs.    Kaery understands the surgical procedures and the different surgical options that are available.  She understands the lifestyle changes that are required after surgery and has agreed to follow the guidelines outlined in the weight management program.  She also expressed understanding of the risks involved and had all of female questions answered and desires to proceed.      Reyes Ngo MD  11/2/2023    Electronically signed by Reyes gNo Jr., MD at 11/02/23 5983          Operative/Procedure Notes (all)        Reyes Ngo Jr., MD at 11/15/23 1238  Version 1 of 1           PREOPERATIVE DIAGNOSIS:  Morbid obesity with multiple comorbidities as referenced in the most recent history and physical.  Patient status post laparoscopic adjustable gastric band removal and now presents for revision    POSTOPERATIVE DIAGNOSIS:  Morbid obesity with multiple comorbidities as referenced in the most recent history and physical.  Status post laparoscopic adjustable gastric band removal with adhesions    PROCEDURES PERFORMED:  1.  Laparoscopic sleeve gastrectomy conversion from previous adjustable gastric band  2.  Laparoscopic takedown of gastric plication  2.  Extensive laparoscopic lysis of adhesions    Approximately 25 minutes was spent taking down the gastric plication as well as extensive adhesions. This is a conversion procedure therefore the complexity of the case was increased.  Patient had extensive adhesions from previous  bariatric surgery increasing the difficulty of this procedure as well as the length of time of the procedure..    SURGEON:  Reyes Ngo Jr., MD    ASSISTANT: ARLYN Teran      Surgery assisted and facilitated by a certified physician assistant, who directly resulted in a decreased operative time, anesthetic time, wound exposure, and possibly of an operative wound infection, thereby decreasing patient morbidity and ultimately total expenditures. The surgical assistant assisted in placement of trochars, take down of the gastrocolic omentum, short gastric vessels and dissection at the angle of His.  Also assisted in retraction of the stomach during stapling so as not to kink the gastric sleeve.  Also assisted in removing of the gastric specimen, closure of the fascial defect as well as closure of the skin incisions.    ANESTHESIA:  General endotracheal and laparoscopic TAP block with Ropivacaine mixture    ESTIMATED BLOOD LOSS:   Less than 25 mL unless dictated below.    FLUIDS:  Crystalloids.    SPECIMENS:  Gastric remnant    DRAINS:  None.    COUNTS:  Correct.    COMPLICATIONS:  None.    INDICATIONS:  This patient with morbid obesity and associated comorbidities presents for elective laparoscopic, possible open sleeve gastrectomy revision.  The patient has received medical clearance to proceed.  The patient has undergone our extensive educational process and consent process and wishes to proceed.    DESCRIPTION OF PROCEDURE:  The patient was brought to the operating room and placed supine upon the operating room table. SCD hose were placed.  The patient underwent uneventful general endotracheal anesthesia per the anesthesiology staff. The abdomen was prepped with ChloraPrep and draped in the usual sterile fashion.  An Ioban was used as well if not allergic. Anesthesia staff then passed a 18 Occitan orogastric tube into the stomach to decompress.  A 5-10 mm transverse incision was made a few centimeters  above and to the left of the umbilicus and the peritoneal cavity entered under direct camera visualization using a 5 or 10 mm 0° laparoscope and an Optiview trocar.  The abdomen was then insufflated to a pressure of 12 mmHg with CO2 gas.  Exploratory laparoscopy revealed no evidence of injury from the entrance technique and no significant abnormalities unless addendum dictated below.  An angled laparoscope was then used.  The patient was placed in reverse Trendelenburg position.  Under direct camera visualization a 12 mm trocar was placed in the right lateral subcostal position.  A 12 mm trocar was placed in the right midabdominal position.  A 5 mm trocar was placed in the left midabdominal position. A Koko retractor was placed through an epigastric incision and used to elevate the left lobe of the liver.  Patient did have extensive adhesions in the upper abdomen from previous adjustable gastric band.  Prior to placing the liver retractor the adhesions from the stomach to the left lobe of the liver were taking down using electrocautery, scissors and Enseal device.  The gastric plication from previous adjustable gastric band was taking down using sharp dissection with the scissors thus exposing the left chris.  Any remaining sutures were removed or made sure that they were lateral in the gastric specimen prior to dividing the stomach forming the gastric sleeve.  At this point, approximately residential along the greater curvature, the gastrocolic omentum was divided with the Enseal and this proceeded superiorly to the angle of His taking down the short gastric vessels.  All posterior attachments of the lesser sac and posterior aspect of the stomach to the pancreas were taken down as well.  The left chris was exposed along its length.  Dissection then proceeded medially taking down the greater curvature with an Enseal until just proximal to the pylorus.The standard clamp and 18 Icelandic balloon bougie were used to size  the gastric sleeve. The stomach was marked in the 3 positions using indelible ink.  The 3 markings were at the angle of Hiss, the incisura and antrum using 1cm, 3cm and 4-5cm respectively. The 1st load was green on the Feather Sound Flex stapler with single side Veritas Nahed-Strip and this was placed 4-5 cm proximal to the pylorus.  The next several loads were gold placed with single side absorbable Veritas Nahed-Strips depending on the size of the stomach.  Careful attention was made to stay 1 cm from the esophagus.  Areas of the reinforced staple line were oversewn with absorbable sutures as needed for bleeding or questionable staple lines. At this point, the gastrectomy specimen was withdrawn through the 12 mm trocar site incision. The specimen staple line was inspected and was intact. The specimen was then sent off to pathology.  At this point, the sleeve was submerged under saline and using the orogastric tube to insufflate the stomach, a leak test was performed.  This revealed the sleeve to be watertight, no air bubbles, no leak, and no bleeding seen from the staple lines and no significant abnormalities.  Irrigation fluid from the abdomen was then suctioned free.  The gastric sleeve staple line was then treated with Tisseel fibrin glue. The fascia at the 12 mm trocar site incision was closed with a single 0 Vicryl suture in a figure-of-eight fashion placed under direct laparoscopic camera visualization with a suture passer and tying the knot extracorporeally.  The fascia in the area was infiltrated with local anesthesia. All incisions were then infiltrated with local anesthetic. The remaining trocars were removed under direct camera visualization with no bleeding noted from their sites.  The abdomen was desufflated of gas. The skin in each incision was closed using 4-0 antibiotic impregnated Monocryl in a subcuticular stitch followed by Dermabond.  The patient tolerated the procedure well without complication and  was taken to the recovery room in stable condition.  All sponge, needle, and instrument counts were correct.    The hiatus was checked for a hernia and no hernia was detected.      Electronically signed by Shirley Ngo Jr., MD at 11/15/23 0428       Discharge Summary    No notes of this type exist for this encounter.       Discharge Order (From admission, onward)       Start     Ordered    11/16/23 0831  Discharge patient  Once        Expected Discharge Date: 11/16/23   Discharge Disposition: Home or Self Care   Physician of Record for Attribution - Please select from Treatment Team: SHIRLEY NGO JR [[id number]]   Review needed by CMO to determine Physician of Record: No      Question Answer Comment   Physician of Record for Attribution - Please select from Treatment Team SHIRLEY NGO JR    Review needed by CMO to determine Physician of Record No        11/16/23 3559

## 2023-11-16 NOTE — PROGRESS NOTES
Continued Stay Note  [facility]     Patient Name: Karey Lopez  MRN: [id number]  Today's Date: 11/16/2023    Admit Date: 11/15/2023        Discharge Plan       Row Name 11/16/23 1623       Plan    Final Discharge Disposition Code 01 - home or self-care    Final Note D/c home                   Discharge Codes    No documentation.                 Expected Discharge Date and Time       Expected Discharge Date Expected Discharge Time    Nov 16, 2023               Meghan Tafoya RN

## 2023-11-17 ENCOUNTER — TRANSITIONAL CARE MANAGEMENT TELEPHONE ENCOUNTER (OUTPATIENT)
Dept: CALL CENTER | Facility: HOSPITAL | Age: 48
End: 2023-11-17
Payer: COMMERCIAL

## 2023-11-17 NOTE — OUTREACH NOTE
Call Center TCM Note      Flowsheet Row Responses   [facility] patient discharged from? [place]   Does the patient have one of the following disease processes/diagnoses(primary or secondary)? General Surgery   TCM attempt successful? Yes   Call start time 1337   Call end time 1342   Discharge diagnosis GASTRIC SLEEVE LAPAROSCOPIC Conversion,  Lysis of Adhesions   Person spoke with today (if not patient) and relationship Patient   Meds reviewed with patient/caregiver? Yes   Does the patient have all medications related to this admission filled (includes all antibiotics, pain medications, etc.) Yes   Is the patient taking all medications as directed (includes completed medication regime)? Yes   Comments PCP Sunita APPLE. Patient has 30min office visit appt in place with PCP for 11/21  145pm.   Does the patient have an appointment with their PCP within 7-14 days of discharge? Yes   Has home health visited the patient within 72 hours of discharge? N/A   Psychosocial issues? No   Did the patient receive a copy of their discharge instructions? Yes   Nursing interventions Reviewed instructions with patient   What is the patient's perception of their health status since discharge? Improving   Nursing interventions Nurse provided patient education   Is the patient /caregiver able to teach back basic post-op care? Continue use of incentive spirometry at least 1 week post discharge, Drive as instructed by MD in discharge instructions, Take showers only when approved by MD-sponge bathe until then, No tub bath, swimming, or hot tub until instructed by MD, Keep incision areas clean,dry and protected, Lifting as instructed by MD in discharge instructions   Is the patient/caregiver able to teach back steps to recovery at home? Set small, achievable goals for return to baseline health, Rest and rebuild strength, gradually increase activity  [follow bariatric diet instructions.]   If the patient is a current smoker,  are they able to teach back resources for cessation? Not a smoker   Is the patient/caregiver able to teach back the hierarchy of who to call/visit for symptoms/problems? PCP, Specialist, Home health nurse, Urgent Care, ED, 911 Yes   TCM call completed? Yes   Wrap up additional comments Post op appt with surgeons office 11/22  1045am   Call end time 1342   Would this patient benefit from a Referral to [facility] Social Work? No   Is the patient interested in additional calls from an ambulatory ? No            Dania Berry RN    11/17/2023, 13:44 EST

## 2023-11-17 NOTE — DISCHARGE SUMMARY
"Discharge Summary    Patient name: Karey Lopez    Medical record number: [id number]    Admission date: 11/15/2023  Discharge date:  11/16/2023    Attending physician: Dr. Reyes Ngo    Primary care physician: Sunita Hylton APRN    Referring physician: Reyes Ngo Jr., MD  [address] [address]    Condition on discharge: Stable    Primary Diagnoses:  Morbid obesity with co-morbidities    Operative Procedure:  laparoscopic sleeve gastrectomy conversion from gastric band, takedown of gastric plication, TALISHA     Karey Lopez  is post op day one status post procedure listed. Patient denies shortness of air and lower extremity pain. Feels better than yesterday. No vomiting this am. Ambulating well and using incentive spirometer.          /64 (BP Location: Left arm, Patient Position: Lying)   Pulse 58   Temp 97.2 °F (36.2 °C) (Oral)   Resp 16   Ht 162.6 cm (64\")   Wt 122 kg (270 lb)   SpO2 99%   BMI 46.35 kg/m²     General:  alert, appears stated age, and cooperative   Abdomen: soft, bowel sounds active, appropriate tenderness   Incision:   healing well, no drainage, no erythema, no hernia, no seroma, no swelling, no dehiscence, incision well approximated   Heart: Regular rate   Lungs: Clear to auscultation bilaterally     I reviewed the patient's new clinical results.     Lab Results (last 24 hours)       Procedure Component Value Units Date/Time    Tissue Pathology Exam [[id number]] Collected: 11/15/23 1254    Specimen: Tissue from Stomach Updated: 11/16/23 1230     Case Report --     Surgical Pathology Report                         Case: [id number]                                  Authorizing Provider:  Reyes Ngo Jr.,   Collected:           11/15/2023 12:54 PM                                 MD                                                                           Ordering Location:     [facility]  Received:            11/15/2023 " "02:14 PM                                 [facility] OR                                                                       Pathologist:           Sadia Hooper MD                                                    Specimen:    Stomach, PORTION OF STOMACH                                                                 Final Diagnosis --     1.  Stomach, partial gastrectomy:   -Benign oxyntic mucosa with lymphoid aggregates.   -Negative for Helicobacter on routine staining.   -No metaplasia, dysplasia nor malignancy identified.    [facility]       Gross Description --     1. Stomach.  Received in formalin labeled \"portion of stomach\" is a partial gastrectomy specimen consisting of a 20 cm long greater curvature with a 20 cm long staple line margin.  The serosal surface is smooth tan-pink with a minimal amount of adherent adipose tissue.  The mucosa is tan and glistening and focally erythematous with normal rugae and the wall thickness averages 0.3 cm.  Representative sections are submitted as 1A-1C.    jap/uso/[facility]        Comprehensive Metabolic Panel [[id number]]  (Abnormal) Collected: 11/16/23 0523    Specimen: Blood Updated: 11/16/23 0657     Glucose 133 mg/dL      BUN 8 mg/dL      Creatinine 0.66 mg/dL      Sodium 137 mmol/L      Potassium 4.9 mmol/L      Comment: Slight hemolysis detected by analyzer. Result may be falsely elevated.        Chloride 102 mmol/L      CO2 21.9 mmol/L      Calcium 9.2 mg/dL      Total Protein 6.4 g/dL      Albumin 3.6 g/dL      ALT (SGPT) 29 U/L      AST (SGOT) 37 U/L      Alkaline Phosphatase 60 U/L      Total Bilirubin 0.3 mg/dL      Globulin 2.8 gm/dL      A/G Ratio 1.3 g/dL      BUN/Creatinine Ratio 12.1     Anion Gap 13.1 mmol/L      eGFR 108.4 mL/min/1.73     Narrative:      GFR Normal >60  Chronic Kidney Disease <60  Kidney Failure <15      Phosphorus [[id number]]  (Normal) Collected: 11/16/23 0523    Specimen: Blood Updated: 11/16/23 0657     Phosphorus 4.0 mg/dL     Magnesium " [[id number]]  (Normal) Collected: 11/16/23 0523    Specimen: Blood Updated: 11/16/23 0657     Magnesium 2.2 mg/dL     CBC & Differential [[id number]]  (Abnormal) Collected: 11/16/23 0523    Specimen: Blood Updated: 11/16/23 0547    Narrative:      The following orders were created for panel order CBC & Differential.  Procedure                               Abnormality         Status                     ---------                               -----------         ------                     CBC Auto Differential[[id number]]        Abnormal            Final result                 Please view results for these tests on the individual orders.    CBC Auto Differential [[id number]]  (Abnormal) Collected: 11/16/23 0523    Specimen: Blood Updated: 11/16/23 0547     WBC 11.81 10*3/mm3      RBC 4.20 10*6/mm3      Hemoglobin 10.5 g/dL      Hematocrit 32.6 %      MCV 77.6 fL      MCH 25.0 pg      MCHC 32.2 g/dL      RDW 16.8 %      RDW-SD 47.7 fl      MPV 9.9 fL      Platelets 411 10*3/mm3      Neutrophil % 83.8 %      Lymphocyte % 10.3 %      Monocyte % 4.6 %      Eosinophil % 0.0 %      Basophil % 0.1 %      Immature Grans % 1.2 %      Neutrophils, Absolute 9.90 10*3/mm3      Lymphocytes, Absolute 1.22 10*3/mm3      Monocytes, Absolute 0.54 10*3/mm3      Eosinophils, Absolute 0.00 10*3/mm3      Basophils, Absolute 0.01 10*3/mm3      Immature Grans, Absolute 0.14 10*3/mm3      nRBC 0.0 /100 WBC                Assessment:      Doing well postoperatively.      Plan:   1. Continue Stage 1 diet  2. Continue with ambulation and Incentive spirometry  3. Plan for d/c home    Patient was seen and examined by Dr. Ngo.    Hospital Course: The patient is a very pleasant 48 y.o. female that was admitted to the hospital with morbid obesity with comorbidities who underwent the above mentioned procedure without complication.  Postoperatively the patient was then transferred to the bariatric unit per protocol.  The patient was afebrile with  vital signs stable.  They were ambulating well and using the incentive spirometer.  POD 1 the patient was started on bariatric diet which they tolerated without difficulty.  Patient was then discharged home to follow up as an outpatient.      Discharge medications:      Discharge Medications        New Medications        Instructions Start Date   traMADol 50 MG tablet  Commonly known as: ULTRAM   50 mg, Oral, Every 6 Hours PRN             Changes to Medications        Instructions Start Date   lisinopril 40 MG tablet  Commonly known as: PRINIVIL,ZESTRIL  What changed: when to take this   40 mg, Oral, Daily      montelukast 10 MG tablet  Commonly known as: SINGULAIR  What changed: when to take this   10 mg, Oral, Daily      ondansetron ODT 8 MG disintegrating tablet  Commonly known as: ZOFRAN-ODT  What changed: Another medication with the same name was added. Make sure you understand how and when to take each.   DISSOLVE 1 TABLET ON THE TONGUE EVERY 12 HOURS AS NEEDED FOR NAUSEA OR VOMITING      ondansetron ODT 4 MG disintegrating tablet  Commonly known as: ZOFRAN-ODT  What changed: You were already taking a medication with the same name, and this prescription was added. Make sure you understand how and when to take each.   4 mg, Translingual, Every 8 Hours PRN             Continue These Medications        Instructions Start Date   acetaminophen 650 MG 8 hr tablet  Commonly known as: TYLENOL   Oral, Every 8 Hours      albuterol sulfate  (90 Base) MCG/ACT inhaler  Commonly known as: PROVENTIL HFA;VENTOLIN HFA;PROAIR HFA   2 puffs, Inhalation, Every 4 Hours PRN      albuterol (2.5 MG/3ML) 0.083% nebulizer solution  Commonly known as: PROVENTIL   2.5 mg, Nebulization, Every 4 Hours PRN      atenolol 25 MG tablet  Commonly known as: TENORMIN   12.5 mg, Oral, Every 24 Hours      folic acid-vit B6-vit B12 2.5-25-1 MG tablet tablet  Commonly known as: FOLBEE   1 tablet, Oral, Daily      ipratropium-albuterol 0.5-2.5  mg/3 ml nebulizer  Commonly known as: DUO-NEB   3 mL, Nebulization, Every 4 Hours PRN      Lancets misc   1 each, Does not apply, Daily, USE WITH ONE TOUCH METER      lidocaine 5 %  Commonly known as: LIDODERM   APPLY 1 PATCH DAILY TOPICALLY REMOVE AND DISCARD PATCH WITHIN 12 HOURS OR AS DIRECTED.      Magnesium 250 MG tablet   1 tablet, Oral, Daily      metFORMIN 500 MG tablet  Commonly known as: GLUCOPHAGE   250 mg, Oral, 2 Times Daily With Meals      multivitamin tablet tablet   1 tablet, Oral, Daily      ONE TOUCH ULTRA 2 w/Device kit   1 each, Does not apply, Daily      OneTouch Ultra test strip  Generic drug: glucose blood   USE 1 EACH TO TEST BLOOD SUGAR DAILY.      pantoprazole 40 MG EC tablet  Commonly known as: PROTONIX   40 mg, Oral, 2 Times Daily      sucralfate 1 g tablet  Commonly known as: Carafate   1 g, Oral, 4 Times Daily      tiZANidine 4 MG tablet  Commonly known as: ZANAFLEX   TAKE 1 TABLET BY MOUTH EVERY 6 HOURS AS NEEDED FOR MUSCLE SPASMS      vitamin D3 125 MCG (5000 UT) capsule capsule   10,000 Units, Oral, Daily      Wellbutrin  MG 24 hr tablet  Generic drug: buPROPion XL   150 mg, Oral, Nightly             ASK your doctor about these medications        Instructions Start Date   Enoxaparin Sodium 40 MG/0.4ML solution prefilled syringe syringe  Commonly known as: LOVENOX  Ask about: Should I take this medication?   40 mg, Subcutaneous, Every 12 Hours Scheduled, Start after surgery unless instructed otherwise               Discharge instructions:  Per Bariatric manual; per our protocol      Follow-up appointment: Follow up with Dr. Ngo in the office as scheduled.  If not already scheduled call for appointment at [phone].

## 2023-11-21 ENCOUNTER — OFFICE VISIT (OUTPATIENT)
Dept: FAMILY MEDICINE CLINIC | Age: 48
End: 2023-11-21
Payer: COMMERCIAL

## 2023-11-21 VITALS
BODY MASS INDEX: 45.58 KG/M2 | WEIGHT: 267 LBS | SYSTOLIC BLOOD PRESSURE: 130 MMHG | HEART RATE: 86 BPM | HEIGHT: 64 IN | OXYGEN SATURATION: 96 % | DIASTOLIC BLOOD PRESSURE: 74 MMHG

## 2023-11-21 DIAGNOSIS — F32.A DEPRESSION, UNSPECIFIED DEPRESSION TYPE: ICD-10-CM

## 2023-11-21 DIAGNOSIS — E11.9 TYPE 2 DIABETES MELLITUS WITHOUT COMPLICATION, WITHOUT LONG-TERM CURRENT USE OF INSULIN: ICD-10-CM

## 2023-11-21 DIAGNOSIS — J30.9 ALLERGIC RHINITIS, UNSPECIFIED SEASONALITY, UNSPECIFIED TRIGGER: ICD-10-CM

## 2023-11-21 DIAGNOSIS — D64.9 ANEMIA, UNSPECIFIED TYPE: ICD-10-CM

## 2023-11-21 DIAGNOSIS — B37.0 THRUSH: ICD-10-CM

## 2023-11-21 DIAGNOSIS — E66.01 OBESITY, CLASS III, BMI 40-49.9 (MORBID OBESITY): Primary | ICD-10-CM

## 2023-11-21 PROBLEM — R61 NIGHT SWEATS: Status: RESOLVED | Noted: 2023-06-23 | Resolved: 2023-11-21

## 2023-11-21 PROBLEM — R50.9 FEVER: Status: RESOLVED | Noted: 2023-08-11 | Resolved: 2023-11-21

## 2023-11-21 PROBLEM — R06.00 DYSPNEA: Status: RESOLVED | Noted: 2023-08-04 | Resolved: 2023-11-21

## 2023-11-21 NOTE — ASSESSMENT & PLAN NOTE
Currently stable  Intolerant to SSRIs as manifested as excessive sweating.  Consider an SNRI such as Cymbalta which could also help with chronic pain for future use if symptoms or not stable.  I would feel more comfortable having her wean off of Wellbutrin if we were to consider change to an SNRI.  Patient will consider.  Handout provided on Cymbalta.

## 2023-11-21 NOTE — PROGRESS NOTES
"Chief Complaint  Transitional Care Management ([facility] 11/15-11/16)    Subjective          Karey Lopez presents to [facility]     Patient is a 48-year-old female who is here today after having a gastric sleeve procedure done on November 15.  She was discharged on November 16.  To see bariatrics tomorrow.  On November 16 her labs showed she was mildly anemic with a hemoglobin 10.5 and hematocrit of 32.6.  She does have type 2 diabetes and her last hemoglobin A1c was 6.8 in August.  Patient brings blood pressure log which shows average blood pressures being 120/70.  She has had a couple blood pressures that were approximately 106/60 and she felt dizzy at that time.  Therefore she has been cutting lisinopril 40 mg tablets in half and taking at bedtime.  She is already taking atenolol half of a 25 mg tablet once a day for previous arrhythmia which is currently stable.  Only concern patient has is gas pain after the gastric sleeve procedure.  She has minimal nausea and no vomiting.  Anxiety is currently stable on Wellbutrin 150 mg daily.  She has had night sweats previously that resolved with discontinuation of SSRI medication     Objective   Vital Signs:   Vitals:    11/21/23 1348 11/21/23 1423   BP: 142/68 130/74   BP Location: Left arm Right arm   Patient Position: Sitting Sitting   Cuff Size: Large Adult Large Adult   Pulse: 86    SpO2: 96%    Weight: 121 kg (267 lb)    Height: 162.6 cm (64\")        Wt Readings from Last 3 Encounters:   11/21/23 121 kg (267 lb)   11/15/23 122 kg (270 lb)   11/02/23 127 kg (281 lb)      BP Readings from Last 3 Encounters:   11/21/23 130/74   11/16/23 125/64   11/09/23 141/76       Body mass index is 45.83 kg/m².             Physical Exam  Vitals reviewed.   Constitutional:       General: She is not in acute distress.     Appearance: Normal appearance. She is well-developed. She is obese.   HENT:      Mouth/Throat:      Comments: Thrush noted on " tongue  Cardiovascular:      Rate and Rhythm: Normal rate and regular rhythm.      Heart sounds: Normal heart sounds.   Pulmonary:      Effort: Pulmonary effort is normal.      Breath sounds: Normal breath sounds.   Musculoskeletal:      Right lower leg: No edema.      Left lower leg: No edema.   Skin:     General: Skin is warm and dry.   Neurological:      General: No focal deficit present.      Mental Status: She is alert.   Psychiatric:         Attention and Perception: Attention normal.         Mood and Affect: Mood and affect normal.         Behavior: Behavior normal.           Current Outpatient Medications:     acetaminophen (TYLENOL) 650 MG 8 hr tablet, Take  by mouth Every 8 (Eight) Hours., Disp: , Rfl:     albuterol (PROVENTIL) (2.5 MG/3ML) 0.083% nebulizer solution, Take 2.5 mg by nebulization Every 4 (Four) Hours As Needed for Wheezing., Disp: 60 each, Rfl: 5    albuterol sulfate  (90 Base) MCG/ACT inhaler, Inhale 2 puffs Every 4 (Four) Hours As Needed for Wheezing., Disp: 8 g, Rfl: 5    atenolol (TENORMIN) 25 MG tablet, Take 0.5 tablets by mouth Daily., Disp: , Rfl:     Blood Glucose Monitoring Suppl (ONE TOUCH ULTRA 2) w/Device kit, 1 each Daily., Disp: 1 each, Rfl: 0    buPROPion XL (Wellbutrin XL) 150 MG 24 hr tablet, Take 1 tablet by mouth Every Night., Disp: , Rfl:     folic acid-vit B6-vit B12 (FOLBEE) 2.5-25-1 MG tablet tablet, Take 1 tablet by mouth Daily., Disp: 40 tablet, Rfl: 0    glucose blood (OneTouch Ultra) test strip, USE 1 EACH TO TEST BLOOD SUGAR DAILY., Disp: 100 each, Rfl: 3    Lancets misc, Use 1 each Daily. USE WITH ONE TOUCH METER, Disp: 100 each, Rfl: 1    lidocaine (LIDODERM) 5 %, APPLY 1 PATCH DAILY TOPICALLY REMOVE AND DISCARD PATCH WITHIN 12 HOURS OR AS DIRECTED., Disp: , Rfl:     lisinopril (PRINIVIL,ZESTRIL) 40 MG tablet, Take 1 tablet by mouth Daily for 180 days. (Patient taking differently: Take 1 tablet by mouth Every Night.), Disp: 90 tablet, Rfl: 1    Magnesium  250 MG tablet, Take 1 tablet by mouth Daily., Disp: , Rfl:     metFORMIN (GLUCOPHAGE) 500 MG tablet, Take 0.5 tablets by mouth 2 (Two) Times a Day With Meals., Disp: , Rfl:     montelukast (SINGULAIR) 10 MG tablet, TAKE 1 TABLET BY MOUTH DAILY (Patient taking differently: Take 1 tablet by mouth Every Night.), Disp: 90 tablet, Rfl: 1    multivitamin (MULTI VITAMIN PO), Take 1 tablet by mouth Daily., Disp: , Rfl:     ondansetron ODT (ZOFRAN-ODT) 4 MG disintegrating tablet, Place 1 tablet on the tongue Every 8 (Eight) Hours As Needed for Nausea., Disp: 20 tablet, Rfl: 0    ondansetron ODT (ZOFRAN-ODT) 8 MG disintegrating tablet, DISSOLVE 1 TABLET ON THE TONGUE EVERY 12 HOURS AS NEEDED FOR NAUSEA OR VOMITING, Disp: 20 tablet, Rfl: 1    pantoprazole (PROTONIX) 40 MG EC tablet, Take 1 tablet by mouth 2 (Two) Times a Day., Disp: 60 tablet, Rfl: 3    sucralfate (Carafate) 1 g tablet, Take 1 tablet by mouth 4 (Four) Times a Day., Disp: 120 tablet, Rfl: 3    tiZANidine (ZANAFLEX) 4 MG tablet, TAKE 1 TABLET BY MOUTH EVERY 6 HOURS AS NEEDED FOR MUSCLE SPASMS, Disp: 30 tablet, Rfl: 1    traMADol (ULTRAM) 50 MG tablet, Take 1 tablet by mouth Every 6 (Six) Hours As Needed for Moderate Pain., Disp: 12 tablet, Rfl: 0    vitamin D3 125 MCG (5000 UT) capsule capsule, Take 2 capsules by mouth Daily., Disp: , Rfl:     ipratropium-albuterol (DUO-NEB) 0.5-2.5 mg/3 ml nebulizer, Take 3 mL by nebulization Every 4 (Four) Hours As Needed for Wheezing for up to 30 days., Disp: 360 mL, Rfl: 0    nystatin (MYCOSTATIN) 100,000 unit/mL suspension, Swish and swallow 5 mL 4 (Four) Times a Day., Disp: 240 mL, Rfl: 0    Current Facility-Administered Medications:     Allergy Serum Injection, 0.5 mL, Subcutaneous, Once, Sunita Hylton APRN   Past Medical History:   Diagnosis Date    Acute non-recurrent maxillary sinusitis 05/04/2023    Allergic rhinitis, unspecified     Anxiety     Arthritis     Asthma     Asthma, intrinsic 1985    Cough variant  asthma     Diabetes mellitus 06/2022    Dorsalgia, unspecified     Essential (primary) hypertension     Family history of ischemic heart disease and other diseases of the circulatory system     GERD (gastroesophageal reflux disease) 06/27/2023    History of gastric ulcer 08/2023    History of Helicobacter pylori infection 08/2023    Hypertension     Left lower quadrant pain     Leucocytosis 2020    due to chronic inflammation per hematology    Lichen sclerosus 2019    Obesity, unspecified     Other fatigue     Persistent mood (affective) disorder, unspecified     PONV (postoperative nausea and vomiting)     Sepsis     Right knee Sepsis    Sleep apnea, obstructive     WEARS CPAP    Unspecified abdominal pain     Unspecified ovarian cyst, left side     Vitamin D deficiency, unspecified      Allergies   Allergen Reactions    Ozempic (0.25 Or 0.5 Mg-Dose) [Semaglutide(0.25 Or 0.5mg-Dos)] Other (See Comments)     Fatigue and nausea    Vilazodone Hcl Mental Status Change               Result Review :     Common labs          8/4/2023    08:59 11/6/2023    07:38 11/16/2023    05:23   Common Labs   Glucose 118  126  133    BUN 9  13  8    Creatinine 0.68  0.76  0.66    Sodium 137  137  137    Potassium 4.4  4.1  4.9    Chloride 101  99  102    Calcium 10.2  10.2  9.2    Albumin 3.9  4.4  3.6    Total Bilirubin 0.3  0.3  0.3    Alkaline Phosphatase 55  66  60    AST (SGOT) 17  28  37    ALT (SGPT) 8  23  29    WBC  10.15  11.81    Hemoglobin  12.8  10.5    Hematocrit  42.0  32.6    Platelets  536  411    Total Cholesterol 164      Triglycerides 192      HDL Cholesterol 39      LDL Cholesterol  92      Hemoglobin A1C 6.80           Mammo Screening Digital Tomosynthesis Bilateral With CAD    Result Date: 11/10/2023  Benign mammogram. Suggest routine mammographic screening.  RECOMMENDATION(S):  ROUTINE MAMMOGRAM AND CLINICAL EVALUATION IN 12 MONTHS.   BIRADS:  DIAGNOSTIC CATEGORY 2--BENIGN FINDING   BREAST COMPOSITION:  Scattered areas fibroglandular density.  PLEASE NOTE:  A NORMAL MAMMOGRAM DOES NOT EXCLUDE THE POSSIBILITY OF BREAST CANCER. ANY CLINICALLY SUSPICIOUS PALPABLE LUMP SHOULD BE BIOPSIED.      ABBEY PEREZ MD       Electronically Signed and Approved By: ABBEY PEREZ MD on 11/10/2023 at 9:13             XR Hand 3+ View Left    Result Date: 8/25/2023   Normal left hand      Reyes Carbajal MD       Electronically Signed and Approved By: Reyes Carbajal MD on 8/25/2023 at 13:20             XR Hand 3+ View Right    Result Date: 8/25/2023   Normal right hand      Reyes Carbajal MD       Electronically Signed and Approved By: Reyes Carbajal MD on 8/25/2023 at 13:19                      Social History     Tobacco Use   Smoking Status Never    Passive exposure: Never   Smokeless Tobacco Never           Assessment and Plan    Diagnoses and all orders for this visit:    1. Obesity, Class III, BMI 40-49.9 (morbid obesity) (Primary)  Assessment & Plan:  Repeat CBC and other labs in 6 weeks.  She is taking an iron vitamin supplement      2. Anemia, unspecified type  -     CBC w AUTO Differential; Future    3. Thrush  -     nystatin (MYCOSTATIN) 100,000 unit/mL suspension; Swish and swallow 5 mL 4 (Four) Times a Day.  Dispense: 240 mL; Refill: 0    4. Type 2 diabetes mellitus without complication, without long-term current use of insulin  -     Hemoglobin A1c; Future  -     Lipid panel; Future  -     Comprehensive metabolic panel; Future    5. Allergic rhinitis, unspecified seasonality, unspecified trigger  -     Allergy Serum Injection    6. Depression, unspecified depression type  Assessment & Plan:  Currently stable  Intolerant to SSRIs as manifested as excessive sweating.  Consider an SNRI such as Cymbalta which could also help with chronic pain for future use if symptoms or not stable.  I would feel more comfortable having her wean off of Wellbutrin if we were to consider change to an SNRI.  Patient will consider.   Handout provided on Cymbalta.          Follow Up    No follow-ups on file.  Patient was given instructions and counseling regarding her condition or for health maintenance advice. Please see specific information pulled into the AVS if appropriate.

## 2023-11-22 ENCOUNTER — OFFICE VISIT (OUTPATIENT)
Dept: BARIATRICS/WEIGHT MGMT | Facility: CLINIC | Age: 48
End: 2023-11-22
Payer: COMMERCIAL

## 2023-11-22 VITALS
TEMPERATURE: 98.4 F | SYSTOLIC BLOOD PRESSURE: 155 MMHG | DIASTOLIC BLOOD PRESSURE: 76 MMHG | WEIGHT: 264 LBS | HEIGHT: 64 IN | HEART RATE: 61 BPM | BODY MASS INDEX: 45.07 KG/M2

## 2023-11-22 DIAGNOSIS — Z90.3 HISTORY OF SLEEVE GASTRECTOMY: ICD-10-CM

## 2023-11-22 DIAGNOSIS — E66.01 OBESITY, CLASS III, BMI 40-49.9 (MORBID OBESITY): Primary | ICD-10-CM

## 2023-11-22 PROCEDURE — 99024 POSTOP FOLLOW-UP VISIT: CPT | Performed by: SURGERY

## 2023-11-22 RX ORDER — METOCLOPRAMIDE 10 MG/1
10 TABLET ORAL
Qty: 120 TABLET | Refills: 0 | Status: SHIPPED | OUTPATIENT
Start: 2023-11-22

## 2023-11-22 RX ORDER — HYOSCYAMINE SULFATE 0.12 MG/1
0.12 TABLET SUBLINGUAL EVERY 4 HOURS PRN
Qty: 40 EACH | Refills: 1 | Status: SHIPPED | OUTPATIENT
Start: 2023-11-22

## 2023-11-22 NOTE — PROGRESS NOTES
"Chief Complaint  Post-op (1 week post op sleeve )    Subjective        Karey Lopez presents to [facility]  History of Present Illness  Patient is 1 week status post sleeve, reduction from previous Lap-Band--overall doing okay, no significant abdominal pain, dysphagia or reflux--patient describes abdominal bubbling which she says is driving her crazy, sounds like she was having some gas discomfort--she is drinking the appropriate amount of fluids  Objective   Vital Signs:  /76 (BP Location: Right arm, Patient Position: Sitting, Cuff Size: Adult)   Pulse 61   Temp 98.4 °F (36.9 °C) (Infrared)   Ht 162.6 cm (64.02\")   Wt 120 kg (264 lb)   BMI 45.29 kg/m²   Estimated body mass index is 45.29 kg/m² as calculated from the following:    Height as of this encounter: 162.6 cm (64.02\").    Weight as of this encounter: 120 kg (264 lb).               Physical Exam   Alert, pleasant  No respiratory distress  Abdomen soft  Result Review :                   Assessment and Plan   Diagnoses and all orders for this visit:    1. Obesity, Class III, BMI 40-49.9 (morbid obesity) (Primary)    2. History of sleeve gastrectomy    Other orders  -     metoclopramide (Reglan) 10 MG tablet; Take 1 tablet by mouth 4 (Four) Times a Day Before Meals & at Bedtime.  Dispense: 120 tablet; Refill: 0  -     Hyoscyamine Sulfate SL (Levsin/SL) 0.125 MG sublingual tablet; Take 0.125 mg by mouth Every 4 (Four) Hours As Needed (abdominal spasm).  Dispense: 40 each; Refill: 1    Overall patient looks pretty good--we will try some Reglan and Levsin to see if this helps with her symptoms--plan follow-up in 3 weeks but sooner if symptoms worsen.       I spent 10 minutes caring for Karey on this date of service. This time includes time spent by me in the following activities:preparing for the visit, reviewing tests, obtaining and/or reviewing a separately obtained history, performing a medically appropriate " examination and/or evaluation , counseling and educating the patient/family/caregiver, documenting information in the medical record, and independently interpreting results and communicating that information with the patient/family/caregiver  Follow Up   No follow-ups on file.  Patient was given instructions and counseling regarding her condition or for health maintenance advice. Please see specific information pulled into the AVS if appropriate.

## 2023-12-05 ENCOUNTER — CLINICAL SUPPORT (OUTPATIENT)
Dept: FAMILY MEDICINE CLINIC | Age: 48
End: 2023-12-05
Payer: COMMERCIAL

## 2023-12-05 DIAGNOSIS — Z23 IMMUNIZATION DUE: Primary | ICD-10-CM

## 2023-12-05 PROCEDURE — 90686 IIV4 VACC NO PRSV 0.5 ML IM: CPT | Performed by: FAMILY MEDICINE

## 2023-12-05 PROCEDURE — 90471 IMMUNIZATION ADMIN: CPT | Performed by: FAMILY MEDICINE

## 2023-12-09 DIAGNOSIS — G89.28 CHRONIC POSTOPERATIVE PAIN: ICD-10-CM

## 2023-12-09 DIAGNOSIS — R73.9 HYPERGLYCEMIA: ICD-10-CM

## 2023-12-11 RX ORDER — ATENOLOL 25 MG/1
12.5 TABLET ORAL DAILY
Qty: 45 TABLET | Refills: 0 | Status: SHIPPED | OUTPATIENT
Start: 2023-12-11

## 2023-12-11 RX ORDER — METFORMIN HYDROCHLORIDE 500 MG/1
500 TABLET, EXTENDED RELEASE ORAL 2 TIMES DAILY WITH MEALS
Qty: 60 TABLET | Refills: 1 | OUTPATIENT
Start: 2023-12-11

## 2023-12-11 RX ORDER — TIZANIDINE 4 MG/1
TABLET ORAL
Qty: 30 TABLET | Refills: 1 | Status: SHIPPED | OUTPATIENT
Start: 2023-12-11

## 2023-12-13 ENCOUNTER — CLINICAL SUPPORT (OUTPATIENT)
Dept: FAMILY MEDICINE CLINIC | Age: 48
End: 2023-12-13
Payer: COMMERCIAL

## 2023-12-13 DIAGNOSIS — J30.9 ALLERGIC RHINITIS, UNSPECIFIED SEASONALITY, UNSPECIFIED TRIGGER: Primary | ICD-10-CM

## 2023-12-13 DIAGNOSIS — E11.9 TYPE 2 DIABETES MELLITUS WITHOUT COMPLICATION, WITHOUT LONG-TERM CURRENT USE OF INSULIN: Primary | ICD-10-CM

## 2023-12-13 PROCEDURE — 95115 IMMUNOTHERAPY ONE INJECTION: CPT | Performed by: FAMILY MEDICINE

## 2023-12-19 RX ORDER — PANTOPRAZOLE SODIUM 40 MG/1
40 TABLET, DELAYED RELEASE ORAL 2 TIMES DAILY
Qty: 60 TABLET | Refills: 3 | Status: SHIPPED | OUTPATIENT
Start: 2023-12-19

## 2024-01-03 ENCOUNTER — CLINICAL SUPPORT (OUTPATIENT)
Dept: FAMILY MEDICINE CLINIC | Age: 49
End: 2024-01-03
Payer: COMMERCIAL

## 2024-01-03 DIAGNOSIS — J30.9 ALLERGIC RHINITIS, UNSPECIFIED SEASONALITY, UNSPECIFIED TRIGGER: Primary | ICD-10-CM

## 2024-01-03 PROCEDURE — 95115 IMMUNOTHERAPY ONE INJECTION: CPT | Performed by: FAMILY MEDICINE

## 2024-01-05 ENCOUNTER — OFFICE VISIT (OUTPATIENT)
Dept: BARIATRICS/WEIGHT MGMT | Facility: CLINIC | Age: 49
End: 2024-01-05
Payer: COMMERCIAL

## 2024-01-05 VITALS
SYSTOLIC BLOOD PRESSURE: 123 MMHG | HEART RATE: 57 BPM | BODY MASS INDEX: 42.34 KG/M2 | TEMPERATURE: 97.7 F | DIASTOLIC BLOOD PRESSURE: 71 MMHG | WEIGHT: 248 LBS | HEIGHT: 64 IN

## 2024-01-05 DIAGNOSIS — Z98.84 S/P LAPAROSCOPIC SLEEVE GASTRECTOMY: ICD-10-CM

## 2024-01-05 DIAGNOSIS — M25.50 ARTHRALGIA, UNSPECIFIED JOINT: ICD-10-CM

## 2024-01-05 DIAGNOSIS — E66.01 OBESITY, CLASS III, BMI 40-49.9 (MORBID OBESITY): Primary | ICD-10-CM

## 2024-01-05 DIAGNOSIS — E11.9 TYPE 2 DIABETES MELLITUS WITHOUT COMPLICATION, WITHOUT LONG-TERM CURRENT USE OF INSULIN: ICD-10-CM

## 2024-01-05 PROBLEM — S92.032A CLOSED DISPLACED AVULSION FRACTURE OF TUBEROSITY OF LEFT CALCANEUS: Status: ACTIVE | Noted: 2023-12-19

## 2024-01-05 PROBLEM — E66.813 CLASS 3 SEVERE OBESITY DUE TO EXCESS CALORIES WITH SERIOUS COMORBIDITY AND BODY MASS INDEX (BMI) OF 50.0 TO 59.9 IN ADULT: Status: RESOLVED | Noted: 2023-11-02 | Resolved: 2024-01-05

## 2024-01-05 PROBLEM — Z90.3 HISTORY OF SLEEVE GASTRECTOMY: Status: RESOLVED | Noted: 2023-11-22 | Resolved: 2024-01-05

## 2024-01-05 PROBLEM — S93.402A MODERATE LEFT ANKLE SPRAIN: Status: ACTIVE | Noted: 2023-12-19

## 2024-01-05 NOTE — PROGRESS NOTES
[facility] BARIATRIC SURGERY  [address]  [facility] [address]  [phone]  [address] [id number] [phone]  Dept: [phone]  1/5/2024      Karey Lopez.  [id number]  [id number]  1975  female      Chief Complaint   Patient presents with    Follow-up     Follow up sleeve       BH Post-Op Bariatric Surgery:   Karey Lopez is status post Laparoscopic Sleeve Revision from Band procedure, performed on 11/15/2023     HPI:       Today's weight is 112 kg (248 lb) pounds, today's BMI is Body mass index is 42.55 kg/m²., she has a loss of 16 pounds since the last visit and her  weight loss since surgery is 33 pounds. The patient reports a decreased portion size and loss of appetite.    Karey Lopez denies vomiting/c/abdominal pains/regurg/reflux and reports occ. Nausea and diarrhea when taking Metformin. Pt has been off of this for a few days and states this has improved.    Pt reports being frustrated with her weight loss.     Diet and Exercise: Diet history reviewed and discussed with the patient. Weight loss/gains to date discussed with the patient. The patient states they are eating <70 grams of protein per day. She reports eating 3 meals per day, a typical portion size of <1 cup, eating 1 snacks per day, drinking 8 or more 8-oz. glasses of water per day, no carbonated beverage consumption and exercising regularly.     Diet recall:  Bfast- eggs or yogurt  Lunch- deli turkey with cheese  Dinner- grilled chicken and veg.    Supplements: multi.     Review of Systems   Constitutional:  Positive for appetite change. Negative for fatigue and unexpected weight change.   HENT: Negative.     Eyes: Negative.    Respiratory: Negative.     Cardiovascular: Negative.  Negative for leg swelling.   Gastrointestinal:  Positive for diarrhea and nausea. Negative for abdominal distention, abdominal pain, constipation and vomiting.    Genitourinary:  Negative for difficulty urinating, frequency and urgency.   Musculoskeletal:  Negative for back pain.   Skin: Negative.    Psychiatric/Behavioral: Negative.     All other systems reviewed and are negative.      Patient Active Problem List   Diagnosis    Allergic rhinitis    Sleep apnea, unspecified    Essential (primary) hypertension    Obesity, Class III, BMI 40-49.9 (morbid obesity)    Depression    Vitamin D deficiency    Chronic left shoulder pain    Keratosis pilaris    Arthralgia    Type 2 diabetes mellitus without complication, without long-term current use of insulin    Other fatigue    Amenorrhea    Allergies    Ankle swelling    Leaking of urine    Menstrual irregularity    Shoulder pain    Foot pain    Knee pain    Sciatica    Back pain    Muscle pain    GERD (gastroesophageal reflux disease)    Acute gastric ulcer without hemorrhage or perforation    Gastric ulcer without hemorrhage or perforation    Persistent mood (affective) disorder, unspecified    Localized edema    MESFIN positive    Anti-RNP antibodies present    Myalgia    Arthritis of right foot    Bursitis of intermetatarsal bursa of right foot    Impacted cerumen of right ear    History of removal of laparoscopic gastric banding device    Closed displaced avulsion fracture of tuberosity of left calcaneus    Moderate left ankle sprain    S/P laparoscopic sleeve gastrectomy       Past Medical History:   Diagnosis Date    Acute non-recurrent maxillary sinusitis 05/04/2023    Allergic rhinitis, unspecified     Anxiety     Arthritis     Asthma     Asthma, intrinsic 1985    Cough variant asthma     Diabetes mellitus 06/2022    Dorsalgia, unspecified     Essential (primary) hypertension     Family history of ischemic heart disease and other diseases of the circulatory system     GERD (gastroesophageal reflux disease) 06/27/2023    History of gastric ulcer 08/2023    History of Helicobacter pylori infection 08/2023    Hypertension      Left lower quadrant pain     Leucocytosis 2020    due to chronic inflammation per hematology    Lichen sclerosus 2019    Obesity, unspecified     Other fatigue     Persistent mood (affective) disorder, unspecified     PONV (postoperative nausea and vomiting)     Sepsis     Right knee Sepsis    Sleep apnea, obstructive     WEARS CPAP    Unspecified abdominal pain     Unspecified ovarian cyst, left side     Vitamin D deficiency, unspecified        The following portions of the patient's history were reviewed and updated as appropriate: allergies, current medications, past medical history, past surgical history, and problem list.    Vitals:    01/05/24 0902   BP: 123/71   Pulse: 57   Temp: 97.7 °F (36.5 °C)       Physical Exam  Vitals reviewed.   Constitutional:       General: She is not in acute distress.     Appearance: Normal appearance. She is morbidly obese.   HENT:      Head: Normocephalic and atraumatic.   Eyes:      General: No scleral icterus.     Pupils: Pupils are equal, round, and reactive to light.   Cardiovascular:      Rate and Rhythm: Normal rate and regular rhythm.   Pulmonary:      Effort: Pulmonary effort is normal. No respiratory distress.   Musculoskeletal:         General: Normal range of motion.      Cervical back: Normal range of motion and neck supple.   Neurological:      General: No focal deficit present.      Mental Status: She is alert and oriented to person, place, and time.   Psychiatric:         Mood and Affect: Mood normal.         Behavior: Behavior normal.         Assessment:   Post-op, the patient is doing well.     Encounter Diagnoses   Name Primary?    Obesity, Class III, BMI 40-49.9 (morbid obesity) Yes    S/P laparoscopic sleeve gastrectomy     Type 2 diabetes mellitus without complication, without long-term current use of insulin     Arthralgia, unspecified joint        Plan:   Advised pt she may need to continue to hold Metformin and to talk with her pcp.  Encouraged pt to  track her protein daily.  Will get 1 month labs.  Encouraged pt to keep up the good work with her weight loss progress.  Encouraged patient to be sure to get plenty of lean protein per day through small frequent meals all with a protein source.   Activity restrictions: none.   Recommended patient be sure to get at least 70 grams of protein per day by eating small, frequent meals all with high lean protein choices. Be sure to limit/cut back on daily carbohydrate intake. Discussed with the patient the recommended amount of water per day to intake. Reviewed vitamin requirements. Be sure to do routine exercise consistently throughout the week, including both cardio and strength training.     Instructions / Recommendations: dietary counseling recommended, recommended a daily protein intake of  grams, vitamin supplement(s) recommended, recommended exercising consistently throughout the week, behavior modifications recommended and instructed to call the office for concerns, questions, or problems.     The patient was instructed to follow up in 2 months .     The patient was counseled regarding. Total time spent during this encounter today was 20 minutes.

## 2024-01-08 ENCOUNTER — LAB (OUTPATIENT)
Dept: LAB | Facility: HOSPITAL | Age: 49
End: 2024-01-08
Payer: COMMERCIAL

## 2024-01-08 DIAGNOSIS — E66.01 OBESITY, CLASS III, BMI 40-49.9 (MORBID OBESITY): ICD-10-CM

## 2024-01-08 DIAGNOSIS — Z98.84 S/P LAPAROSCOPIC SLEEVE GASTRECTOMY: ICD-10-CM

## 2024-01-08 DIAGNOSIS — M25.50 ARTHRALGIA, UNSPECIFIED JOINT: ICD-10-CM

## 2024-01-08 DIAGNOSIS — D64.9 ANEMIA, UNSPECIFIED TYPE: ICD-10-CM

## 2024-01-08 DIAGNOSIS — E11.9 TYPE 2 DIABETES MELLITUS WITHOUT COMPLICATION, WITHOUT LONG-TERM CURRENT USE OF INSULIN: ICD-10-CM

## 2024-01-08 LAB
25(OH)D3 SERPL-MCNC: 135 NG/ML (ref 30–100)
ALBUMIN SERPL-MCNC: 4.5 G/DL (ref 3.5–5.2)
ALBUMIN/GLOB SERPL: 1.5 G/DL
ALP SERPL-CCNC: 77 U/L (ref 39–117)
ALT SERPL W P-5'-P-CCNC: 23 U/L (ref 1–33)
ANION GAP SERPL CALCULATED.3IONS-SCNC: 14.9 MMOL/L (ref 5–15)
AST SERPL-CCNC: 19 U/L (ref 1–32)
BASOPHILS # BLD AUTO: 0.04 10*3/MM3 (ref 0–0.2)
BASOPHILS NFR BLD AUTO: 0.4 % (ref 0–1.5)
BILIRUB SERPL-MCNC: 0.4 MG/DL (ref 0–1.2)
BUN SERPL-MCNC: 12 MG/DL (ref 6–20)
BUN/CREAT SERPL: 13.8 (ref 7–25)
CALCIUM SPEC-SCNC: 10.9 MG/DL (ref 8.6–10.5)
CHLORIDE SERPL-SCNC: 100 MMOL/L (ref 98–107)
CHOLEST SERPL-MCNC: 196 MG/DL (ref 0–200)
CO2 SERPL-SCNC: 24.1 MMOL/L (ref 22–29)
CREAT SERPL-MCNC: 0.87 MG/DL (ref 0.57–1)
DEPRECATED RDW RBC AUTO: 50.3 FL (ref 37–54)
EGFRCR SERPLBLD CKD-EPI 2021: 82.3 ML/MIN/1.73
EOSINOPHIL # BLD AUTO: 0.26 10*3/MM3 (ref 0–0.4)
EOSINOPHIL NFR BLD AUTO: 2.7 % (ref 0.3–6.2)
ERYTHROCYTE [DISTWIDTH] IN BLOOD BY AUTOMATED COUNT: 16.6 % (ref 12.3–15.4)
FERRITIN SERPL-MCNC: 87 NG/ML (ref 13–150)
FOLATE SERPL-MCNC: >20 NG/ML (ref 4.78–24.2)
GLOBULIN UR ELPH-MCNC: 3.1 GM/DL
GLUCOSE SERPL-MCNC: 110 MG/DL (ref 65–99)
HBA1C MFR BLD: 6.4 % (ref 4.8–5.6)
HCT VFR BLD AUTO: 39 % (ref 34–46.6)
HDLC SERPL-MCNC: 47 MG/DL (ref 40–60)
HGB BLD-MCNC: 12 G/DL (ref 12–15.9)
IMM GRANULOCYTES # BLD AUTO: 0.03 10*3/MM3 (ref 0–0.05)
IMM GRANULOCYTES NFR BLD AUTO: 0.3 % (ref 0–0.5)
IRON 24H UR-MRATE: 44 MCG/DL (ref 37–145)
LDLC SERPL CALC-MCNC: 101 MG/DL (ref 0–100)
LDLC/HDLC SERPL: 1.97 {RATIO}
LYMPHOCYTES # BLD AUTO: 2.55 10*3/MM3 (ref 0.7–3.1)
LYMPHOCYTES NFR BLD AUTO: 26.9 % (ref 19.6–45.3)
MCH RBC QN AUTO: 25.1 PG (ref 26.6–33)
MCHC RBC AUTO-ENTMCNC: 30.8 G/DL (ref 31.5–35.7)
MCV RBC AUTO: 81.6 FL (ref 79–97)
MONOCYTES # BLD AUTO: 0.6 10*3/MM3 (ref 0.1–0.9)
MONOCYTES NFR BLD AUTO: 6.3 % (ref 5–12)
NEUTROPHILS NFR BLD AUTO: 6.01 10*3/MM3 (ref 1.7–7)
NEUTROPHILS NFR BLD AUTO: 63.4 % (ref 42.7–76)
PLATELET # BLD AUTO: 490 10*3/MM3 (ref 140–450)
PMV BLD AUTO: 9.8 FL (ref 6–12)
POTASSIUM SERPL-SCNC: 4.1 MMOL/L (ref 3.5–5.2)
PREALB SERPL-MCNC: 25.7 MG/DL (ref 20–40)
PROT SERPL-MCNC: 7.6 G/DL (ref 6–8.5)
RBC # BLD AUTO: 4.78 10*6/MM3 (ref 3.77–5.28)
SODIUM SERPL-SCNC: 139 MMOL/L (ref 136–145)
TRIGL SERPL-MCNC: 281 MG/DL (ref 0–150)
VLDLC SERPL-MCNC: 48 MG/DL (ref 5–40)
WBC NRBC COR # BLD AUTO: 9.49 10*3/MM3 (ref 3.4–10.8)

## 2024-01-08 PROCEDURE — 80061 LIPID PANEL: CPT

## 2024-01-08 PROCEDURE — 84134 ASSAY OF PREALBUMIN: CPT

## 2024-01-08 PROCEDURE — 80053 COMPREHEN METABOLIC PANEL: CPT

## 2024-01-08 PROCEDURE — 36415 COLL VENOUS BLD VENIPUNCTURE: CPT

## 2024-01-08 PROCEDURE — 82306 VITAMIN D 25 HYDROXY: CPT

## 2024-01-08 PROCEDURE — 85025 COMPLETE CBC W/AUTO DIFF WBC: CPT

## 2024-01-08 PROCEDURE — 83540 ASSAY OF IRON: CPT

## 2024-01-08 PROCEDURE — 84425 ASSAY OF VITAMIN B-1: CPT

## 2024-01-08 PROCEDURE — 82728 ASSAY OF FERRITIN: CPT

## 2024-01-08 PROCEDURE — 83036 HEMOGLOBIN GLYCOSYLATED A1C: CPT

## 2024-01-08 PROCEDURE — 82746 ASSAY OF FOLIC ACID SERUM: CPT

## 2024-01-08 PROCEDURE — 83921 ORGANIC ACID SINGLE QUANT: CPT

## 2024-01-09 ENCOUNTER — TELEPHONE (OUTPATIENT)
Dept: BARIATRICS/WEIGHT MGMT | Facility: CLINIC | Age: 49
End: 2024-01-09
Payer: COMMERCIAL

## 2024-01-09 NOTE — TELEPHONE ENCOUNTER
Called pt to discuss lab values. Pt's Vit D level 135. Advised pt this should not be over 100 as it could lead to cardiac issues. Pt is taking a daily multivitamin as well as Vit D 5000 BID. Advised pt to hold additional vitamin D supp and only take Multivitamin. Pt verbalized understanding. Pt states her TRIG level elevated. Reviewed and level is 281. Advised pt sometimes this can be elevated after bariatric surgery.

## 2024-01-11 ENCOUNTER — CLINICAL SUPPORT (OUTPATIENT)
Dept: FAMILY MEDICINE CLINIC | Age: 49
End: 2024-01-11
Payer: COMMERCIAL

## 2024-01-11 DIAGNOSIS — J30.9 ALLERGIC RHINITIS, UNSPECIFIED SEASONALITY, UNSPECIFIED TRIGGER: Primary | ICD-10-CM

## 2024-01-11 PROCEDURE — 95115 IMMUNOTHERAPY ONE INJECTION: CPT | Performed by: FAMILY MEDICINE

## 2024-01-12 LAB — VIT B1 BLD-SCNC: 135.9 NMOL/L (ref 66.5–200)

## 2024-01-15 LAB — METHYLMALONATE SERPL-SCNC: 78 NMOL/L (ref 0–378)

## 2024-01-18 DIAGNOSIS — F32.A DEPRESSION, UNSPECIFIED DEPRESSION TYPE: Primary | ICD-10-CM

## 2024-01-18 DIAGNOSIS — M79.10 MYALGIA: ICD-10-CM

## 2024-01-18 RX ORDER — DULOXETIN HYDROCHLORIDE 30 MG/1
CAPSULE, DELAYED RELEASE ORAL
Qty: 60 CAPSULE | Refills: 0 | Status: SHIPPED | OUTPATIENT
Start: 2024-01-18

## 2024-01-18 RX ORDER — BUPROPION HYDROCHLORIDE 75 MG/1
TABLET ORAL
Qty: 17 TABLET | Refills: 0 | Status: SHIPPED | OUTPATIENT
Start: 2024-01-18

## 2024-02-01 ENCOUNTER — OFFICE VISIT (OUTPATIENT)
Dept: FAMILY MEDICINE CLINIC | Age: 49
End: 2024-02-01
Payer: COMMERCIAL

## 2024-02-01 VITALS
HEART RATE: 60 BPM | SYSTOLIC BLOOD PRESSURE: 127 MMHG | BODY MASS INDEX: 41.83 KG/M2 | DIASTOLIC BLOOD PRESSURE: 81 MMHG | HEIGHT: 64 IN | OXYGEN SATURATION: 97 % | WEIGHT: 245 LBS

## 2024-02-01 DIAGNOSIS — E83.52 HYPERCALCEMIA: ICD-10-CM

## 2024-02-01 DIAGNOSIS — I10 ESSENTIAL (PRIMARY) HYPERTENSION: ICD-10-CM

## 2024-02-01 DIAGNOSIS — J30.9 ALLERGIC RHINITIS, UNSPECIFIED SEASONALITY, UNSPECIFIED TRIGGER: ICD-10-CM

## 2024-02-01 DIAGNOSIS — E55.9 VITAMIN D DEFICIENCY: Primary | ICD-10-CM

## 2024-02-01 PROCEDURE — 99214 OFFICE O/P EST MOD 30 MIN: CPT | Performed by: NURSE PRACTITIONER

## 2024-02-01 PROCEDURE — 95115 IMMUNOTHERAPY ONE INJECTION: CPT | Performed by: NURSE PRACTITIONER

## 2024-02-01 RX ORDER — MONTELUKAST SODIUM 10 MG/1
10 TABLET ORAL NIGHTLY
Qty: 90 TABLET | Refills: 1 | Status: SHIPPED | OUTPATIENT
Start: 2024-02-01

## 2024-02-01 RX ORDER — ATENOLOL 25 MG/1
12.5 TABLET ORAL DAILY
Qty: 45 TABLET | Refills: 0 | Status: SHIPPED | OUTPATIENT
Start: 2024-02-01

## 2024-02-01 RX ORDER — LISINOPRIL 40 MG/1
40 TABLET ORAL NIGHTLY
Qty: 90 TABLET | Refills: 1 | Status: SHIPPED | OUTPATIENT
Start: 2024-02-01 | End: 2024-07-30

## 2024-02-01 RX ORDER — PREGABALIN 75 MG/1
1 CAPSULE ORAL 2 TIMES DAILY
COMMUNITY
Start: 2024-01-25

## 2024-02-01 NOTE — PROGRESS NOTES
"Chief Complaint  Diabetes (6 month f/u. )    Subjective          Karey Lopez presents to [facility]     Patient is a 49-year-old female who is here today to follow-up regarding type 2 diabetes.  Labs recently checked by bariatrics on January 8.  She had gastric sleeve procedure in November.  Hemoglobin A1c 6.4%.  Currently taking metformin 500 mg tablet at bedtime.  We had sent regular release tablets so she could cut the tablet in half and take twice daily but she prefers to take it once daily.  She does experience some loose stools and stomach upset to a mild degree with use of metformin.  Previous intolerance to Ozempic.  Is hesitant to take Farxiga or Jardiance for concern of possible urinary tract infection.  Does not routinely monitor blood sugar.  No concerns for low blood sugar episodes.  Labs also revealed a high vitamin D level and a mildly high calcium level at 10.9.  2 weeks ago she stopped her calcium and vitamin D supplement.  Repeat labs are not planned at this time.    Patient continues with chronic pain and she sees a podiatrist and orthopedics and a left shoulder arthroscopy will be done on February 20.  Diffuse arthralgia is reported.  No recent injury.  She did see rheumatology last year due to a chronically elevated CRP level.  Dr. Danielle stated not due to any inflammatory cause and will follow-up later on this year.  She is on Lyrica per podiatry and we are in the process of transitioning her from Wellbutrin for anxiety and depression to Cymbalta for anxiety and depression with hopes it would help control or reduce her pain levels.  With history of an ulcer she cannot take NSAIDs.  Tylenol is not effective.  She does benefit from a muscle relaxer at bedtime.     Objective   Vital Signs:   Vitals:    02/01/24 0812   BP: 127/81   BP Location: Right arm   Patient Position: Sitting   Pulse: 60   SpO2: 97%   Weight: 111 kg (245 lb)   Height: 162.6 cm (64.02\") "       Wt Readings from Last 3 Encounters:   02/01/24 111 kg (245 lb)   01/05/24 112 kg (248 lb)   11/22/23 120 kg (264 lb)      BP Readings from Last 3 Encounters:   02/01/24 127/81   01/05/24 123/71   11/22/23 155/76       Body mass index is 42.03 kg/m².             Physical Exam  Vitals reviewed.   Constitutional:       General: She is not in acute distress.     Appearance: Normal appearance. She is well-developed. She is obese.   Neck:      Thyroid: No thyromegaly.   Cardiovascular:      Rate and Rhythm: Normal rate and regular rhythm.      Pulses:           Posterior tibial pulses are 2+ on the right side and 2+ on the left side.      Heart sounds: Normal heart sounds.   Pulmonary:      Effort: Pulmonary effort is normal.      Breath sounds: Normal breath sounds.   Musculoskeletal:      Right lower leg: No edema.      Left lower leg: No edema.   Skin:     General: Skin is warm and dry.   Neurological:      General: No focal deficit present.      Mental Status: She is alert.   Psychiatric:         Attention and Perception: Attention normal.         Mood and Affect: Mood and affect normal.         Behavior: Behavior normal.           Current Outpatient Medications:     acetaminophen (TYLENOL) 650 MG 8 hr tablet, Take  by mouth Every 8 (Eight) Hours., Disp: , Rfl:     albuterol (PROVENTIL) (2.5 MG/3ML) 0.083% nebulizer solution, Take 2.5 mg by nebulization Every 4 (Four) Hours As Needed for Wheezing., Disp: 60 each, Rfl: 5    albuterol sulfate  (90 Base) MCG/ACT inhaler, Inhale 2 puffs Every 4 (Four) Hours As Needed for Wheezing., Disp: 8 g, Rfl: 5    atenolol (TENORMIN) 25 MG tablet, Take 0.5 tablets by mouth Daily., Disp: 45 tablet, Rfl: 0    Blood Glucose Monitoring Suppl (ONE TOUCH ULTRA 2) w/Device kit, 1 each Daily., Disp: 1 each, Rfl: 0    buPROPion (WELLBUTRIN) 75 MG tablet, Take 1 tablet twice daily x 3 days One half tablet twice daily x 1 week Then one half tablet once daily x 1 week then stop,  Disp: 17 tablet, Rfl: 0    DULoxetine (CYMBALTA) 30 MG capsule, Take one tablet daily starting January 30.  May increase to 2 tablets once daily after one week if desired., Disp: 60 capsule, Rfl: 0    glucose blood (OneTouch Ultra) test strip, USE 1 EACH TO TEST BLOOD SUGAR DAILY., Disp: 100 each, Rfl: 3    Lancets misc, Use 1 each Daily. USE WITH ONE TOUCH METER, Disp: 100 each, Rfl: 1    lidocaine (LIDODERM) 5 %, APPLY 1 PATCH DAILY TOPICALLY REMOVE AND DISCARD PATCH WITHIN 12 HOURS OR AS DIRECTED., Disp: , Rfl:     lisinopril (PRINIVIL,ZESTRIL) 40 MG tablet, Take 1 tablet by mouth Every Night for 180 days., Disp: 90 tablet, Rfl: 1    Magnesium 250 MG tablet, Take 1 tablet by mouth Daily., Disp: , Rfl:     metFORMIN (GLUCOPHAGE) 500 MG tablet, Take 0.5 tablets by mouth 2 (Two) Times a Day With Meals., Disp: 60 tablet, Rfl: 2    montelukast (SINGULAIR) 10 MG tablet, Take 1 tablet by mouth Every Night., Disp: 90 tablet, Rfl: 1    multivitamin (MULTI VITAMIN PO), Take 1 tablet by mouth Daily., Disp: , Rfl:     ondansetron ODT (ZOFRAN-ODT) 8 MG disintegrating tablet, DISSOLVE 1 TABLET ON THE TONGUE EVERY 12 HOURS AS NEEDED FOR NAUSEA OR VOMITING, Disp: 20 tablet, Rfl: 1    pantoprazole (PROTONIX) 40 MG EC tablet, TAKE 1 TABLET BY MOUTH TWICE DAILY, Disp: 60 tablet, Rfl: 3    pregabalin (LYRICA) 75 MG capsule, Take 1 capsule by mouth 2 (Two) Times a Day., Disp: , Rfl:     sucralfate (Carafate) 1 g tablet, Take 1 tablet by mouth 4 (Four) Times a Day., Disp: 120 tablet, Rfl: 3    tiZANidine (ZANAFLEX) 4 MG tablet, TAKE 1 TABLET BY MOUTH EVERY 6 HOURS AS NEEDED FOR MUSCLE SPASMS, Disp: 30 tablet, Rfl: 1    vitamin D3 125 MCG (5000 UT) capsule capsule, Take 2 capsules by mouth Daily., Disp: , Rfl:     ipratropium-albuterol (DUO-NEB) 0.5-2.5 mg/3 ml nebulizer, Take 3 mL by nebulization Every 4 (Four) Hours As Needed for Wheezing for up to 30 days., Disp: 360 mL, Rfl: 0    Current Facility-Administered Medications:      Allergy Serum Injection, 0.5 mL, Subcutaneous, Once, Sunita Hylton, APRN   Past Medical History:   Diagnosis Date    Acute non-recurrent maxillary sinusitis 05/04/2023    Allergic rhinitis, unspecified     Anxiety     Arthritis     Asthma     Asthma, intrinsic 1985    Cough variant asthma     Diabetes mellitus 06/2022    Dorsalgia, unspecified     Essential (primary) hypertension     Family history of ischemic heart disease and other diseases of the circulatory system     GERD (gastroesophageal reflux disease) 06/27/2023    History of gastric ulcer 08/2023    History of Helicobacter pylori infection 08/2023    Hypertension     Left lower quadrant pain     Leucocytosis 2020    due to chronic inflammation per hematology    Lichen sclerosus 2019    Obesity, unspecified     Other fatigue     Persistent mood (affective) disorder, unspecified     PONV (postoperative nausea and vomiting)     Sepsis     Right knee Sepsis    Sleep apnea, obstructive     WEARS CPAP    Unspecified abdominal pain     Unspecified ovarian cyst, left side     Vitamin D deficiency, unspecified      Allergies   Allergen Reactions    Ozempic (0.25 Or 0.5 Mg-Dose) [Semaglutide(0.25 Or 0.5mg-Dos)] Other (See Comments)     Fatigue and nausea    Vilazodone Hcl Mental Status Change               Result Review :     Common labs          11/6/2023    07:38 11/16/2023    05:23 1/8/2024    07:40   Common Labs   Glucose 126  133  110    BUN 13  8  12    Creatinine 0.76  0.66  0.87    Sodium 137  137  139    Potassium 4.1  4.9  4.1    Chloride 99  102  100    Calcium 10.2  9.2  10.9    Albumin 4.4  3.6  4.5    Total Bilirubin 0.3  0.3  0.4    Alkaline Phosphatase 66  60  77    AST (SGOT) 28  37  19    ALT (SGPT) 23  29  23    WBC 10.15  11.81  9.49    Hemoglobin 12.8  10.5  12.0    Hematocrit 42.0  32.6  39.0    Platelets 536  411  490    Total Cholesterol   196    Triglycerides   281    HDL Cholesterol   47    LDL Cholesterol    101    Hemoglobin A1C   6.40          Mammo Screening Digital Tomosynthesis Bilateral With CAD    Result Date: 11/10/2023  Benign mammogram. Suggest routine mammographic screening.  RECOMMENDATION(S):  ROUTINE MAMMOGRAM AND CLINICAL EVALUATION IN 12 MONTHS.   BIRADS:  DIAGNOSTIC CATEGORY 2--BENIGN FINDING   BREAST COMPOSITION: Scattered areas fibroglandular density.  PLEASE NOTE:  A NORMAL MAMMOGRAM DOES NOT EXCLUDE THE POSSIBILITY OF BREAST CANCER. ANY CLINICALLY SUSPICIOUS PALPABLE LUMP SHOULD BE BIOPSIED.      ABBEY PEREZ MD       Electronically Signed and Approved By: ABBEY PEREZ MD on 11/10/2023 at 9:13                      Social History     Tobacco Use   Smoking Status Never    Passive exposure: Never   Smokeless Tobacco Never           Assessment and Plan    Diagnoses and all orders for this visit:    1. Vitamin D deficiency (Primary)  -     Vitamin D 25 hydroxy; Future    2. Hypercalcemia  -     Basic metabolic panel; Future  -     PTH, Intact; Future    3. Essential (primary) hypertension  Assessment & Plan:  Monitor blood pressure at home and report any elevated readings.  Goal is for blood pressure be 130/85 or lower.  Patient is just now starting duloxetine for anxiety, depression, and chronic pain.  Follow-up if not improving.  Regular follow-up in 3 months or sooner if concerns.    Orders:  -     lisinopril (PRINIVIL,ZESTRIL) 40 MG tablet; Take 1 tablet by mouth Every Night for 180 days.  Dispense: 90 tablet; Refill: 1  -     atenolol (TENORMIN) 25 MG tablet; Take 0.5 tablets by mouth Daily.  Dispense: 45 tablet; Refill: 0    4. Allergic rhinitis, unspecified seasonality, unspecified trigger  -     montelukast (SINGULAIR) 10 MG tablet; Take 1 tablet by mouth Every Night.  Dispense: 90 tablet; Refill: 1  -     Allergy Serum Injection        Follow Up    Return in about 3 months (around 5/1/2024).  Patient was given instructions and counseling regarding her condition or for health maintenance advice. Please see specific  information pulled into the AVS if appropriate.

## 2024-02-02 DIAGNOSIS — G89.28 CHRONIC POSTOPERATIVE PAIN: ICD-10-CM

## 2024-02-05 RX ORDER — TIZANIDINE 4 MG/1
TABLET ORAL
Qty: 30 TABLET | Refills: 1 | Status: SHIPPED | OUTPATIENT
Start: 2024-02-05

## 2024-02-05 NOTE — ASSESSMENT & PLAN NOTE
Monitor blood pressure at home and report any elevated readings.  Goal is for blood pressure be 130/85 or lower.  Patient is just now starting duloxetine for anxiety, depression, and chronic pain.  Follow-up if not improving.  Regular follow-up in 3 months or sooner if concerns.

## 2024-02-16 ENCOUNTER — CLINICAL SUPPORT (OUTPATIENT)
Dept: FAMILY MEDICINE CLINIC | Age: 49
End: 2024-02-16
Payer: COMMERCIAL

## 2024-02-16 DIAGNOSIS — J30.9 ALLERGIC RHINITIS, UNSPECIFIED SEASONALITY, UNSPECIFIED TRIGGER: Primary | ICD-10-CM

## 2024-02-16 PROCEDURE — 95115 IMMUNOTHERAPY ONE INJECTION: CPT | Performed by: FAMILY MEDICINE

## 2024-03-03 DIAGNOSIS — M79.10 MYALGIA: ICD-10-CM

## 2024-03-03 DIAGNOSIS — F32.A DEPRESSION, UNSPECIFIED DEPRESSION TYPE: ICD-10-CM

## 2024-03-04 RX ORDER — DULOXETIN HYDROCHLORIDE 30 MG/1
30 CAPSULE, DELAYED RELEASE ORAL DAILY
OUTPATIENT
Start: 2024-03-04

## 2024-03-04 RX ORDER — DULOXETIN HYDROCHLORIDE 60 MG/1
60 CAPSULE, DELAYED RELEASE ORAL DAILY
Qty: 30 CAPSULE | Refills: 1 | Status: SHIPPED | OUTPATIENT
Start: 2024-03-04

## 2024-03-08 ENCOUNTER — TELEPHONE (OUTPATIENT)
Dept: FAMILY MEDICINE CLINIC | Age: 49
End: 2024-03-08
Payer: COMMERCIAL

## 2024-03-08 NOTE — TELEPHONE ENCOUNTER
----- Message from Kiley Hylton sent at 3/7/2024 10:31 AM EST -----      ----- Message -----  From: SYSTEM  Sent: 3/7/2024   1:56 AM EST  To: yony Schneider [facility]

## 2024-03-11 ENCOUNTER — LAB (OUTPATIENT)
Dept: LAB | Facility: HOSPITAL | Age: 49
End: 2024-03-11
Payer: COMMERCIAL

## 2024-03-11 ENCOUNTER — CLINICAL SUPPORT (OUTPATIENT)
Dept: FAMILY MEDICINE CLINIC | Age: 49
End: 2024-03-11
Payer: COMMERCIAL

## 2024-03-11 DIAGNOSIS — E55.9 VITAMIN D DEFICIENCY: ICD-10-CM

## 2024-03-11 DIAGNOSIS — E83.52 HYPERCALCEMIA: ICD-10-CM

## 2024-03-11 DIAGNOSIS — J30.9 ALLERGIC RHINITIS, UNSPECIFIED SEASONALITY, UNSPECIFIED TRIGGER: Primary | ICD-10-CM

## 2024-03-11 LAB
25(OH)D3 SERPL-MCNC: 75 NG/ML (ref 30–100)
ANION GAP SERPL CALCULATED.3IONS-SCNC: 13.5 MMOL/L (ref 5–15)
BUN SERPL-MCNC: 10 MG/DL (ref 6–20)
BUN/CREAT SERPL: 13.5 (ref 7–25)
CALCIUM SPEC-SCNC: 10.3 MG/DL (ref 8.6–10.5)
CHLORIDE SERPL-SCNC: 102 MMOL/L (ref 98–107)
CO2 SERPL-SCNC: 24.5 MMOL/L (ref 22–29)
CREAT SERPL-MCNC: 0.74 MG/DL (ref 0.57–1)
EGFRCR SERPLBLD CKD-EPI 2021: 99.3 ML/MIN/1.73
GLUCOSE SERPL-MCNC: 120 MG/DL (ref 65–99)
POTASSIUM SERPL-SCNC: 4.4 MMOL/L (ref 3.5–5.2)
PTH-INTACT SERPL-MCNC: 21.2 PG/ML (ref 15–65)
SODIUM SERPL-SCNC: 140 MMOL/L (ref 136–145)

## 2024-03-11 PROCEDURE — 80048 BASIC METABOLIC PNL TOTAL CA: CPT

## 2024-03-11 PROCEDURE — 95115 IMMUNOTHERAPY ONE INJECTION: CPT | Performed by: FAMILY MEDICINE

## 2024-03-11 PROCEDURE — 36415 COLL VENOUS BLD VENIPUNCTURE: CPT

## 2024-03-11 PROCEDURE — 83970 ASSAY OF PARATHORMONE: CPT

## 2024-03-11 PROCEDURE — 82306 VITAMIN D 25 HYDROXY: CPT

## 2024-03-13 NOTE — PROGRESS NOTES
Vitamin D level, calcium level, and kidney function are normal.  Parathyroid hormone level is also normal.

## 2024-03-17 DIAGNOSIS — G89.28 CHRONIC POSTOPERATIVE PAIN: ICD-10-CM

## 2024-03-19 RX ORDER — TIZANIDINE 4 MG/1
TABLET ORAL
Qty: 30 TABLET | Refills: 1 | Status: SHIPPED | OUTPATIENT
Start: 2024-03-19

## 2024-03-21 ENCOUNTER — OFFICE VISIT (OUTPATIENT)
Dept: BARIATRICS/WEIGHT MGMT | Facility: CLINIC | Age: 49
End: 2024-03-21
Payer: COMMERCIAL

## 2024-03-21 VITALS
BODY MASS INDEX: 39.4 KG/M2 | HEART RATE: 66 BPM | HEIGHT: 64 IN | TEMPERATURE: 97.7 F | DIASTOLIC BLOOD PRESSURE: 91 MMHG | SYSTOLIC BLOOD PRESSURE: 146 MMHG | WEIGHT: 230.8 LBS

## 2024-03-21 DIAGNOSIS — I10 ESSENTIAL (PRIMARY) HYPERTENSION: ICD-10-CM

## 2024-03-21 DIAGNOSIS — E66.9 OBESITY, CLASS II, BMI 35-39.9: Primary | ICD-10-CM

## 2024-03-21 DIAGNOSIS — Z98.84 HISTORY OF REMOVAL OF LAPAROSCOPIC GASTRIC BANDING DEVICE: ICD-10-CM

## 2024-03-21 DIAGNOSIS — Z98.84 S/P LAPAROSCOPIC SLEEVE GASTRECTOMY: ICD-10-CM

## 2024-03-21 DIAGNOSIS — K21.9 GASTROESOPHAGEAL REFLUX DISEASE, UNSPECIFIED WHETHER ESOPHAGITIS PRESENT: ICD-10-CM

## 2024-03-21 DIAGNOSIS — M25.473 ANKLE SWELLING, UNSPECIFIED LATERALITY: ICD-10-CM

## 2024-03-21 DIAGNOSIS — E55.9 VITAMIN D DEFICIENCY: ICD-10-CM

## 2024-03-21 PROBLEM — E66.01 OBESITY, CLASS III, BMI 40-49.9 (MORBID OBESITY): Status: RESOLVED | Noted: 2018-01-03 | Resolved: 2024-03-21

## 2024-03-21 PROBLEM — E66.812 OBESITY, CLASS II, BMI 35-39.9: Status: ACTIVE | Noted: 2024-03-21

## 2024-03-21 PROBLEM — E66.813 OBESITY, CLASS III, BMI 40-49.9 (MORBID OBESITY): Status: RESOLVED | Noted: 2018-01-03 | Resolved: 2024-03-21

## 2024-03-21 PROCEDURE — 99213 OFFICE O/P EST LOW 20 MIN: CPT | Performed by: NURSE PRACTITIONER

## 2024-03-21 NOTE — PROGRESS NOTES
[facility] BAR[facility]ATRIC [facility] BARIATRIC SURGERY  [address]  [facility] [address]  [phone]  [address]  [facility] [id number] [phone]  Dept: [phone]  3/21/2024      Karey Lopez.  [id number]  [id number]  1975  female      Chief Complaint   Patient presents with    Follow-up     4 month follow up sleeve       BH Post-Op Bariatric Surgery:   Karey Lopez is status post Laparoscopic Sleeve procedure, performed on 11/15/23     HPI:     Today's weight is 105 kg (230 lb 12.8 oz) pounds, today's BMI is Body mass index is 39.6 kg/m²., she has a loss of 18 pounds since the last visit and her weight loss since surgery is 51 pounds. The patient reports a decreased portion size and loss of appetite.     Diet and Exercise: Diet history reviewed and discussed with the patient. Weight loss/gains to date discussed with the patient. The patient states they are eating 60 grams of protein per day. She reports eating 3 meals per day, a typical portion size of 1/2 cup, eating 0-1 snacks per day, drinking 5-6 or more 8-oz. glasses of water per day, no carbonated beverage consumption. She just had a scope of her shoulder.     Supplements: celebrate [employer] with iron and calcium.     Review of Systems   Constitutional:  Positive for appetite change. Negative for fatigue and unexpected weight change.   HENT: Negative.     Eyes: Negative.    Respiratory: Negative.     Cardiovascular: Negative.  Negative for leg swelling.   Gastrointestinal:  Negative for abdominal distention, abdominal pain, constipation, diarrhea, nausea and vomiting.   Genitourinary:  Negative for difficulty urinating, frequency and urgency.   Musculoskeletal:  Negative for back pain.   Skin: Negative.    Psychiatric/Behavioral: Negative.     All other systems reviewed and are negative.      Patient Active Problem List   Diagnosis    Allergic rhinitis    Sleep apnea, unspecified     Essential (primary) hypertension    Depression    Vitamin D deficiency    Chronic left shoulder pain    Keratosis pilaris    Arthralgia    Type 2 diabetes mellitus without complication, without long-term current use of insulin    Other fatigue    Amenorrhea    Allergies    Ankle swelling    Leaking of urine    Menstrual irregularity    Shoulder pain    Foot pain    Knee pain    Sciatica    Back pain    Muscle pain    GERD (gastroesophageal reflux disease)    Acute gastric ulcer without hemorrhage or perforation    Gastric ulcer without hemorrhage or perforation    Persistent mood (affective) disorder, unspecified    Localized edema    MESFIN positive    Anti-RNP antibodies present    Myalgia    Arthritis of right foot    Bursitis of intermetatarsal bursa of right foot    Impacted cerumen of right ear    History of removal of laparoscopic gastric banding device    Closed displaced avulsion fracture of tuberosity of left calcaneus    Moderate left ankle sprain    S/P laparoscopic sleeve gastrectomy    Obesity, Class II, BMI 35-39.9       Past Medical History:   Diagnosis Date    Acute non-recurrent maxillary sinusitis 05/04/2023    Allergic rhinitis, unspecified     Anxiety     Arthritis     Asthma     Asthma, intrinsic 1985    Cough variant asthma     Diabetes mellitus 06/2022    Dorsalgia, unspecified     Essential (primary) hypertension     Family history of ischemic heart disease and other diseases of the circulatory system     GERD (gastroesophageal reflux disease) 06/27/2023    History of gastric ulcer 08/2023    History of Helicobacter pylori infection 08/2023    Hypertension     Left lower quadrant pain     Leucocytosis 2020    due to chronic inflammation per hematology    Lichen sclerosus 2019    Obesity, unspecified     Other fatigue     Persistent mood (affective) disorder, unspecified     PONV (postoperative nausea and vomiting)     Sepsis     Right knee Sepsis    Sleep apnea, obstructive     WEARS CPAP     Unspecified abdominal pain     Unspecified ovarian cyst, left side     Vitamin D deficiency, unspecified        The following portions of the patient's history were reviewed and updated as appropriate: allergies, current medications, past family history, past medical history, past social history, past surgical history, and problem list.    Vitals:    03/21/24 0810   BP: 146/91   Pulse: 66   Temp: 97.7 °F (36.5 °C)       Physical Exam  Vitals and nursing note reviewed.   Constitutional:       Appearance: She is well-developed.   Neck:      Thyroid: No thyromegaly.   Cardiovascular:      Rate and Rhythm: Normal rate.   Pulmonary:      Effort: Pulmonary effort is normal. No respiratory distress.   Abdominal:      Palpations: Abdomen is soft.   Musculoskeletal:         General: No tenderness.   Skin:     General: Skin is warm and dry.   Neurological:      Mental Status: She is alert.   Psychiatric:         Behavior: Behavior normal.         Assessment:   Post-op, the patient is doing well .     Encounter Diagnoses   Name Primary?    Obesity, Class II, BMI 35-39.9 Yes    History of removal of laparoscopic gastric banding device     S/P laparoscopic sleeve gastrectomy     Essential (primary) hypertension     Vitamin D deficiency     Gastroesophageal reflux disease, unspecified whether esophagitis present     Ankle swelling, unspecified laterality        Plan:   Encouraged patient to be sure to get plenty of lean protein per day through small frequent meals all with a protein source.   Activity restrictions: none.   Recommended patient be sure to get at least 70 grams of protein per day by eating small, frequent meals all with high lean protein choices. Be sure to limit/cut back on daily carbohydrate intake. Discussed with the patient the recommended amount of water per day to intake- half of body weight in ounces. Reviewed vitamin requirements. Be sure to do routine exercise, 150 minutes per week minimum, including both  cardio and strength training.     Instructions / Recommendations: dietary counseling recommended, recommended a daily protein intake of  grams, vitamin supplement(s) recommended, recommended exercising at least 150 minutes per week, behavior modifications recommended and instructed to call the office for concerns, questions, or problems.     The patient was instructed to follow up in 3 months .     Total time spent during this encounter today was 25 minutes

## 2024-03-22 ENCOUNTER — CLINICAL SUPPORT (OUTPATIENT)
Dept: FAMILY MEDICINE CLINIC | Age: 49
End: 2024-03-22
Payer: COMMERCIAL

## 2024-03-22 DIAGNOSIS — J30.9 ALLERGIC RHINITIS, UNSPECIFIED SEASONALITY, UNSPECIFIED TRIGGER: Primary | ICD-10-CM

## 2024-03-22 PROCEDURE — 95115 IMMUNOTHERAPY ONE INJECTION: CPT | Performed by: FAMILY MEDICINE

## 2024-04-05 ENCOUNTER — CLINICAL SUPPORT (OUTPATIENT)
Dept: FAMILY MEDICINE CLINIC | Age: 49
End: 2024-04-05
Payer: COMMERCIAL

## 2024-04-05 DIAGNOSIS — J30.9 ALLERGIC RHINITIS, UNSPECIFIED SEASONALITY, UNSPECIFIED TRIGGER: Primary | ICD-10-CM

## 2024-04-05 PROCEDURE — 95115 IMMUNOTHERAPY ONE INJECTION: CPT | Performed by: FAMILY MEDICINE

## 2024-04-17 RX ORDER — PANTOPRAZOLE SODIUM 40 MG/1
40 TABLET, DELAYED RELEASE ORAL 2 TIMES DAILY
Qty: 60 TABLET | Refills: 3 | Status: SHIPPED | OUTPATIENT
Start: 2024-04-17

## 2024-04-18 DIAGNOSIS — I10 ESSENTIAL (PRIMARY) HYPERTENSION: ICD-10-CM

## 2024-04-19 RX ORDER — ATENOLOL 25 MG/1
12.5 TABLET ORAL DAILY
Qty: 15 TABLET | Refills: 0 | Status: SHIPPED | OUTPATIENT
Start: 2024-04-19

## 2024-04-25 ENCOUNTER — CLINICAL SUPPORT (OUTPATIENT)
Dept: FAMILY MEDICINE CLINIC | Age: 49
End: 2024-04-25
Payer: COMMERCIAL

## 2024-04-25 DIAGNOSIS — J30.9 ALLERGIC RHINITIS, UNSPECIFIED SEASONALITY, UNSPECIFIED TRIGGER: Primary | ICD-10-CM

## 2024-04-25 PROCEDURE — 95115 IMMUNOTHERAPY ONE INJECTION: CPT | Performed by: FAMILY MEDICINE

## 2024-04-30 DIAGNOSIS — G89.28 CHRONIC POSTOPERATIVE PAIN: ICD-10-CM

## 2024-04-30 RX ORDER — DULOXETIN HYDROCHLORIDE 60 MG/1
60 CAPSULE, DELAYED RELEASE ORAL DAILY
Qty: 90 CAPSULE | Refills: 0 | OUTPATIENT
Start: 2024-04-30

## 2024-04-30 RX ORDER — DULOXETIN HYDROCHLORIDE 60 MG/1
60 CAPSULE, DELAYED RELEASE ORAL DAILY
Qty: 90 CAPSULE | Refills: 0 | Status: SHIPPED | OUTPATIENT
Start: 2024-04-30

## 2024-04-30 RX ORDER — TIZANIDINE 4 MG/1
4 TABLET ORAL EVERY 8 HOURS PRN
Qty: 180 TABLET | Refills: 1 | Status: SHIPPED | OUTPATIENT
Start: 2024-04-30

## 2024-05-10 ENCOUNTER — CLINICAL SUPPORT (OUTPATIENT)
Dept: FAMILY MEDICINE CLINIC | Age: 49
End: 2024-05-10
Payer: COMMERCIAL

## 2024-05-10 DIAGNOSIS — J30.9 ALLERGIC RHINITIS, UNSPECIFIED SEASONALITY, UNSPECIFIED TRIGGER: Primary | ICD-10-CM

## 2024-05-10 PROCEDURE — 95115 IMMUNOTHERAPY ONE INJECTION: CPT | Performed by: FAMILY MEDICINE

## 2024-05-21 ENCOUNTER — HOSPITAL ENCOUNTER (OUTPATIENT)
Dept: GENERAL RADIOLOGY | Facility: HOSPITAL | Age: 49
Discharge: HOME | End: 2024-05-21
Admitting: NURSE PRACTITIONER
Payer: COMMERCIAL

## 2024-05-21 ENCOUNTER — OFFICE VISIT (OUTPATIENT)
Dept: FAMILY MEDICINE CLINIC | Age: 49
End: 2024-05-21
Payer: COMMERCIAL

## 2024-05-21 VITALS
SYSTOLIC BLOOD PRESSURE: 132 MMHG | WEIGHT: 229.2 LBS | HEIGHT: 64 IN | DIASTOLIC BLOOD PRESSURE: 57 MMHG | BODY MASS INDEX: 39.13 KG/M2 | TEMPERATURE: 98.3 F | HEART RATE: 58 BPM

## 2024-05-21 DIAGNOSIS — G89.29 CHRONIC BILATERAL LOW BACK PAIN WITH RIGHT-SIDED SCIATICA: ICD-10-CM

## 2024-05-21 DIAGNOSIS — R11.0 NAUSEA: Primary | ICD-10-CM

## 2024-05-21 DIAGNOSIS — J30.9 ALLERGIC RHINITIS, UNSPECIFIED SEASONALITY, UNSPECIFIED TRIGGER: ICD-10-CM

## 2024-05-21 DIAGNOSIS — G60.9 HEREDITARY AND IDIOPATHIC PERIPHERAL NEUROPATHY: ICD-10-CM

## 2024-05-21 DIAGNOSIS — M54.41 CHRONIC BILATERAL LOW BACK PAIN WITH RIGHT-SIDED SCIATICA: ICD-10-CM

## 2024-05-21 PROCEDURE — 72100 X-RAY EXAM L-S SPINE 2/3 VWS: CPT

## 2024-05-21 RX ORDER — ONDANSETRON 4 MG/1
4 TABLET, FILM COATED ORAL EVERY 8 HOURS PRN
Qty: 90 TABLET | Refills: 0 | Status: SHIPPED | OUTPATIENT
Start: 2024-05-21

## 2024-05-21 RX ORDER — METHYLPREDNISOLONE 4 MG/1
TABLET ORAL
Qty: 21 TABLET | Refills: 0 | Status: SHIPPED | OUTPATIENT
Start: 2024-05-21

## 2024-05-21 NOTE — Clinical Note
.  Please request visit note from Dr. Renata Lim, rheumatology.  Labs also requested from that office.

## 2024-05-21 NOTE — PROGRESS NOTES
"Chief Complaint  Anxiety (F/u on cymbalta)    Subjective          Karey Lopez presents to [facility]     Patient is a 49-year-old female who is here today to follow-up regarding anxiety and depression.  No longer having night sweats since stopping SSRIs.  Denies night sweats on duloxetine 60 mg daily.  Denies medication side effects but not certain if it is helping reduce her chronic pain.  She is currently seeing local orthopedics who is currently treating neuropathy.  Gabapentin was not tolerated or effective and Lyrica helped but caused increased appetite and weight gain.  Patient has obesity and has been trying to lose weight.  Increase in weight contributes to depression.  She recently stopped Lyrica and appetite has decreased to a more normal level.  Pain and constant burning of the feet have persisted.  Pain is currently 7 out of 10 today.  Patient also with some low back pain with right-sided sciatica.  Has been several years since she has had any imaging of her back.  Denies any injury.    Arthralgia diffuse most notably of her hands.  Currently seeing rheumatology, Dr. Renata Lim, and labs were done 1 month ago.  She had a positive MESFIN previously that is being evaluated.  Patient is not certain the uric acid level has been checked.  Objective   Vital Signs:   Vitals:    05/21/24 0805   BP: 132/57   BP Location: Left arm   Patient Position: Sitting   Pulse: 58   Temp: 98.3 °F (36.8 °C)   TempSrc: Temporal   Weight: 104 kg (229 lb 3.2 oz)   Height: 162.6 cm (64.02\")       Wt Readings from Last 3 Encounters:   05/21/24 104 kg (229 lb 3.2 oz)   03/21/24 105 kg (230 lb 12.8 oz)   02/01/24 111 kg (245 lb)      BP Readings from Last 3 Encounters:   05/21/24 132/57   03/21/24 146/91   02/01/24 127/81       Body mass index is 39.32 kg/m².             Physical Exam  Vitals reviewed.   Constitutional:       General: She is not in acute distress.     Appearance: Normal " appearance. She is well-developed. She is obese.   Cardiovascular:      Rate and Rhythm: Normal rate and regular rhythm.      Pulses:           Dorsalis pedis pulses are 2+ on the right side and 2+ on the left side.      Heart sounds: Normal heart sounds.   Pulmonary:      Effort: Pulmonary effort is normal.      Breath sounds: Normal breath sounds.   Musculoskeletal:      Right lower leg: No edema.      Left lower leg: No edema.   Feet:      Right foot:      Protective Sensation: 3 sites tested.  3 sites sensed.      Skin integrity: Skin integrity normal. No ulcer or blister.      Toenail Condition: Right toenails are normal.      Left foot:      Protective Sensation: 3 sites tested.  3 sites sensed.      Skin integrity: Skin integrity normal. No ulcer or blister.      Toenail Condition: Left toenails are normal.      Comments:      Skin:     General: Skin is warm and dry.   Neurological:      General: No focal deficit present.      Mental Status: She is alert.   Psychiatric:         Attention and Perception: Attention normal.         Mood and Affect: Mood and affect normal.         Behavior: Behavior normal.           Current Outpatient Medications:     acetaminophen (TYLENOL) 650 MG 8 hr tablet, Take  by mouth Every 8 (Eight) Hours., Disp: , Rfl:     albuterol (PROVENTIL) (2.5 MG/3ML) 0.083% nebulizer solution, Take 2.5 mg by nebulization Every 4 (Four) Hours As Needed for Wheezing., Disp: 60 each, Rfl: 5    albuterol sulfate  (90 Base) MCG/ACT inhaler, Inhale 2 puffs Every 4 (Four) Hours As Needed for Wheezing., Disp: 8 g, Rfl: 5    atenolol (TENORMIN) 25 MG tablet, TAKE 1/2 TABLET DAILY, Disp: 15 tablet, Rfl: 0    Blood Glucose Monitoring Suppl (ONE TOUCH ULTRA 2) w/Device kit, 1 each Daily., Disp: 1 each, Rfl: 0    DULoxetine (CYMBALTA) 60 MG capsule, Take 1 capsule by mouth Daily., Disp: 90 capsule, Rfl: 0    glucose blood (OneTouch Ultra) test strip, USE 1 EACH TO TEST BLOOD SUGAR DAILY., Disp: 100  each, Rfl: 3    Lancets misc, Use 1 each Daily. USE WITH ONE TOUCH METER, Disp: 100 each, Rfl: 1    lidocaine (LIDODERM) 5 %, APPLY 1 PATCH DAILY TOPICALLY REMOVE AND DISCARD PATCH WITHIN 12 HOURS OR AS DIRECTED., Disp: , Rfl:     lisinopril (PRINIVIL,ZESTRIL) 40 MG tablet, Take 1 tablet by mouth Every Night for 180 days., Disp: 90 tablet, Rfl: 1    Magnesium 250 MG tablet, Take 1 tablet by mouth Daily., Disp: , Rfl:     metFORMIN (GLUCOPHAGE) 500 MG tablet, Take 0.5 tablets by mouth 2 (Two) Times a Day With Meals. (Patient taking differently: Take 1 tablet by mouth Daily With Breakfast.), Disp: 60 tablet, Rfl: 2    montelukast (SINGULAIR) 10 MG tablet, Take 1 tablet by mouth Every Night., Disp: 90 tablet, Rfl: 1    multivitamin (MULTI VITAMIN PO), Take 1 tablet by mouth Daily., Disp: , Rfl:     ondansetron ODT (ZOFRAN-ODT) 8 MG disintegrating tablet, DISSOLVE 1 TABLET ON THE TONGUE EVERY 12 HOURS AS NEEDED FOR NAUSEA OR VOMITING, Disp: 20 tablet, Rfl: 1    pantoprazole (PROTONIX) 40 MG EC tablet, TAKE 1 TABLET BY MOUTH TWICE DAILY, Disp: 60 tablet, Rfl: 3    pregabalin (LYRICA) 75 MG capsule, Take 1 capsule by mouth 2 (Two) Times a Day., Disp: , Rfl:     tiZANidine (ZANAFLEX) 4 MG tablet, Take 1 tablet by mouth Every 8 (Eight) Hours As Needed for Muscle Spasms., Disp: 180 tablet, Rfl: 1    ipratropium-albuterol (DUO-NEB) 0.5-2.5 mg/3 ml nebulizer, Take 3 mL by nebulization Every 4 (Four) Hours As Needed for Wheezing for up to 30 days., Disp: 360 mL, Rfl: 0    methylPREDNISolone (MEDROL) 4 MG dose pack, Take as directed on package instructions., Disp: 21 tablet, Rfl: 0    ondansetron (Zofran) 4 MG tablet, Take 1 tablet by mouth Every 8 (Eight) Hours As Needed for Nausea or Vomiting., Disp: 90 tablet, Rfl: 0    Current Facility-Administered Medications:     Allergy Serum Injection, 0.5 mL, Subcutaneous, Once, Sunita Hylton, APRN   Past Medical History:   Diagnosis Date    Acute non-recurrent maxillary sinusitis  05/04/2023    Allergic rhinitis, unspecified     Anxiety     Arthritis     Asthma     Asthma, intrinsic 1985    Cough variant asthma     Diabetes mellitus 06/2022    Dorsalgia, unspecified     Essential (primary) hypertension     Family history of ischemic heart disease and other diseases of the circulatory system     GERD (gastroesophageal reflux disease) 06/27/2023    History of gastric ulcer 08/2023    History of Helicobacter pylori infection 08/2023    Hypertension     Left lower quadrant pain     Leucocytosis 2020    due to chronic inflammation per hematology    Lichen sclerosus 2019    Obesity, unspecified     Other fatigue     Persistent mood (affective) disorder, unspecified     PONV (postoperative nausea and vomiting)     Sepsis     Right knee Sepsis    Sleep apnea, obstructive     WEARS CPAP    Unspecified abdominal pain     Unspecified ovarian cyst, left side     Vitamin D deficiency, unspecified      Allergies   Allergen Reactions    Ozempic (0.25 Or 0.5 Mg-Dose) [Semaglutide(0.25 Or 0.5mg-Dos)] Other (See Comments)     Fatigue and nausea    Vilazodone Hcl Mental Status Change               Result Review :     Common labs          11/16/2023    05:23 1/8/2024    07:40 3/11/2024    08:08   Common Labs   Glucose 133  110  120    BUN 8  12  10    Creatinine 0.66  0.87  0.74    Sodium 137  139  140    Potassium 4.9  4.1  4.4    Chloride 102  100  102    Calcium 9.2  10.9  10.3    Albumin 3.6  4.5     Total Bilirubin 0.3  0.4     Alkaline Phosphatase 60  77     AST (SGOT) 37  19     ALT (SGPT) 29  23     WBC 11.81  9.49     Hemoglobin 10.5  12.0     Hematocrit 32.6  39.0     Platelets 411  490     Total Cholesterol  196     Triglycerides  281     HDL Cholesterol  47     LDL Cholesterol   101     Hemoglobin A1C  6.40          No Images in the past 120 days found..           Social History     Tobacco Use   Smoking Status Never    Passive exposure: Never   Smokeless Tobacco Never           Assessment and Plan     Diagnoses and all orders for this visit:    1. Nausea (Primary)  -     ondansetron (Zofran) 4 MG tablet; Take 1 tablet by mouth Every 8 (Eight) Hours As Needed for Nausea or Vomiting.  Dispense: 90 tablet; Refill: 0    2. Chronic bilateral low back pain with right-sided sciatica  -     XR Spine Lumbar 2 or 3 View; Future  -     methylPREDNISolone (MEDROL) 4 MG dose pack; Take as directed on package instructions.  Dispense: 21 tablet; Refill: 0  -     Ambulatory Referral to Pain Management Clinic    3. Allergic rhinitis, unspecified seasonality, unspecified trigger  -     Allergy Serum Injection    4. Hereditary and idiopathic peripheral neuropathy  Assessment & Plan:  Proceed with imaging of her spine that could be a contributor to her neuropathy.  Also refer to pain management.  Has exhausted options with orthopedics.  She is advised to reach out to provider who prescribes her Lyrica to let them know what her concern is as far as increasing weight and appetite.    Orders:  -     Ambulatory Referral to Pain Management Clinic  -     Ambulatory Referral to Pain Management Clinic        Follow Up    No follow-ups on file.  Patient was given instructions and counseling regarding her condition or for health maintenance advice. Please see specific information pulled into the AVS if appropriate.

## 2024-05-22 ENCOUNTER — TELEPHONE (OUTPATIENT)
Dept: FAMILY MEDICINE CLINIC | Age: 49
End: 2024-05-22
Payer: COMMERCIAL

## 2024-05-22 NOTE — PROGRESS NOTES
Some arthritis noted with no other abnormality.  Due to extent of foot neuropathy and pending order to pain management for evaluation and treatment of neuropathy of the feet, we could go ahead and order an MRI of the lumbar spine and see if insurance will cover this test.  Please let me know if you want me to go ahead and initiate order for MRI of the lumbar spine without contrast.

## 2024-05-22 NOTE — TELEPHONE ENCOUNTER
Please request visit note from Dr. Renata Lim, rheumatology.  Labs also requested from that office.

## 2024-05-24 DIAGNOSIS — M54.41 CHRONIC BILATERAL LOW BACK PAIN WITH RIGHT-SIDED SCIATICA: Primary | ICD-10-CM

## 2024-05-24 DIAGNOSIS — G89.29 CHRONIC BILATERAL LOW BACK PAIN WITH RIGHT-SIDED SCIATICA: Primary | ICD-10-CM

## 2024-05-24 DIAGNOSIS — G57.93 NEUROPATHY OF BOTH FEET: ICD-10-CM

## 2024-05-24 NOTE — ASSESSMENT & PLAN NOTE
Proceed with imaging of her spine that could be a contributor to her neuropathy.  Also refer to pain management.  Has exhausted options with orthopedics.  She is advised to reach out to provider who prescribes her Lyrica to let them know what her concern is as far as increasing weight and appetite.

## 2024-06-09 DIAGNOSIS — J30.9 ALLERGIC RHINITIS, UNSPECIFIED SEASONALITY, UNSPECIFIED TRIGGER: ICD-10-CM

## 2024-06-09 DIAGNOSIS — I10 ESSENTIAL (PRIMARY) HYPERTENSION: ICD-10-CM

## 2024-06-10 ENCOUNTER — HOSPITAL ENCOUNTER (OUTPATIENT)
Dept: MRI IMAGING | Facility: HOSPITAL | Age: 49
Discharge: HOME | End: 2024-06-10
Admitting: NURSE PRACTITIONER
Payer: COMMERCIAL

## 2024-06-10 ENCOUNTER — CLINICAL SUPPORT (OUTPATIENT)
Dept: FAMILY MEDICINE CLINIC | Age: 49
End: 2024-06-10
Payer: COMMERCIAL

## 2024-06-10 DIAGNOSIS — J30.9 ALLERGIC RHINITIS, UNSPECIFIED SEASONALITY, UNSPECIFIED TRIGGER: Primary | ICD-10-CM

## 2024-06-10 DIAGNOSIS — M54.41 CHRONIC BILATERAL LOW BACK PAIN WITH RIGHT-SIDED SCIATICA: ICD-10-CM

## 2024-06-10 DIAGNOSIS — E11.9 TYPE 2 DIABETES MELLITUS WITHOUT COMPLICATION, WITHOUT LONG-TERM CURRENT USE OF INSULIN: ICD-10-CM

## 2024-06-10 DIAGNOSIS — G89.29 CHRONIC BILATERAL LOW BACK PAIN WITH RIGHT-SIDED SCIATICA: ICD-10-CM

## 2024-06-10 DIAGNOSIS — G57.93 NEUROPATHY OF BOTH FEET: ICD-10-CM

## 2024-06-10 PROCEDURE — 72148 MRI LUMBAR SPINE W/O DYE: CPT

## 2024-06-10 PROCEDURE — 95115 IMMUNOTHERAPY ONE INJECTION: CPT | Performed by: FAMILY MEDICINE

## 2024-06-10 RX ORDER — DULOXETIN HYDROCHLORIDE 60 MG/1
60 CAPSULE, DELAYED RELEASE ORAL DAILY
Qty: 90 CAPSULE | Refills: 0 | Status: SHIPPED | OUTPATIENT
Start: 2024-06-10

## 2024-06-10 RX ORDER — MONTELUKAST SODIUM 10 MG/1
10 TABLET ORAL NIGHTLY
Qty: 90 TABLET | Refills: 1 | Status: SHIPPED | OUTPATIENT
Start: 2024-06-10

## 2024-06-10 RX ORDER — ATENOLOL 25 MG/1
12.5 TABLET ORAL DAILY
Qty: 15 TABLET | Refills: 0 | Status: SHIPPED | OUTPATIENT
Start: 2024-06-10

## 2024-06-10 NOTE — TELEPHONE ENCOUNTER
This is prescribed 250mg bid but pt reported taking 500mg daily.  Is this ok to fill 90 day supply like pt reports? Or how prescribed?

## 2024-06-11 NOTE — PROGRESS NOTES
You do have 2 broad-based disc bulging of the lower lumbar fine.  It is reasonable to proceed with pain management referral.  Please let me know if that is not resulting in getting an appointment with pain management.

## 2024-06-26 ENCOUNTER — OFFICE VISIT (OUTPATIENT)
Dept: BARIATRICS/WEIGHT MGMT | Facility: CLINIC | Age: 49
End: 2024-06-26
Payer: COMMERCIAL

## 2024-06-26 VITALS
BODY MASS INDEX: 37.9 KG/M2 | HEART RATE: 53 BPM | TEMPERATURE: 97.5 F | HEIGHT: 64 IN | SYSTOLIC BLOOD PRESSURE: 118 MMHG | WEIGHT: 222 LBS | DIASTOLIC BLOOD PRESSURE: 67 MMHG

## 2024-06-26 DIAGNOSIS — F32.A DEPRESSION, UNSPECIFIED DEPRESSION TYPE: ICD-10-CM

## 2024-06-26 DIAGNOSIS — E11.9 TYPE 2 DIABETES MELLITUS WITHOUT COMPLICATION, WITHOUT LONG-TERM CURRENT USE OF INSULIN: ICD-10-CM

## 2024-06-26 DIAGNOSIS — Z98.84 HISTORY OF REMOVAL OF LAPAROSCOPIC GASTRIC BANDING DEVICE: ICD-10-CM

## 2024-06-26 DIAGNOSIS — E66.9 OBESITY, CLASS II, BMI 35-39.9: Primary | ICD-10-CM

## 2024-06-26 DIAGNOSIS — M25.569 KNEE PAIN, UNSPECIFIED CHRONICITY, UNSPECIFIED LATERALITY: ICD-10-CM

## 2024-06-26 DIAGNOSIS — K21.9 GASTROESOPHAGEAL REFLUX DISEASE, UNSPECIFIED WHETHER ESOPHAGITIS PRESENT: ICD-10-CM

## 2024-06-26 DIAGNOSIS — G47.30 SLEEP APNEA, UNSPECIFIED TYPE: ICD-10-CM

## 2024-06-26 DIAGNOSIS — I10 ESSENTIAL (PRIMARY) HYPERTENSION: ICD-10-CM

## 2024-06-26 RX ORDER — AMITRIPTYLINE HYDROCHLORIDE 25 MG/1
25 TABLET, FILM COATED ORAL DAILY
COMMUNITY
Start: 2024-06-11 | End: 2025-06-11

## 2024-06-26 NOTE — PROGRESS NOTES
"[facility]ATR[facility] BARIATRIC SURGERY  [address]  [facility] [id number] [phone]  [address]  [facility] [id number] [phone]  Dept: [phone]  6/26/2024      Karey Lopez.  [id number]  [id number]  1975  female      Chief Complaint   Patient presents with    Follow-up     7 month follow up sleeve       BH Post-Op Bariatric Surgery:   Karey Lopez is status post Laparoscopic Sleeve procedure, performed on 11/15/23     HPI:       Today's weight is 101 kg (222 lb) pounds, today's BMI is Body mass index is 38.09 kg/m²., she has a loss of 8 pounds since the last visit and her weight loss since surgery is 59 pounds. The patient reports a decreased portion size and loss of appetite.    Karey Lopez denies nausea, vomiting, reflux and reports constipation. She has concerns about her weight loss being \"slow\". She has lost 34% of her excess weight from her consult day to this visit already. She was on a steroid dose pack last month, has a history of lapband which was removed prior to sleeve surgery, and constipation is likely affecting her scale weight.     She reports using mirilax for constipation. She reports that she has transitioned to a less processed diet.      Diet and Exercise: Diet history reviewed and discussed with the patient. Weight loss/gains to date discussed with the patient. The patient states they are eating 60-70 grams of protein per day. She reports eating 3 meals per day, a typical portion size of 1/2-2/3 cup, eating 1 snacks per day, drinking 120 or more 8-oz. glasses of water per day, no carbonated beverage consumption and exercising regularly- her feet are burning due to neuropathy. She has worsening burning with any increased physical activity.     She has been prepping all of her meals and weighing her food with a food scale.    She has been trying to walk more at work at the [employer] but has a desk " job. She does report that she is markedly more active at her house than she was prior to surgery.     Supplements: sublingual b12.     Review of Systems   Constitutional:  Positive for appetite change. Negative for fatigue and unexpected weight change.   HENT: Negative.     Eyes: Negative.    Respiratory: Negative.     Cardiovascular: Negative.  Negative for leg swelling.   Gastrointestinal:  Positive for constipation. Negative for abdominal distention, abdominal pain, diarrhea, nausea and vomiting.   Genitourinary:  Negative for difficulty urinating, frequency and urgency.   Musculoskeletal:  Negative for back pain.   Skin: Negative.    Psychiatric/Behavioral: Negative.     All other systems reviewed and are negative.      Patient Active Problem List   Diagnosis    Allergic rhinitis    Sleep apnea, unspecified    Essential (primary) hypertension    Depression    Vitamin D deficiency    Chronic left shoulder pain    Keratosis pilaris    Arthralgia    Type 2 diabetes mellitus without complication, without long-term current use of insulin    Other fatigue    Amenorrhea    Allergies    Ankle swelling    Leaking of urine    Menstrual irregularity    Shoulder pain    Foot pain    Knee pain    Sciatica    Back pain    Muscle pain    GERD (gastroesophageal reflux disease)    Acute gastric ulcer without hemorrhage or perforation    Gastric ulcer without hemorrhage or perforation    Persistent mood (affective) disorder, unspecified    Localized edema    MESFIN positive    Anti-RNP antibodies present    Myalgia    Arthritis of right foot    Bursitis of intermetatarsal bursa of right foot    Impacted cerumen of right ear    History of removal of laparoscopic gastric banding device    Closed displaced avulsion fracture of tuberosity of left calcaneus    Moderate left ankle sprain    S/P laparoscopic sleeve gastrectomy    Obesity, Class II, BMI 35-39.9    Hereditary and idiopathic peripheral neuropathy       Past Medical History:    Diagnosis Date    Acute non-recurrent maxillary sinusitis 05/04/2023    Allergic rhinitis, unspecified     Anxiety     Arthritis     Asthma     Asthma, intrinsic 1985    Cough variant asthma     Diabetes mellitus 06/2022    Dorsalgia, unspecified     Essential (primary) hypertension     Family history of ischemic heart disease and other diseases of the circulatory system     GERD (gastroesophageal reflux disease) 06/27/2023    History of gastric ulcer 08/2023    History of Helicobacter pylori infection 08/2023    Hypertension     Left lower quadrant pain     Leucocytosis 2020    due to chronic inflammation per hematology    Lichen sclerosus 2019    Obesity, unspecified     Other fatigue     Persistent mood (affective) disorder, unspecified     PONV (postoperative nausea and vomiting)     Sepsis     Right knee Sepsis    Sleep apnea, obstructive     WEARS CPAP    Unspecified abdominal pain     Unspecified ovarian cyst, left side     Vitamin D deficiency, unspecified        The following portions of the patient's history were reviewed and updated as appropriate: allergies, current medications, past family history, past medical history, past social history, past surgical history, and problem list.    Vitals:    06/26/24 0818   BP: 118/67   Pulse: 53   Temp: 97.5 °F (36.4 °C)       Physical Exam  Vitals and nursing note reviewed.   Constitutional:       Appearance: She is well-developed.   Neck:      Thyroid: No thyromegaly.   Cardiovascular:      Rate and Rhythm: Normal rate.   Pulmonary:      Effort: Pulmonary effort is normal. No respiratory distress.   Abdominal:      Palpations: Abdomen is soft.   Musculoskeletal:         General: No tenderness.   Skin:     General: Skin is warm and dry.   Neurological:      Mental Status: She is alert.   Psychiatric:         Behavior: Behavior normal.         Assessment:   Post-op, the patient is doing well.     Encounter Diagnoses   Name Primary?    Obesity, Class II, BMI  35-39.9 Yes    Essential (primary) hypertension     Depression, unspecified depression type     Knee pain, unspecified chronicity, unspecified laterality     Sleep apnea, unspecified type        Plan:   Will add a daily fiber supplement to regimen to help regulate bowel patterns. Compared progress to national average and patient is doing well with her weight loss thus far but likely scale weight was affected by constipation and was likely slowed due to steroid dose pack last month.     Encouraged patient to be sure to get plenty of lean protein per day through small frequent meals all with a protein source.   Activity restrictions: none.   Recommended patient be sure to get at least 70 grams of protein per day by eating small, frequent meals all with high lean protein choices. Be sure to limit/cut back on daily carbohydrate intake. Discussed with the patient the recommended amount of water per day to intake- half of body weight in ounces. Reviewed vitamin requirements. Be sure to do routine exercise, 150 minutes per week minimum, including both cardio and strength training.   3 mo  Instructions / Recommendations: dietary counseling recommended, recommended a daily protein intake of  grams, vitamin supplement(s) recommended, recommended exercising at least 150 minutes per week, behavior modifications recommended and instructed to call the office for concerns, questions, or problems.     The patient was instructed to follow up in 3 months .     Total time spent during this encounter today was 35 minutes

## 2024-06-27 RX ORDER — PANTOPRAZOLE SODIUM 40 MG/1
40 TABLET, DELAYED RELEASE ORAL 2 TIMES DAILY
Qty: 180 TABLET | Refills: 0 | Status: SHIPPED | OUTPATIENT
Start: 2024-06-27

## 2024-07-08 ENCOUNTER — CLINICAL SUPPORT (OUTPATIENT)
Dept: FAMILY MEDICINE CLINIC | Age: 49
End: 2024-07-08
Payer: COMMERCIAL

## 2024-07-08 DIAGNOSIS — J30.9 ALLERGIC RHINITIS, UNSPECIFIED SEASONALITY, UNSPECIFIED TRIGGER: Primary | ICD-10-CM

## 2024-07-08 PROCEDURE — 95115 IMMUNOTHERAPY ONE INJECTION: CPT | Performed by: FAMILY MEDICINE

## 2024-07-22 ENCOUNTER — CLINICAL SUPPORT (OUTPATIENT)
Dept: FAMILY MEDICINE CLINIC | Age: 49
End: 2024-07-22
Payer: COMMERCIAL

## 2024-07-22 DIAGNOSIS — J30.9 ALLERGIC RHINITIS, UNSPECIFIED SEASONALITY, UNSPECIFIED TRIGGER: Primary | ICD-10-CM

## 2024-07-22 DIAGNOSIS — I10 ESSENTIAL (PRIMARY) HYPERTENSION: ICD-10-CM

## 2024-07-22 PROCEDURE — 95115 IMMUNOTHERAPY ONE INJECTION: CPT | Performed by: FAMILY MEDICINE

## 2024-07-22 RX ORDER — ATENOLOL 25 MG/1
12.5 TABLET ORAL DAILY
Qty: 45 TABLET | Refills: 0 | Status: CANCELLED | OUTPATIENT
Start: 2024-07-22

## 2024-08-09 ENCOUNTER — OFFICE VISIT (OUTPATIENT)
Dept: FAMILY MEDICINE CLINIC | Age: 49
End: 2024-08-09
Payer: COMMERCIAL

## 2024-08-09 ENCOUNTER — LAB (OUTPATIENT)
Dept: LAB | Facility: HOSPITAL | Age: 49
End: 2024-08-09
Payer: COMMERCIAL

## 2024-08-09 VITALS
DIASTOLIC BLOOD PRESSURE: 59 MMHG | HEART RATE: 69 BPM | WEIGHT: 220.6 LBS | HEIGHT: 64 IN | TEMPERATURE: 98 F | SYSTOLIC BLOOD PRESSURE: 126 MMHG | BODY MASS INDEX: 37.66 KG/M2

## 2024-08-09 DIAGNOSIS — K58.2 IRRITABLE BOWEL SYNDROME WITH BOTH CONSTIPATION AND DIARRHEA: ICD-10-CM

## 2024-08-09 DIAGNOSIS — J30.9 ALLERGIC RHINITIS, UNSPECIFIED SEASONALITY, UNSPECIFIED TRIGGER: Primary | ICD-10-CM

## 2024-08-09 DIAGNOSIS — Z12.31 ENCOUNTER FOR SCREENING MAMMOGRAM FOR MALIGNANT NEOPLASM OF BREAST: ICD-10-CM

## 2024-08-09 DIAGNOSIS — G89.29 CHRONIC BILATERAL LOW BACK PAIN WITH LEFT-SIDED SCIATICA: ICD-10-CM

## 2024-08-09 DIAGNOSIS — E55.9 VITAMIN D DEFICIENCY: ICD-10-CM

## 2024-08-09 DIAGNOSIS — F32.A DEPRESSION, UNSPECIFIED DEPRESSION TYPE: ICD-10-CM

## 2024-08-09 DIAGNOSIS — I10 ESSENTIAL (PRIMARY) HYPERTENSION: ICD-10-CM

## 2024-08-09 DIAGNOSIS — Z12.11 COLON CANCER SCREENING: ICD-10-CM

## 2024-08-09 DIAGNOSIS — E11.9 TYPE 2 DIABETES MELLITUS WITHOUT COMPLICATION, WITHOUT LONG-TERM CURRENT USE OF INSULIN: ICD-10-CM

## 2024-08-09 DIAGNOSIS — M54.42 CHRONIC BILATERAL LOW BACK PAIN WITH LEFT-SIDED SCIATICA: ICD-10-CM

## 2024-08-09 DIAGNOSIS — Z87.11 HISTORY OF GASTRIC ULCER: ICD-10-CM

## 2024-08-09 DIAGNOSIS — B37.0 THRUSH, ORAL: ICD-10-CM

## 2024-08-09 PROBLEM — E11.42 DIABETIC POLYNEUROPATHY ASSOCIATED WITH TYPE 2 DIABETES MELLITUS: Chronic | Status: ACTIVE | Noted: 2024-07-09

## 2024-08-09 LAB
25(OH)D3 SERPL-MCNC: 84.7 NG/ML (ref 30–100)
ALBUMIN SERPL-MCNC: 4.5 G/DL (ref 3.5–5.2)
ALBUMIN UR-MCNC: <1.2 MG/DL
ALBUMIN/GLOB SERPL: 1.5 G/DL
ALP SERPL-CCNC: 83 U/L (ref 39–117)
ALT SERPL W P-5'-P-CCNC: 16 U/L (ref 1–33)
ANION GAP SERPL CALCULATED.3IONS-SCNC: 12.4 MMOL/L (ref 5–15)
AST SERPL-CCNC: 20 U/L (ref 1–32)
BILIRUB SERPL-MCNC: 0.3 MG/DL (ref 0–1.2)
BUN SERPL-MCNC: 10 MG/DL (ref 6–20)
BUN/CREAT SERPL: 15.4 (ref 7–25)
CALCIUM SPEC-SCNC: 10.1 MG/DL (ref 8.6–10.5)
CHLORIDE SERPL-SCNC: 102 MMOL/L (ref 98–107)
CHOLEST SERPL-MCNC: 209 MG/DL (ref 0–200)
CO2 SERPL-SCNC: 22.6 MMOL/L (ref 22–29)
CREAT SERPL-MCNC: 0.65 MG/DL (ref 0.57–1)
CREAT UR-MCNC: 97.2 MG/DL
EGFRCR SERPLBLD CKD-EPI 2021: 108.1 ML/MIN/1.73
GLOBULIN UR ELPH-MCNC: 3.1 GM/DL
GLUCOSE SERPL-MCNC: 93 MG/DL (ref 65–99)
HBA1C MFR BLD: 6.1 % (ref 4.8–5.6)
HDLC SERPL-MCNC: 43 MG/DL (ref 40–60)
LDLC SERPL CALC-MCNC: 136 MG/DL (ref 0–100)
LDLC/HDLC SERPL: 3.08 {RATIO}
MICROALBUMIN/CREAT UR: NORMAL MG/G{CREAT}
POTASSIUM SERPL-SCNC: 4.8 MMOL/L (ref 3.5–5.2)
PROT SERPL-MCNC: 7.6 G/DL (ref 6–8.5)
SODIUM SERPL-SCNC: 137 MMOL/L (ref 136–145)
TRIGL SERPL-MCNC: 167 MG/DL (ref 0–150)
VLDLC SERPL-MCNC: 30 MG/DL (ref 5–40)

## 2024-08-09 PROCEDURE — 82306 VITAMIN D 25 HYDROXY: CPT

## 2024-08-09 PROCEDURE — 82570 ASSAY OF URINE CREATININE: CPT

## 2024-08-09 PROCEDURE — 83036 HEMOGLOBIN GLYCOSYLATED A1C: CPT

## 2024-08-09 PROCEDURE — 82043 UR ALBUMIN QUANTITATIVE: CPT

## 2024-08-09 PROCEDURE — 36415 COLL VENOUS BLD VENIPUNCTURE: CPT

## 2024-08-09 PROCEDURE — 80053 COMPREHEN METABOLIC PANEL: CPT

## 2024-08-09 PROCEDURE — 80061 LIPID PANEL: CPT

## 2024-08-09 RX ORDER — MONTELUKAST SODIUM 10 MG/1
10 TABLET ORAL NIGHTLY
Qty: 90 TABLET | Refills: 1 | Status: SHIPPED | OUTPATIENT
Start: 2024-08-09

## 2024-08-09 RX ORDER — FLUCONAZOLE 100 MG/1
100 TABLET ORAL DAILY
Qty: 7 TABLET | Refills: 0 | Status: SHIPPED | OUTPATIENT
Start: 2024-08-09

## 2024-08-09 RX ORDER — LISINOPRIL 40 MG/1
40 TABLET ORAL NIGHTLY
Qty: 90 TABLET | Refills: 1 | Status: SHIPPED | OUTPATIENT
Start: 2024-08-09 | End: 2025-02-05

## 2024-08-09 RX ORDER — CYCLOBENZAPRINE HCL 10 MG
10 TABLET ORAL 2 TIMES DAILY PRN
Qty: 90 TABLET | Refills: 1 | Status: SHIPPED | OUTPATIENT
Start: 2024-08-09

## 2024-08-09 RX ORDER — DULOXETIN HYDROCHLORIDE 60 MG/1
60 CAPSULE, DELAYED RELEASE ORAL DAILY
Qty: 90 CAPSULE | Refills: 0 | Status: SHIPPED | OUTPATIENT
Start: 2024-08-09

## 2024-08-09 RX ORDER — KETOCONAZOLE 20 MG/G
1 CREAM TOPICAL
COMMUNITY
Start: 2024-07-24

## 2024-08-09 NOTE — PROGRESS NOTES
Chief Complaint  Diabetes and Hypertension    Subjective          Karey Lopez presents to [facility]     Patient is a 49-year-old female who is here today regarding type 2 diabetes.  Her last hemoglobin A1c was 6.4 in January.  Her current treatment includes metformin 500 mg daily with breakfast.  Denies side effects and requests refills.  Last eye exam is been within the last 2 years.    Gastric sleeve procedure last year.  Reports increased frequency of constipation and diarrhea.  Has bloating and gassiness if takes psyllium fiber supplement.  She has had some variation in her stool pattern since gallbladder removal at 17.  Had a colonoscopy in 2010 that was normal.  Is agreeable to getting updated colon cancer screening.  MiraLAX and Dulcolax are not consistently effective and hesitates to take them on a long-term basis.    History of tubal ligation and last menstrual cycle 10 days ago.  Last Pap unknown.  Cervical cancer screening encouraged.    Patient reports recurrent oral thrush.  Contributing factors include diabetes and dry mouth.  Feels more dry mouth on tizanidine that she did other muscle relaxers.  Uses CPAP which also contributes to dry mouth.  Patient not currently needing to use any inhaled medications that could contribute to thrush.  Last dental exam more than 1 year ago.  Has not taken any antibiotics recently.    For hypertension patient takes lisinopril 40 mg daily.  Denies side effects and requests refills.  Had taken atenolol one half of a 25 mg tablet once daily previously for an occasional faster or irregular heartbeat.  Symptoms were absent on atenolol.  Ran out of atenolol recently and feels as if she gets an occasional skipped heartbeat.  Denies any chest pain or shortness of breath or any other related symptoms.  Would like to continue taking atenolol.  Denies side effects and requests refills.    Depression is stable on duloxetine 60 mg daily.   "Denies side effects and requests refills.    Singulair 10 mg at bedtime helps control allergic rhinitis.  Denies side effects and requests refills.     Objective   Vital Signs:   Vitals:    08/09/24 0916   BP: 126/59   BP Location: Left arm   Patient Position: Sitting   Pulse: 69   Temp: 98 °F (36.7 °C)   TempSrc: Temporal   Weight: 100 kg (220 lb 9.6 oz)   Height: 162.6 cm (64.02\")       Wt Readings from Last 3 Encounters:   08/09/24 100 kg (220 lb 9.6 oz)   06/26/24 101 kg (222 lb)   06/10/24 104 kg (229 lb)      BP Readings from Last 3 Encounters:   08/09/24 126/59   06/26/24 118/67   05/21/24 132/57       Body mass index is 37.85 kg/m².             Physical Exam  Vitals reviewed.   Constitutional:       General: She is not in acute distress.     Appearance: Normal appearance. She is well-developed. She is obese.   HENT:      Mouth/Throat:      Comments: Patchy faintly white areas of tongue  Neck:      Thyroid: No thyromegaly.      Vascular: No carotid bruit.   Cardiovascular:      Rate and Rhythm: Normal rate and regular rhythm.      Pulses:           Posterior tibial pulses are 2+ on the right side and 2+ on the left side.      Heart sounds: Normal heart sounds.   Pulmonary:      Effort: Pulmonary effort is normal.      Breath sounds: Normal breath sounds.   Musculoskeletal:      Right lower leg: No edema.      Left lower leg: No edema.   Skin:     General: Skin is warm and dry.   Neurological:      General: No focal deficit present.      Mental Status: She is alert.   Psychiatric:         Attention and Perception: Attention normal.         Mood and Affect: Mood and affect normal.         Behavior: Behavior normal.           Current Outpatient Medications:     acetaminophen (TYLENOL) 650 MG 8 hr tablet, Take  by mouth Every 8 (Eight) Hours., Disp: , Rfl:     albuterol (PROVENTIL) (2.5 MG/3ML) 0.083% nebulizer solution, Take 2.5 mg by nebulization Every 4 (Four) Hours As Needed for Wheezing., Disp: 60 each, Rfl: " 5    albuterol sulfate  (90 Base) MCG/ACT inhaler, Inhale 2 puffs Every 4 (Four) Hours As Needed for Wheezing., Disp: 8 g, Rfl: 5    Blood Glucose Monitoring Suppl (ONE TOUCH ULTRA 2) w/Device kit, 1 each Daily., Disp: 1 each, Rfl: 0    DULoxetine (CYMBALTA) 60 MG capsule, Take 1 capsule by mouth Daily., Disp: 90 capsule, Rfl: 0    glucose blood (OneTouch Ultra) test strip, USE 1 EACH TO TEST BLOOD SUGAR DAILY., Disp: 100 each, Rfl: 3    ipratropium-albuterol (DUO-NEB) 0.5-2.5 mg/3 ml nebulizer, Take 3 mL by nebulization Every 4 (Four) Hours As Needed for Wheezing for up to 30 days., Disp: 360 mL, Rfl: 0    ketoconazole (NIZORAL) 2 % cream, 1 Application., Disp: , Rfl:     Lancets misc, Use 1 each Daily. USE WITH ONE TOUCH METER, Disp: 100 each, Rfl: 1    lidocaine (LIDODERM) 5 %, APPLY 1 PATCH DAILY TOPICALLY REMOVE AND DISCARD PATCH WITHIN 12 HOURS OR AS DIRECTED., Disp: , Rfl:     lisinopril (PRINIVIL,ZESTRIL) 40 MG tablet, Take 1 tablet by mouth Every Night for 180 days., Disp: 90 tablet, Rfl: 1    Magnesium 250 MG tablet, Take 1 tablet by mouth Daily., Disp: , Rfl:     metFORMIN (GLUCOPHAGE) 500 MG tablet, Take 1 tablet by mouth Daily With Breakfast., Disp: 90 tablet, Rfl: 1    montelukast (SINGULAIR) 10 MG tablet, Take 1 tablet by mouth Every Night., Disp: 90 tablet, Rfl: 1    multivitamin (MULTI VITAMIN PO), Take 1 tablet by mouth Daily., Disp: , Rfl:     ondansetron ODT (ZOFRAN-ODT) 8 MG disintegrating tablet, DISSOLVE 1 TABLET ON THE TONGUE EVERY 12 HOURS AS NEEDED FOR NAUSEA OR VOMITING, Disp: 20 tablet, Rfl: 1    pantoprazole (PROTONIX) 40 MG EC tablet, TAKE 1 TABLET BY MOUTH TWICE DAILY, Disp: 180 tablet, Rfl: 0    pregabalin (LYRICA) 75 MG capsule, Take 1 capsule by mouth 2 (Two) Times a Day., Disp: , Rfl:     atenolol (TENORMIN) 25 MG tablet, TAKE 1/2 TABLET DAILY (Patient not taking: Reported on 8/9/2024), Disp: 15 tablet, Rfl: 0    cyclobenzaprine (FLEXERIL) 10 MG tablet, Take 1 tablet by  mouth 2 (Two) Times a Day As Needed for Muscle Spasms., Disp: 90 tablet, Rfl: 1    fluconazole (Diflucan) 100 MG tablet, Take 1 tablet by mouth Daily., Disp: 7 tablet, Rfl: 0    Current Facility-Administered Medications:     Allergy Serum Injection, 0.5 mL, Subcutaneous, Once, Sunita Hylton, AMBIKA   Past Medical History:   Diagnosis Date    Acute non-recurrent maxillary sinusitis 05/04/2023    Allergic rhinitis, unspecified     Anxiety     Arthritis     Asthma     Asthma, intrinsic 1985    Cough variant asthma     Diabetes mellitus 06/2022    Dorsalgia, unspecified     Essential (primary) hypertension     Family history of ischemic heart disease and other diseases of the circulatory system     GERD (gastroesophageal reflux disease) 06/27/2023    History of gastric ulcer 08/2023    History of Helicobacter pylori infection 08/2023    Hypertension     Left lower quadrant pain     Leucocytosis 2020    due to chronic inflammation per hematology    Lichen sclerosus 2019    Obesity, unspecified     Other fatigue     Persistent mood (affective) disorder, unspecified     PONV (postoperative nausea and vomiting)     Sepsis     Right knee Sepsis    Sleep apnea, obstructive     WEARS CPAP    Unspecified abdominal pain     Unspecified ovarian cyst, left side     Vitamin D deficiency, unspecified      Allergies   Allergen Reactions    Ozempic (0.25 Or 0.5 Mg-Dose) [Semaglutide(0.25 Or 0.5mg-Dos)] Other (See Comments)     Fatigue and nausea    Vilazodone Hcl Mental Status Change               Result Review :     Common labs          11/16/2023    05:23 1/8/2024    07:40 3/11/2024    08:08   Common Labs   Glucose 133  110  120    BUN 8  12  10    Creatinine 0.66  0.87  0.74    Sodium 137  139  140    Potassium 4.9  4.1  4.4    Chloride 102  100  102    Calcium 9.2  10.9  10.3    Albumin 3.6  4.5     Total Bilirubin 0.3  0.4     Alkaline Phosphatase 60  77     AST (SGOT) 37  19     ALT (SGPT) 29  23     WBC 11.81  9.49      Hemoglobin 10.5  12.0     Hematocrit 32.6  39.0     Platelets 411  490     Total Cholesterol  196     Triglycerides  281     HDL Cholesterol  47     LDL Cholesterol   101     Hemoglobin A1C  6.40          MRI Lumbar Spine Without Contrast    Result Date: 6/10/2024  Impression: Degenerative changes in the lumbar spine primarily at L5/S1 with mild to moderate right neural foramina stenosis. Electronically Signed: Jimena Ghosh MD  6/10/2024 1:10 PM EDT  Workstation ID: [id number]    XR Spine Lumbar 2 or 3 View    Result Date: 5/21/2024  Impression: 1.  Facet arthropathy lower lumbar spine.   Electronically Signed By-Evangelist Medina MD On:5/21/2024 10:43 AM               Social History     Tobacco Use   Smoking Status Never    Passive exposure: Never   Smokeless Tobacco Never           Assessment and Plan    Diagnoses and all orders for this visit:    1. Allergic rhinitis, unspecified seasonality, unspecified trigger (Primary)  -     Allergy Serum Injection  -     montelukast (SINGULAIR) 10 MG tablet; Take 1 tablet by mouth Every Night.  Dispense: 90 tablet; Refill: 1    2. Type 2 diabetes mellitus without complication, without long-term current use of insulin  -     Comprehensive metabolic panel; Future  -     Hemoglobin A1c; Future  -     Lipid panel; Future  -     Microalbumin / Creatinine Urine Ratio - Urine, Clean Catch; Future  -     metFORMIN (GLUCOPHAGE) 500 MG tablet; Take 1 tablet by mouth Daily With Breakfast.  Dispense: 90 tablet; Refill: 1    3. Essential (primary) hypertension  -     lisinopril (PRINIVIL,ZESTRIL) 40 MG tablet; Take 1 tablet by mouth Every Night for 180 days.  Dispense: 90 tablet; Refill: 1    4. Vitamin D deficiency  -     Vitamin D 25 hydroxy; Future    5. Chronic bilateral low back pain with left-sided sciatica  -     cyclobenzaprine (FLEXERIL) 10 MG tablet; Take 1 tablet by mouth 2 (Two) Times a Day As Needed for Muscle Spasms.  Dispense: 90 tablet; Refill: 1    6. Depression,  unspecified depression type  -     DULoxetine (CYMBALTA) 60 MG capsule; Take 1 capsule by mouth Daily.  Dispense: 90 capsule; Refill: 0    7. Thrush, oral  -     fluconazole (Diflucan) 100 MG tablet; Take 1 tablet by mouth Daily.  Dispense: 7 tablet; Refill: 0    8. Irritable bowel syndrome with both constipation and diarrhea  -     Ambulatory Referral to Gastroenterology    9. History of gastric ulcer  -     Ambulatory Referral to Gastroenterology    10. Colon cancer screening  -     Ambulatory Referral to Gastroenterology    11. Encounter for screening mammogram for malignant neoplasm of breast  -     Mammo Screening Digital Tomosynthesis Bilateral With CAD; Future        Follow Up    No follow-ups on file.  Patient was given instructions and counseling regarding her condition or for health maintenance advice. Please see specific information pulled into the AVS if appropriate.

## 2024-08-13 NOTE — PROGRESS NOTES
Cholesterol is up slightly but triglycerides are improved.  Decrease intake of saturated fat.  Diabetes is under better control and vitamin D is currently normal.  Kidney and liver function are normal.

## 2024-08-14 NOTE — ASSESSMENT & PLAN NOTE
Further treatment recommendations pending lab results.  Follow-up in 6 months or sooner if concerns.  Follow-up if thrush does not resolve.  Encourage  dental exam to rule out other causes of symptoms

## 2024-08-19 ENCOUNTER — CLINICAL SUPPORT (OUTPATIENT)
Dept: FAMILY MEDICINE CLINIC | Age: 49
End: 2024-08-19
Payer: COMMERCIAL

## 2024-08-19 DIAGNOSIS — J30.9 ALLERGIC RHINITIS, UNSPECIFIED SEASONALITY, UNSPECIFIED TRIGGER: Primary | ICD-10-CM

## 2024-08-19 PROCEDURE — 95115 IMMUNOTHERAPY ONE INJECTION: CPT | Performed by: FAMILY MEDICINE

## 2024-08-27 RX ORDER — PANTOPRAZOLE SODIUM 40 MG/1
40 TABLET, DELAYED RELEASE ORAL 2 TIMES DAILY
Qty: 60 TABLET | Refills: 0 | Status: SHIPPED | OUTPATIENT
Start: 2024-08-27

## 2024-09-03 DIAGNOSIS — G89.28 CHRONIC POSTOPERATIVE PAIN: ICD-10-CM

## 2024-09-04 DIAGNOSIS — M62.838 MUSCLE SPASM: Primary | ICD-10-CM

## 2024-09-11 ENCOUNTER — CLINICAL SUPPORT (OUTPATIENT)
Dept: FAMILY MEDICINE CLINIC | Age: 49
End: 2024-09-11
Payer: COMMERCIAL

## 2024-09-11 DIAGNOSIS — J30.9 ALLERGIC RHINITIS, UNSPECIFIED SEASONALITY, UNSPECIFIED TRIGGER: Primary | ICD-10-CM

## 2024-09-11 PROCEDURE — 95115 IMMUNOTHERAPY ONE INJECTION: CPT | Performed by: FAMILY MEDICINE

## 2024-10-10 ENCOUNTER — CLINICAL SUPPORT (OUTPATIENT)
Dept: FAMILY MEDICINE CLINIC | Age: 49
End: 2024-10-10
Payer: COMMERCIAL

## 2024-10-10 ENCOUNTER — LAB (OUTPATIENT)
Dept: LAB | Facility: HOSPITAL | Age: 49
End: 2024-10-10
Payer: COMMERCIAL

## 2024-10-10 ENCOUNTER — OFFICE VISIT (OUTPATIENT)
Age: 49
End: 2024-10-10
Payer: COMMERCIAL

## 2024-10-10 VITALS
SYSTOLIC BLOOD PRESSURE: 134 MMHG | OXYGEN SATURATION: 100 % | WEIGHT: 215.8 LBS | HEART RATE: 67 BPM | DIASTOLIC BLOOD PRESSURE: 65 MMHG | HEIGHT: 64 IN | BODY MASS INDEX: 36.84 KG/M2 | RESPIRATION RATE: 18 BRPM

## 2024-10-10 DIAGNOSIS — K59.00 CONSTIPATION, UNSPECIFIED CONSTIPATION TYPE: ICD-10-CM

## 2024-10-10 DIAGNOSIS — Z12.11 ENCOUNTER FOR SCREENING FOR MALIGNANT NEOPLASM OF COLON: ICD-10-CM

## 2024-10-10 DIAGNOSIS — J30.9 ALLERGIC RHINITIS, UNSPECIFIED SEASONALITY, UNSPECIFIED TRIGGER: Primary | ICD-10-CM

## 2024-10-10 DIAGNOSIS — K21.9 GASTROESOPHAGEAL REFLUX DISEASE, UNSPECIFIED WHETHER ESOPHAGITIS PRESENT: Primary | ICD-10-CM

## 2024-10-10 LAB
DEPRECATED RDW RBC AUTO: 43.1 FL (ref 37–54)
ERYTHROCYTE [DISTWIDTH] IN BLOOD BY AUTOMATED COUNT: 13.7 % (ref 12.3–15.4)
FERRITIN SERPL-MCNC: 76.7 NG/ML (ref 13–150)
FOLATE SERPL-MCNC: 18.5 NG/ML (ref 4.78–24.2)
HCT VFR BLD AUTO: 39.9 % (ref 34–46.6)
HGB BLD-MCNC: 12.9 G/DL (ref 12–15.9)
IRON 24H UR-MRATE: 63 MCG/DL (ref 37–145)
MCH RBC QN AUTO: 27.8 PG (ref 26.6–33)
MCHC RBC AUTO-ENTMCNC: 32.3 G/DL (ref 31.5–35.7)
MCV RBC AUTO: 86 FL (ref 79–97)
PLATELET # BLD AUTO: 400 10*3/MM3 (ref 140–450)
PMV BLD AUTO: 9.4 FL (ref 6–12)
RBC # BLD AUTO: 4.64 10*6/MM3 (ref 3.77–5.28)
WBC NRBC COR # BLD AUTO: 8.34 10*3/MM3 (ref 3.4–10.8)

## 2024-10-10 PROCEDURE — 36415 COLL VENOUS BLD VENIPUNCTURE: CPT

## 2024-10-10 PROCEDURE — 83540 ASSAY OF IRON: CPT

## 2024-10-10 PROCEDURE — 85027 COMPLETE CBC AUTOMATED: CPT

## 2024-10-10 PROCEDURE — 95115 IMMUNOTHERAPY ONE INJECTION: CPT | Performed by: NURSE PRACTITIONER

## 2024-10-10 PROCEDURE — 82746 ASSAY OF FOLIC ACID SERUM: CPT

## 2024-10-10 PROCEDURE — 82728 ASSAY OF FERRITIN: CPT

## 2024-10-10 RX ORDER — DOCUSATE SODIUM 100 MG/1
100 CAPSULE, LIQUID FILLED ORAL 2 TIMES DAILY
Qty: 180 CAPSULE | Refills: 1 | Status: SHIPPED | OUTPATIENT
Start: 2024-10-10

## 2024-10-10 RX ORDER — ONDANSETRON 4 MG/1
4 TABLET, FILM COATED ORAL EVERY 8 HOURS PRN
COMMUNITY
Start: 2024-09-24

## 2024-10-10 RX ORDER — TRIAMCINOLONE ACETONIDE 1 MG/G
CREAM TOPICAL
COMMUNITY
Start: 2024-09-24

## 2024-10-10 NOTE — PROGRESS NOTES
Chief Complaint     Irritable Bowel Syndrome, Diarrhea, and Constipation (New Patient)    History of Present Illness     Karey Lopez is a 49 y.o. female with PMH of gastric sleeve 2023, GERD and cholecystectomy who presents to [facility]OLOGY on referral from AMBIKA Posada for a gastroenterology evaluation of Irritable bowel syndrome with both constipation and diarrhea, History of gastric ulcer, Colon cancer screening.      History of present illness:  Reports increased frequency of constipation and diarrhea. Has bloating and gassiness if takes psyllium fiber supplement. She has had some variation in her stool pattern since gallbladder removal at 17. MiraLAX and Dulcolax are not consistently effective and hesitates to take them on a long-term basis.     Experiences trouble with bowel habits since her cholecystectomy at age 17. Complaints of bowel urgency. She reports several foot surgery and was taking ibuprofen regular. Had an EGD prior to Gastric Sleeve surgery and a sizable gastric ulcer and was told to discontinue use of NSAIDs. She continues to have pain in right foot and has since used Tylenol. Repeat EGD showed healing but H.pylori and was treatment. November 2023 Gastric Sleeve. Never had irradiated. She has tried to clean eating has worsened her constipated. She reports using Ducolax and then would have diarrhea a whole day. She was started on Ducolax and colace and her symptoms have improved. She reports 1 BM every 1-2 days. Stool described as formed, soft and sometimes hard, medium to dark brown in color. No reports of mucus, hematochezia or melena. She reports improved cramping and twisting abdominal pain that was worsened with constipation.     She reports continued struggle with thrush since her gastric sleeve, was started on diflucan. but has been improved with probiotic and yogurt. She reports white patches and raw tongue. Patient reports nausea 2-3 times a  week, she describes at random. She reports vomiting a couple times a month if she eats foods such as salad that upsets her stomach. No hematemesis. After her ulcer was found she was started on Protonix 40 mg once a day. Patient denies dysphagia or unintentional weight loss.     Patient denies a family history of colon cancer or of colon polyps.  Patient's last EGD 10/31/2023 performed by Dr. Reyes Ngo.  Previous Colonoscopy 2010 reported as normal colonoscopy. Of note patient notes anesthesia has trouble sedating her and she was awake during her procedure.     History      Past Medical History:   Diagnosis Date    Acute non-recurrent maxillary sinusitis 05/04/2023    Allergic rhinitis, unspecified     Anxiety     Arthritis     Asthma     Asthma, intrinsic 1985    Cough variant asthma     Diabetes mellitus 06/2022    Dorsalgia, unspecified     Essential (primary) hypertension     Family history of ischemic heart disease and other diseases of the circulatory system     GERD (gastroesophageal reflux disease) 06/27/2023    History of gastric ulcer 08/2023    History of Helicobacter pylori infection 08/2023    Hypertension     Left lower quadrant pain     Leucocytosis 2020    due to chronic inflammation per hematology    Lichen sclerosus 2019    Obesity, unspecified     Other fatigue     Persistent mood (affective) disorder, unspecified     PONV (postoperative nausea and vomiting)     Sepsis     Right knee Sepsis    Sleep apnea, obstructive     WEARS CPAP    Unspecified abdominal pain     Unspecified ovarian cyst, left side     Vitamin D deficiency, unspecified        Past Surgical History:   Procedure Laterality Date    COLONOSCOPY  01/2010    normal    ENDOSCOPY  01/2010, 01/2018 1/2010- small hernia and 01/2018 mild gastitis    ENDOSCOPY N/A 08/01/2023    Procedure: ESOPHAGOGASTRODUODENOSCOPY WITH BIOPSY;  Surgeon: Reyes Ngo Jr., MD;  Location: [facility] ENDOSCOPY;  Service: General;  Laterality: N/A;   PRE- DYSPEPSIA, H/O BAND REMOVAL  POST- GASTRITIS, GASTRIC ULCER    ENDOSCOPY N/A 10/31/2023    Procedure: ESOPHAGOGASTRODUODENOSCOPY WITH BIOPSY;  Surgeon: Reyes Ngo Jr., MD;  Location: [facility] ENDOSCOPY;  Service: General;  Laterality: N/A;  PRE- H/O GASTRIC ULCER   POST- GASTRITIS    FOOT SURGERY Right 11/2015, 4/28/2017    FRACTURE SURGERY Right 11/2014, 3/2015    ankle    GALLBLADDER SURGERY  1992    GASTRIC BANDING REMOVAL  2019    GASTRIC SLEEVE LAPAROSCOPIC N/A 11/15/2023    Procedure: GASTRIC SLEEVE LAPAROSCOPIC Conversion; Lysis of Adhesions;  Surgeon: Reyes Ngo Jr., MD;  Location:  [facility];  Service: Bariatric;  Laterality: N/A;    KNEE ARTHROPLASTY Right 12/10/2019    KNEE ARTHROSCOPY Right 01/30/2020    Procedure: KNEE ARTHROSCOPY WITH INCISION AND DRAINAGE;  Surgeon: Fareed Chacon MD;  Location: [facility] MAIN OR;  Service: Orthopedics    LAPAROSCOPIC GASTRIC BANDING  07/2010    and was removed july 2019; scar tissue    LAPAROSCOPIC TUBAL LIGATION      MOUTH SURGERY      wisdom teeth removal    TUBAL ABDOMINAL LIGATION         Family History   Problem Relation Age of Onset    Asthma Mother     Heart disease Mother     Coronary artery disease Mother     Diabetes type II Mother     Diabetes Mother     Hypertension Mother     Stroke Father     Hypertension Father     Obesity Brother     Heart attack Brother         MI    Hypertension Brother     Cervical cancer Maternal Grandmother     Lung cancer Maternal Grandfather     Stroke Paternal Grandmother     Malig Hyperthermia Neg Hx     Colon cancer Neg Hx         Current Medications        Current Outpatient Medications:     acetaminophen (TYLENOL) 650 MG 8 hr tablet, Take  by mouth Every 8 (Eight) Hours., Disp: , Rfl:     albuterol (PROVENTIL) (2.5 MG/3ML) 0.083% nebulizer solution, Take 2.5 mg by nebulization Every 4 (Four) Hours As Needed for Wheezing., Disp: 60 each, Rfl: 5    albuterol sulfate  (90 Base) MCG/ACT inhaler,  Inhale 2 puffs Every 4 (Four) Hours As Needed for Wheezing., Disp: 8 g, Rfl: 5    Blood Glucose Monitoring Suppl (ONE TOUCH ULTRA 2) w/Device kit, 1 each Daily., Disp: 1 each, Rfl: 0    DULoxetine (CYMBALTA) 60 MG capsule, Take 1 capsule by mouth Daily., Disp: 90 capsule, Rfl: 0    glucose blood (OneTouch Ultra) test strip, USE 1 EACH TO TEST BLOOD SUGAR DAILY., Disp: 100 each, Rfl: 3    ketoconazole (NIZORAL) 2 % cream, 1 Application., Disp: , Rfl:     Lancets misc, Use 1 each Daily. USE WITH ONE TOUCH METER, Disp: 100 each, Rfl: 1    lidocaine (LIDODERM) 5 %, APPLY 1 PATCH DAILY TOPICALLY REMOVE AND DISCARD PATCH WITHIN 12 HOURS OR AS DIRECTED., Disp: , Rfl:     lisinopril (PRINIVIL,ZESTRIL) 40 MG tablet, Take 1 tablet by mouth Every Night for 180 days., Disp: 90 tablet, Rfl: 1    Magnesium 250 MG tablet, Take 1 tablet by mouth Daily., Disp: , Rfl:     metFORMIN (GLUCOPHAGE) 500 MG tablet, Take 1 tablet by mouth Daily With Breakfast., Disp: 90 tablet, Rfl: 1    montelukast (SINGULAIR) 10 MG tablet, Take 1 tablet by mouth Every Night., Disp: 90 tablet, Rfl: 1    multivitamin (MULTI VITAMIN PO), Take 1 tablet by mouth Daily., Disp: , Rfl:     ondansetron ODT (ZOFRAN-ODT) 8 MG disintegrating tablet, DISSOLVE 1 TABLET ON THE TONGUE EVERY 12 HOURS AS NEEDED FOR NAUSEA OR VOMITING, Disp: 20 tablet, Rfl: 1    pantoprazole (PROTONIX) 40 MG EC tablet, TAKE 1 TABLET BY MOUTH TWICE DAILY, Disp: 60 tablet, Rfl: 0    pregabalin (LYRICA) 75 MG capsule, Take 1 capsule by mouth 2 (Two) Times a Day., Disp: , Rfl:     tiZANidine (ZANAFLEX) 4 MG tablet, Take 1 tablet by mouth Every 8 (Eight) Hours As Needed for Muscle Spasms. May cause drowsiness, Disp: 90 tablet, Rfl: 0    triamcinolone (KENALOG) 0.1 % cream, APPLY TWICE DAILY TO THE AFFECTED AREA ON THE ABDOMEN FOR 2 WEEKS / MONTH AS NEEDED, Disp: , Rfl:     atenolol (TENORMIN) 25 MG tablet, TAKE 1/2 TABLET DAILY (Patient not taking: Reported on 8/9/2024), Disp: 15 tablet, Rfl:  0    docusate sodium (Colace) 100 MG capsule, Take 1 capsule by mouth 2 (Two) Times a Day., Disp: 180 capsule, Rfl: 1    fluconazole (Diflucan) 100 MG tablet, Take 1 tablet by mouth Daily. (Patient not taking: Reported on 10/10/2024), Disp: 7 tablet, Rfl: 0    ipratropium-albuterol (DUO-NEB) 0.5-2.5 mg/3 ml nebulizer, Take 3 mL by nebulization Every 4 (Four) Hours As Needed for Wheezing for up to 30 days., Disp: 360 mL, Rfl: 0    ondansetron (ZOFRAN) 4 MG tablet, Take 1 tablet by mouth Every 8 (Eight) Hours As Needed for Nausea or Vomiting. (Patient not taking: Reported on 10/10/2024), Disp: , Rfl:     polyethylene glycol (GoLYTELY) 236 g solution, Follow office instructions., Disp: 4000 mL, Rfl: 0    Current Facility-Administered Medications:     Allergy Serum Injection, 0.5 mL, Subcutaneous, Once, Sunita Hylton APRN     Allergies     Allergies   Allergen Reactions    Ozempic (0.25 Or 0.5 Mg-Dose) [Semaglutide(0.25 Or 0.5mg-Dos)] Other (See Comments)     Fatigue and nausea    Vilazodone Hcl Mental Status Change       Social History       Social History     Social History Narrative    Not on file       Immunizations     Immunization:  Immunization History   Administered Date(s) Administered    COVID-19 (PFIZER) 12YRS+ (COMIRNATY) 12/05/2023    COVID-19 (PFIZER) BIVALENT 12+YRS 02/02/2023    COVID-19 (PFIZER) Purple Cap Monovalent 03/13/2021, 04/09/2021, 12/09/2021    Flu Vaccine Split Quad 11/24/2014, 11/12/2015, 12/19/2016, 12/06/2017, 12/20/2019, 11/30/2020    Fluzone  >6mos 01/08/2014    Fluzone (or Fluarix & Flulaval for VFC) >6mos 10/18/2022, 12/05/2023    Fluzone Quad >6mos (Multi-dose) 01/08/2014    Influenza Injectable Mdck Pf Quad 12/09/2021    Influenza TIV (IM) 10/23/2012, 10/24/2012    Influenza, Unspecified 11/30/2020, 12/09/2021          Objective     Objective     Vital Signs:   /65 (BP Location: Right arm, Patient Position: Sitting, Cuff Size: Adult)   Pulse 67   Resp 18   Ht 162.6 cm  "(64.02\")   Wt 97.9 kg (215 lb 12.8 oz)   SpO2 100%   BMI 37.02 kg/m²       Physical Exam  HENT:      Head: Normocephalic.      Nose: Nose normal.   Eyes:      Pupils: Pupils are equal, round, and reactive to light.   Cardiovascular:      Rate and Rhythm: Normal rate.   Pulmonary:      Effort: Pulmonary effort is normal.   Abdominal:      General: Bowel sounds are normal.   Musculoskeletal:         General: Normal range of motion.   Neurological:      General: No focal deficit present.      Mental Status: She is alert.   Psychiatric:         Mood and Affect: Mood normal.         Results      Result Review :   The following data was reviewed by: AMBIKA Williamson on 10/10/2024:    Lab Results - Last 18 Months   Lab Units 01/08/24  0740 11/16/23  0523 11/06/23  0738   WBC 10*3/mm3 9.49 11.81* 10.15   HEMOGLOBIN g/dL 12.0 10.5* 12.8   MCV fL 81.6 77.6* 79.4   PLATELETS 10*3/mm3 490* 411 536*         Lab Results - Last 18 Months   Lab Units 08/09/24  1016 03/11/24  0808 01/08/24  0740   BUN mg/dL 10 10 12   CREATININE mg/dL 0.65 0.74 0.87   SODIUM mmol/L 137 140 139   POTASSIUM mmol/L 4.8 4.4 4.1   CHLORIDE mmol/L 102 102 100   CO2 mmol/L 22.6 24.5 24.1   GLUCOSE mg/dL 93 120* 110*      No results for input(s): \"PROTIME\", \"INR\", \"PTT\" in the last 91112 hours.  Lab Results - Last 18 Months   Lab Units 08/09/24  1016 01/08/24  0740 11/16/23  0523   AST (SGOT) U/L 20 19 37*   ALT (SGPT) U/L 16 23 29   ALK PHOS U/L 83 77 60   BILIRUBIN mg/dL 0.3 0.4 0.3   TOTAL PROTEIN g/dL 7.6 7.6 6.4   ALBUMIN g/dL 4.5 4.5 3.6      Lab Results - Last 18 Months   Lab Units 01/08/24  0740   IRON mcg/dL 44   FERRITIN ng/mL 87.00   FOLATE ng/mL >20.00     No results for input(s): \"HAV\", \"HEPAIGM\", \"HEPBIGM\", \"HEPBCAB\", \"HBEAG\", \"HEPCAB\" in the last 10792 hours.    No Images in the past 120 days found..       Assessment and Plan        Assessment and Plan    Diagnoses and all orders for this visit:    1. Gastroesophageal reflux " disease, unspecified whether esophagitis present (Primary)    2. Encounter for screening for malignant neoplasm of colon  -     Case Request; Standing  -     Case Request  -     CBC (No Diff)  -     Iron  -     Ferritin  -     Folate    3. Constipation, unspecified constipation type  -     docusate sodium (Colace) 100 MG capsule; Take 1 capsule by mouth 2 (Two) Times a Day.  Dispense: 180 capsule; Refill: 1    Other orders  -     Follow Anesthesia Guidelines / Protocol; Standing  -     Follow Anesthesia Guidelines / Protocol; Future  -     Obtain Informed Consent; Future  -     Verify NPO; Standing  -     Verify Bowel Prep Was Successful; Standing  -     Give Tap Water Enema If Bowel Prep Insufficient; Standing  -     Obtain Informed Consent; Standing  -     POC Urine Pregnancy; Standing  -     polyethylene glycol (GoLYTELY) 236 g solution; Follow office instructions.  Dispense: 4000 mL; Refill: 0        COLONOSCOPY (N/A)      Follow Up        Follow Up   Return for follow up after procedure.    Colonoscopy ordered for colorectal cancer screening. 4 L bowel prep.  I have recommended that the patient undergo further evaluation with a colonoscopy.  I have discussed this procedure in detail with the patient.  I have discussed the risk, benefits and alternatives.  I have discussed the risk of anesthesia, bleeding and perforation.  Patient understands these risks, benefits and alternatives and wishes to proceed.  I will schedule her at her earliest convenience.  Patient agreeable to this plan and will call with any questions or concerns.   Continue Protonix 40 mg daily for GERD.  Patient advised and recommended to follow GERD diet and lifestyle modifications including no food at least 3-4 hours before sleep, keep the head side of the bed elevated to minimum of 30 degrees, and use 2 pillows for sleep.  CBC, iron, folate, and ferritin  ordered.  Stop Dulcolax    Patient was given instructions and counseling regarding her  condition or for health maintenance advice. Please see specific information pulled into the AVS if appropriate.

## 2024-10-16 ENCOUNTER — CLINICAL SUPPORT (OUTPATIENT)
Dept: FAMILY MEDICINE CLINIC | Age: 49
End: 2024-10-16
Payer: COMMERCIAL

## 2024-10-16 DIAGNOSIS — J30.9 ALLERGIC RHINITIS, UNSPECIFIED SEASONALITY, UNSPECIFIED TRIGGER: Primary | ICD-10-CM

## 2024-10-16 PROCEDURE — 95115 IMMUNOTHERAPY ONE INJECTION: CPT | Performed by: FAMILY MEDICINE

## 2024-10-24 ENCOUNTER — OFFICE VISIT (OUTPATIENT)
Dept: BARIATRICS/WEIGHT MGMT | Facility: CLINIC | Age: 49
End: 2024-10-24
Payer: COMMERCIAL

## 2024-10-24 ENCOUNTER — TELEPHONE (OUTPATIENT)
Age: 49
End: 2024-10-24
Payer: COMMERCIAL

## 2024-10-24 VITALS
SYSTOLIC BLOOD PRESSURE: 140 MMHG | HEART RATE: 82 BPM | BODY MASS INDEX: 37.73 KG/M2 | TEMPERATURE: 98.1 F | DIASTOLIC BLOOD PRESSURE: 90 MMHG | HEIGHT: 64 IN | WEIGHT: 221 LBS

## 2024-10-24 DIAGNOSIS — Z98.84 S/P LAPAROSCOPIC SLEEVE GASTRECTOMY: ICD-10-CM

## 2024-10-24 DIAGNOSIS — E66.01 OBESITY, CLASS III, BMI 40-49.9 (MORBID OBESITY): Primary | ICD-10-CM

## 2024-10-24 DIAGNOSIS — Z12.11 ENCOUNTER FOR SCREENING FOR MALIGNANT NEOPLASM OF COLON: Primary | ICD-10-CM

## 2024-10-24 DIAGNOSIS — E11.9 TYPE 2 DIABETES MELLITUS WITHOUT COMPLICATION, WITHOUT LONG-TERM CURRENT USE OF INSULIN: ICD-10-CM

## 2024-10-24 DIAGNOSIS — Z71.3 DIETARY COUNSELING: ICD-10-CM

## 2024-10-24 NOTE — PROGRESS NOTES
"Chief Complaint  Follow-up (11 fup sleeve)    Subjective        Kraey Lopez presents to [facility] BARIATRIC SURGERY  History of Present Illness  Patient presents 11-month status post sleeve gastrectomy 11/15/2023--overall doing well, has lost more than 60 pounds from her preop weight--patient has been stalled out for several months now, has maintained a weight of 221 pounds--patient complains of abdominal pain, dysphagia or reflux--patient does complain of having sores on her tongue which she thought might have been thrush, she was treated with oral Diflucan which seemed to help but her doctors think that this is more of a vitamin deficiency.  She did have some vitamin levels checked at her last appointment including a B12 level that was normal.  Reviewed her diet and exercise history, all seems appropriate.  Objective   Vital Signs:  /90 (BP Location: Right arm, Patient Position: Sitting, Cuff Size: Adult)   Pulse 82   Temp 98.1 °F (36.7 °C) (Temporal)   Ht 162.6 cm (64.02\")   Wt 100 kg (221 lb)   BMI 37.92 kg/m²   Estimated body mass index is 37.92 kg/m² as calculated from the following:    Height as of this encounter: 162.6 cm (64.02\").    Weight as of this encounter: 100 kg (221 lb).               Physical Exam   Alert, pleasant  No respiratory distress  Abdomen soft  Result Review :                   Assessment and Plan   Diagnoses and all orders for this visit:    1. Obesity, Class III, BMI 40-49.9 (morbid obesity) (Primary)  -     Vitamin E; Future  -     Vitamin K1; Future  -     Zinc; Future  -     Vitamin A; Future  -     Vitamin B1, Whole Blood; Future    2. Type 2 diabetes mellitus without complication, without long-term current use of insulin    3. S/P laparoscopic sleeve gastrectomy    4. Dietary counseling    Other orders  -     Magic Mouthwash Oral Suspension (diphenhydrAMINE HCl - aluminum & magnesium hydroxide-simethicone - lidocaine - nystatin); Swish and spit 5 " mL Every 4 (Four) Hours As Needed for Mucositis.  Dispense: 200 mL; Refill: 1  -     Tirzepatide-Weight Management (ZEPBOUND) 2.5 MG/0.5ML solution auto-injector; Inject 0.5 mL under the skin into the appropriate area as directed 1 (One) Time Per Week.  Dispense: 1 mL; Refill: 0    I will check some additional labs on the patient.  Patient states that she does take multivitamins but is not good about taking them every day.  Cathy will start the patient on Zepbound to help with her diabetes management as well as potential weight loss.  Plan to have the patient follow-up in 1 to 2 months.       I spent 30 minutes caring for Karey on this date of service. This time includes time spent by me in the following activities:preparing for the visit, reviewing tests, obtaining and/or reviewing a separately obtained history, performing a medically appropriate examination and/or evaluation , counseling and educating the patient/family/caregiver, documenting information in the medical record, and independently interpreting results and communicating that information with the patient/family/caregiver  Follow Up   No follow-ups on file.  Patient was given instructions and counseling regarding her condition or for health maintenance advice. Please see specific information pulled into the AVS if appropriate.

## 2024-10-25 RX ORDER — PANTOPRAZOLE SODIUM 40 MG/1
40 TABLET, DELAYED RELEASE ORAL 2 TIMES DAILY
Qty: 60 TABLET | Refills: 0 | Status: SHIPPED | OUTPATIENT
Start: 2024-10-25

## 2024-11-01 NOTE — PRE-PROCEDURE INSTRUCTIONS
Reminded of arrival time at  0900  , Entrance C of the [facility] hospital. Instructed to bring or have a  over the age of 18 set up to drive you home the day of procedure.    Instructed on clear liquid diet the day before, nothing red or purple. Call with any questions about the prep or if in need of the prep.  Reminded them not to eat or drink anything am of procedure unless its a sip of water with medications. Hold zepbound 7 days prior to procedure pt states never started. Hold diabetic meds, lisinopril am of procedure. Bring/use inhalers prior to procedure.  Patient verbalized understanding.

## 2024-11-05 ENCOUNTER — TELEPHONE (OUTPATIENT)
Dept: GASTROENTEROLOGY | Facility: CLINIC | Age: 49
End: 2024-11-05
Payer: COMMERCIAL

## 2024-11-05 NOTE — TELEPHONE ENCOUNTER
Caller: QUINN LEDBETTER    Relationship to patient: SELF    Best call back number: [phone]    Chief complaint: R/S COLONOSCOPY     Type of visit: COLONOSCOPY     Requested date:      If rescheduling, when is the original appointment: 11.11    Additional notes:

## 2024-11-08 NOTE — TELEPHONE ENCOUNTER
Karey PEARL John  1975    Patient requested to Reschedule their Colonoscopy. I have offered to reschedule this patient and patient has agreed/declined. Patient has been rescheduled to 12/30/2024.    Reason for cancelling/rescheduling: didnt give one     This procedure was ordered by AMBIKA Williamson for an important reason. We want to inform you that there are risks associated with not proceeding with the procedure at this time such as a delay in diagnosis, risk of incurable disease, or cancer.      Updated clearance needed?: n/a      Is the patient currently on any injectable medications for weight loss or diabetes? Yes- zepbound    If yes, are they aware that they will need to discontinue this 7 days prior to their procedure? Yes/No    Patient verbalized understanding for all of the above information.

## 2024-11-12 ENCOUNTER — LAB (OUTPATIENT)
Dept: LAB | Facility: HOSPITAL | Age: 49
End: 2024-11-12
Payer: COMMERCIAL

## 2024-11-12 ENCOUNTER — CLINICAL SUPPORT (OUTPATIENT)
Dept: FAMILY MEDICINE CLINIC | Age: 49
End: 2024-11-12
Payer: COMMERCIAL

## 2024-11-12 ENCOUNTER — HOSPITAL ENCOUNTER (OUTPATIENT)
Dept: MAMMOGRAPHY | Facility: HOSPITAL | Age: 49
Discharge: HOME | End: 2024-11-12
Payer: COMMERCIAL

## 2024-11-12 DIAGNOSIS — J30.9 ALLERGIC RHINITIS, UNSPECIFIED SEASONALITY, UNSPECIFIED TRIGGER: Primary | ICD-10-CM

## 2024-11-12 DIAGNOSIS — E66.01 OBESITY, CLASS III, BMI 40-49.9 (MORBID OBESITY): ICD-10-CM

## 2024-11-12 DIAGNOSIS — Z12.31 ENCOUNTER FOR SCREENING MAMMOGRAM FOR MALIGNANT NEOPLASM OF BREAST: ICD-10-CM

## 2024-11-12 PROCEDURE — 84425 ASSAY OF VITAMIN B-1: CPT

## 2024-11-12 PROCEDURE — 84630 ASSAY OF ZINC: CPT

## 2024-11-12 PROCEDURE — 84446 ASSAY OF VITAMIN E: CPT

## 2024-11-12 PROCEDURE — 77067 SCR MAMMO BI INCL CAD: CPT

## 2024-11-12 PROCEDURE — 95115 IMMUNOTHERAPY ONE INJECTION: CPT | Performed by: FAMILY MEDICINE

## 2024-11-12 PROCEDURE — 84597 ASSAY OF VITAMIN K: CPT

## 2024-11-12 PROCEDURE — 77063 BREAST TOMOSYNTHESIS BI: CPT

## 2024-11-12 PROCEDURE — 36415 COLL VENOUS BLD VENIPUNCTURE: CPT

## 2024-11-12 PROCEDURE — 84590 ASSAY OF VITAMIN A: CPT

## 2024-11-13 NOTE — PROGRESS NOTES
Your recent mammogram is negative/normal.  Fibroglandular density is not uncommon.  Please continue monthly self breast exams and report any changes.

## 2024-11-15 LAB — ZINC SERPL-MCNC: 118 UG/DL (ref 44–115)

## 2024-11-16 LAB
A-TOCOPHEROL VIT E SERPL-MCNC: 16.2 MG/L (ref 7–25.1)
GAMMA TOCOPHEROL SERPL-MCNC: 3.1 MG/L (ref 0.5–5.5)
PHYTONADIONE SERPL-MCNC: 0.85 NG/ML (ref 0.1–2.2)
VIT A SERPL-MCNC: 58.2 UG/DL (ref 20.1–62)
VIT B1 BLD-SCNC: 120.8 NMOL/L (ref 66.5–200)

## 2024-11-17 DIAGNOSIS — M62.838 MUSCLE SPASM: ICD-10-CM
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
## 2024-12-03 ENCOUNTER — CLINICAL SUPPORT (OUTPATIENT)
Dept: FAMILY MEDICINE CLINIC | Age: 49
End: 2024-12-03
Payer: COMMERCIAL

## 2024-12-03 DIAGNOSIS — J30.9 ALLERGIC RHINITIS, UNSPECIFIED SEASONALITY, UNSPECIFIED TRIGGER: Primary | ICD-10-CM

## 2024-12-03 PROCEDURE — 95115 IMMUNOTHERAPY ONE INJECTION: CPT | Performed by: FAMILY MEDICINE

## 2024-12-04 ENCOUNTER — CLINICAL SUPPORT (OUTPATIENT)
Dept: FAMILY MEDICINE CLINIC | Age: 49
End: 2024-12-04
Payer: COMMERCIAL

## 2024-12-04 DIAGNOSIS — Z23 IMMUNIZATION DUE: ICD-10-CM

## 2024-12-04 DIAGNOSIS — J30.9 ALLERGIC RHINITIS, UNSPECIFIED SEASONALITY, UNSPECIFIED TRIGGER: Primary | ICD-10-CM

## 2024-12-04 PROCEDURE — 90480 ADMN SARSCOV2 VAC 1/ONLY CMP: CPT | Performed by: FAMILY MEDICINE

## 2024-12-04 PROCEDURE — 91320 SARSCV2 VAC 30MCG TRS-SUC IM: CPT | Performed by: FAMILY MEDICINE

## 2024-12-04 PROCEDURE — 90656 IIV3 VACC NO PRSV 0.5 ML IM: CPT | Performed by: FAMILY MEDICINE

## 2024-12-04 PROCEDURE — 90471 IMMUNIZATION ADMIN: CPT | Performed by: FAMILY MEDICINE

## 2024-12-08 ENCOUNTER — PATIENT MESSAGE (OUTPATIENT)
Dept: BARIATRICS/WEIGHT MGMT | Facility: CLINIC | Age: 49
End: 2024-12-08
Payer: COMMERCIAL

## 2024-12-11 ENCOUNTER — OFFICE VISIT (OUTPATIENT)
Dept: FAMILY MEDICINE CLINIC | Age: 49
End: 2024-12-11
Payer: COMMERCIAL

## 2024-12-11 VITALS
WEIGHT: 232 LBS | HEIGHT: 64 IN | BODY MASS INDEX: 39.61 KG/M2 | DIASTOLIC BLOOD PRESSURE: 88 MMHG | OXYGEN SATURATION: 98 % | TEMPERATURE: 97.7 F | HEART RATE: 75 BPM | SYSTOLIC BLOOD PRESSURE: 148 MMHG

## 2024-12-11 DIAGNOSIS — K14.6 TONGUE PAIN: Primary | ICD-10-CM

## 2024-12-11 DIAGNOSIS — J30.9 ALLERGIC RHINITIS, UNSPECIFIED SEASONALITY, UNSPECIFIED TRIGGER: ICD-10-CM

## 2024-12-11 DIAGNOSIS — I10 ESSENTIAL (PRIMARY) HYPERTENSION: ICD-10-CM

## 2024-12-11 PROBLEM — G57.51 TARSAL TUNNEL SYNDROME, RIGHT: Status: ACTIVE | Noted: 2024-09-17

## 2024-12-11 PROCEDURE — 87102 FUNGUS ISOLATION CULTURE: CPT | Performed by: NURSE PRACTITIONER

## 2024-12-11 PROCEDURE — 99213 OFFICE O/P EST LOW 20 MIN: CPT | Performed by: NURSE PRACTITIONER

## 2024-12-11 RX ORDER — CLOBETASOL PROPIONATE 0.05 G/ML
1 SPRAY TOPICAL 2 TIMES DAILY
Qty: 59 ML | Refills: 0 | Status: SHIPPED | OUTPATIENT
Start: 2024-12-11

## 2024-12-11 RX ORDER — NYSTATIN 100000 [USP'U]/ML
SUSPENSION ORAL
COMMUNITY
Start: 2024-11-30 | End: 2024-12-11

## 2024-12-11 NOTE — PROGRESS NOTES
Chief Complaint  Thrush (Patient c/o thrush patient states it keeps coming back )    Subjective          Karey Lopez presents to [facility]     Patient is a 49-year-old female who is here today regarding intermittent discomfort with her tongue that sometimes may exhibit with a localized area of irritation and an intermittent scaly or flakiness present of her tongue that she shows with pictures on her phone.  She has been treated for thrush previously.  Nystatin generally helps for short amount of time.  She did take an oral course of Diflucan within the last 1 year and symptoms seem to have resolved for 2 or 3 months.  She denies any associated symptoms such as hoarseness or any significant dry mouth.  She does not wear dentures and gets regular dental cleanings.  She did not have any symptoms of concern with her last dental cleaning however.  He does feel like cottons in her mouth but her taste is not affected.  She recently completed a course of nystatin about 1 week ago and had received that from urgent care.  She has not been on any recent antibiotics.  She is diabetic and her hemoglobin A1c in August was 6.1.  Diabetes is under good control.  Insurance would not cover GLP-1 medicine such as Mounjaro because her hemoglobin A1c was less than 7.  She had intolerance to Ozempic previously.  Bariatric surgeon, Dr. Benjamin Mahan, plans to start her on Zepbound.  Patient does not administer any inhaled rand course steroids for which she does not rinse her mouth after use.  Denies any problems or concerns with suppressed immune system or previous exposure to HIV.  She states urgent care or the [facility] checked a vitamin B12 level and it was normal.    Patient not currently taking atenolol 1/2 tablet daily as she was previously experiencing some lower blood pressure readings.     Objective   Vital Signs:   Vitals:    12/11/24 0923 12/11/24 1012   BP: 147/71 148/88   BP  "Location: Left arm Left arm   Patient Position: Sitting Sitting   Cuff Size: Large Adult Adult   Pulse: 75    Temp: 97.7 °F (36.5 °C)    TempSrc: Temporal    SpO2: 98%    Weight: 105 kg (232 lb)    Height: 162.6 cm (64\")        Wt Readings from Last 3 Encounters:   12/11/24 105 kg (232 lb)   10/24/24 100 kg (221 lb)   10/10/24 97.9 kg (215 lb 12.8 oz)      BP Readings from Last 3 Encounters:   12/11/24 148/88   10/24/24 140/90   10/10/24 134/65       Body mass index is 39.82 kg/m².             Physical Exam  Vitals reviewed.   Constitutional:       General: She is not in acute distress.     Appearance: Normal appearance. She is well-developed. She is obese.   HENT:      Mouth/Throat:      Mouth: No oral lesions.      Dentition: No gum lesions.      Tongue: No lesions.      Tonsils: No tonsillar exudate.   Cardiovascular:      Rate and Rhythm: Normal rate and regular rhythm.      Heart sounds: Normal heart sounds.   Pulmonary:      Effort: Pulmonary effort is normal.      Breath sounds: Normal breath sounds.   Musculoskeletal:      Right lower leg: No edema.      Left lower leg: No edema.   Skin:     General: Skin is warm and dry.   Neurological:      General: No focal deficit present.      Mental Status: She is alert.   Psychiatric:         Attention and Perception: Attention normal.         Mood and Affect: Mood and affect normal.         Behavior: Behavior normal.           Current Outpatient Medications:     acetaminophen (TYLENOL) 650 MG 8 hr tablet, Take  by mouth Every 8 (Eight) Hours., Disp: , Rfl:     albuterol (PROVENTIL) (2.5 MG/3ML) 0.083% nebulizer solution, Take 2.5 mg by nebulization Every 4 (Four) Hours As Needed for Wheezing., Disp: 60 each, Rfl: 5    albuterol sulfate  (90 Base) MCG/ACT inhaler, Inhale 2 puffs Every 4 (Four) Hours As Needed for Wheezing., Disp: 8 g, Rfl: 5    Blood Glucose Monitoring Suppl (ONE TOUCH ULTRA 2) w/Device kit, 1 each Daily., Disp: 1 each, Rfl: 0    docusate " sodium (Colace) 100 MG capsule, Take 1 capsule by mouth 2 (Two) Times a Day., Disp: 180 capsule, Rfl: 1    DULoxetine (CYMBALTA) 60 MG capsule, Take 1 capsule by mouth Daily., Disp: 90 capsule, Rfl: 0    glucose blood (OneTouch Ultra) test strip, USE 1 EACH TO TEST BLOOD SUGAR DAILY., Disp: 100 each, Rfl: 3    ketoconazole (NIZORAL) 2 % cream, 1 Application., Disp: , Rfl:     Lancets misc, Use 1 each Daily. USE WITH ONE TOUCH METER, Disp: 100 each, Rfl: 1    lidocaine (LIDODERM) 5 %, APPLY 1 PATCH DAILY TOPICALLY REMOVE AND DISCARD PATCH WITHIN 12 HOURS OR AS DIRECTED., Disp: , Rfl:     lisinopril (PRINIVIL,ZESTRIL) 40 MG tablet, Take 1 tablet by mouth Every Night for 180 days., Disp: 90 tablet, Rfl: 1    Magnesium 250 MG tablet, Take 1 tablet by mouth Daily., Disp: , Rfl:     metFORMIN (GLUCOPHAGE) 500 MG tablet, Take 1 tablet by mouth Daily With Breakfast., Disp: 90 tablet, Rfl: 1    montelukast (SINGULAIR) 10 MG tablet, Take 1 tablet by mouth Every Night., Disp: 90 tablet, Rfl: 1    multivitamin (MULTI VITAMIN PO), Take 1 tablet by mouth Daily., Disp: , Rfl:     ondansetron ODT (ZOFRAN-ODT) 8 MG disintegrating tablet, DISSOLVE 1 TABLET ON THE TONGUE EVERY 12 HOURS AS NEEDED FOR NAUSEA OR VOMITING, Disp: 20 tablet, Rfl: 1    pantoprazole (PROTONIX) 40 MG EC tablet, TAKE 1 TABLET BY MOUTH TWICE DAILY (Patient taking differently: Take 1 tablet by mouth Daily.), Disp: 60 tablet, Rfl: 0    polyethylene glycol (GoLYTELY) 236 g solution, Follow office instructions., Disp: 4000 mL, Rfl: 0    pregabalin (LYRICA) 75 MG capsule, Take 1 capsule by mouth 2 (Two) Times a Day., Disp: , Rfl:     tiZANidine (ZANAFLEX) 4 MG tablet, TAKE 1 TABLET EVERY 8 HOURSAS NEEDED FOR MUSCLE       SPASMS. MAY CAUSE          DROWSINESS, Disp: 90 tablet, Rfl: 0    triamcinolone (KENALOG) 0.1 % cream, APPLY TWICE DAILY TO THE AFFECTED AREA ON THE ABDOMEN FOR 2 WEEKS / MONTH AS NEEDED, Disp: , Rfl:     Clobetasol Propionate 0.05 % external spray,  Apply 1 Application topically to the appropriate area as directed 2 (Two) Times a Day. Need in orabase please, Disp: 59 mL, Rfl: 0    ipratropium-albuterol (DUO-NEB) 0.5-2.5 mg/3 ml nebulizer, Take 3 mL by nebulization Every 4 (Four) Hours As Needed for Wheezing for up to 30 days., Disp: 360 mL, Rfl: 0    Tirzepatide-Weight Management (ZEPBOUND) 2.5 MG/0.5ML solution auto-injector, Inject 0.5 mL under the skin into the appropriate area as directed 1 (One) Time Per Week. (Patient not taking: Reported on 12/11/2024), Disp: 1 mL, Rfl: 0    Tirzepatide-Weight Management (ZEPBOUND) 2.5 MG/0.5ML solution auto-injector, Inject 0.5 mL under the skin into the appropriate area as directed 1 (One) Time Per Week. (Patient not taking: Reported on 12/11/2024), Disp: 1 mL, Rfl: 1    Current Facility-Administered Medications:     Allergy Serum Injection, 0.5 mL, Subcutaneous, Once, Sunita Hylton, APRN   Past Medical History:   Diagnosis Date    Acute non-recurrent maxillary sinusitis 05/04/2023    Allergic rhinitis, unspecified     Anxiety     Arthritis     Asthma     Asthma, intrinsic 1985    Cough variant asthma     Diabetes mellitus 06/2022    Dorsalgia, unspecified     Essential (primary) hypertension     Family history of ischemic heart disease and other diseases of the circulatory system     GERD (gastroesophageal reflux disease) 06/27/2023    History of gastric ulcer 08/2023    History of Helicobacter pylori infection 08/2023    Hypertension     Left lower quadrant pain     Leucocytosis 2020    due to chronic inflammation per hematology    Lichen sclerosus 2019    Obesity, unspecified     Other fatigue     Persistent mood (affective) disorder, unspecified     PONV (postoperative nausea and vomiting)     Sepsis     Right knee Sepsis    Sleep apnea, obstructive     WEARS CPAP    Unspecified abdominal pain     Unspecified ovarian cyst, left side     Vitamin D deficiency, unspecified      Allergies   Allergen Reactions     Ozempic (0.25 Or 0.5 Mg-Dose) [Semaglutide(0.25 Or 0.5mg-Dos)] Other (See Comments)     Fatigue and nausea    Vilazodone Hcl Mental Status Change               Result Review :     Common labs          3/11/2024    08:08 8/9/2024    10:16 10/10/2024    09:42   Common Labs   Glucose 120  93     BUN 10  10     Creatinine 0.74  0.65     Sodium 140  137     Potassium 4.4  4.8     Chloride 102  102     Calcium 10.3  10.1     Albumin  4.5     Total Bilirubin  0.3     Alkaline Phosphatase  83     AST (SGOT)  20     ALT (SGPT)  16     WBC   8.34    Hemoglobin   12.9    Hematocrit   39.9    Platelets   400    Total Cholesterol  209     Triglycerides  167     HDL Cholesterol  43     LDL Cholesterol   136     Hemoglobin A1C  6.10     Microalbumin, Urine  <1.2          Mammo Screening Digital Tomosynthesis Bilateral With CAD    Result Date: 11/12/2024  No mammographic evidence of malignancy.  Recommend annual screening mammography.  BI-RADS ASSESSMENT: Category 2: Benign  Note:  It has been reported that there is approximately a 15% false negative rate in mammography.  Therefore, management of a palpable abnormality should not be deferred because of a negative mammogram.  Electronically Signed By-Mumtaz Vela MD On:11/12/2024 4:20 PM               Social History     Tobacco Use   Smoking Status Never    Passive exposure: Never   Smokeless Tobacco Never           Assessment and Plan    Diagnoses and all orders for this visit:    1. Tongue pain (Primary)  Assessment & Plan:  Findings on exam and pictures on her phone do not concern me for thrush.  We will check a fungal culture as a precaution.  Patient does not feel as if she is at risk for HIV he has not had any history of needlestick injuries or activities that would put her at high risk for HIV.  Diabetes is well-controlled.  I cannot rule out lichen planus.  Patient does states she has had lichen sclerosus vaginally reported to her previously but denies any current  symptoms.  See if the pharmacy has an oral version of a an anti-inflammatory clobetasol.  Further treatment recommendations pending fungal culture results.    Orders:  -     Fungus Culture - , Tongue; Future  -     Clobetasol Propionate 0.05 % external spray; Apply 1 Application topically to the appropriate area as directed 2 (Two) Times a Day. Need in orabase please  Dispense: 59 mL; Refill: 0  -     Fungus Culture - , Tongue    2. Allergic rhinitis, unspecified seasonality, unspecified trigger  -     Allergy Serum Injection    3. Essential (primary) hypertension  Assessment & Plan:  Patient to monitor blood pressure at home and keep a blood pressure log and call with updated blood pressure log.  Patient had self stopped atenolol previously.  If elevations persist we may restart atenolol.          Follow Up    No follow-ups on file.  Patient was given instructions and counseling regarding her condition or for health maintenance advice. Please see specific information pulled into the AVS if appropriate.

## 2024-12-11 NOTE — ASSESSMENT & PLAN NOTE
Patient to monitor blood pressure at home and keep a blood pressure log and call with updated blood pressure log.  Patient had self stopped atenolol previously.  If elevations persist we may restart atenolol.

## 2024-12-11 NOTE — ASSESSMENT & PLAN NOTE
Findings on exam and pictures on her phone do not concern me for thrush.  We will check a fungal culture as a precaution.  Patient does not feel as if she is at risk for HIV he has not had any history of needlestick injuries or activities that would put her at high risk for HIV.  Diabetes is well-controlled.  I cannot rule out lichen planus.  Patient does states she has had lichen sclerosus vaginally reported to her previously but denies any current symptoms.  See if the pharmacy has an oral version of a an anti-inflammatory clobetasol.  Further treatment recommendations pending fungal culture results.

## 2024-12-18 LAB — FUNGUS WND CULT: NORMAL

## 2024-12-19 ENCOUNTER — CLINICAL SUPPORT (OUTPATIENT)
Dept: FAMILY MEDICINE CLINIC | Age: 49
End: 2024-12-19
Payer: COMMERCIAL

## 2024-12-19 DIAGNOSIS — J30.9 ALLERGIC RHINITIS, UNSPECIFIED SEASONALITY, UNSPECIFIED TRIGGER: Primary | ICD-10-CM

## 2024-12-19 DIAGNOSIS — K14.6 TONGUE PAIN: Primary | ICD-10-CM

## 2024-12-19 DIAGNOSIS — Q38.3 TONGUE ABNORMALITY: ICD-10-CM

## 2024-12-19 PROCEDURE — 95115 IMMUNOTHERAPY ONE INJECTION: CPT | Performed by: FAMILY MEDICINE

## 2024-12-19 NOTE — TELEPHONE ENCOUNTER
"YESENIAI -     Called patient in regards to recent my chart message.    Patients PA that was submitted for Zepbound was still pending as of today so I called the number attached to the PA. ([phone])    Was then transferred to [employer]. ([phone])    Spoke to Sue. Patients PA has not been denied or approved as of yet due to a fax that was sent to us that needs to be filled out by provider. (We do have the form)    Zepbound is not on the preferred list of medications. Wegovy and Saxenda are. Patient has had to tried either one of these medications before being on zepbound.    Sue said once form is filled out based on what option provider chooses then the PA will be updated. If provider wants patient to be on zepbound it will have to go to clinical review in which they already have the clinicals that were provided by our office.    Called and gave patient this update.  She has not been on wegovy or saxenda. She has only been on Ozempic. She stated that she had very severe side effects  while being on Ozempic which resulted in hair loss, fatigue and nausea. She stated once she stopped Ozempic her side effects resolved. She is now on metformin. She was going to try Mounjaro but was told that her A1C has to be 7 or more and hers was not.    She does not want to try wegovy.  She is frustrated over this whole thing with her insurance. I told her we would give Dr Mahan the form for him to fill out. If he chooses to keep her on zepbound then it will go to clinical review. Her ins will let us know the outcome.    Adding this to message - for all [employer] employees that have the alpha prefix \"R\" (this patient does) on there ins that want to be on wegovy or saxenda there is a form that can be filled out if you go to www.[employer].org to start the PA    Patient voiced understanding to all.   "

## 2024-12-25 LAB — FUNGUS WND CULT: NORMAL

## 2024-12-27 ENCOUNTER — ANESTHESIA EVENT (OUTPATIENT)
Dept: GASTROENTEROLOGY | Facility: HOSPITAL | Age: 49
End: 2024-12-27
Payer: COMMERCIAL

## 2024-12-27 NOTE — ANESTHESIA PREPROCEDURE EVALUATION
Anesthesia Evaluation     Patient summary reviewed and Nursing notes reviewed   history of anesthetic complications:  PONV  NPO Solid Status: > 8 hours  NPO Liquid Status: > 2 hours           Airway   Mallampati: I  TM distance: >3 FB  Neck ROM: full  Large neck circumference  Dental          Pulmonary - normal exam   (+) a smoker, COPD, asthma,sleep apnea on CPAP  Cardiovascular - normal exam  Exercise tolerance: good (4-7 METS)    ECG reviewed    (+) hypertension    ROS comment: Sinus rhythm  Short ME interval  Low voltage, precordial leads  Abnormal R-wave progression, early transition  No change from previous tracing  Electronically Signed By: Hilda Araya ([facility]) 26-Jun-2023 11:55:57  Date and Time of Study: 2023-06-26 11:32:21      Neuro/Psych  (+) numbness, psychiatric history Anxiety and Depression  GI/Hepatic/Renal/Endo    (+) obesity, morbid obesity, GERD well controlled, PUD, GI bleeding , diabetes mellitus type 2, thyroid problem     ROS Comment: 2023 Gastric sleeve    Musculoskeletal     (+) back pain  Abdominal  - normal exam   Substance History      OB/GYN          Other   arthritis,                 Anesthesia Plan    ASA 3     general   total IV anesthesia  (Total IV Anesthesia    Patient understands anesthesia not responsible for dental damage.  Risks explained including allergic reactions, BP, HR, O2 changes, aspiration, advanced airway placement. Pt verbalized understanding. )  intravenous induction     Anesthetic plan, risks, benefits, and alternatives have been provided, discussed and informed consent has been obtained with: patient.    Plan discussed with CRNA.    CODE STATUS:

## 2024-12-30 ENCOUNTER — HOSPITAL ENCOUNTER (OUTPATIENT)
Facility: HOSPITAL | Age: 49
Setting detail: HOSPITAL OUTPATIENT SURGERY
Discharge: HOME | End: 2024-12-30
Attending: INTERNAL MEDICINE | Admitting: INTERNAL MEDICINE
Payer: COMMERCIAL

## 2024-12-30 ENCOUNTER — ANESTHESIA (OUTPATIENT)
Dept: GASTROENTEROLOGY | Facility: HOSPITAL | Age: 49
End: 2024-12-30
Payer: COMMERCIAL

## 2024-12-30 VITALS
WEIGHT: 235.45 LBS | OXYGEN SATURATION: 100 % | RESPIRATION RATE: 18 BRPM | DIASTOLIC BLOOD PRESSURE: 72 MMHG | BODY MASS INDEX: 40.42 KG/M2 | TEMPERATURE: 98.6 F | SYSTOLIC BLOOD PRESSURE: 155 MMHG | HEART RATE: 65 BPM

## 2024-12-30 DIAGNOSIS — Z12.11 ENCOUNTER FOR SCREENING FOR MALIGNANT NEOPLASM OF COLON: ICD-10-CM

## 2024-12-30 LAB — GLUCOSE BLDC GLUCOMTR-MCNC: 129 MG/DL (ref 70–99)

## 2024-12-30 PROCEDURE — 45380 COLONOSCOPY AND BIOPSY: CPT | Performed by: INTERNAL MEDICINE

## 2024-12-30 PROCEDURE — 25010000002 LIDOCAINE PF 2% 2 % SOLUTION: Performed by: NURSE ANESTHETIST, CERTIFIED REGISTERED

## 2024-12-30 PROCEDURE — 25010000002 PROPOFOL 10 MG/ML EMULSION: Performed by: NURSE ANESTHETIST, CERTIFIED REGISTERED

## 2024-12-30 PROCEDURE — 82948 REAGENT STRIP/BLOOD GLUCOSE: CPT | Performed by: NURSE ANESTHETIST, CERTIFIED REGISTERED

## 2024-12-30 PROCEDURE — 88305 TISSUE EXAM BY PATHOLOGIST: CPT | Performed by: INTERNAL MEDICINE

## 2024-12-30 PROCEDURE — 25810000003 LACTATED RINGERS PER 1000 ML: Performed by: NURSE ANESTHETIST, CERTIFIED REGISTERED

## 2024-12-30 RX ORDER — SODIUM CHLORIDE, SODIUM LACTATE, POTASSIUM CHLORIDE, CALCIUM CHLORIDE 600; 310; 30; 20 MG/100ML; MG/100ML; MG/100ML; MG/100ML
30 INJECTION, SOLUTION INTRAVENOUS CONTINUOUS
Status: DISCONTINUED | OUTPATIENT
Start: 2024-12-30 | End: 2024-12-30 | Stop reason: HOSPADM

## 2024-12-30 RX ORDER — LIDOCAINE HYDROCHLORIDE 20 MG/ML
INJECTION, SOLUTION EPIDURAL; INFILTRATION; INTRACAUDAL; PERINEURAL AS NEEDED
Status: DISCONTINUED | OUTPATIENT
Start: 2024-12-30 | End: 2024-12-30 | Stop reason: SURG

## 2024-12-30 RX ORDER — PROPOFOL 10 MG/ML
VIAL (ML) INTRAVENOUS AS NEEDED
Status: DISCONTINUED | OUTPATIENT
Start: 2024-12-30 | End: 2024-12-30 | Stop reason: SURG

## 2024-12-30 RX ADMIN — LIDOCAINE HYDROCHLORIDE 60 MG: 20 INJECTION, SOLUTION INTRAVENOUS at 07:42

## 2024-12-30 RX ADMIN — PROPOFOL 100 MG: 10 INJECTION, EMULSION INTRAVENOUS at 07:42

## 2024-12-30 RX ADMIN — SODIUM CHLORIDE, POTASSIUM CHLORIDE, SODIUM LACTATE AND CALCIUM CHLORIDE: 600; 310; 30; 20 INJECTION, SOLUTION INTRAVENOUS at 07:38

## 2024-12-30 RX ADMIN — PROPOFOL 200 MCG/KG/MIN: 10 INJECTION, EMULSION INTRAVENOUS at 07:43

## 2024-12-30 NOTE — H&P
Pre Procedure History & Physical    Chief Complaint: Colonoscopy    HPI: Patient is 49 years old obese female with known history of sleep apnea on CPAP, diabetes melitis presented today for open access average risk screening colonoscopy.  Patient never had a screening colonoscopy.  She denies family history of colorectal cancer or colon polyps.  She does not take any blood thinner or anticoagulant medications.  Patient denies obvious GI symptoms of bleed, altered bowel habits, unintentional significant weight loss or poor appetite    Past Medical History:   Past Medical History:   Diagnosis Date    Acute non-recurrent maxillary sinusitis 05/04/2023    Allergic rhinitis, unspecified     Anxiety     Arthritis     Asthma     Asthma, intrinsic 1985    Cough variant asthma     Diabetes mellitus 06/2022    Dorsalgia, unspecified     Essential (primary) hypertension     Family history of ischemic heart disease and other diseases of the circulatory system     GERD (gastroesophageal reflux disease) 06/27/2023    History of gastric ulcer 08/2023    History of Helicobacter pylori infection 08/2023    Hypertension     Left lower quadrant pain     Leucocytosis 2020    due to chronic inflammation per hematology    Lichen sclerosus 2019    Obesity, unspecified     Other fatigue     Persistent mood (affective) disorder, unspecified     PONV (postoperative nausea and vomiting)     Sepsis     Right knee Sepsis    Sleep apnea, obstructive     WEARS CPAP    Unspecified abdominal pain     Unspecified ovarian cyst, left side     Vitamin D deficiency, unspecified        Past Surgical History:  Past Surgical History:   Procedure Laterality Date    COLONOSCOPY  01/2010    normal    ENDOSCOPY  01/2010, 01/2018 1/2010- small hernia and 01/2018 mild gastitis    ENDOSCOPY N/A 08/01/2023    Procedure: ESOPHAGOGASTRODUODENOSCOPY WITH BIOPSY;  Surgeon: Reyes Ngo Jr., MD;  Location: [facility] ENDOSCOPY;  Service: General;  Laterality:  N/A;  PRE- DYSPEPSIA, H/O BAND REMOVAL  POST- GASTRITIS, GASTRIC ULCER    ENDOSCOPY N/A 10/31/2023    Procedure: ESOPHAGOGASTRODUODENOSCOPY WITH BIOPSY;  Surgeon: Reyes Ngo Jr., MD;  Location: [facility] ENDOSCOPY;  Service: General;  Laterality: N/A;  PRE- H/O GASTRIC ULCER   POST- GASTRITIS    FOOT SURGERY Right 11/2015, 4/28/2017    FRACTURE SURGERY Right 11/2014, 3/2015    ankle    GALLBLADDER SURGERY  1992    GASTRIC BANDING REMOVAL  2019    GASTRIC SLEEVE LAPAROSCOPIC N/A 11/15/2023    Procedure: GASTRIC SLEEVE LAPAROSCOPIC Conversion; Lysis of Adhesions;  Surgeon: Reyes Ngo Jr., MD;  Location:  [facility];  Service: Bariatric;  Laterality: N/A;    KNEE ARTHROPLASTY Right 12/10/2019    KNEE ARTHROSCOPY Right 01/30/2020    Procedure: KNEE ARTHROSCOPY WITH INCISION AND DRAINAGE;  Surgeon: Fareed Chacon MD;  Location: [facility] MAIN OR;  Service: Orthopedics    LAPAROSCOPIC GASTRIC BANDING  07/2010    and was removed july 2019; scar tissue    LAPAROSCOPIC TUBAL LIGATION      MOUTH SURGERY      wisdom teeth removal    TUBAL ABDOMINAL LIGATION         Family History:  Family History   Problem Relation Age of Onset    Asthma Mother     Heart disease Mother     Coronary artery disease Mother     Diabetes type II Mother     Diabetes Mother     Hypertension Mother     Stroke Father     Hypertension Father     Obesity Brother     Heart attack Brother         MI    Hypertension Brother     Cervical cancer Maternal Grandmother     Lung cancer Maternal Grandfather     Stroke Paternal Grandmother     Malig Hyperthermia Neg Hx     Colon cancer Neg Hx        Social History:   reports that she has never smoked. She has never been exposed to tobacco smoke. She has never used smokeless tobacco. She reports that she does not drink alcohol and does not use drugs.    Medications:   Facility-Administered Medications Prior to Admission   Medication Dose Route Frequency Provider Last Rate Last Admin    Allergy Serum  Injection  0.5 mL Subcutaneous Once Sunita Hylton APRN         Medications Prior to Admission   Medication Sig Dispense Refill Last Dose/Taking    DULoxetine (CYMBALTA) 60 MG capsule Take 1 capsule by mouth Daily. 90 capsule 0 12/29/2024    lisinopril (PRINIVIL,ZESTRIL) 40 MG tablet Take 1 tablet by mouth Every Night for 180 days. 90 tablet 1 12/29/2024    Magnesium 250 MG tablet Take 1 tablet by mouth Daily.   12/29/2024    metFORMIN (GLUCOPHAGE) 500 MG tablet Take 1 tablet by mouth Daily With Breakfast. 90 tablet 1 12/29/2024    montelukast (SINGULAIR) 10 MG tablet Take 1 tablet by mouth Every Night. 90 tablet 1 12/29/2024    multivitamin (MULTI VITAMIN PO) Take 1 tablet by mouth Daily.   12/29/2024    pantoprazole (PROTONIX) 40 MG EC tablet TAKE 1 TABLET BY MOUTH TWICE DAILY (Patient taking differently: Take 1 tablet by mouth Daily.) 60 tablet 0 12/29/2024    pregabalin (LYRICA) 75 MG capsule Take 1 capsule by mouth 2 (Two) Times a Day.   12/29/2024    tiZANidine (ZANAFLEX) 4 MG tablet TAKE 1 TABLET EVERY 8 HOURSAS NEEDED FOR MUSCLE       SPASMS. MAY CAUSE          DROWSINESS 90 tablet 0 12/29/2024    acetaminophen (TYLENOL) 650 MG 8 hr tablet Take  by mouth Every 8 (Eight) Hours.       albuterol (PROVENTIL) (2.5 MG/3ML) 0.083% nebulizer solution Take 2.5 mg by nebulization Every 4 (Four) Hours As Needed for Wheezing. 60 each 5 Unknown    albuterol sulfate  (90 Base) MCG/ACT inhaler Inhale 2 puffs Every 4 (Four) Hours As Needed for Wheezing. 8 g 5 Unknown    Blood Glucose Monitoring Suppl (ONE TOUCH ULTRA 2) w/Device kit 1 each Daily. 1 each 0     Clobetasol Propionate 0.05 % external spray Apply 1 Application topically to the appropriate area as directed 2 (Two) Times a Day. Need in orabase please 59 mL 0     docusate sodium (Colace) 100 MG capsule Take 1 capsule by mouth 2 (Two) Times a Day. 180 capsule 1     glucose blood (OneTouch Ultra) test strip USE 1 EACH TO TEST BLOOD SUGAR DAILY. 100 each 3      ipratropium-albuterol (DUO-NEB) 0.5-2.5 mg/3 ml nebulizer Take 3 mL by nebulization Every 4 (Four) Hours As Needed for Wheezing for up to 30 days. 360 mL 0     ketoconazole (NIZORAL) 2 % cream 1 Application.       Lancets misc Use 1 each Daily. USE WITH ONE TOUCH METER 100 each 1     lidocaine (LIDODERM) 5 % APPLY 1 PATCH DAILY TOPICALLY REMOVE AND DISCARD PATCH WITHIN 12 HOURS OR AS DIRECTED.       ondansetron ODT (ZOFRAN-ODT) 8 MG disintegrating tablet DISSOLVE 1 TABLET ON THE TONGUE EVERY 12 HOURS AS NEEDED FOR NAUSEA OR VOMITING 20 tablet 1     Tirzepatide-Weight Management (ZEPBOUND) 2.5 MG/0.5ML solution auto-injector Inject 0.5 mL under the skin into the appropriate area as directed 1 (One) Time Per Week. (Patient not taking: Reported on 12/11/2024) 1 mL 0     Tirzepatide-Weight Management (ZEPBOUND) 2.5 MG/0.5ML solution auto-injector Inject 0.5 mL under the skin into the appropriate area as directed 1 (One) Time Per Week. (Patient not taking: Reported on 12/11/2024) 1 mL 1     triamcinolone (KENALOG) 0.1 % cream APPLY TWICE DAILY TO THE AFFECTED AREA ON THE ABDOMEN FOR 2 WEEKS / MONTH AS NEEDED          Allergies:  Ozempic (0.25 or 0.5 mg-dose) [semaglutide(0.25 or 0.5mg-dos)] and Vilazodone hcl      Objective     Blood pressure 138/70, pulse 68, temperature 97.5 °F (36.4 °C), temperature source Temporal, resp. rate 16, weight 107 kg (235 lb 7.2 oz), SpO2 99%.    Physical Exam   Constitutional: Pt is oriented to person, place, and time and well-developed, well-nourished, and in no distress.   Mouth/Throat: Oropharynx is clear and moist.   Neck: Normal range of motion.   Cardiovascular: Normal rate, regular rhythm and normal heart sounds.    Pulmonary/Chest: Effort normal and breath sounds normal.   Abdominal: Soft. Nontender  Skin: Skin is warm and dry.   Psychiatric: Mood, memory, affect and judgment normal.     Assessment & Plan     Diagnosis:    Average risk screening colonoscopy    Anticipated  Surgical Procedure:    Colonoscopy    The risks, benefits, and alternatives of this procedure have been discussed with the patient or the responsible party- the patient understands and agrees to proceed.        Electronically signed by Ramon Ingram MD, 12/30/24, 7:33 AM EST.

## 2024-12-30 NOTE — ANESTHESIA POSTPROCEDURE EVALUATION
Patient: Karey Lopez    Procedure Summary       Date: 12/30/24 Room / Location: [facility] ENDOSCOPY 5 / [facility] ENDOSCOPY    Anesthesia Start: 0738 Anesthesia Stop: 0820    Procedure: COLONOSCOPY with forcep polypectomies Diagnosis:       Encounter for screening for malignant neoplasm of colon      (Encounter for screening for malignant neoplasm of colon [Z12.11])    Surgeons: Ramon Ingram MD Provider: Beverly Alejandro CRNA    Anesthesia Type: general ASA Status: 3            Anesthesia Type: general    Vitals  Vitals Value Taken Time   /72 12/30/24 0839   Temp 37 °C (98.6 °F) 12/30/24 0839   Pulse 65 12/30/24 0839   Resp 18 12/30/24 0839   SpO2 100 % 12/30/24 0839           Post Anesthesia Care and Evaluation    Post-procedure mental status: acceptable.  Pain management: satisfactory to patient    Airway patency: patent  Anesthetic complications: No anesthetic complications    Cardiovascular status: acceptable  Respiratory status: acceptable    Comments: Per chart review

## 2024-12-31 LAB
CYTO UR: NORMAL
LAB AP CASE REPORT: NORMAL
LAB AP CLINICAL INFORMATION: NORMAL
PATH REPORT.FINAL DX SPEC: NORMAL
PATH REPORT.GROSS SPEC: NORMAL

## 2025-01-01 LAB — FUNGUS WND CULT: NORMAL

## 2025-01-08 ENCOUNTER — CLINICAL SUPPORT (OUTPATIENT)
Dept: FAMILY MEDICINE CLINIC | Age: 50
End: 2025-01-08
Payer: COMMERCIAL

## 2025-01-08 ENCOUNTER — TELEPHONE (OUTPATIENT)
Dept: GASTROENTEROLOGY | Facility: CLINIC | Age: 50
End: 2025-01-08
Payer: COMMERCIAL

## 2025-01-08 DIAGNOSIS — J30.9 ALLERGIC RHINITIS, UNSPECIFIED SEASONALITY, UNSPECIFIED TRIGGER: Primary | ICD-10-CM

## 2025-01-08 DIAGNOSIS — M62.838 MUSCLE SPASM: ICD-10-CM

## 2025-01-08 DIAGNOSIS — F32.A DEPRESSION, UNSPECIFIED DEPRESSION TYPE: ICD-10-CM

## 2025-01-08 DIAGNOSIS — K59.00 CONSTIPATION, UNSPECIFIED CONSTIPATION TYPE: Primary | ICD-10-CM

## 2025-01-08 LAB — FUNGUS WND CULT: NORMAL

## 2025-01-08 PROCEDURE — 95115 IMMUNOTHERAPY ONE INJECTION: CPT | Performed by: FAMILY MEDICINE

## 2025-01-08 RX ORDER — POLYETHYLENE GLYCOL 3350 17 G/17G
17 POWDER, FOR SOLUTION ORAL 2 TIMES DAILY
Qty: 60 PACKET | Refills: 0 | Status: SHIPPED | OUTPATIENT
Start: 2025-01-08 | End: 2025-02-07

## 2025-01-08 NOTE — TELEPHONE ENCOUNTER
Start Miralax (one 17 gram scoop dissolved in 8 ounces of liquid  twice daily), this is available over the counter but I also sent as a prescription to [facility] in [place].  Continue Docusate stool softener  (Colace) 100 mg twice daily.    Patient to follow a healthy high fiber diet with plenty of fruits, vegetables, and use adequate liquids.

## 2025-01-08 NOTE — TELEPHONE ENCOUNTER
----- Message from Claudine PEREZ sent at 1/8/2025  3:47 PM EST -----  Called pt to review results - having complaints of constipation when only taking colace. She has a follow up scheduled for 1.31.25 but wanted to see if you wanted to do anything in the mean time    Recall Entered   Updated  Recall letter mailed  REFERRAL UPDATED

## 2025-01-09 RX ORDER — DULOXETIN HYDROCHLORIDE 60 MG/1
60 CAPSULE, DELAYED RELEASE ORAL DAILY
Qty: 90 CAPSULE | Refills: 0 | Status: SHIPPED | OUTPATIENT
Start: 2025-01-09

## 2025-01-23 ENCOUNTER — CLINICAL SUPPORT (OUTPATIENT)
Dept: FAMILY MEDICINE CLINIC | Age: 50
End: 2025-01-23
Payer: COMMERCIAL

## 2025-01-23 DIAGNOSIS — J30.9 ALLERGIC RHINITIS, UNSPECIFIED SEASONALITY, UNSPECIFIED TRIGGER: Primary | ICD-10-CM

## 2025-01-23 PROCEDURE — 95115 IMMUNOTHERAPY ONE INJECTION: CPT | Performed by: INTERNAL MEDICINE

## 2025-02-12 ENCOUNTER — LAB (OUTPATIENT)
Dept: LAB | Facility: HOSPITAL | Age: 50
End: 2025-02-12
Payer: COMMERCIAL

## 2025-02-12 ENCOUNTER — OFFICE VISIT (OUTPATIENT)
Dept: FAMILY MEDICINE CLINIC | Age: 50
End: 2025-02-12
Payer: COMMERCIAL

## 2025-02-12 VITALS
BODY MASS INDEX: 40.97 KG/M2 | TEMPERATURE: 97.8 F | WEIGHT: 240 LBS | SYSTOLIC BLOOD PRESSURE: 144 MMHG | HEART RATE: 66 BPM | OXYGEN SATURATION: 99 % | HEIGHT: 64 IN | DIASTOLIC BLOOD PRESSURE: 78 MMHG

## 2025-02-12 DIAGNOSIS — Z23 NEED FOR DIPHTHERIA-TETANUS-PERTUSSIS (TDAP) VACCINE: Primary | ICD-10-CM

## 2025-02-12 DIAGNOSIS — J30.9 ALLERGIC RHINITIS, UNSPECIFIED SEASONALITY, UNSPECIFIED TRIGGER: ICD-10-CM

## 2025-02-12 DIAGNOSIS — E11.9 TYPE 2 DIABETES MELLITUS WITHOUT COMPLICATION, WITHOUT LONG-TERM CURRENT USE OF INSULIN: ICD-10-CM

## 2025-02-12 DIAGNOSIS — F32.A DEPRESSION, UNSPECIFIED DEPRESSION TYPE: ICD-10-CM

## 2025-02-12 DIAGNOSIS — Z11.59 SCREENING FOR VIRAL DISEASE: ICD-10-CM

## 2025-02-12 DIAGNOSIS — Z87.11 HISTORY OF GASTRIC ULCER: ICD-10-CM

## 2025-02-12 DIAGNOSIS — M62.838 MUSCLE SPASM: ICD-10-CM

## 2025-02-12 DIAGNOSIS — I10 ESSENTIAL (PRIMARY) HYPERTENSION: ICD-10-CM

## 2025-02-12 LAB
ALBUMIN SERPL-MCNC: 4 G/DL (ref 3.5–5.2)
ALBUMIN/GLOB SERPL: 1.3 G/DL
ALP SERPL-CCNC: 68 U/L (ref 39–117)
ALT SERPL W P-5'-P-CCNC: 11 U/L (ref 1–33)
ANION GAP SERPL CALCULATED.3IONS-SCNC: 11.4 MMOL/L (ref 5–15)
AST SERPL-CCNC: 15 U/L (ref 1–32)
BILIRUB SERPL-MCNC: 0.3 MG/DL (ref 0–1.2)
BUN SERPL-MCNC: 12 MG/DL (ref 6–20)
BUN/CREAT SERPL: 16.4 (ref 7–25)
CALCIUM SPEC-SCNC: 10.2 MG/DL (ref 8.6–10.5)
CHLORIDE SERPL-SCNC: 103 MMOL/L (ref 98–107)
CHOLEST SERPL-MCNC: 211 MG/DL (ref 0–200)
CO2 SERPL-SCNC: 21.6 MMOL/L (ref 22–29)
CREAT SERPL-MCNC: 0.73 MG/DL (ref 0.57–1)
EGFRCR SERPLBLD CKD-EPI 2021: 100.3 ML/MIN/1.73
GLOBULIN UR ELPH-MCNC: 3.2 GM/DL
GLUCOSE SERPL-MCNC: 109 MG/DL (ref 65–99)
HBA1C MFR BLD: 6.1 % (ref 4.8–5.6)
HCV AB SER QL: NORMAL
HDLC SERPL-MCNC: 54 MG/DL (ref 40–60)
LDLC SERPL CALC-MCNC: 124 MG/DL (ref 0–100)
LDLC/HDLC SERPL: 2.21 {RATIO}
POTASSIUM SERPL-SCNC: 4.7 MMOL/L (ref 3.5–5.2)
PROT SERPL-MCNC: 7.2 G/DL (ref 6–8.5)
SODIUM SERPL-SCNC: 136 MMOL/L (ref 136–145)
TRIGL SERPL-MCNC: 187 MG/DL (ref 0–150)
VLDLC SERPL-MCNC: 33 MG/DL (ref 5–40)

## 2025-02-12 PROCEDURE — 90471 IMMUNIZATION ADMIN: CPT | Performed by: NURSE PRACTITIONER

## 2025-02-12 PROCEDURE — 86803 HEPATITIS C AB TEST: CPT

## 2025-02-12 PROCEDURE — 90715 TDAP VACCINE 7 YRS/> IM: CPT | Performed by: NURSE PRACTITIONER

## 2025-02-12 PROCEDURE — 36415 COLL VENOUS BLD VENIPUNCTURE: CPT

## 2025-02-12 PROCEDURE — 80053 COMPREHEN METABOLIC PANEL: CPT

## 2025-02-12 PROCEDURE — 80061 LIPID PANEL: CPT

## 2025-02-12 PROCEDURE — 99214 OFFICE O/P EST MOD 30 MIN: CPT | Performed by: NURSE PRACTITIONER

## 2025-02-12 PROCEDURE — 83036 HEMOGLOBIN GLYCOSYLATED A1C: CPT

## 2025-02-12 RX ORDER — MONTELUKAST SODIUM 10 MG/1
10 TABLET ORAL NIGHTLY
Qty: 90 TABLET | Refills: 1 | Status: SHIPPED | OUTPATIENT
Start: 2025-02-12

## 2025-02-12 RX ORDER — LISINOPRIL 40 MG/1
40 TABLET ORAL NIGHTLY
Qty: 90 TABLET | Refills: 1 | Status: SHIPPED | OUTPATIENT
Start: 2025-02-12 | End: 2025-08-11

## 2025-02-12 RX ORDER — ATENOLOL 50 MG/1
TABLET ORAL
Qty: 90 TABLET | Refills: 1 | Status: SHIPPED | OUTPATIENT
Start: 2025-02-12

## 2025-02-12 RX ORDER — DULOXETIN HYDROCHLORIDE 60 MG/1
60 CAPSULE, DELAYED RELEASE ORAL DAILY
Qty: 90 CAPSULE | Refills: 1 | Status: SHIPPED | OUTPATIENT
Start: 2025-02-12

## 2025-02-12 RX ORDER — ONDANSETRON 4 MG/1
4 TABLET, FILM COATED ORAL EVERY 8 HOURS PRN
COMMUNITY
Start: 2025-01-29 | End: 2025-02-19

## 2025-02-12 RX ORDER — ACETAMINOPHEN AND CODEINE PHOSPHATE 300; 30 MG/1; MG/1
TABLET ORAL
COMMUNITY
Start: 2025-01-29 | End: 2025-02-12

## 2025-02-12 NOTE — PROGRESS NOTES
"Chief Complaint  Diabetes (6 month follow up ), Hypertension, Depression, and Irritable Bowel Syndrome    Subjective          Karey Lopez presents to [facility]     Patient is a 50-year-old female who is here today for follow-up regarding hypertension.  Current treatment is lisinopril 40 mg daily.  Denies side effects and requests refills.  Did previously take atenolol one half of a 25 mg tablet previously for irregular heartbeat but discontinued use when she was having some hypotension.  Denies any current hypotension.  Blood pressure has been 130/85 or lower at home.    Last hemoglobin A1c was 6.1 in August.  For type 2 diabetes she is currently taking metformin 500 mg once per day.  Previous intolerance to Ozempic and hemoglobin A1c is too low to justify using Mounjaro.  Currently seeing bariatrics and Zepbound was denied coverage by insurance.  Insurance wants her to try Wegovy or Saxenda first.  Denies any low blood sugar readings.    Has seen an advanced ear nose and throat regarding recurrent tongue symptomatology.  ENT feels as if it is geographic tongue.  We have ruled out recurrent thrush.  ENT does not feel as if it is lichen but states they will do a biopsy next time symptoms present themselves.  Patient is reporting some improvement with starting a probiotic.    Allergy symptoms are stable on singular 10 mg daily.  Denies side effects and requests refills.     Objective   Vital Signs:   Vitals:    02/12/25 0803 02/12/25 0846   BP: 147/77 144/78   BP Location: Left arm Left arm   Patient Position: Sitting Sitting   Cuff Size: Large Adult Large Adult   Pulse: 66    Temp: 97.8 °F (36.6 °C)    TempSrc: Temporal    SpO2: 99%    Weight: 109 kg (240 lb)    Height: 162.6 cm (64\")        Wt Readings from Last 3 Encounters:   02/12/25 109 kg (240 lb)   12/30/24 107 kg (235 lb 7.2 oz)   12/11/24 105 kg (232 lb)      BP Readings from Last 3 Encounters:   02/12/25 144/78 "   12/30/24 155/72   12/11/24 148/88       Body mass index is 41.2 kg/m².             Physical Exam  Vitals reviewed.   Constitutional:       General: She is not in acute distress.     Appearance: Normal appearance. She is well-developed. She is obese.   Neck:      Thyroid: No thyromegaly.   Cardiovascular:      Rate and Rhythm: Normal rate and regular rhythm.      Pulses:           Dorsalis pedis pulses are 2+ on the right side and 2+ on the left side.        Posterior tibial pulses are 2+ on the right side and 2+ on the left side.      Heart sounds: Normal heart sounds.   Pulmonary:      Effort: Pulmonary effort is normal.      Breath sounds: Normal breath sounds.   Musculoskeletal:      Right lower leg: No edema.      Left lower leg: No edema.   Feet:      Right foot:      Protective Sensation: 3 sites tested.  3 sites sensed.      Skin integrity: Skin integrity normal. No ulcer or blister.      Toenail Condition: Right toenails are normal.      Left foot:      Protective Sensation: 3 sites tested.  3 sites sensed.      Skin integrity: Skin integrity normal. No ulcer or blister.      Toenail Condition: Left toenails are normal.      Comments:      Skin:     General: Skin is warm and dry.   Neurological:      General: No focal deficit present.      Mental Status: She is alert.   Psychiatric:         Attention and Perception: Attention normal.         Mood and Affect: Mood and affect normal.         Behavior: Behavior normal.           Current Outpatient Medications:     acetaminophen (TYLENOL) 650 MG 8 hr tablet, Take  by mouth Every 8 (Eight) Hours., Disp: , Rfl:     albuterol (PROVENTIL) (2.5 MG/3ML) 0.083% nebulizer solution, Take 2.5 mg by nebulization Every 4 (Four) Hours As Needed for Wheezing., Disp: 60 each, Rfl: 5    albuterol sulfate  (90 Base) MCG/ACT inhaler, Inhale 2 puffs Every 4 (Four) Hours As Needed for Wheezing., Disp: 8 g, Rfl: 5    Blood Glucose Monitoring Suppl (ONE TOUCH ULTRA 2)  w/Device kit, 1 each Daily., Disp: 1 each, Rfl: 0    Clobetasol Propionate 0.05 % external spray, Apply 1 Application topically to the appropriate area as directed 2 (Two) Times a Day. Need in orabase please, Disp: 59 mL, Rfl: 0    docusate sodium (Colace) 100 MG capsule, Take 1 capsule by mouth 2 (Two) Times a Day., Disp: 180 capsule, Rfl: 1    DULoxetine (CYMBALTA) 60 MG capsule, Take 1 capsule by mouth Daily., Disp: 90 capsule, Rfl: 1    glucose blood (OneTouch Ultra) test strip, USE 1 EACH TO TEST BLOOD SUGAR DAILY., Disp: 100 each, Rfl: 3    ketoconazole (NIZORAL) 2 % cream, 1 Application., Disp: , Rfl:     Lancets misc, Use 1 each Daily. USE WITH ONE TOUCH METER, Disp: 100 each, Rfl: 1    lidocaine (LIDODERM) 5 %, APPLY 1 PATCH DAILY TOPICALLY REMOVE AND DISCARD PATCH WITHIN 12 HOURS OR AS DIRECTED., Disp: , Rfl:     lisinopril (PRINIVIL,ZESTRIL) 40 MG tablet, Take 1 tablet by mouth Every Night for 180 days., Disp: 90 tablet, Rfl: 1    Magnesium 250 MG tablet, Take 1 tablet by mouth Daily., Disp: , Rfl:     metFORMIN (GLUCOPHAGE) 500 MG tablet, Take 1 tablet by mouth Daily With Breakfast., Disp: 90 tablet, Rfl: 1    montelukast (SINGULAIR) 10 MG tablet, Take 1 tablet by mouth Every Night., Disp: 90 tablet, Rfl: 1    multivitamin (MULTI VITAMIN PO), Take 1 tablet by mouth Daily., Disp: , Rfl:     ondansetron ODT (ZOFRAN-ODT) 8 MG disintegrating tablet, DISSOLVE 1 TABLET ON THE TONGUE EVERY 12 HOURS AS NEEDED FOR NAUSEA OR VOMITING, Disp: 20 tablet, Rfl: 1    pantoprazole (PROTONIX) 40 MG EC tablet, Take 1 tablet by mouth Daily., Disp: , Rfl:     pregabalin (LYRICA) 75 MG capsule, Take 1 capsule by mouth 2 (Two) Times a Day., Disp: , Rfl:     tiZANidine (ZANAFLEX) 4 MG tablet, Take 1 tablet by mouth Every 8 (Eight) Hours As Needed for Muscle Spasms., Disp: 90 tablet, Rfl: 1    triamcinolone (KENALOG) 0.1 % cream, APPLY TWICE DAILY TO THE AFFECTED AREA ON THE ABDOMEN FOR 2 WEEKS / MONTH AS NEEDED, Disp: , Rfl:      atenolol (TENORMIN) 50 MG tablet, Take one half tablet once daily, Disp: 90 tablet, Rfl: 1    ipratropium-albuterol (DUO-NEB) 0.5-2.5 mg/3 ml nebulizer, Take 3 mL by nebulization Every 4 (Four) Hours As Needed for Wheezing for up to 30 days., Disp: 360 mL, Rfl: 0   Past Medical History:   Diagnosis Date    Acute non-recurrent maxillary sinusitis 05/04/2023    Allergic rhinitis, unspecified     Anxiety     Arthritis     Asthma     Asthma, intrinsic 1985    Cough variant asthma     Diabetes mellitus 06/2022    Dorsalgia, unspecified     Essential (primary) hypertension     Family history of ischemic heart disease and other diseases of the circulatory system     GERD (gastroesophageal reflux disease) 06/27/2023    History of gastric ulcer 08/2023    History of Helicobacter pylori infection 08/2023    Hypertension     Left lower quadrant pain     Leucocytosis 2020    due to chronic inflammation per hematology    Lichen sclerosus 2019    Obesity, unspecified     Other fatigue     Persistent mood (affective) disorder, unspecified     PONV (postoperative nausea and vomiting)     Sepsis     Right knee Sepsis    Sleep apnea, obstructive     WEARS CPAP    Unspecified abdominal pain     Unspecified ovarian cyst, left side     Vitamin D deficiency, unspecified      Allergies   Allergen Reactions    Ozempic (0.25 Or 0.5 Mg-Dose) [Semaglutide(0.25 Or 0.5mg-Dos)] Other (See Comments)     Fatigue and nausea    Vilazodone Hcl Mental Status Change               Result Review :     Common labs          8/9/2024    10:16 10/10/2024    09:42 2/12/2025    09:07   Common Labs   Glucose 93   109    BUN 10   12    Creatinine 0.65   0.73    Sodium 137   136    Potassium 4.8   4.7    Chloride 102   103    Calcium 10.1   10.2    Albumin 4.5   4.0    Total Bilirubin 0.3   0.3    Alkaline Phosphatase 83   68    AST (SGOT) 20   15    ALT (SGPT) 16   11    WBC  8.34     Hemoglobin  12.9     Hematocrit  39.9     Platelets  400     Total  Cholesterol 209   211    Triglycerides 167   187    HDL Cholesterol 43   54    LDL Cholesterol  136   124    Hemoglobin A1C 6.10   6.10    Microalbumin, Urine <1.2           Mammo Screening Digital Tomosynthesis Bilateral With CAD    Result Date: 11/12/2024  No mammographic evidence of malignancy.  Recommend annual screening mammography.  BI-RADS ASSESSMENT: Category 2: Benign  Note:  It has been reported that there is approximately a 15% false negative rate in mammography.  Therefore, management of a palpable abnormality should not be deferred because of a negative mammogram.  Electronically Signed By-Mumtaz Vela MD On:11/12/2024 4:20 PM               Social History     Tobacco Use   Smoking Status Never    Passive exposure: Never   Smokeless Tobacco Never           Assessment and Plan    Diagnoses and all orders for this visit:    1. Need for diphtheria-tetanus-pertussis (Tdap) vaccine (Primary)  -     Tdap Vaccine => 8yo IM (BOOSTRIX/ADACEL)    2. Screening for viral disease  -     Hepatitis C antibody; Future    3. Type 2 diabetes mellitus without complication, without long-term current use of insulin  -     Comprehensive metabolic panel; Future  -     Hemoglobin A1c; Future  -     Lipid panel; Future  -     metFORMIN (GLUCOPHAGE) 500 MG tablet; Take 1 tablet by mouth Daily With Breakfast.  Dispense: 90 tablet; Refill: 1    4. Depression, unspecified depression type  -     DULoxetine (CYMBALTA) 60 MG capsule; Take 1 capsule by mouth Daily.  Dispense: 90 capsule; Refill: 1    5. Essential (primary) hypertension  -     atenolol (TENORMIN) 50 MG tablet; Take one half tablet once daily  Dispense: 90 tablet; Refill: 1  -     lisinopril (PRINIVIL,ZESTRIL) 40 MG tablet; Take 1 tablet by mouth Every Night for 180 days.  Dispense: 90 tablet; Refill: 1    6. Allergic rhinitis, unspecified seasonality, unspecified trigger  -     montelukast (SINGULAIR) 10 MG tablet; Take 1 tablet by mouth Every Night.  Dispense: 90  tablet; Refill: 1  -     Discontinue: Allergy Serum Injection    7. Muscle spasm  -     tiZANidine (ZANAFLEX) 4 MG tablet; Take 1 tablet by mouth Every 8 (Eight) Hours As Needed for Muscle Spasms.  Dispense: 90 tablet; Refill: 1    8. History of gastric ulcer  -     pantoprazole (PROTONIX) 40 MG EC tablet; Take 1 tablet by mouth Daily.        Follow Up    Return in about 6 months (around 8/12/2025).  Patient was given instructions and counseling regarding her condition or for health maintenance advice. Please see specific information pulled into the AVS if appropriate.

## 2025-02-14 NOTE — PROGRESS NOTES
Diabetes under good control.  Kidney and liver function are normal.  Cholesterol remains mildly elevated.  If elevations persist, consider starting pravastatin or zetia to help lower cholesterol.  You have never had hepatitis c.

## 2025-02-19 RX ORDER — PANTOPRAZOLE SODIUM 40 MG/1
40 TABLET, DELAYED RELEASE ORAL DAILY
Start: 2025-02-19

## 2025-02-27 ENCOUNTER — CLINICAL SUPPORT (OUTPATIENT)
Dept: FAMILY MEDICINE CLINIC | Age: 50
End: 2025-02-27
Payer: COMMERCIAL

## 2025-02-27 DIAGNOSIS — J30.9 ALLERGIC RHINITIS, UNSPECIFIED SEASONALITY, UNSPECIFIED TRIGGER: Primary | ICD-10-CM

## 2025-02-27 PROCEDURE — 95115 IMMUNOTHERAPY ONE INJECTION: CPT | Performed by: FAMILY MEDICINE

## 2025-03-06 ENCOUNTER — PATIENT MESSAGE (OUTPATIENT)
Dept: BARIATRICS/WEIGHT MGMT | Facility: CLINIC | Age: 50
End: 2025-03-06

## 2025-03-06 ENCOUNTER — OFFICE VISIT (OUTPATIENT)
Dept: BARIATRICS/WEIGHT MGMT | Facility: CLINIC | Age: 50
End: 2025-03-06
Payer: COMMERCIAL

## 2025-03-06 VITALS
TEMPERATURE: 97.1 F | WEIGHT: 245 LBS | HEIGHT: 64 IN | BODY MASS INDEX: 41.83 KG/M2 | HEART RATE: 69 BPM | DIASTOLIC BLOOD PRESSURE: 72 MMHG | SYSTOLIC BLOOD PRESSURE: 130 MMHG

## 2025-03-06 DIAGNOSIS — E11.9 TYPE 2 DIABETES MELLITUS WITHOUT COMPLICATION, WITHOUT LONG-TERM CURRENT USE OF INSULIN: Primary | ICD-10-CM

## 2025-03-06 DIAGNOSIS — Z71.3 DIETARY COUNSELING: ICD-10-CM

## 2025-03-06 DIAGNOSIS — Z98.84 HISTORY OF REMOVAL OF LAPAROSCOPIC GASTRIC BANDING DEVICE: ICD-10-CM

## 2025-03-06 DIAGNOSIS — Z98.84 S/P LAPAROSCOPIC SLEEVE GASTRECTOMY: ICD-10-CM

## 2025-03-06 DIAGNOSIS — M77.51 BURSITIS OF INTERMETATARSAL BURSA OF RIGHT FOOT: ICD-10-CM

## 2025-03-06 RX ORDER — SEMAGLUTIDE 0.25 MG/.5ML
0.25 INJECTION, SOLUTION SUBCUTANEOUS WEEKLY
Qty: 2 ML | Refills: 0 | Status: SHIPPED | OUTPATIENT
Start: 2025-03-06 | End: 2025-04-03

## 2025-03-06 NOTE — PROGRESS NOTES
"Chief Complaint  Follow-up (Follow up sleeve/RX)    Subjective        Karey Lopez presents to [facility]  History of Present Illness  Patient follows up after conversion of Lap-Band to sleeve gastrectomy 11/15/2023--patient's weight before the surgery was 281 pounds and she got down to 221 pounds, which that she has gained back to 245--patient is very frustrated with her weight recidivism--patient has type 2 diabetes and we tried to put her on Mounjaro/Zepbound prescription but not cover it--patient has previously been on Ozempic and stated did nothing for her, she states that her insurance would cover Wegovy.  Objective   Vital Signs:  /72 (BP Location: Right arm, Patient Position: Sitting, Cuff Size: Large Adult)   Pulse 69   Temp 97.1 °F (36.2 °C) (Temporal)   Ht 162.6 cm (64.02\")   Wt 111 kg (245 lb)   BMI 42.03 kg/m²   Estimated body mass index is 42.03 kg/m² as calculated from the following:    Height as of this encounter: 162.6 cm (64.02\").    Weight as of this encounter: 111 kg (245 lb).               Physical Exam   Alert, pleasant  No respiratory distress  Abdomen soft  Result Review :                   Assessment and Plan   Diagnoses and all orders for this visit:    1. Type 2 diabetes mellitus without complication, without long-term current use of insulin (Primary)    2. S/P laparoscopic sleeve gastrectomy    3. History of removal of laparoscopic gastric banding device    4. Dietary counseling    5. Bursitis of intermetatarsal bursa of right foot    Other orders  -     Wegovy 0.25 MG/0.5ML solution auto-injector; Inject 0.5 mL under the skin into the appropriate area as directed 1 (One) Time Per Week for 28 days.  Dispense: 2 mL; Refill: 0    Will call in an order for Wegovy 0.25 mg weekly--I told patient to be optimistic that may be the combination of her sleeve gastrectomy and the GLP-1 would make a difference.  Long discussion with patient about " potential revisional bariatric surgery--I am concerned with her hemoglobin A1c being elevated over 6 that that will lead to long-term potential issues for her--I think adding a malabsorption component to her sleeve gastrectomy could potentially help with her daily blood sugars but also help with additional weight loss.  Patient is concerned about the malabsorption component and the fact that she might get low on vitamin levels causing a multitude of health issues--I reassured her that if she takes her daily bariatric multivitamins and comes in for regular visits and has lab work that she would be okay.    Patient will follow-up in 1 month we will see if the GLP-1 medication was covered and if it is working for her.  If she is having problems getting coverage we would consider moving forward with revisional surgery.       I spent 30 minutes caring for Karey on this date of service. This time includes time spent by me in the following activities:preparing for the visit, reviewing tests, obtaining and/or reviewing a separately obtained history, performing a medically appropriate examination and/or evaluation , counseling and educating the patient/family/caregiver, documenting information in the medical record, and independently interpreting results and communicating that information with the patient/family/caregiver  Follow Up   No follow-ups on file.  Patient was given instructions and counseling regarding her condition or for health maintenance advice. Please see specific information pulled into the AVS if appropriate.

## 2025-03-10 ENCOUNTER — CLINICAL SUPPORT (OUTPATIENT)
Dept: FAMILY MEDICINE CLINIC | Age: 50
End: 2025-03-10
Payer: COMMERCIAL

## 2025-03-10 DIAGNOSIS — J30.9 ALLERGIC RHINITIS, UNSPECIFIED SEASONALITY, UNSPECIFIED TRIGGER: Primary | ICD-10-CM

## 2025-03-10 PROCEDURE — 95115 IMMUNOTHERAPY ONE INJECTION: CPT | Performed by: FAMILY MEDICINE

## 2025-03-19 ENCOUNTER — CLINICAL SUPPORT (OUTPATIENT)
Dept: FAMILY MEDICINE CLINIC | Age: 50
End: 2025-03-19
Payer: COMMERCIAL

## 2025-03-19 DIAGNOSIS — J30.9 ALLERGIC RHINITIS, UNSPECIFIED SEASONALITY, UNSPECIFIED TRIGGER: Primary | ICD-10-CM

## 2025-03-19 PROCEDURE — 95115 IMMUNOTHERAPY ONE INJECTION: CPT | Performed by: FAMILY MEDICINE

## 2025-04-07 RX ORDER — SEMAGLUTIDE 0.25 MG/.5ML
0.25 INJECTION, SOLUTION SUBCUTANEOUS WEEKLY
Qty: 2 ML | Refills: 0 | Status: SHIPPED | OUTPATIENT
Start: 2025-04-07 | End: 2025-05-05

## 2025-04-07 RX ORDER — SEMAGLUTIDE 0.25 MG/.5ML
INJECTION, SOLUTION SUBCUTANEOUS
Qty: 2 ML | Refills: 0 | OUTPATIENT
Start: 2025-04-07

## 2025-04-09 ENCOUNTER — CLINICAL SUPPORT (OUTPATIENT)
Dept: FAMILY MEDICINE CLINIC | Age: 50
End: 2025-04-09
Payer: COMMERCIAL

## 2025-04-09 DIAGNOSIS — J30.9 ALLERGIC RHINITIS, UNSPECIFIED SEASONALITY, UNSPECIFIED TRIGGER: Primary | ICD-10-CM

## 2025-04-09 PROCEDURE — 95117 IMMUNOTHERAPY INJECTIONS: CPT | Performed by: FAMILY MEDICINE

## 2025-04-21 ENCOUNTER — OFFICE VISIT (OUTPATIENT)
Age: 50
End: 2025-04-21
Payer: COMMERCIAL

## 2025-04-21 ENCOUNTER — CLINICAL SUPPORT (OUTPATIENT)
Dept: FAMILY MEDICINE CLINIC | Age: 50
End: 2025-04-21
Payer: COMMERCIAL

## 2025-04-21 VITALS
HEART RATE: 65 BPM | SYSTOLIC BLOOD PRESSURE: 127 MMHG | RESPIRATION RATE: 17 BRPM | BODY MASS INDEX: 40.26 KG/M2 | DIASTOLIC BLOOD PRESSURE: 73 MMHG | WEIGHT: 235.8 LBS | HEIGHT: 64 IN | OXYGEN SATURATION: 98 %

## 2025-04-21 DIAGNOSIS — J30.9 ALLERGIC RHINITIS, UNSPECIFIED SEASONALITY, UNSPECIFIED TRIGGER: Primary | ICD-10-CM

## 2025-04-21 DIAGNOSIS — E11.9 TYPE 2 DIABETES MELLITUS WITHOUT COMPLICATION, WITHOUT LONG-TERM CURRENT USE OF INSULIN: ICD-10-CM

## 2025-04-21 DIAGNOSIS — Z98.84 S/P LAPAROSCOPIC SLEEVE GASTRECTOMY: Primary | ICD-10-CM

## 2025-04-21 DIAGNOSIS — Z71.3 DIETARY COUNSELING: ICD-10-CM

## 2025-04-21 PROCEDURE — 99213 OFFICE O/P EST LOW 20 MIN: CPT | Performed by: SURGERY

## 2025-04-21 PROCEDURE — 95115 IMMUNOTHERAPY ONE INJECTION: CPT | Performed by: INTERNAL MEDICINE

## 2025-04-21 RX ORDER — SEMAGLUTIDE 0.5 MG/.5ML
0.5 INJECTION, SOLUTION SUBCUTANEOUS WEEKLY
Qty: 2 ML | Refills: 0 | Status: SHIPPED | OUTPATIENT
Start: 2025-04-21 | End: 2025-05-19

## 2025-04-21 NOTE — PROGRESS NOTES
"Chief Complaint  Follow-up (GS & Wegovy f/u)    Subjective        Karey Lopez presents to [facility]  History of Present Illness  Patient presents for medical weight loss follow-up with a previous sleeve gastrectomy--patient currently on Wegovy 0.25 mg q. Weekly--she has lost 10 pounds since last visit.  No abdominal pain, dysphagia or reflux with the medication.    Patient has tried to \"clean up\" her diet.  She is trying to increase protein and decrease processed food intake.  We discussed this.  Objective   Vital Signs:  /73 (BP Location: Left arm, Patient Position: Sitting, Cuff Size: Large Adult)   Pulse 65   Resp 17   Ht 162.6 cm (64.02\")   Wt 107 kg (235 lb 12.8 oz)   SpO2 98%   BMI 40.45 kg/m²   Estimated body mass index is 40.45 kg/m² as calculated from the following:    Height as of this encounter: 162.6 cm (64.02\").    Weight as of this encounter: 107 kg (235 lb 12.8 oz).               Physical Exam   Alert, pleasant  No respiratory distress  Abdomen soft  Result Review :                   Assessment and Plan   Diagnoses and all orders for this visit:    1. S/P laparoscopic sleeve gastrectomy (Primary)    2. Type 2 diabetes mellitus without complication, without long-term current use of insulin    3. Dietary counseling    Will increase her medication to 0.5 mg q. Weekly--plan follow-up in 2 months       I spent 20 minutes caring for Karey on this date of service. This time includes time spent by me in the following activities:preparing for the visit, reviewing tests, obtaining and/or reviewing a separately obtained history, performing a medically appropriate examination and/or evaluation , counseling and educating the patient/family/caregiver, documenting information in the medical record, and independently interpreting results and communicating that information with the patient/family/caregiver  Follow Up   No follow-ups on file.  Patient was given " instructions and counseling regarding her condition or for health maintenance advice. Please see specific information pulled into the AVS if appropriate.

## 2025-05-05 ENCOUNTER — CLINICAL SUPPORT (OUTPATIENT)
Dept: FAMILY MEDICINE CLINIC | Age: 50
End: 2025-05-05
Payer: COMMERCIAL

## 2025-05-05 DIAGNOSIS — J30.9 ALLERGIC RHINITIS, UNSPECIFIED SEASONALITY, UNSPECIFIED TRIGGER: Primary | ICD-10-CM

## 2025-05-05 PROCEDURE — 95115 IMMUNOTHERAPY ONE INJECTION: CPT | Performed by: FAMILY MEDICINE

## 2025-05-08 ENCOUNTER — PATIENT MESSAGE (OUTPATIENT)
Dept: FAMILY MEDICINE CLINIC | Age: 50
End: 2025-05-08
Payer: COMMERCIAL

## 2025-05-08 DIAGNOSIS — M62.838 MUSCLE SPASM: ICD-10-CM

## 2025-05-12 ENCOUNTER — CLINICAL SUPPORT (OUTPATIENT)
Dept: FAMILY MEDICINE CLINIC | Age: 50
End: 2025-05-12
Payer: COMMERCIAL

## 2025-05-12 DIAGNOSIS — J30.9 ALLERGIC RHINITIS, UNSPECIFIED SEASONALITY, UNSPECIFIED TRIGGER: Primary | ICD-10-CM

## 2025-05-12 PROCEDURE — 95115 IMMUNOTHERAPY ONE INJECTION: CPT | Performed by: FAMILY MEDICINE

## 2025-05-27 ENCOUNTER — CLINICAL SUPPORT (OUTPATIENT)
Dept: FAMILY MEDICINE CLINIC | Age: 50
End: 2025-05-27
Payer: COMMERCIAL

## 2025-05-27 DIAGNOSIS — J30.9 ALLERGIC RHINITIS, UNSPECIFIED SEASONALITY, UNSPECIFIED TRIGGER: Primary | ICD-10-CM

## 2025-05-27 PROCEDURE — 95115 IMMUNOTHERAPY ONE INJECTION: CPT | Performed by: FAMILY MEDICINE

## 2025-06-06 ENCOUNTER — CLINICAL SUPPORT (OUTPATIENT)
Dept: FAMILY MEDICINE CLINIC | Age: 50
End: 2025-06-06
Payer: COMMERCIAL

## 2025-06-06 DIAGNOSIS — J30.9 ALLERGIC RHINITIS, UNSPECIFIED SEASONALITY, UNSPECIFIED TRIGGER: Primary | ICD-10-CM

## 2025-06-06 PROCEDURE — 95115 IMMUNOTHERAPY ONE INJECTION: CPT | Performed by: FAMILY MEDICINE

## 2025-06-16 ENCOUNTER — OFFICE VISIT (OUTPATIENT)
Age: 50
End: 2025-06-16
Payer: COMMERCIAL

## 2025-06-16 ENCOUNTER — CLINICAL SUPPORT (OUTPATIENT)
Dept: FAMILY MEDICINE CLINIC | Age: 50
End: 2025-06-16
Payer: COMMERCIAL

## 2025-06-16 VITALS
OXYGEN SATURATION: 98 % | SYSTOLIC BLOOD PRESSURE: 145 MMHG | DIASTOLIC BLOOD PRESSURE: 70 MMHG | HEIGHT: 64 IN | BODY MASS INDEX: 40.46 KG/M2 | HEART RATE: 71 BPM | WEIGHT: 237 LBS

## 2025-06-16 DIAGNOSIS — E11.42 DIABETIC POLYNEUROPATHY ASSOCIATED WITH TYPE 2 DIABETES MELLITUS: Chronic | ICD-10-CM

## 2025-06-16 DIAGNOSIS — Z71.3 DIETARY COUNSELING: Primary | ICD-10-CM

## 2025-06-16 DIAGNOSIS — J30.9 ALLERGIC RHINITIS, UNSPECIFIED SEASONALITY, UNSPECIFIED TRIGGER: Primary | ICD-10-CM

## 2025-06-16 DIAGNOSIS — Z98.84 S/P LAPAROSCOPIC SLEEVE GASTRECTOMY: ICD-10-CM

## 2025-06-16 DIAGNOSIS — Z98.84 HISTORY OF REMOVAL OF LAPAROSCOPIC GASTRIC BANDING DEVICE: ICD-10-CM

## 2025-06-16 PROCEDURE — 95115 IMMUNOTHERAPY ONE INJECTION: CPT | Performed by: FAMILY MEDICINE

## 2025-06-16 PROCEDURE — 99214 OFFICE O/P EST MOD 30 MIN: CPT | Performed by: SURGERY

## 2025-06-16 RX ORDER — TIRZEPATIDE 5 MG/.5ML
5 INJECTION, SOLUTION SUBCUTANEOUS WEEKLY
Qty: 2.5 ML | Refills: 0 | Status: SHIPPED | OUTPATIENT
Start: 2025-06-16

## 2025-06-16 NOTE — PROGRESS NOTES
"Chief Complaint  Follow-up (Wegovy - ) and Constipation    Subjective        Karey Lopez presents to [facility]  History of Present Illness  Patient presents for follow-up for medical weight loss after having previous lap band removed and converted to sleeve gastrectomy--patient's weight loss has been disappointing, basically 30 pounds--we tried patient on Wegovy 0.5 mg, she had done well on the 0.25 mg and lost 10 pounds--patient stated that she had extreme fatigue with this dosage and basically stopped taking it.  Patient would like to try tirzepatide, he understands that her insurance does not cover it.  Talked about using compounded medication as a potential.  We have also discussed the potential of converting her sleeve gastrectomy to some form of a bypass.  Patient wants to exhaust all medical options first.  Objective   Vital Signs:  /70 (BP Location: Left arm, Patient Position: Sitting, Cuff Size: Large Adult)   Pulse 71   Ht 162.6 cm (64.02\")   Wt 108 kg (237 lb)   SpO2 98%   BMI 40.66 kg/m²   Estimated body mass index is 40.66 kg/m² as calculated from the following:    Height as of this encounter: 162.6 cm (64.02\").    Weight as of this encounter: 108 kg (237 lb).               Physical Exam   Alert, pleasant  No respiratory distress  Abdomen soft  Result Review :                   Assessment and Plan   Diagnoses and all orders for this visit:    1. Dietary counseling (Primary)    2. History of removal of laparoscopic gastric banding device    3. S/P laparoscopic sleeve gastrectomy    4. Diabetic polyneuropathy associated with type 2 diabetes mellitus    Other orders  -     Tirzepatide-Weight Management (Zepbound) 5 MG/0.5ML solution; Inject 0.5 mL under the skin into the appropriate area as directed 1 (One) Time Per Week. Inject 62 units under the skin in the appropriate area as directed 1 time per week; Tirzepatide 5 mg/Cyanocobalamin 155 mcg; " Indications: low calorie diet nutritional support  Dispense: 2.5 mL; Refill: 0    Will switch patient to compounded tirzepatide 5 mg q. Weekly--plan follow-up 1 month to see how she does--if no improvement would consider discussing possible loop gastrojejunostomy.       I spent 30 minutes caring for Karey on this date of service. This time includes time spent by me in the following activities:preparing for the visit, reviewing tests, obtaining and/or reviewing a separately obtained history, performing a medically appropriate examination and/or evaluation , counseling and educating the patient/family/caregiver, documenting information in the medical record, and independently interpreting results and communicating that information with the patient/family/caregiver  Follow Up   No follow-ups on file.  Patient was given instructions and counseling regarding her condition or for health maintenance advice. Please see specific information pulled into the AVS if appropriate.

## 2025-07-03 DIAGNOSIS — E11.9 TYPE 2 DIABETES MELLITUS WITHOUT COMPLICATION, WITHOUT LONG-TERM CURRENT USE OF INSULIN: ICD-10-CM

## 2025-07-03 DIAGNOSIS — J30.9 ALLERGIC RHINITIS, UNSPECIFIED SEASONALITY, UNSPECIFIED TRIGGER: ICD-10-CM

## 2025-07-07 ENCOUNTER — CLINICAL SUPPORT (OUTPATIENT)
Dept: FAMILY MEDICINE CLINIC | Age: 50
End: 2025-07-07
Payer: COMMERCIAL

## 2025-07-07 DIAGNOSIS — J30.9 ALLERGIC RHINITIS, UNSPECIFIED SEASONALITY, UNSPECIFIED TRIGGER: Primary | ICD-10-CM

## 2025-07-07 PROCEDURE — 95115 IMMUNOTHERAPY ONE INJECTION: CPT | Performed by: FAMILY MEDICINE

## 2025-07-07 RX ORDER — MONTELUKAST SODIUM 10 MG/1
10 TABLET ORAL NIGHTLY
Qty: 90 TABLET | Refills: 1 | OUTPATIENT
Start: 2025-07-07

## 2025-07-11 RX ORDER — TIRZEPATIDE 5 MG/.5ML
5 INJECTION, SOLUTION SUBCUTANEOUS WEEKLY
Qty: 2.5 ML | Refills: 0 | Status: SHIPPED | OUTPATIENT
Start: 2025-07-11

## 2025-07-21 ENCOUNTER — OFFICE VISIT (OUTPATIENT)
Age: 50
End: 2025-07-21
Payer: COMMERCIAL

## 2025-07-21 ENCOUNTER — CLINICAL SUPPORT (OUTPATIENT)
Dept: FAMILY MEDICINE CLINIC | Age: 50
End: 2025-07-21
Payer: COMMERCIAL

## 2025-07-21 VITALS
WEIGHT: 227 LBS | BODY MASS INDEX: 38.76 KG/M2 | HEIGHT: 64 IN | HEART RATE: 60 BPM | DIASTOLIC BLOOD PRESSURE: 61 MMHG | SYSTOLIC BLOOD PRESSURE: 128 MMHG | OXYGEN SATURATION: 99 %

## 2025-07-21 DIAGNOSIS — Z98.84 S/P LAPAROSCOPIC SLEEVE GASTRECTOMY: ICD-10-CM

## 2025-07-21 DIAGNOSIS — J30.9 ALLERGIC RHINITIS, UNSPECIFIED SEASONALITY, UNSPECIFIED TRIGGER: Primary | ICD-10-CM

## 2025-07-21 DIAGNOSIS — E66.812 OBESITY, CLASS II, BMI 35-39.9: Primary | ICD-10-CM

## 2025-07-21 PROCEDURE — 95115 IMMUNOTHERAPY ONE INJECTION: CPT | Performed by: FAMILY MEDICINE

## 2025-07-21 PROCEDURE — 99213 OFFICE O/P EST LOW 20 MIN: CPT | Performed by: SURGERY

## 2025-07-21 RX ORDER — TIRZEPATIDE 5 MG/.5ML
5 INJECTION, SOLUTION SUBCUTANEOUS WEEKLY
Qty: 2.5 ML | Refills: 0 | Status: SHIPPED | OUTPATIENT
Start: 2025-07-21

## 2025-07-21 NOTE — PROGRESS NOTES
"Chief Complaint  Follow-up (S/P laparoscopic sleeve gastrectomy/Type 2 diabetes mellitus without complication, without long-term current use of insulin/)    Subjective        Karey Lopez presents to [facility]  History of Present Illness  Patient presents for medical weight loss visit after previous sleeve gastrectomy--we started compounded tirzepatide recently, patient is on 5 mg q. Weekly--patient lost 10 pounds since last visit--no abdominal pain, dysphagia or reflux.  Reviewed patient's diet and exercise.  Patient states that she is \"working her butt off\" .  She has given up going out to eat, and really worked on decreasing her carbohydrate and processed food intake.  Her goal is to get her weight below 200 pounds.  Objective   Vital Signs:  /61 (BP Location: Left arm, Patient Position: Sitting, Cuff Size: Large Adult)   Pulse 60   Ht 162.6 cm (64.02\")   Wt 103 kg (227 lb)   SpO2 99%   BMI 38.94 kg/m²   Estimated body mass index is 38.94 kg/m² as calculated from the following:    Height as of this encounter: 162.6 cm (64.02\").    Weight as of this encounter: 103 kg (227 lb).               Physical Exam   Alert, pleasant  No respiratory distress  Abdomen soft  Result Review :                   Assessment and Plan   Diagnoses and all orders for this visit:    1. Obesity, Class II, BMI 35-39.9 (Primary)    2. S/P laparoscopic sleeve gastrectomy    Will continue current dosage of compounded tirzepatide--follow-up in 2 months       I spent 20 minutes caring for Karey on this date of service. This time includes time spent by me in the following activities:preparing for the visit, reviewing tests, obtaining and/or reviewing a separately obtained history, performing a medically appropriate examination and/or evaluation , counseling and educating the patient/family/caregiver, documenting information in the medical record, and independently interpreting results and " communicating that information with the patient/family/caregiver  Follow Up   No follow-ups on file.  Patient was given instructions and counseling regarding her condition or for health maintenance advice. Please see specific information pulled into the AVS if appropriate.

## 2025-08-12 RX ORDER — TIRZEPATIDE 5 MG/.5ML
5 INJECTION, SOLUTION SUBCUTANEOUS WEEKLY
Qty: 2.5 ML | Refills: 0 | Status: SHIPPED | OUTPATIENT
Start: 2025-08-12

## 2025-08-15 ENCOUNTER — OFFICE VISIT (OUTPATIENT)
Dept: FAMILY MEDICINE CLINIC | Age: 50
End: 2025-08-15
Payer: COMMERCIAL

## 2025-08-15 ENCOUNTER — LAB (OUTPATIENT)
Dept: LAB | Facility: HOSPITAL | Age: 50
End: 2025-08-15
Payer: COMMERCIAL

## 2025-08-15 VITALS
WEIGHT: 228 LBS | TEMPERATURE: 97.7 F | HEIGHT: 64 IN | BODY MASS INDEX: 38.93 KG/M2 | HEART RATE: 57 BPM | OXYGEN SATURATION: 98 % | DIASTOLIC BLOOD PRESSURE: 79 MMHG | SYSTOLIC BLOOD PRESSURE: 134 MMHG

## 2025-08-15 DIAGNOSIS — M62.838 MUSCLE SPASM: ICD-10-CM

## 2025-08-15 DIAGNOSIS — J45.20 MILD INTERMITTENT ASTHMA WITHOUT COMPLICATION: ICD-10-CM

## 2025-08-15 DIAGNOSIS — F32.A DEPRESSION, UNSPECIFIED DEPRESSION TYPE: ICD-10-CM

## 2025-08-15 DIAGNOSIS — R53.83 OTHER FATIGUE: ICD-10-CM

## 2025-08-15 DIAGNOSIS — J30.9 ALLERGIC RHINITIS, UNSPECIFIED SEASONALITY, UNSPECIFIED TRIGGER: ICD-10-CM

## 2025-08-15 DIAGNOSIS — E11.9 TYPE 2 DIABETES MELLITUS WITHOUT COMPLICATION, WITHOUT LONG-TERM CURRENT USE OF INSULIN: Primary | ICD-10-CM

## 2025-08-15 DIAGNOSIS — E11.9 TYPE 2 DIABETES MELLITUS WITHOUT COMPLICATION, WITHOUT LONG-TERM CURRENT USE OF INSULIN: ICD-10-CM

## 2025-08-15 DIAGNOSIS — E55.9 VITAMIN D DEFICIENCY: ICD-10-CM

## 2025-08-15 DIAGNOSIS — I10 ESSENTIAL (PRIMARY) HYPERTENSION: ICD-10-CM

## 2025-08-15 DIAGNOSIS — Z12.31 ENCOUNTER FOR SCREENING MAMMOGRAM FOR MALIGNANT NEOPLASM OF BREAST: ICD-10-CM

## 2025-08-15 PROBLEM — B37.0 THRUSH, ORAL: Status: RESOLVED | Noted: 2024-08-09 | Resolved: 2025-08-15

## 2025-08-15 PROBLEM — K14.6 TONGUE PAIN: Status: RESOLVED | Noted: 2024-12-11 | Resolved: 2025-08-15

## 2025-08-15 PROBLEM — M79.673 FOOT PAIN: Status: RESOLVED | Noted: 2023-06-23 | Resolved: 2025-08-15

## 2025-08-15 PROBLEM — Z71.3 DIETARY COUNSELING: Status: RESOLVED | Noted: 2024-10-10 | Resolved: 2025-08-15

## 2025-08-15 PROBLEM — K25.3 ACUTE GASTRIC ULCER WITHOUT HEMORRHAGE OR PERFORATION: Status: RESOLVED | Noted: 2023-08-01 | Resolved: 2025-08-15

## 2025-08-15 PROBLEM — Z11.59 SCREENING FOR VIRAL DISEASE: Status: RESOLVED | Noted: 2025-02-12 | Resolved: 2025-08-15

## 2025-08-15 PROBLEM — S92.032A CLOSED DISPLACED AVULSION FRACTURE OF TUBEROSITY OF LEFT CALCANEUS: Status: RESOLVED | Noted: 2023-12-19 | Resolved: 2025-08-15

## 2025-08-15 PROBLEM — N91.2 AMENORRHEA: Status: RESOLVED | Noted: 2023-02-02 | Resolved: 2025-08-15

## 2025-08-15 PROBLEM — R32 LEAKING OF URINE: Status: RESOLVED | Noted: 2023-06-23 | Resolved: 2025-08-15

## 2025-08-15 PROBLEM — M79.10 MUSCLE PAIN: Status: RESOLVED | Noted: 2023-06-23 | Resolved: 2025-08-15

## 2025-08-15 PROBLEM — M25.569 KNEE PAIN: Status: RESOLVED | Noted: 2023-06-23 | Resolved: 2025-08-15

## 2025-08-15 PROBLEM — Z12.11 COLON CANCER SCREENING: Status: RESOLVED | Noted: 2024-08-09 | Resolved: 2025-08-15

## 2025-08-15 PROBLEM — H61.21 IMPACTED CERUMEN OF RIGHT EAR: Status: RESOLVED | Noted: 2023-10-18 | Resolved: 2025-08-15

## 2025-08-15 PROBLEM — M25.473 ANKLE SWELLING: Status: RESOLVED | Noted: 2023-06-23 | Resolved: 2025-08-15

## 2025-08-15 PROBLEM — Z23 NEED FOR DIPHTHERIA-TETANUS-PERTUSSIS (TDAP) VACCINE: Status: RESOLVED | Noted: 2025-02-12 | Resolved: 2025-08-15

## 2025-08-15 PROBLEM — M25.50 ARTHRALGIA: Status: RESOLVED | Noted: 2022-07-20 | Resolved: 2025-08-15

## 2025-08-15 PROBLEM — M25.519 SHOULDER PAIN: Status: RESOLVED | Noted: 2023-06-23 | Resolved: 2025-08-15

## 2025-08-15 PROBLEM — S93.402A MODERATE LEFT ANKLE SPRAIN: Status: RESOLVED | Noted: 2023-12-19 | Resolved: 2025-08-15

## 2025-08-15 PROBLEM — R60.0 LOCALIZED EDEMA: Status: RESOLVED | Noted: 2023-08-04 | Resolved: 2025-08-15

## 2025-08-15 PROBLEM — M20.41 HAMMER TOE OF RIGHT FOOT: Status: ACTIVE | Noted: 2025-06-10

## 2025-08-15 PROBLEM — K25.9 GASTRIC ULCER WITHOUT HEMORRHAGE OR PERFORATION: Status: RESOLVED | Noted: 2023-08-04 | Resolved: 2025-08-15

## 2025-08-15 PROBLEM — N92.6 MENSTRUAL IRREGULARITY: Status: RESOLVED | Noted: 2023-06-23 | Resolved: 2025-08-15

## 2025-08-15 PROBLEM — M79.10 MYALGIA: Status: RESOLVED | Noted: 2023-08-11 | Resolved: 2025-08-15

## 2025-08-15 LAB
25(OH)D3 SERPL-MCNC: 55.8 NG/ML (ref 30–100)
ALBUMIN SERPL-MCNC: 4.4 G/DL (ref 3.5–5.2)
ALBUMIN UR-MCNC: <1.2 MG/DL
ALBUMIN/GLOB SERPL: 1.5 G/DL
ALP SERPL-CCNC: 66 U/L (ref 39–117)
ALT SERPL W P-5'-P-CCNC: 22 U/L (ref 1–33)
ANION GAP SERPL CALCULATED.3IONS-SCNC: 14 MMOL/L (ref 5–15)
AST SERPL-CCNC: 29 U/L (ref 1–32)
BILIRUB SERPL-MCNC: 0.3 MG/DL (ref 0–1.2)
BUN SERPL-MCNC: 13 MG/DL (ref 6–20)
BUN/CREAT SERPL: 18.3 (ref 7–25)
CALCIUM SPEC-SCNC: 9.6 MG/DL (ref 8.6–10.5)
CHLORIDE SERPL-SCNC: 100 MMOL/L (ref 98–107)
CHOLEST SERPL-MCNC: 240 MG/DL (ref 0–200)
CO2 SERPL-SCNC: 23 MMOL/L (ref 22–29)
CREAT SERPL-MCNC: 0.71 MG/DL (ref 0.57–1)
CREAT UR-MCNC: 19.1 MG/DL
EGFRCR SERPLBLD CKD-EPI 2021: 103.7 ML/MIN/1.73
GLOBULIN UR ELPH-MCNC: 3 GM/DL
GLUCOSE SERPL-MCNC: 89 MG/DL (ref 65–99)
HBA1C MFR BLD: 5.5 % (ref 4.8–5.6)
HDLC SERPL-MCNC: 46 MG/DL (ref 40–60)
LDLC SERPL CALC-MCNC: 165 MG/DL (ref 0–100)
LDLC/HDLC SERPL: 3.52 {RATIO}
MICROALBUMIN/CREAT UR: NORMAL MG/G{CREAT}
POTASSIUM SERPL-SCNC: 4.4 MMOL/L (ref 3.5–5.2)
PROT SERPL-MCNC: 7.4 G/DL (ref 6–8.5)
SODIUM SERPL-SCNC: 137 MMOL/L (ref 136–145)
TRIGL SERPL-MCNC: 160 MG/DL (ref 0–150)
VIT B12 BLD-MCNC: >2000 PG/ML (ref 211–946)
VLDLC SERPL-MCNC: 29 MG/DL (ref 5–40)

## 2025-08-15 PROCEDURE — 82306 VITAMIN D 25 HYDROXY: CPT

## 2025-08-15 PROCEDURE — 82043 UR ALBUMIN QUANTITATIVE: CPT

## 2025-08-15 PROCEDURE — 80061 LIPID PANEL: CPT

## 2025-08-15 PROCEDURE — 36415 COLL VENOUS BLD VENIPUNCTURE: CPT

## 2025-08-15 PROCEDURE — 82570 ASSAY OF URINE CREATININE: CPT

## 2025-08-15 PROCEDURE — 82607 VITAMIN B-12: CPT

## 2025-08-15 PROCEDURE — 83036 HEMOGLOBIN GLYCOSYLATED A1C: CPT

## 2025-08-15 PROCEDURE — 80053 COMPREHEN METABOLIC PANEL: CPT

## 2025-08-15 RX ORDER — LISINOPRIL 40 MG/1
40 TABLET ORAL NIGHTLY
Qty: 90 TABLET | Refills: 1 | Status: SHIPPED | OUTPATIENT
Start: 2025-08-15 | End: 2026-02-11

## 2025-08-15 RX ORDER — DULOXETIN HYDROCHLORIDE 60 MG/1
60 CAPSULE, DELAYED RELEASE ORAL DAILY
Qty: 90 CAPSULE | Refills: 1 | Status: SHIPPED | OUTPATIENT
Start: 2025-08-15

## 2025-08-15 RX ORDER — ALBUTEROL SULFATE 90 UG/1
2 INHALANT RESPIRATORY (INHALATION) EVERY 4 HOURS PRN
Qty: 8 G | Refills: 5 | Status: SHIPPED | OUTPATIENT
Start: 2025-08-15

## 2025-08-15 RX ORDER — ALBUTEROL SULFATE 0.83 MG/ML
2.5 SOLUTION RESPIRATORY (INHALATION) EVERY 4 HOURS PRN
Qty: 90 EACH | Refills: 5 | Status: SHIPPED | OUTPATIENT
Start: 2025-08-15

## (undated) DEVICE — SYR LUERLOK 30CC

## (undated) DEVICE — GLV SURG SENSICARE PI PF LF 8.5 GRN STRL

## (undated) DEVICE — TBG PUMP ARTHSCP MAIN AR6400 16FT

## (undated) DEVICE — APPL CHLORAPREP W/TINT 26ML ORNG

## (undated) DEVICE — BLCK/BITE BLOX W/DENTL/RIM W/STRAP 54F

## (undated) DEVICE — CONN TBG Y 5 IN 1 LF STRL

## (undated) DEVICE — ECHELON FLEX POWERED PLUS LONG ARTICULATING ENDOSCOPIC LINEAR CUTTER, 60MM: Brand: ECHELON FLEX

## (undated) DEVICE — GLV SURG SENSICARE PI MIC PF SZ6 LF STRL

## (undated) DEVICE — DISPOSABLE TOURNIQUET CUFF SINGLE BLADDER, SINGLE PORT AND QUICK CONNECT CONNECTOR: Brand: COLOR CUFF

## (undated) DEVICE — FRCP BX RADJAW4 NDL 2.8 240CM LG OG BX40

## (undated) DEVICE — THE EASYGRIP FLO-41 PRECISION MIS DELIVERY SYSTEM (EASYGRIP FLO-41 SYSTEM) IS A STERILE, SINGLE-USE DEVICE THAT CONSISTS OF TWO COMPONENTS: (1) ONE APPLICATOR DEVICE WITH A 41 CM LONG CANNULA (5 MM OUTER DIAMETER) AND (2) ONE EMPTY 1.5 ML SYRINGE.: Brand: EASYGRIP FLO-41

## (undated) DEVICE — THE DEVICE IS A DISPOSABLE, LIGATURE PASSING, SUTURING APPARATUS AND NEEDLE GUIDE FOR THE ABDOMINAL WALL WHICH IS NON-POWERED, HAND-HELD, AND HAND-MANIPULATED,INTENDED TO BE USED IN VARIOUS GENERAL SURGICAL PROCEDURES. THE DEVICE INCLUDES A LIGATURE CARRIER PATHWAY, NEEDLE GUIDE, TWO NEEDLES, REFERENCE PLANE T-BAR, AND A GUIDEWIRE. THE HANDLE OF THE DEVICE PROVIDES TWO DIAMETRICALLY OPPOSED ENCLOSED GUIDEWAYS FOR THE ADVANCEMENT AND RETRACTION OF THE NEEDLES UNDER MANUAL CONTROL OF A PLUNGER LOCATED AT THE PROXIMAL END OF THE DEVICE.AS PART OF THE M-CLOSE CONVENIENCE KIT, A NERVE BLOCK NEEDLE IS INCLUDED FOR THE ADMINISTRATION OF LOCAL ANESTHETIC AGENTS TO PROVIDE REGIONAL AND LOCAL ANESTHESIA.  A TELFA ANTIMICROBIAL, NON-ADHERENT PAD IS ALSO PROVIDED IN THE KIT FOR USE AS A PRIMARY DRESSING FOR THE SURGICAL INCISION.: Brand: M-CLOSE KIT

## (undated) DEVICE — MSK PROC CURAPLEX O2 2/ADAPT 7FT

## (undated) DEVICE — MAT FLR ABSORBENT LG 4FT 10 2.5FT

## (undated) DEVICE — TUBING, SUCTION, 1/4" X 10', STRAIGHT: Brand: MEDLINE

## (undated) DEVICE — UNDERCAST PADDING: Brand: DEROYAL

## (undated) DEVICE — LN SMPL CO2 SHTRM SD STREAM W/M LUER

## (undated) DEVICE — ADAPT CLN BIOGUARD AIR/H2O DISP

## (undated) DEVICE — SOL NACL 0.9PCT 1000ML

## (undated) DEVICE — ABL ASP APOLLO RF XL 90D

## (undated) DEVICE — PK OSC LAP SLV 40

## (undated) DEVICE — STERILE CAST PADDING KIT: Brand: CARDINAL HEALTH

## (undated) DEVICE — ENDOPATH XCEL BLADELESS TROCARS WITH STABILITY SLEEVES: Brand: ENDOPATH XCEL

## (undated) DEVICE — BITEBLOCK OMNI BLOC

## (undated) DEVICE — SUT ETHLN 3/0 PSL BLK MONO SA 30IN 1691H

## (undated) DEVICE — GLV SURG TRIUMPH CLASSIC PF LTX 8 STRL

## (undated) DEVICE — GLV SURG TRIUMPH CLASSIC PF LTX 8.5 STRL

## (undated) DEVICE — KT ORCA ORCAPOD DISP STRL

## (undated) DEVICE — LAPAROSCOPIC SMOKE FILTRATION SYSTEM: Brand: PALL LAPAROSHIELD® PLUS LAPAROSCOPIC SMOKE FILTRATION SYSTEM

## (undated) DEVICE — PK ARTHSCP 40

## (undated) DEVICE — VIOLET BRAIDED (POLYGLACTIN 910), SYNTHETIC ABSORBABLE SUTURE: Brand: COATED VICRYL

## (undated) DEVICE — BANDAGE,GAUZE,BULKEE II,4.5"X4.1YD,STRL: Brand: MEDLINE

## (undated) DEVICE — VITAL SIGNS™ ADULT ANESTHESIA BREATHING CIRCUIT: Brand: VITAL SIGNS™

## (undated) DEVICE — PAD GRND REM POLYHESIVE A/ DISP

## (undated) DEVICE — SENSR O2 OXIMAX FNGR A/ 18IN NONSTR

## (undated) DEVICE — HARMONIC ACE +7 LAPAROSCOPIC SHEARS ADVANCED HEMOSTASIS 5MM DIAMETER 45CM SHAFT LENGTH  FOR USE WITH GRAY HAND PIECE ONLY: Brand: HARMONIC ACE

## (undated) DEVICE — SYR LUERLOK 20CC BX/50

## (undated) DEVICE — SNAR POLYP CAPTIFLEX XS/OVL 11X2.4MM 240CM 1P/U

## (undated) DEVICE — BNDG ELAS ELITE V/CLOSE 4IN 5YD LF STRL

## (undated) DEVICE — STPLR SKIN VISISTAT WD 35CT

## (undated) DEVICE — Device

## (undated) DEVICE — PAD,ABDOMINAL,8"X10",ST,LF: Brand: MEDLINE

## (undated) DEVICE — SINGLE-USE BIOPSY FORCEPS: Brand: RADIAL JAW 4

## (undated) DEVICE — BNDG ESMARK STRL 6INX12FT LF

## (undated) DEVICE — SOL IRRG H2O PL/BG 1000ML STRL

## (undated) DEVICE — ANTIBACTERIAL UNDYED BRAIDED (POLYGLACTIN 910), SYNTHETIC ABSORBABLE SUTURE: Brand: COATED VICRYL

## (undated) DEVICE — GLV SURG PREMIERPRO ORTHO LTX PF SZ8 BRN

## (undated) DEVICE — PATIENT RETURN ELECTRODE, SINGLE-USE, CONTACT QUALITY MONITORING, ADULT, WITH 9FT CORD, FOR PATIENTS WEIGING OVER 33LBS. (15KG): Brand: MEGADYNE

## (undated) DEVICE — GLV SURG SENSICARE PI MIC PF SZ8.5 LF STRL

## (undated) DEVICE — SOLIDIFIER LIQLOC PLS 1500CC BT

## (undated) DEVICE — PK ORTHO MINOR 40

## (undated) DEVICE — GLV SURG SENSICARE POLYISPRN W/ALOE PF LF 6.5 GRN STRL

## (undated) DEVICE — GLV SURG BIOGEL LTX PF 8

## (undated) DEVICE — TROCAR: Brand: KII OPTICAL ACCESS SYSTEM

## (undated) DEVICE — THE SINGLE USE ETRAP – POLYP TRAP IS USED FOR SUCTION RETRIEVAL OF ENDOSCOPICALLY REMOVED POLYPS.: Brand: ETRAP

## (undated) DEVICE — 500ML,PRESSURE INFUSER W/STOPCOCK: Brand: MEDLINE

## (undated) DEVICE — NEEDLE, QUINCKE, 18GX3.5": Brand: MEDLINE

## (undated) DEVICE — DECANTER BAG 9": Brand: MEDLINE INDUSTRIES, INC.

## (undated) DEVICE — KT DRN EVAC WND PVC PCH WTROC RND 10F400

## (undated) DEVICE — Device: Brand: DEFENDO AIR/WATER/SUCTION AND BIOPSY VALVE

## (undated) DEVICE — SEALANT WND FIBRIN TISSEEL PREFIL/SYR/PRIMAFZ 2ML

## (undated) DEVICE — DRSNG GZ PETROLTM XEROFORM CURAD 1X8IN STRL

## (undated) DEVICE — DISSCT ENDO

## (undated) DEVICE — NDL HYPO PRECISIONGLIDE REG 21G 1 1/2

## (undated) DEVICE — CLMP STD 22CM DISP

## (undated) DEVICE — HANDPIECE SET WITH COAXIAL HIGH FLOW TIP AND SUCTION TUBE: Brand: INTERPULSE

## (undated) DEVICE — APL DUPLOSPRAYER MIS 40CM